# Patient Record
Sex: MALE | Race: WHITE | NOT HISPANIC OR LATINO | Employment: OTHER | URBAN - METROPOLITAN AREA
[De-identification: names, ages, dates, MRNs, and addresses within clinical notes are randomized per-mention and may not be internally consistent; named-entity substitution may affect disease eponyms.]

---

## 2017-10-05 ENCOUNTER — OFFICE VISIT (OUTPATIENT)
Dept: UROLOGY | Facility: CLINIC | Age: 52
End: 2017-10-05
Payer: COMMERCIAL

## 2017-10-05 VITALS
HEIGHT: 71 IN | BODY MASS INDEX: 26.85 KG/M2 | DIASTOLIC BLOOD PRESSURE: 63 MMHG | SYSTOLIC BLOOD PRESSURE: 131 MMHG | WEIGHT: 191.81 LBS | HEART RATE: 45 BPM

## 2017-10-05 DIAGNOSIS — N52.01 ERECTILE DYSFUNCTION DUE TO ARTERIAL INSUFFICIENCY: ICD-10-CM

## 2017-10-05 DIAGNOSIS — N40.1 BPH WITH URINARY OBSTRUCTION: ICD-10-CM

## 2017-10-05 DIAGNOSIS — E29.1 MALE HYPOGONADISM: Primary | ICD-10-CM

## 2017-10-05 DIAGNOSIS — N13.8 BPH WITH URINARY OBSTRUCTION: ICD-10-CM

## 2017-10-05 PROCEDURE — 99999 PR PBB SHADOW E&M-EST. PATIENT-LVL IV: CPT | Mod: PBBFAC,,, | Performed by: UROLOGY

## 2017-10-05 PROCEDURE — 99204 OFFICE O/P NEW MOD 45 MIN: CPT | Mod: S$GLB,,, | Performed by: UROLOGY

## 2017-10-05 RX ORDER — PAPAVERINE HYDROCHLORIDE 30 MG/ML
INJECTION INTRAMUSCULAR; INTRAVENOUS
Qty: 5 ML | Refills: 0 | Status: SHIPPED | OUTPATIENT
Start: 2017-10-05 | End: 2017-10-12

## 2017-10-05 NOTE — PROGRESS NOTES
CHIEF COMPLAINT:    Mr. Riggs is a 52 y.o. male presenting with hypogonadism and ED.    PRESENTING ILLNESS:    Nubia Riggs is a 52 y.o. male with a history of hypogonadism treated by Gildardo Andre with clomid at first.  However, his symptoms didn't improve, and he was then transitioned to IM TRT and HCG.  I have no records of what his T was before starting treatment.  His last shot was 4 days ago.  While on TRT he has more energy.    He also c/o ED.  This has been present for > 1 year.  He's tried and failed oral meds.  He's currently using ICI with good results.  However, he doesn't know the dose of ICI.  He'd like a refill.    He has urinary frequency.  No other LUTS.  It's not bothersome.  No hematuria.  No dysuria.    REVIEW OF SYSTEMS:    Nubia Riggs denies any history of headache, blurred vision, fever, nausea, vomiting, chills, abdominal pain, bleeding per rectum, cough, SOB, recent loss of consciousness, recent mental status changes, seizures, dizziness, or upper or lower extremity weakness.    SHAHZAD  1. 1  2. 1  3. 1  4. 2  5. 2      PATIENT HISTORY:    Past Medical History:   Diagnosis Date    ADD (attention deficit disorder)     Anemia     GERD (gastroesophageal reflux disease)        Past Surgical History:   Procedure Laterality Date    CERVICAL SPINE SURGERY      facit repair    CHOLECYSTECTOMY      implant      chin    LIPOSUCTION      waiste    RHINOPLASTY TIP      SPINE SURGERY      l5 s1 micro discectomy    SPINE SURGERY      l5 s1 rodes placed    TONSILLECTOMY         Family History   Problem Relation Age of Onset    Cancer Father     Cancer Paternal Grandmother     Heart disease Paternal Grandfather     Diabetes Neg Hx     Colon polyps Neg Hx        Social History     Social History    Marital status:      Spouse name: N/A    Number of children: 4    Years of education: N/A     Occupational History    Contractor [employer]      Social History  Main Topics    Smoking status: Former Smoker     Packs/day: 1.00     Years: 15.00     Quit date: 4/1/2012    Smokeless tobacco: Never Used    Alcohol use No    Drug use: No    Sexual activity: Not on file     Other Topics Concern    Not on file     Social History Narrative    No narrative on file       Allergies:  Meperidine and Sulfa (sulfonamide antibiotics)    Medications:    Current Outpatient Prescriptions:     albuterol (VENTOLIN HFA) 90 mcg/actuation HFAA, Inhale 2 puffs into the lungs every 4 (four) hours as needed., Disp: 1 Inhaler, Rfl: 6    alprazolam (XANAX) 0.25 MG tablet, Take 1 tablet (0.25 mg total) by mouth daily as needed., Disp: 30 tablet, Rfl: 3    ascorbic acid-multivit-min (VITAMIN C ENERGY BOOSTER) 1,000 mg PwEP, Take 1 Package by mouth once daily.  , Disp: , Rfl:     b complex vitamins capsule, Take 1 capsule by mouth once daily.  , Disp: , Rfl:     coQ10, ubiquinol, 100 mg Cap, Take 1 capsule by mouth once daily.  , Disp: , Rfl:     diphenhydrAMINE (BENADRYL) 25 mg capsule, Take 25 mg by mouth every 6 (six) hours as needed for Itching., Disp: , Rfl:     montelukast (SINGULAIR) 10 mg tablet, Take 10 mg by mouth daily as needed., Disp: , Rfl:     papaverine 30 mg/mL injection, Add Phentolamine 1 mg/cc Add PGE1 10 mcg/cc  SIG: Bring to office for test dose in physician office, Disp: 5 mL, Rfl: 0    zolpidem (AMBIEN) 10 mg Tab, Take 1 tablet (10 mg total) by mouth nightly as needed., Disp: 30 tablet, Rfl: 3    PHYSICAL EXAMINATION:    The patient generally appears in good health, is appropriately interactive, and is in no apparent distress.     Eyes: anicteric sclerae, moist conjunctivae; no lid-lag; PERRLA     HENT: Atraumatic; oropharynx clear with moist mucous membranes and no mucosal ulcerations;normal hard and soft palate.  No evidence of lymphadenopathy.    Neck: Trachea midline.  No thyromegaly.    Musculoskeletal: No abnormal gait.    Skin: No lesions.    Mental:  Cooperative with normal affect.  Is oriented to time, place, and person.    Neuro: Grossly intact.    Chest: Normal inspiratory effort.   No accessory muscles.  No audible wheezes.  Respirations symmetric on inspiration and expiration.    Heart: Regular rhythm.      Abdomen:  Soft, non-tender. No masses or organomegaly. Bladder is not palpable. No evidence of flank discomfort. No evidence of inguinal hernia.    Genitourinary: The penis is circumcised with no evidence of plaques or induration. The urethral meatus is normal. The testes, epididymides, and cord structures are normal in size and contour bilaterally. The scrotum is normal in size and contour.    Normal anal sphincter tone. No rectal mass.    The prostate is 30 g. Normal landmarks. Lateral sulci. Median furrow intact.  No nodularity or induration. Seminal vesicles are normal.    Extremities: No clubbing, cyanosis, or edema      LABS:      Lab Results   Component Value Date    PSA 0.17 05/25/2012    PSA 0.23 03/23/2011    PSA 0.48 08/26/2010       IMPRESSION:    Encounter Diagnoses   Name Primary?    Male hypogonadism Yes    Erectile dysfunction due to arterial insufficiency     BPH with urinary obstruction          PLAN:    1. Discussed that TRT with HCG is not traditional.  Recommend he stop this.  Also discussed that I do not do IM injections.  Recommend topical vs testopel.  He'd like to think about this.    2. Will check a T in 2 weeks to establish a baseline.  No TRT until then.  3. For the ED will use ICI. Side effects discussed.  A new Rx was given.  Will set up for teaching.  4. RTC 3 weeks to discuss his form of T replacement.    Copy to:

## 2017-10-05 NOTE — PATIENT INSTRUCTIONS
Testosterone Implant (Testopel)  What is this medicine?  TESTOSTERONE (carmel TOS ter one) is the main male hormone. It supports normal male traits such as muscle growth, facial hair, and deep voice. This medicine is used in males to treat low testosterone levels.  This medicine may be used for other purposes; ask your health care provider or pharmacist if you have questions.  What should I tell my health care provider before I take this medicine?  They need to know if you have any of these conditions:  · breast cancer  · diabetes  · heart disease  · kidney disease  · liver disease  · lung disease  · prostate cancer, enlargement  · an unusual or allergic reaction to testosterone, other medicines, foods, dyes, or preservatives  · this drug is not for use in females  How should I use this medicine?  This medicine will be inserted under your skin by your doctor or health care professional.  Contact your pediatrician or health care professional regarding the use of this medicine in children. While this medicine may be prescribed for children for selected conditions, precautions do apply.  Overdosage: If you think you have taken too much of this medicine contact a poison control center or emergency room at once.  NOTE: This medicine is only for you. Do not share this medicine with others.  What if I miss a dose?  Try not to miss a dose. Your doctor or health care professional will tell you when your next dose is due. Notify the office if you are unable to keep an appointment.  What may interact with this medicine?  · medicines for diabetes  · medicines that treat or prevent blood clots like warfarin  · oxyphenbutazone  · propranolol  · steroid medicines like prednisone or cortisone  This list may not describe all possible interactions. Give your health care provider a list of all the medicines, herbs, non-prescription drugs, or dietary supplements you use. Also tell them if you smoke, drink alcohol, or use illegal drugs.  Some items may interact with your medicine.  What should I watch for while using this medicine?  Visit your doctor or health care professional for regular checks on your progress. They will need to check the level of testosterone in your blood.  This medicine may affect blood sugar levels. If you have diabetes, check with your doctor or health care professional before you change your diet or the dose of your diabetic medicine.  Call your doctor or health care professional if you notice a change in the way this medicine is working. It is possible that the pellets may accidentally fall out.  This drug is banned from use in athletes by most athletic organizations.  What side effects may I notice from receiving this medicine?  Side effects that you should report to your doctor or health care professional as soon as possible:  · allergic reactions like skin rash, itching or hives, swelling of the face, lips, or tongue  · Infection  · breast enlargement  · breathing problems  · changes in mood, especially anger, depression, or rage  · dark urine  · general ill feeling or flu-like symptoms  · light-colored stools  · loss of appetite, nausea  · nausea, vomiting  · right upper belly pain  · stomach pain  · swelling of ankles  · too frequent or persistent erections  · trouble passing urine or change in the amount of urine  · unusually weak or tired  · yellowing of eyes or skin  Side effects that usually do not require medical attention (report to your doctor or health care professional if they continue or are bothersome):  · acne  · change in sex drive or performance  · hair loss  · headache  This list may not describe all possible side effects. Call your doctor for medical advice about side effects. You may report side effects to FDA at 3-211-KOT-6179.  Where should I keep my medicine?  This drug will be inserted under your skin by your doctor. It will not be stored at home.  NOTE:This sheet is a summary. It may not cover  all possible information. If you have questions about this medicine, talk to your doctor, pharmacist, or health care provider. Copyright© 2011 [employer]

## 2017-10-12 ENCOUNTER — OFFICE VISIT (OUTPATIENT)
Dept: UROLOGY | Facility: CLINIC | Age: 52
End: 2017-10-12
Payer: COMMERCIAL

## 2017-10-12 VITALS
WEIGHT: 191 LBS | HEIGHT: 70 IN | HEART RATE: 66 BPM | DIASTOLIC BLOOD PRESSURE: 66 MMHG | SYSTOLIC BLOOD PRESSURE: 120 MMHG | BODY MASS INDEX: 27.35 KG/M2 | TEMPERATURE: 98 F

## 2017-10-12 DIAGNOSIS — E29.1 MALE HYPOGONADISM: Primary | ICD-10-CM

## 2017-10-12 DIAGNOSIS — N52.01 ERECTILE DYSFUNCTION DUE TO ARTERIAL INSUFFICIENCY: ICD-10-CM

## 2017-10-12 PROCEDURE — 99214 OFFICE O/P EST MOD 30 MIN: CPT | Mod: S$GLB,,, | Performed by: NURSE PRACTITIONER

## 2017-10-12 PROCEDURE — 99999 PR PBB SHADOW E&M-EST. PATIENT-LVL III: CPT | Mod: PBBFAC,,, | Performed by: NURSE PRACTITIONER

## 2017-10-12 RX ORDER — PAPAVERINE HYDROCHLORIDE 30 MG/ML
INJECTION INTRAMUSCULAR; INTRAVENOUS
Qty: 10 ML | Refills: 11 | Status: SHIPPED | OUTPATIENT
Start: 2017-10-12 | End: 2017-10-12 | Stop reason: SDUPTHER

## 2017-10-12 RX ORDER — PAPAVERINE HYDROCHLORIDE 30 MG/ML
INJECTION INTRAMUSCULAR; INTRAVENOUS
Qty: 10 ML | Refills: 11 | Status: SHIPPED | OUTPATIENT
Start: 2017-10-12 | End: 2018-07-16 | Stop reason: SDUPTHER

## 2017-10-12 NOTE — PROGRESS NOTES
Subjective:       Patient ID: Nubia Riggs is a 52 y.o. male.    Chief Complaint: Erectile Dysfunction (PEP instructions)    Nubia Riggs is a 52 y.o. male with a history of hypogonadism treated by Gildardo Andre with clomid at first.    However, his symptoms didn't improve, and he was then transitioned to IM TRT and HCG.   He was seen in clinic with Dr. Salgado 10/05/2017.  He had no records of what his T was before starting treatment.   His last shot was 4 days ago.  While on TRT he has more energy.     He also c/o ED.    This has been present for > 1 year.    He's tried and failed oral meds.    He's currently using ICI with good results.    He'd like a refill.    He is here today for ICI education and reeducation.  He brought in his own medication.    He reports using ~80 units in the past with good results.  No history of priapisms.  He gets AM erections, but unable to maintain.          Past Medical History:  No date: ADD (attention deficit disorder)  No date: Anemia  No date: GERD (gastroesophageal reflux disease)    Past Surgical History:  No date: CERVICAL SPINE SURGERY      Comment: facit repair  No date: CHOLECYSTECTOMY  No date: implant      Comment: chin  No date: LIPOSUCTION      Comment: waiste  No date: RHINOPLASTY TIP  No date: SPINE SURGERY      Comment: l5 s1 micro discectomy  No date: SPINE SURGERY      Comment: l5 s1 rodes placed  No date: TONSILLECTOMY    Review of patient's family history indicates:    Cancer                         Father                    Cancer                         Paternal Grandmother      Heart disease                  Paternal Grandfather      Diabetes                       Neg Hx                    Colon polyps                   Neg Hx                      Social History    Marital status:             Spouse name:                       Years of education:                 Number of children: 4             Occupational History  Occupation           Employer            Comment               Contractor          [employer]     Social History Main Topics    Smoking status: Former Smoker                                                                Packs/day: 1.00      Years: 15.00          Quit date: 4/1/2012    Smokeless tobacco: Never Used                        Alcohol use: No              Drug use: No              Sexual activity: Not on file          Other Topics            Concern    None on file    Social History Narrative    None on file        Allergies:  Meperidine and Sulfa (sulfonamide antibiotics)    Medications:  Current Outpatient Prescriptions:   albuterol (VENTOLIN HFA) 90 mcg/actuation HFAA, Inhale 2 puffs into the lungs every 4 (four) hours as needed., Disp: 1 Inhaler, Rfl: 6  alprazolam (XANAX) 0.25 MG tablet, Take 1 tablet (0.25 mg total) by mouth daily as needed., Disp: 30 tablet, Rfl: 3  ascorbic acid-multivit-min (VITAMIN C ENERGY BOOSTER) 1,000 mg PwEP, Take 1 Package by mouth once daily.  , Disp: , Rfl:   b complex vitamins capsule, Take 1 capsule by mouth once daily.  , Disp: , Rfl:   coQ10, ubiquinol, 100 mg Cap, Take 1 capsule by mouth once daily.  , Disp: , Rfl:   diphenhydrAMINE (BENADRYL) 25 mg capsule, Take 25 mg by mouth every 6 (six) hours as needed for Itching., Disp: , Rfl:   montelukast (SINGULAIR) 10 mg tablet, Take 10 mg by mouth daily as needed., Disp: , Rfl:    zolpidem (AMBIEN) 10 mg Tab, Take 1 tablet (10 mg total) by mouth nightly as needed., Disp: 30 tablet, Rfl: 3                    Review of Systems   Constitutional: Negative.  Negative for activity change, appetite change, fatigue and fever.   HENT: Negative.  Negative for congestion, ear pain, facial swelling, mouth sores, nosebleeds, postnasal drip, rhinorrhea, sinus pressure, sore throat and trouble swallowing.    Eyes: Negative.  Negative for photophobia, redness, itching and visual disturbance.   Respiratory: Negative.  Negative for cough,  chest tightness, shortness of breath and wheezing.    Cardiovascular: Negative.  Negative for chest pain and palpitations.   Gastrointestinal: Negative.  Negative for abdominal pain, constipation, diarrhea, nausea and vomiting.   Genitourinary: Negative for difficulty urinating, dysuria, enuresis, flank pain, frequency, genital sores, hematuria, nocturia, penile pain, scrotal swelling, testicular pain and urgency.        He has urinary frequency.  No other LUTS.  It's not bothersome.  No hematuria.  No dysuria.   Musculoskeletal: Negative.  Negative for back pain, gait problem, joint swelling, myalgias and neck stiffness.   Skin: Negative.  Negative for rash and wound.   Neurological: Negative.  Negative for dizziness, tremors, seizures, syncope, speech difficulty, weakness, light-headedness and headaches.   Hematological: Does not bruise/bleed easily.   Psychiatric/Behavioral: Negative.  Negative for agitation, confusion, decreased concentration, hallucinations, self-injury, sleep disturbance and suicidal ideas. The patient is not nervous/anxious.        Objective:      Physical Exam   Nursing note and vitals reviewed.  Constitutional: He is oriented to person, place, and time. Vital signs are normal. He appears well-developed and well-nourished. He is active and cooperative.  Non-toxic appearance. He does not have a sickly appearance.   HENT:   Head: Normocephalic and atraumatic.   Right Ear: External ear normal.   Left Ear: External ear normal.   Nose: Nose normal.   Mouth/Throat: Mucous membranes are normal.   Eyes: Conjunctivae and lids are normal. No scleral icterus.   Neck: Trachea normal, normal range of motion and full passive range of motion without pain. Neck supple. No JVD present. No tracheal deviation present.   Cardiovascular: Normal rate, regular rhythm, S1 normal and S2 normal.    Pulmonary/Chest: Effort normal and breath sounds normal. No respiratory distress. He exhibits no tenderness.    Abdominal: Soft. Normal appearance and bowel sounds are normal. There is no hepatosplenomegaly. There is no tenderness. There is no rebound, no guarding and no CVA tenderness.   Genitourinary: Testes normal and penis normal. Right testis shows no mass, no swelling and no tenderness. Left testis shows no mass, no swelling and no tenderness. Circumcised. No hypospadias, penile erythema or penile tenderness. No discharge found.   Musculoskeletal: Normal range of motion.   Lymphadenopathy: No inguinal adenopathy noted on the right or left side.   Neurological: He is alert and oriented to person, place, and time. He has normal strength.   Skin: Skin is warm, dry and intact.     Psychiatric: He has a normal mood and affect. His behavior is normal. Judgment and thought content normal.       Assessment:       1. Male hypogonadism    2. Erectile dysfunction due to arterial insufficiency        Plan:         I spent 30 minutes with the patient of which more than half was spent in direct consultation with the patient in regards to our treatment and plan.    Education and recommendations of today's plan of care including home remedies.  We discussed ED and the contributing factors. We reviewed his personal factors that contribute to ED. Patient was educated on ED treatments. We discussed PDE-5 inhibitors, SEN, Muse, and IPP.    He is here today for the Intracorporal injection/ICI education and first injection.  Educational materials were supplied.    ICI is an injectable medication that consists of three different medications to produce an erection. It is known as a Trimix Compound or PEP. The medication is a compound medication and is only mixed up at certain pharmacies known as a compound pharmacy. The medication has to be stored in the refrigerator. Improper storage decreases the effectiveness of the medication.     He was educated that it is an injection. With any injection there is risk for possible pain at the injection  site as well as risk for an infection. Clean technique must be followed to help prevent infection. Proper needle disposable is necessary. He voices understanding.    The injection is best given when standing up and the penis is positioned perpendicular to the body. The urethral opening should be facing away from the body. Injection is always given on the lateral or side of the penis. Never give the injection to the top of the penis to avoid possible trauma to arteries and veins. Never give the injection to the bottom of the penis to avoid trauma to the urethra. He should alternate the injection sites to avoid the build up of scar tissue due to multiple injections.    This medication can increase the risk of erections lasting longer than 4 hours. This is known as a priapism and is a medical emergency. This requires immediate medical attention. This is main reason we give the first dose in the office today. We start at low dose and see how well the patients reacts. We can increase the medication if needed depending on the reaction the patient receives today. We discussed the fine line between enough medication for penetration and too much leading to a priapism. He voices understanding.    He bibi up 15 units and he injected him in the left lateral aspect of the penis. Within 15 minutes, he achieved an erection firm enough for intercourse but would like it firmer. He was pleased with low dose results.   We discussed risks for rapidly increasing. He is a long term user of ICI and knows dose titration.    He was instructed to keep the dosage at 15-20 units. If noticing a decrease in reaction he can increase the dosage by 5 units or 0.05ml. He may also refill the Rx.     If have any questions, please give me a call. Educational materials were given to patient and all questions were answered. He voiced understanding and left the office without a priapism.

## 2017-10-19 ENCOUNTER — TELEPHONE (OUTPATIENT)
Dept: INTERNAL MEDICINE | Facility: CLINIC | Age: 52
End: 2017-10-19

## 2017-10-19 NOTE — TELEPHONE ENCOUNTER
----- Message from Ximena Hinson sent at 10/18/2017  4:43 PM CDT -----  Contact: Self  Pt is calling to be scheduled for an appt.  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 Jorge Navarro.   unable to make the appt due to an exhausted template.    He can be reached at [phone].    Thank you.

## 2017-11-21 ENCOUNTER — OFFICE VISIT (OUTPATIENT)
Dept: UROLOGY | Facility: CLINIC | Age: 52
End: 2017-11-21
Payer: COMMERCIAL

## 2017-11-21 VITALS
HEART RATE: 59 BPM | HEIGHT: 70 IN | DIASTOLIC BLOOD PRESSURE: 75 MMHG | SYSTOLIC BLOOD PRESSURE: 132 MMHG | BODY MASS INDEX: 28.09 KG/M2 | WEIGHT: 196.19 LBS

## 2017-11-21 DIAGNOSIS — N52.01 ERECTILE DYSFUNCTION DUE TO ARTERIAL INSUFFICIENCY: ICD-10-CM

## 2017-11-21 DIAGNOSIS — N48.89 PENILE PAIN: Primary | ICD-10-CM

## 2017-11-21 PROCEDURE — 99214 OFFICE O/P EST MOD 30 MIN: CPT | Mod: S$GLB,,, | Performed by: NURSE PRACTITIONER

## 2017-11-21 PROCEDURE — 99999 PR PBB SHADOW E&M-EST. PATIENT-LVL III: CPT | Mod: PBBFAC,,, | Performed by: NURSE PRACTITIONER

## 2017-11-21 NOTE — PROGRESS NOTES
Subjective:       Patient ID: Nubia Riggs is a 52 y.o. male.    Chief Complaint: Penis Pain    Nubia Riggs is a 52 y.o. male with a history of hypogonadism treated by Gildardo Andre with clomid at first.    However, his symptoms didn't improve, and he was then transitioned to IM TRT and HCG.   He was seen in clinic with Dr. Salgado 10/05/2017.  He had no records of what his T was before starting treatment.   His last shot was 4 days ago.  While on TRT he has more energy.     He also c/o ED.    This has been present for > 1 year.    He's tried and failed oral meds.    He's currently using ICI with good results.    He'd like a refill.    He was last seen in clinic with me 10/12/2017 for ICI education and reeducation.  He brought in his own medication.    He reports using ~80 units in the past with good results.  No history of priapisms.  He gets AM erections, but unable to maintain.  He now successfully using 25 units.  Here today because he felt a sting on side of penis.  He concerned injections would worsen it.  He denies painful erection, curvature or urinary problems.    He has been thinking about IPP so has been doing some stretching exercises due to loss of length with IPP surgery.              Past Medical History:  No date: ADD (attention deficit disorder)  No date: Anemia  No date: GERD (gastroesophageal reflux disease)    Past Surgical History:  No date: CERVICAL SPINE SURGERY      Comment: facit repair  No date: CHOLECYSTECTOMY  No date: implant      Comment: chin  No date: LIPOSUCTION      Comment: waiste  No date: RHINOPLASTY TIP  No date: SPINE SURGERY      Comment: l5 s1 micro discectomy  No date: SPINE SURGERY      Comment: l5 s1 rodes placed  No date: TONSILLECTOMY    Review of patient's family history indicates:    Cancer                         Father                    Cancer                         Paternal Grandmother      Heart disease                  Paternal Grandfather      Diabetes                        Neg Hx                    Colon polyps                   Neg Hx                      Social History    Marital status:             Spouse name:                       Years of education:                 Number of children: 4             Occupational History  Occupation          Employer            Comment               Contractor          [employer]     Social History Main Topics    Smoking status: Former Smoker                                                                Packs/day: 1.00      Years: 15.00          Quit date: 4/1/2012    Smokeless tobacco: Never Used                        Alcohol use: No              Drug use: No              Sexual activity: Not on file          Other Topics            Concern    None on file    Social History Narrative    None on file        Allergies:  Meperidine and Sulfa (sulfonamide antibiotics)    Medications:  Current Outpatient Prescriptions:   albuterol (VENTOLIN HFA) 90 mcg/actuation HFAA, Inhale 2 puffs into the lungs every 4 (four) hours as needed., Disp: 1 Inhaler, Rfl: 6  alprazolam (XANAX) 0.25 MG tablet, Take 1 tablet (0.25 mg total) by mouth daily as needed., Disp: 30 tablet, Rfl: 3  ascorbic acid-multivit-min (VITAMIN C ENERGY BOOSTER) 1,000 mg PwEP, Take 1 Package by mouth once daily.  , Disp: , Rfl:   b complex vitamins capsule, Take 1 capsule by mouth once daily.  , Disp: , Rfl:   coQ10, ubiquinol, 100 mg Cap, Take 1 capsule by mouth once daily.  , Disp: , Rfl:   diphenhydrAMINE (BENADRYL) 25 mg capsule, Take 25 mg by mouth every 6 (six) hours as needed for Itching., Disp: , Rfl:   montelukast (SINGULAIR) 10 mg tablet, Take 10 mg by mouth daily as needed., Disp: , Rfl:    zolpidem (AMBIEN) 10 mg Tab, Take 1 tablet (10 mg total) by mouth nightly as needed., Disp: 30 tablet, Rfl: 3                    Review of Systems   Constitutional: Negative.  Negative for activity change, appetite change and fever.   HENT:  Negative.  Negative for facial swelling and trouble swallowing.    Eyes: Negative.    Respiratory: Negative.  Negative for shortness of breath.    Cardiovascular: Negative.  Negative for chest pain and palpitations.   Gastrointestinal: Negative for abdominal pain, constipation, diarrhea, nausea and vomiting.   Genitourinary: Positive for penile pain. Negative for difficulty urinating, discharge, dysuria, enuresis, flank pain, frequency, genital sores, hematuria, nocturia, penile swelling, scrotal swelling, testicular pain and urgency.        Ok with how he urinates.  Pleased with results with ICI  Pain/sting does not occur with every erection       Musculoskeletal: Negative for back pain, gait problem and neck stiffness.   Skin: Negative.  Negative for wound.   Neurological: Negative for dizziness, tremors, seizures, syncope, speech difficulty, light-headedness and headaches.   Hematological: Does not bruise/bleed easily.   Psychiatric/Behavioral: Negative for confusion and hallucinations. The patient is not nervous/anxious.        Objective:      Physical Exam   Nursing note and vitals reviewed.  Constitutional: He is oriented to person, place, and time. He appears well-developed and well-nourished.  Non-toxic appearance. He does not have a sickly appearance.   HENT:   Head: Normocephalic and atraumatic.   Right Ear: External ear normal.   Left Ear: External ear normal.   Nose: Nose normal.   Mouth/Throat: Mucous membranes are normal.   Eyes: Conjunctivae and lids are normal. No scleral icterus.   Neck: Trachea normal, normal range of motion and full passive range of motion without pain. Neck supple. No JVD present. No tracheal deviation present.   Cardiovascular: Normal rate, regular rhythm, S1 normal and S2 normal.    Pulmonary/Chest: Effort normal and breath sounds normal. No respiratory distress. He exhibits no tenderness.   Abdominal: Soft. Normal appearance and bowel sounds are normal. There is no  hepatosplenomegaly. There is no tenderness. There is no rebound, no guarding and no CVA tenderness.   Musculoskeletal: Normal range of motion.   Neurological: He is alert and oriented to person, place, and time. He has normal strength.   Skin: Skin is warm, dry and intact.     Psychiatric: He has a normal mood and affect. His behavior is normal. Judgment and thought content normal.       Assessment:       1. Penile pain    2. Erectile dysfunction due to arterial insufficiency        Plan:         I spent 25 minutes with the patient of which more than half was spent in direct consultation with the patient in regards to our treatment and plan.    Education and recommendations of today's plan of care including home remedies.  We discussed expectations with ICI; risks discussed  Avoid that area with injections.  Reassurance no penile fx.  Discussed IPP.   Not sure of the success with stretching exercises  Continue ICI

## 2018-03-08 ENCOUNTER — TELEPHONE (OUTPATIENT)
Dept: CARDIOLOGY | Facility: CLINIC | Age: 53
End: 2018-03-08

## 2018-03-08 ENCOUNTER — OFFICE VISIT (OUTPATIENT)
Dept: CARDIOLOGY | Facility: CLINIC | Age: 53
End: 2018-03-08
Attending: INTERNAL MEDICINE
Payer: COMMERCIAL

## 2018-03-08 VITALS
HEART RATE: 58 BPM | SYSTOLIC BLOOD PRESSURE: 126 MMHG | DIASTOLIC BLOOD PRESSURE: 68 MMHG | WEIGHT: 191 LBS | BODY MASS INDEX: 27.35 KG/M2 | HEIGHT: 70 IN

## 2018-03-08 DIAGNOSIS — R00.1 BRADYCARDIA: Primary | ICD-10-CM

## 2018-03-08 DIAGNOSIS — R00.1 BRADYCARDIA: ICD-10-CM

## 2018-03-08 PROCEDURE — 99203 OFFICE O/P NEW LOW 30 MIN: CPT | Mod: 25,S$GLB,, | Performed by: INTERNAL MEDICINE

## 2018-03-08 PROCEDURE — 93000 ELECTROCARDIOGRAM COMPLETE: CPT | Mod: S$GLB,,, | Performed by: INTERNAL MEDICINE

## 2018-03-08 RX ORDER — ANASTROZOLE 1 MG/1
TABLET ORAL
COMMUNITY
Start: 2018-02-22

## 2018-03-08 RX ORDER — TESTOSTERONE CYPIONATE 200 MG/ML
INJECTION, SOLUTION INTRAMUSCULAR
COMMUNITY
Start: 2017-12-19

## 2018-03-08 RX ORDER — MINOXIDIL 2.5 MG/1
TABLET ORAL
COMMUNITY
Start: 2018-02-19

## 2018-03-08 NOTE — PROGRESS NOTES
Subjective:     Nubia Riggs is a 52 y.o. male with no risk factors for vascular disease. He has issues with erectile dysfunction. He exercises a lot. He has noted that his heart rate has been on the low side for years. No palpitations or weak spells. Feeling well overall.    HPI    Review of Systems   Constitution: Negative for chills, fever, weakness and malaise/fatigue.   HENT: Negative for nosebleeds.    Eyes: Negative for double vision, vision loss in left eye and vision loss in right eye.   Cardiovascular: Positive for chest pain. Negative for claudication, dyspnea on exertion, irregular heartbeat, leg swelling, near-syncope, orthopnea, palpitations, paroxysmal nocturnal dyspnea and syncope.   Respiratory: Negative for cough, hemoptysis, shortness of breath and wheezing.    Endocrine: Negative for cold intolerance and heat intolerance.   Hematologic/Lymphatic: Negative for bleeding problem. Does not bruise/bleed easily.   Skin: Negative for color change and rash.   Musculoskeletal: Negative for back pain, falls, muscle weakness and myalgias.   Gastrointestinal: Negative for heartburn, hematemesis, hematochezia, hemorrhoids, jaundice, melena, nausea and vomiting.   Genitourinary: Negative for dysuria and hematuria.   Neurological: Negative for dizziness, focal weakness, headaches, light-headedness, loss of balance, numbness and vertigo.   Psychiatric/Behavioral: Negative for altered mental status, depression and memory loss. The patient is not nervous/anxious.    Allergic/Immunologic: Negative for hives and persistent infections.       Current Outpatient Prescriptions on File Prior to Visit   Medication Sig Dispense Refill    ascorbic acid-multivit-min (VITAMIN C ENERGY BOOSTER) 1,000 mg PwEP Take 1 Package by mouth once daily.        b complex vitamins capsule Take 1 capsule by mouth once daily.        coQ10, ubiquinol, 100 mg Cap Take 1 capsule by mouth once daily.        diphenhydrAMINE (BENADRYL)  "25 mg capsule Take 25 mg by mouth every 6 (six) hours as needed for Itching.      montelukast (SINGULAIR) 10 mg tablet Take 10 mg by mouth daily as needed.      papaverine 30 mg/mL injection Add: Phentolamine 10 mg  Add: PGE1 100 mcg    Sig:  Inject 20 units (0.20 mls) as directed 10 mL 11    [DISCONTINUED] albuterol (VENTOLIN HFA) 90 mcg/actuation HFAA Inhale 2 puffs into the lungs every 4 (four) hours as needed. 1 Inhaler 6    [DISCONTINUED] alprazolam (XANAX) 0.25 MG tablet Take 1 tablet (0.25 mg total) by mouth daily as needed. 30 tablet 3    [DISCONTINUED] zolpidem (AMBIEN) 10 mg Tab Take 1 tablet (10 mg total) by mouth nightly as needed. 30 tablet 3     No current facility-administered medications on file prior to visit.        /68   Pulse (!) 58   Ht 5' 10" (1.778 m)   Wt 86.6 kg (191 lb)   BMI 27.41 kg/m²       Objective:     Physical Exam   Constitutional: He is oriented to person, place, and time. He appears well-developed and well-nourished.  Non-toxic appearance. No distress.   HENT:   Head: Normocephalic and atraumatic.   Nose: Nose normal.   Eyes: Right eye exhibits no discharge. Left eye exhibits no discharge. Right conjunctiva is not injected. Left conjunctiva is not injected. Right pupil is round. Left pupil is round. Pupils are equal.   Neck: Neck supple. No JVD present. Carotid bruit is not present. No thyromegaly present.   Cardiovascular: Regular rhythm, S1 normal and S2 normal.   No extrasystoles are present. Bradycardia present.  PMI is not displaced.  Exam reveals no gallop.    Pulses:       Radial pulses are 2+ on the right side, and 2+ on the left side.        Femoral pulses are 2+ on the right side, and 2+ on the left side.       Dorsalis pedis pulses are 2+ on the right side, and 2+ on the left side.        Posterior tibial pulses are 2+ on the right side, and 2+ on the left side.   Pulmonary/Chest: Effort normal and breath sounds normal.   Abdominal: Soft. Normal " appearance. There is no hepatosplenomegaly. There is no tenderness.   Musculoskeletal:        Right ankle: He exhibits no swelling, no ecchymosis and no deformity.        Left ankle: He exhibits no swelling, no ecchymosis and no deformity.   Lymphadenopathy:        Head (right side): No submandibular adenopathy present.        Head (left side): No submandibular adenopathy present.     He has no cervical adenopathy.   Neurological: He is alert and oriented to person, place, and time. He is not disoriented. No cranial nerve deficit.   Skin: Skin is warm, dry and intact. No rash noted. He is not diaphoretic. No cyanosis. Nails show no clubbing.   Psychiatric: He has a normal mood and affect. His speech is normal and behavior is normal. Judgment and thought content normal. Cognition and memory are normal.       Assessment:     1. Bradycardia        Plan:     1. Bradycardia   3/8/2018: ECG: SB 46 bpm.    Appears physiologic as he exercises daily.   See no need for further cardiac testing.    F/u 1 year.    Pineda Rivera M.D.

## 2018-03-13 DIAGNOSIS — R00.1 BRADYCARDIA: ICD-10-CM

## 2018-05-25 ENCOUNTER — TELEPHONE (OUTPATIENT)
Dept: UROLOGY | Facility: CLINIC | Age: 53
End: 2018-05-25

## 2018-05-25 NOTE — TELEPHONE ENCOUNTER
Pt called and stated that he has a couple of questions about one of his medications, and would prefer that NP De La Vega give him a call back to discuss this. Please advise.     Pt can be reached at [phone]      Called patient no answer left message on voice mail

## 2018-07-16 ENCOUNTER — OFFICE VISIT (OUTPATIENT)
Dept: UROLOGY | Facility: CLINIC | Age: 53
End: 2018-07-16
Payer: COMMERCIAL

## 2018-07-16 VITALS
HEART RATE: 81 BPM | DIASTOLIC BLOOD PRESSURE: 75 MMHG | SYSTOLIC BLOOD PRESSURE: 138 MMHG | WEIGHT: 191.13 LBS | BODY MASS INDEX: 27.36 KG/M2 | HEIGHT: 70 IN

## 2018-07-16 DIAGNOSIS — E29.1 MALE HYPOGONADISM: ICD-10-CM

## 2018-07-16 DIAGNOSIS — N52.01 ERECTILE DYSFUNCTION DUE TO ARTERIAL INSUFFICIENCY: ICD-10-CM

## 2018-07-16 PROCEDURE — 99214 OFFICE O/P EST MOD 30 MIN: CPT | Mod: S$GLB,,, | Performed by: NURSE PRACTITIONER

## 2018-07-16 PROCEDURE — 99999 PR PBB SHADOW E&M-EST. PATIENT-LVL III: CPT | Mod: PBBFAC,,, | Performed by: NURSE PRACTITIONER

## 2018-07-16 PROCEDURE — 3008F BODY MASS INDEX DOCD: CPT | Mod: CPTII,S$GLB,, | Performed by: NURSE PRACTITIONER

## 2018-07-16 RX ORDER — PAPAVERINE HYDROCHLORIDE 30 MG/ML
INJECTION INTRAMUSCULAR; INTRAVENOUS
Qty: 10 ML | Refills: 11 | Status: SHIPPED | OUTPATIENT
Start: 2018-07-16 | End: 2018-07-16 | Stop reason: SDUPTHER

## 2018-07-16 RX ORDER — PAPAVERINE HYDROCHLORIDE 30 MG/ML
INJECTION INTRAMUSCULAR; INTRAVENOUS
Qty: 10 ML | Refills: 11 | Status: SHIPPED | OUTPATIENT
Start: 2018-07-16 | End: 2018-12-03 | Stop reason: SDUPTHER

## 2018-07-16 NOTE — PROGRESS NOTES
Subjective:       Patient ID: Nubia Riggs is a 53 y.o. male.    Chief Complaint: Follow-up and Erectile Dysfunction    Nubia Riggs is a 53 y.o. male with a history of hypogonadism treated by Gildardo Andre with clomid at first.    However, his symptoms didn't improve, and he was then transitioned to IM TRT and HCG.   He was seen in clinic with Dr. Salgado 10/05/2017.  He had no records of what his T was before starting treatment.   His last shot was 4 days ago.  While on TRT he has more energy.     He also c/o ED.    This has been present for > 1 year.    He's tried and failed oral meds.    He's currently using ICI with good results.    He'd like a refill.    He was seen in clinic with me 10/12/2017 for ICI education and reeducation.  He has been using ICI successfully;  A couple of times was not effective. He has been slowly increasing the dosage.  Now back up to 70 units.  Wants to discussed other options.  Discussed new ED tx on [facility].    He has been thinking about IPP so has been doing some stretching exercises due to loss of length with IPP surgery.              Past Medical History:  No date: ADD (attention deficit disorder)  No date: Anemia  No date: GERD (gastroesophageal reflux disease)    Past Surgical History:  No date: CERVICAL SPINE SURGERY      Comment: facit repair  No date: CHOLECYSTECTOMY  No date: implant      Comment: chin  No date: LIPOSUCTION      Comment: waiste  No date: RHINOPLASTY TIP  No date: SPINE SURGERY      Comment: l5 s1 micro discectomy  No date: SPINE SURGERY      Comment: l5 s1 rodes placed  No date: TONSILLECTOMY    Review of patient's family history indicates:    Cancer                         Father                    Cancer                         Paternal Grandmother      Heart disease                  Paternal Grandfather      Diabetes                       Neg Hx                    Colon polyps                   Neg Hx                       Social History    Marital status:             Spouse name:                       Years of education:                 Number of children: 4             Occupational History  Occupation          Employer            Comment               Contractor          [employer]     Social History Main Topics    Smoking status: Former Smoker                                                                Packs/day: 1.00      Years: 15.00          Quit date: 4/1/2012    Smokeless tobacco: Never Used                        Alcohol use: No              Drug use: No              Sexual activity: Not on file          Other Topics            Concern    None on file    Social History Narrative    None on file        Allergies:  Meperidine and Sulfa (sulfonamide antibiotics)    Medications:  Current Outpatient Prescriptions:   albuterol (VENTOLIN HFA) 90 mcg/actuation HFAA, Inhale 2 puffs into the lungs every 4 (four) hours as needed., Disp: 1 Inhaler, Rfl: 6  alprazolam (XANAX) 0.25 MG tablet, Take 1 tablet (0.25 mg total) by mouth daily as needed., Disp: 30 tablet, Rfl: 3  ascorbic acid-multivit-min (VITAMIN C ENERGY BOOSTER) 1,000 mg PwEP, Take 1 Package by mouth once daily.  , Disp: , Rfl:   b complex vitamins capsule, Take 1 capsule by mouth once daily.  , Disp: , Rfl:   coQ10, ubiquinol, 100 mg Cap, Take 1 capsule by mouth once daily.  , Disp: , Rfl:   diphenhydrAMINE (BENADRYL) 25 mg capsule, Take 25 mg by mouth every 6 (six) hours as needed for Itching., Disp: , Rfl:   montelukast (SINGULAIR) 10 mg tablet, Take 10 mg by mouth daily as needed., Disp: , Rfl:    zolpidem (AMBIEN) 10 mg Tab, Take 1 tablet (10 mg total) by mouth nightly as needed., Disp: 30 tablet, Rfl: 3                    Review of Systems   Constitutional: Negative for activity change, appetite change, chills and fever.   HENT: Negative for facial swelling and trouble swallowing.    Eyes: Negative for visual disturbance.   Respiratory:  Negative for chest tightness and shortness of breath.    Cardiovascular: Negative for chest pain and palpitations.   Gastrointestinal: Negative.  Negative for abdominal pain, constipation, diarrhea, nausea and vomiting.   Genitourinary: Negative for difficulty urinating, dysuria, flank pain, hematuria, penile pain, penile swelling, scrotal swelling and testicular pain.   Musculoskeletal: Negative for back pain, gait problem, myalgias and neck stiffness.   Skin: Negative for rash.   Neurological: Negative for dizziness and speech difficulty.   Hematological: Does not bruise/bleed easily.   Psychiatric/Behavioral: Negative for behavioral problems.       Objective:      Physical Exam   Nursing note and vitals reviewed.  Constitutional: He is oriented to person, place, and time. Vital signs are normal. He appears well-developed and well-nourished. He is active and cooperative.  Non-toxic appearance. He does not have a sickly appearance.   HENT:   Head: Normocephalic and atraumatic.   Right Ear: External ear normal.   Left Ear: External ear normal.   Nose: Nose normal.   Mouth/Throat: Mucous membranes are normal.   Eyes: Conjunctivae and lids are normal. No scleral icterus.   Neck: Trachea normal, normal range of motion and full passive range of motion without pain. Neck supple. No JVD present. No tracheal deviation present.   Cardiovascular: Normal rate, S1 normal and S2 normal.    Pulmonary/Chest: Effort normal. No respiratory distress. He exhibits no tenderness.   Abdominal: Soft. Normal appearance and bowel sounds are normal. There is no hepatosplenomegaly. There is no tenderness. There is no CVA tenderness.   Genitourinary: Testes normal and penis normal. No penile tenderness.   Musculoskeletal: Normal range of motion.   Neurological: He is alert and oriented to person, place, and time. He has normal strength.   Skin: Skin is warm, dry and intact.     Psychiatric: He has a normal mood and affect. His behavior is  normal. Judgment and thought content normal.       Assessment:       1. Male hypogonadism    2. Erectile dysfunction due to arterial insufficiency        Plan:         I spent 25 minutes with the patient of which more than half was spent in direct consultation with the patient in regards to our treatment and plan.    Education and recommendations of today's plan of care including home remedies.  We discussed ED and contributory factors.  Continue regular exercise; proper diet.  L-Arginine.  We discussed the other options; cant guaranteed success rate;  Not enough research on [employer]  [facility] he will let me know how it works.

## 2018-12-03 DIAGNOSIS — N52.01 ERECTILE DYSFUNCTION DUE TO ARTERIAL INSUFFICIENCY: ICD-10-CM

## 2018-12-03 DIAGNOSIS — E29.1 MALE HYPOGONADISM: ICD-10-CM

## 2018-12-03 RX ORDER — PAPAVERINE HYDROCHLORIDE 30 MG/ML
INJECTION INTRAMUSCULAR; INTRAVENOUS
Qty: 10 ML | Refills: 11 | Status: SHIPPED | OUTPATIENT
Start: 2018-12-03 | End: 2018-12-04 | Stop reason: SDUPTHER

## 2018-12-04 ENCOUNTER — TELEPHONE (OUTPATIENT)
Dept: UROLOGY | Facility: CLINIC | Age: 53
End: 2018-12-04

## 2018-12-04 DIAGNOSIS — N52.01 ERECTILE DYSFUNCTION DUE TO ARTERIAL INSUFFICIENCY: ICD-10-CM

## 2018-12-04 DIAGNOSIS — E29.1 MALE HYPOGONADISM: ICD-10-CM

## 2018-12-04 RX ORDER — PAPAVERINE HYDROCHLORIDE 30 MG/ML
INJECTION INTRAMUSCULAR; INTRAVENOUS
Qty: 10 ML | Refills: 11 | Status: SHIPPED | OUTPATIENT
Start: 2018-12-04 | End: 2018-12-06 | Stop reason: SDUPTHER

## 2018-12-04 NOTE — TELEPHONE ENCOUNTER
----- Message from Rebekah Waite LPN sent at 12/3/2018  2:59 PM CST -----  Contact: Self- [phone]  Leena he want you to send pep rx to [facility]  ----- Message -----  From: Leena De La Vega NP  Sent: 12/3/2018  12:38 PM  To: Rebekah Waite LPN    Let him know I faxed the new Rx to [facility].  It is the Double strength with atropine added.  Start with less dose.      ----- Message -----  From: Rebekah Waite LPN  Sent: 11/30/2018   3:13 PM  To: DILSHAD Henning spoke with patient states he would like the quad mix he said yall talked about it.  ----- Message -----  From: Briana Patiño  Sent: 11/30/2018   9:58 AM  To: Ceferino De La Vega- pt called to speak with staff in regards to changing his rx- Trimax- please contact pt at [phone]

## 2018-12-06 ENCOUNTER — TELEPHONE (OUTPATIENT)
Dept: UROLOGY | Facility: CLINIC | Age: 53
End: 2018-12-06

## 2018-12-06 DIAGNOSIS — N52.01 ERECTILE DYSFUNCTION DUE TO ARTERIAL INSUFFICIENCY: ICD-10-CM

## 2018-12-06 DIAGNOSIS — E29.1 MALE HYPOGONADISM: ICD-10-CM

## 2018-12-06 RX ORDER — PAPAVERINE HYDROCHLORIDE 30 MG/ML
INJECTION INTRAMUSCULAR; INTRAVENOUS
Qty: 10 ML | Refills: 11 | Status: SHIPPED | OUTPATIENT
Start: 2018-12-06 | End: 2020-01-01 | Stop reason: SDUPTHER

## 2018-12-06 NOTE — TELEPHONE ENCOUNTER
----- Message from Rebekah Waite LPN sent at 12/6/2018  2:31 PM CST -----  Contact: [facility]:[phone]      ----- Message -----  From: Rajinder Douglas  Sent: 12/6/2018   1:47 PM  To: Ceferino JOE Staff    .Needs Advice    Reason for call:[facility] called and states the pt would like a (quaid) with atropine. papaverine 30 mg/mL injection         Communication Preference:[facility]:[phone]    Additional Information:

## 2019-01-01 ENCOUNTER — TELEPHONE (OUTPATIENT)
Dept: UROLOGY | Facility: CLINIC | Age: 54
End: 2019-01-01

## 2019-01-16 ENCOUNTER — HOSPITAL ENCOUNTER (EMERGENCY)
Facility: HOSPITAL | Age: 54
Discharge: HOME | End: 2019-01-16
Attending: EMERGENCY MEDICINE
Payer: COMMERCIAL

## 2019-01-16 VITALS
TEMPERATURE: 99 F | BODY MASS INDEX: 26.77 KG/M2 | HEART RATE: 88 BPM | OXYGEN SATURATION: 96 % | HEIGHT: 71 IN | DIASTOLIC BLOOD PRESSURE: 80 MMHG | WEIGHT: 191.25 LBS | RESPIRATION RATE: 18 BRPM | SYSTOLIC BLOOD PRESSURE: 141 MMHG

## 2019-01-16 DIAGNOSIS — Z78.9 DIPHTHERIA-PERTUSSIS-TETANUS (DPT) VACCINATION ADMINISTERED AT CURRENT VISIT: ICD-10-CM
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
## 2019-01-16 PROCEDURE — 25000003 PHARM REV CODE 250: Performed by: PHYSICIAN ASSISTANT

## 2019-01-16 PROCEDURE — 63600175 PHARM REV CODE 636 W HCPCS: Performed by: PHYSICIAN ASSISTANT

## 2019-01-16 PROCEDURE — 99283 EMERGENCY DEPT VISIT LOW MDM: CPT | Mod: 25

## 2019-01-16 PROCEDURE — 90471 IMMUNIZATION ADMIN: CPT | Performed by: PHYSICIAN ASSISTANT

## 2019-01-16 PROCEDURE — 12032 INTMD RPR S/A/T/EXT 2.6-7.5: CPT

## 2019-01-16 PROCEDURE — 90715 TDAP VACCINE 7 YRS/> IM: CPT | Performed by: PHYSICIAN ASSISTANT

## 2019-01-16 RX ORDER — AMOXICILLIN AND CLAVULANATE POTASSIUM 875; 125 MG/1; MG/1
1 TABLET, FILM COATED ORAL 2 TIMES DAILY
Qty: 6 TABLET | Refills: 0 | Status: SHIPPED | OUTPATIENT
Start: 2019-01-16 | End: 2019-01-19

## 2019-01-16 RX ADMIN — CLOSTRIDIUM TETANI TOXOID ANTIGEN (FORMALDEHYDE INACTIVATED), CORYNEBACTERIUM DIPHTHERIAE TOXOID ANTIGEN (FORMALDEHYDE INACTIVATED), BORDETELLA PERTUSSIS TOXOID ANTIGEN (GLUTARALDEHYDE INACTIVATED), BORDETELLA PERTUSSIS FILAMENTOUS HEMAGGLUTININ ANTIGEN (FORMALDEHYDE INACTIVATED), BORDETELLA PERTUSSIS PERTACTIN ANTIGEN, AND BORDETELLA PERTUSSIS FIMBRIAE 2/3 ANTIGEN 0.5 ML: 5; 2; 2.5; 5; 3; 5 INJECTION, SUSPENSION INTRAMUSCULAR at 02:01

## 2019-01-16 RX ADMIN — LIDOCAINE HYDROCHLORIDE 10 ML: 10; .005 INJECTION, SOLUTION EPIDURAL; INFILTRATION; INTRACAUDAL; PERINEURAL at 03:01

## 2019-01-16 NOTE — ED PROVIDER NOTES
History      Chief Complaint   Patient presents with    Animal Bite     pt reports he was bit by a dog while at a job site today, pt is unsure of the animals vaccination status       Review of patient's allergies indicates:   Allergen Reactions    Meperidine      Other reaction(s): violent behavior    Sulfa (sulfonamide antibiotics)      Other reaction(s): Unknown        HPI   HPI    2019, 1:58 PM   History obtained from the patient      History of Present Illness: Nubia Riggs is a 53 y.o. male patient who presents to the Emergency Department for dog bite/lac to left forearm from dog at clients house.  He says dog is tied up.  Unknown rabies vac status.  Symptoms are moderate in severity.     No further complaints or concerns at this time.           PCP: Primary Doctor No       Past Medical History:  Past Medical History:   Diagnosis Date    ADD (attention deficit disorder)     Anemia     Bradycardia 3/8/2018    3/8/2018: ECG: SB 46 bpm.    GERD (gastroesophageal reflux disease)          Past Surgical History:  Past Surgical History:   Procedure Laterality Date    BACK SURGERY      CERVICAL SPINE SURGERY      facit repair    CHOLECYSTECTOMY      implant      chin    LIPOSUCTION      waiste    RHINOPLASTY TIP      SPINE SURGERY      l5 s1 micro discectomy    SPINE SURGERY      l5 s1 rodes placed    TONSILLECTOMY             Family History:  Family History   Problem Relation Age of Onset    Cancer Father     Cancer Paternal Grandmother     Heart disease Paternal Grandfather     Diabetes Neg Hx     Colon polyps Neg Hx            Social History:  Social History     Tobacco Use    Smoking status: Former Smoker     Packs/day: 1.00     Years: 15.00     Pack years: 15.00     Last attempt to quit: 2012     Years since quittin.7    Smokeless tobacco: Never Used   Substance and Sexual Activity    Alcohol use: No    Drug use: No    Sexual activity: Not on file       ROS      Review of Systems   Constitutional: Negative for chills and fever.   HENT: Negative for facial swelling and sore throat.    Eyes: Negative for pain and visual disturbance.   Respiratory: Negative for chest tightness and shortness of breath.    Cardiovascular: Negative for chest pain and palpitations.   Gastrointestinal: Negative for abdominal distention, abdominal pain and nausea.   Endocrine: Negative for cold intolerance and heat intolerance.   Genitourinary: Negative for dysuria and hematuria.   Musculoskeletal: Negative for back pain and neck stiffness.   Skin: Positive for wound. Negative for color change, pallor and rash.   Neurological: Negative for syncope and weakness.   Hematological: Negative for adenopathy. Does not bruise/bleed easily.   All other systems reviewed and are negative.      Physical Exam      Initial Vitals [01/16/19 1351]   BP Pulse Resp Temp SpO2   (!) 141/80 88 18 98.7 °F (37.1 °C) 96 %      MAP       --         Physical Exam  Vital signs and nursing notes reviewed.  Constitutional: Patient is in NAD. Awake and alert. Well-developed and well-nourished.  Head: Atraumatic. Normocephalic.  Eyes: PERRL. EOM intact. Conjunctivae nl. No scleral icterus.  ENT: Mucous membranes are moist. Oropharynx is clear.  Neck: Supple. No JVD. No lymphadenopathy.  No meningismus  Cardiovascular: Regular rate and rhythm. No murmurs, rubs, or gallops. Distal pulses are 2+ and symmetric.  Pulmonary/Chest: No respiratory distress. Clear to auscultation bilaterally. No wheezing, rales, or rhonchi.  Abdominal: Soft. Non-distended. No TTP. No rebound, guarding, or rigidity. Good bowel sounds.  Genitourinary: No CVA tenderness  Musculoskeletal: Moves all extremities. No edema.   Skin: Warm and dry.  4cm skin tear to left forearm.  2+dp.  Normal sensation, and cap refill less than 2, to fingers x 5.  Neurological: Awake and alert. No acute focal neurological deficits are appreciated.  Psychiatric: Normal affect.  "Good eye contact. Appropriate in content.      ED Course          Lac Repair  Date/Time: 1/16/2019 8:57 PM  Performed by: Sosa Frankel PA-C  Authorized by: Wiliam Martinez MD   Consent Done: Yes  Consent: Verbal consent obtained.  Risks and benefits: risks, benefits and alternatives were discussed  Consent given by: patient  Patient understanding: patient states understanding of the procedure being performed  Patient consent: the patient's understanding of the procedure matches consent given  Procedure consent: procedure consent matches procedure scheduled  Body area: upper extremity  Location details: left lower arm  Laceration length: 4 cm  Contamination: The wound is contaminated.  Foreign bodies: no foreign bodies  Tendon involvement: none  Nerve involvement: none  Vascular damage: no  Anesthesia: local infiltration    Anesthesia:  Local Anesthetic: lidocaine 1% with epinephrine  Preparation: Patient was prepped and draped in the usual sterile fashion.  Irrigation solution: saline  Irrigation method: syringe  Amount of cleaning: extensive  Debridement: none  Degree of undermining: none  Skin closure: Ethilon  Number of sutures: 3  Technique: simple  Approximation: loose  Approximation difficulty: simple  Dressing: dressing applied  Patient tolerance: Patient tolerated the procedure well with no immediate complications        ED Vital Signs:  Vitals:    01/16/19 1351   BP: (!) 141/80   Pulse: 88   Resp: 18   Temp: 98.7 °F (37.1 °C)   TempSrc: Oral   SpO2: 96%   Weight: 86.7 kg (191 lb 4 oz)   Height: 5' 11" (1.803 m)                 Imaging Results:  Imaging Results          X-Ray Forearm Left (Final result)  Result time 01/16/19 14:33:06    Final result by Pranay Fernández MD (01/16/19 14:33:06)                 Impression:      Soft tissue injury with soft tissue gas.  No fracture or foreign body.      Electronically signed by: Pranay Fernández MD  Date:    01/16/2019  Time:    14:33             " Narrative:    EXAMINATION:  XR FOREARM LEFT    CLINICAL HISTORY:  Bitten by dog, initial encounter, left forearm pain and swelling.    TECHNIQUE:  Standard radiography performed.    COMPARISON:  None    FINDINGS:  Bone density and architecture is normal.  No fracture.  No foreign body.    There is a soft tissue injury with gas within the soft tissues of the mid left forearm.                                   The Emergency Provider reviewed the vital signs and test results, which are outlined above.    ED Discussion             Medication(s) given in the ER:  Medications   Tdap vaccine injection 0.5 mL (0.5 mLs Intramuscular Given 1/16/19 5736)   lidocaine-EPINEPHrine (PF) 1%-1:200,000 injection 10 mL (10 mLs Intradermal Given 1/16/19 1500)           Follow-up Information     [facility] In 2 days.    Specialty:  Internal Medicine  Why:  For wound re-check.  Suture removal in 8-10 days  Contact information:  [address]  [facility] [address] [phone]                    Medication List      START taking these medications    amoxicillin-clavulanate 875-125mg 875-125 mg per tablet  Commonly known as:  AUGMENTIN  Take 1 tablet by mouth 2 (two) times daily. for 3 days        ASK your doctor about these medications    anastrozole 1 mg Tab  Commonly known as:  ARIMIDEX     b complex vitamins capsule     coQ10 (ubiquinol) 100 mg Cap     diphenhydrAMINE 25 mg capsule  Commonly known as:  BENADRYL     minoxidil 2.5 MG tablet  Commonly known as:  LONITEN     montelukast 10 mg tablet  Commonly known as:  SINGULAIR     papaverine 30 mg/mL injection  Papaverine 60mg/ml  Add: Phentolamine 20 mg  Add: PGE1 200 mcg  (60-2-20) Double Strength   Add Atropine 0.1mg/ml  Sig:  Inject 20 units (0.20 mls) as directed     testosterone cypionate 200 mg/mL injection  Commonly known as:  DEPOTESTOTERONE CYPIONATE     VITAMIN C ENERGY BOOSTER 1,000 mg Pwep  Generic drug:  ascorbic acid-multivit-min            Where to Get Your Medications      You can get these medications from any pharmacy    Bring a paper prescription for each of these medications  · amoxicillin-clavulanate 875-125mg 875-125 mg per tablet           Medical Decision Making      Animal control was notified by nurse.      All findings were reviewed with the patient/family in detail.   All remaining questions and concerns were addressed at that time.  Patient/family has been counseled regarding the need for follow-up as well as the indication to return to the emergency room should new or worrisome developments occur.        MDM               Clinical Impression:        ICD-10-CM ICD-9-CM   1. Dog bite W54.0XXA 879.8     E906.0   2. Diphtheria-pertussis-tetanus (DPT) vaccination administered at current visit Z78.9 V49.89             Sosa Frankel PA-C  01/16/19 2049

## 2019-01-16 NOTE — ED NOTES
Contacted animal control to report dog bite. Spoke with Gail. Animal control was given patient's address, telephone number, location of bite, severity, reason for contact with the dog. Also given dog owner's name, address, contact number.

## 2019-01-16 NOTE — ED NOTES
Sterlie, nonadherent dressing placed on wound. Pt given discharge instructions and walked to lobby by provider.

## 2019-10-21 ENCOUNTER — OFFICE VISIT (OUTPATIENT)
Dept: UROLOGY | Facility: CLINIC | Age: 54
End: 2019-10-21
Payer: COMMERCIAL

## 2019-10-21 VITALS
SYSTOLIC BLOOD PRESSURE: 120 MMHG | HEART RATE: 59 BPM | WEIGHT: 183 LBS | DIASTOLIC BLOOD PRESSURE: 65 MMHG | BODY MASS INDEX: 26.2 KG/M2 | HEIGHT: 70 IN

## 2019-10-21 DIAGNOSIS — N52.01 ERECTILE DYSFUNCTION DUE TO ARTERIAL INSUFFICIENCY: ICD-10-CM

## 2019-10-21 DIAGNOSIS — E29.1 MALE HYPOGONADISM: Primary | ICD-10-CM

## 2019-10-21 DIAGNOSIS — N40.0 BENIGN PROSTATIC HYPERPLASIA WITHOUT LOWER URINARY TRACT SYMPTOMS: ICD-10-CM

## 2019-10-21 PROCEDURE — 99999 PR PBB SHADOW E&M-EST. PATIENT-LVL IV: ICD-10-PCS | Mod: PBBFAC,,, | Performed by: NURSE PRACTITIONER

## 2019-10-21 PROCEDURE — 99214 PR OFFICE/OUTPT VISIT, EST, LEVL IV, 30-39 MIN: ICD-10-PCS | Mod: S$GLB,,, | Performed by: NURSE PRACTITIONER

## 2019-10-21 PROCEDURE — 99214 OFFICE O/P EST MOD 30 MIN: CPT | Mod: S$GLB,,, | Performed by: NURSE PRACTITIONER

## 2019-10-21 PROCEDURE — 99999 PR PBB SHADOW E&M-EST. PATIENT-LVL IV: CPT | Mod: PBBFAC,,, | Performed by: NURSE PRACTITIONER

## 2019-10-21 PROCEDURE — 3008F BODY MASS INDEX DOCD: CPT | Mod: CPTII,S$GLB,, | Performed by: NURSE PRACTITIONER

## 2019-10-21 PROCEDURE — 3008F PR BODY MASS INDEX (BMI) DOCUMENTED: ICD-10-PCS | Mod: CPTII,S$GLB,, | Performed by: NURSE PRACTITIONER

## 2019-10-21 RX ORDER — PAPAVERINE HYDROCHLORIDE 30 MG/ML
INJECTION INTRAMUSCULAR; INTRAVENOUS
Qty: 10 ML | Refills: 11 | Status: SHIPPED | OUTPATIENT
Start: 2019-10-21

## 2019-10-21 NOTE — PROGRESS NOTES
Subjective:       Patient ID: Nubia Riggs is a 54 y.o. male.    Chief Complaint: Male hypogonadism    Nubia Riggs is a 54 y.o. male with a history of hypogonadism treated by Gildardo Andre with clomid at first.    However, his symptoms didn't improve, and he was then transitioned to IM TRT and HCG.   He was seen in clinic with Dr. Salgado 10/05/2017.  He had no records of what his T was before starting treatment.   His last shot was 4 days ago.  While on TRT he has more energy.     He also c/o ED.    This has been present for > 1 year.    He's tried and failed oral meds.    He's currently using ICI with good results.    Was seen in clinic with me 10/12/2017 for ICI education and reeducation.  He has been using ICI successfully;  A couple of times was not effective. He has been slowly increasing the dosage.  Now back up to 100 units; titrate's. He gets special formula from [facility].  Wants to discussed other options.      He has been thinking about IPP so has been doing some stretching exercises due to loss of length with IPP surgery.                             PSA                      0.17                05/25/2012                Past Medical History:  No date: ADD (attention deficit disorder)  No date: Anemia  No date: GERD (gastroesophageal reflux disease)    Past Surgical History:  No date: CERVICAL SPINE SURGERY      Comment: facit repair  No date: CHOLECYSTECTOMY  No date: implant      Comment: chin  No date: LIPOSUCTION      Comment: waiste  No date: RHINOPLASTY TIP  No date: SPINE SURGERY      Comment: l5 s1 micro discectomy  No date: SPINE SURGERY      Comment: l5 s1 rodes placed  No date: TONSILLECTOMY    Review of patient's family history indicates:    Cancer                         Father                    Cancer                         Paternal Grandmother      Heart disease                  Paternal Grandfather      Diabetes                       Neg Hx                    Colon polyps                    Neg Hx                      Social History    Marital status:             Spouse name:                       Years of education:                 Number of children: 4             Occupational History  Occupation          Employer            Comment               Contractor          [employer]     Social History Main Topics    Smoking status: Former Smoker                                                                Packs/day: 1.00      Years: 15.00          Quit date: 4/1/2012    Smokeless tobacco: Never Used                        Alcohol use: No              Drug use: No              Sexual activity: Not on file          Other Topics            Concern    None on file    Social History Narrative    None on file        Allergies:  Meperidine and Sulfa (sulfonamide antibiotics)    Medications:  Current Outpatient Prescriptions:   albuterol (VENTOLIN HFA) 90 mcg/actuation HFAA, Inhale 2 puffs into the lungs every 4 (four) hours as needed., Disp: 1 Inhaler, Rfl: 6  alprazolam (XANAX) 0.25 MG tablet, Take 1 tablet (0.25 mg total) by mouth daily as needed., Disp: 30 tablet, Rfl: 3  ascorbic acid-multivit-min (VITAMIN C ENERGY BOOSTER) 1,000 mg PwEP, Take 1 Package by mouth once daily.  , Disp: , Rfl:   b complex vitamins capsule, Take 1 capsule by mouth once daily.  , Disp: , Rfl:   coQ10, ubiquinol, 100 mg Cap, Take 1 capsule by mouth once daily.  , Disp: , Rfl:   diphenhydrAMINE (BENADRYL) 25 mg capsule, Take 25 mg by mouth every 6 (six) hours as needed for Itching., Disp: , Rfl:   montelukast (SINGULAIR) 10 mg tablet, Take 10 mg by mouth daily as needed., Disp: , Rfl:    zolpidem (AMBIEN) 10 mg Tab, Take 1 tablet (10 mg total) by mouth nightly as needed., Disp: 30 tablet, Rfl: 3                  Review of Systems   Constitutional: Negative for activity change, appetite change, chills and fever.   HENT: Negative for facial swelling and trouble swallowing.    Eyes: Negative for visual  disturbance.   Respiratory: Negative for chest tightness and shortness of breath.    Cardiovascular: Negative for chest pain and palpitations.   Gastrointestinal: Negative.  Negative for abdominal pain, constipation, diarrhea, nausea and vomiting.   Genitourinary: Negative for difficulty urinating, dysuria, flank pain, hematuria, penile pain, penile swelling, scrotal swelling and testicular pain.        No issues with urination  Had a tender area on right side of penis after ICI injection.  It has improved in regards to slight curvature.     Musculoskeletal: Negative for back pain, gait problem, myalgias and neck stiffness.   Skin: Negative for rash.   Neurological: Negative for dizziness and speech difficulty.   Hematological: Does not bruise/bleed easily.   Psychiatric/Behavioral: Negative for behavioral problems.       Objective:      Physical Exam   Nursing note and vitals reviewed.  Constitutional: He is oriented to person, place, and time. Vital signs are normal. He appears well-developed and well-nourished. He is active and cooperative.  Non-toxic appearance. He does not have a sickly appearance.   HENT:   Head: Normocephalic and atraumatic.   Right Ear: External ear normal.   Left Ear: External ear normal.   Nose: Nose normal.   Mouth/Throat: Mucous membranes are normal.   Eyes: Conjunctivae and lids are normal. No scleral icterus.   Neck: Trachea normal, normal range of motion and full passive range of motion without pain. Neck supple. No JVD present. No tracheal deviation present.   Cardiovascular: Normal rate, S1 normal and S2 normal.    Pulmonary/Chest: Effort normal. No respiratory distress. He exhibits no tenderness.   Abdominal: Soft. Normal appearance and bowel sounds are normal. There is no hepatosplenomegaly. There is no tenderness. There is no CVA tenderness.   Genitourinary: Testes normal. Circumcised. Penile tenderness present. No hypospadias or penile erythema. No discharge found.          Musculoskeletal: Normal range of motion.   Neurological: He is alert and oriented to person, place, and time. He has normal strength.   Skin: Skin is warm, dry and intact.     Psychiatric: He has a normal mood and affect. His behavior is normal. Judgment and thought content normal.       Assessment:       1. Male hypogonadism    2. Erectile dysfunction due to arterial insufficiency    3. Benign prostatic hyperplasia without lower urinary tract symptoms        Plan:         I spent 25 minutes with the patient of which more than half was spent in direct consultation with the patient in regards to our treatment and plan.    Education and recommendations of today's plan of care including home remedies.  Discussed the contributory factors for his ED.  We discussed ICI therapy; reviewed the dosing and max doses; risks with injecting in same spot.  New Rx to try from [facility]; 30/2/40  We discussed Edex. Look for coupon for him  Discussed SEN and IPP.  He not ready.  Will let me know how the new Rx does; reducing to 50 units  Discussed proper management/maintainence with HRT

## 2019-10-28 ENCOUNTER — OFFICE VISIT (OUTPATIENT)
Dept: ORTHOPEDICS | Facility: CLINIC | Age: 54
End: 2019-10-28
Payer: COMMERCIAL

## 2019-10-28 ENCOUNTER — HOSPITAL ENCOUNTER (OUTPATIENT)
Dept: RADIOLOGY | Facility: HOSPITAL | Age: 54
Discharge: HOME | End: 2019-10-28
Attending: PHYSICIAN ASSISTANT
Payer: COMMERCIAL

## 2019-10-28 VITALS
HEIGHT: 70 IN | WEIGHT: 182.13 LBS | HEART RATE: 88 BPM | BODY MASS INDEX: 26.07 KG/M2 | DIASTOLIC BLOOD PRESSURE: 85 MMHG | SYSTOLIC BLOOD PRESSURE: 154 MMHG

## 2019-10-28 DIAGNOSIS — M25.511 RIGHT SHOULDER PAIN, UNSPECIFIED CHRONICITY: Primary | ICD-10-CM

## 2019-10-28 DIAGNOSIS — M25.511 RIGHT SHOULDER PAIN, UNSPECIFIED CHRONICITY: ICD-10-CM

## 2019-10-28 PROCEDURE — 99203 PR OFFICE/OUTPT VISIT, NEW, LEVL III, 30-44 MIN: ICD-10-PCS | Mod: S$GLB,,, | Performed by: PHYSICIAN ASSISTANT

## 2019-10-28 PROCEDURE — 99203 OFFICE O/P NEW LOW 30 MIN: CPT | Mod: S$GLB,,, | Performed by: PHYSICIAN ASSISTANT

## 2019-10-28 PROCEDURE — 99999 PR PBB SHADOW E&M-EST. PATIENT-LVL IV: ICD-10-PCS | Mod: PBBFAC,,, | Performed by: PHYSICIAN ASSISTANT

## 2019-10-28 PROCEDURE — 99999 PR PBB SHADOW E&M-EST. PATIENT-LVL IV: CPT | Mod: PBBFAC,,, | Performed by: PHYSICIAN ASSISTANT

## 2019-10-28 PROCEDURE — 73030 X-RAY EXAM OF SHOULDER: CPT | Mod: 26,RT,, | Performed by: RADIOLOGY

## 2019-10-28 PROCEDURE — 3008F BODY MASS INDEX DOCD: CPT | Mod: CPTII,S$GLB,, | Performed by: PHYSICIAN ASSISTANT

## 2019-10-28 PROCEDURE — 3008F PR BODY MASS INDEX (BMI) DOCUMENTED: ICD-10-PCS | Mod: CPTII,S$GLB,, | Performed by: PHYSICIAN ASSISTANT

## 2019-10-28 PROCEDURE — 73030 X-RAY EXAM OF SHOULDER: CPT | Mod: TC,RT

## 2019-10-28 PROCEDURE — 73030 XR SHOULDER COMPLETE 2 OR MORE VIEWS RIGHT: ICD-10-PCS | Mod: 26,RT,, | Performed by: RADIOLOGY

## 2019-10-28 RX ORDER — METHYLPREDNISOLONE 4 MG/1
TABLET ORAL
Qty: 1 PACKAGE | Refills: 0 | Status: SHIPPED | OUTPATIENT
Start: 2019-10-28 | End: 2020-01-01 | Stop reason: ALTCHOICE

## 2019-10-28 NOTE — PROGRESS NOTES
SUBJECTIVE:     Chief Complaint & History of Present Illness:  Nubia Riggs is a  New  patient 54 y.o. male who is seen here today with a complaint of  right shoulder pain .  Patient is here today for evaluation treatment of persistent pain in his right shoulder for the past 1 month to 6 weeks.  States he remembers developing sudden pain in sharp sensations in the lateral and posterior aspects of her shoulder while reaching behind him lifting a heavy book bag approximately 6 weeks ago.  Since that time he has had difficulty with sleeping on that side and any overhead activities.  he is quite active and does a daily intense exercise routine to include high weightlifting particularly with the upper extremities.  He has seen a chiropractor on multiple occasions and had and massage therapy to the shoulder which offers him short-term relief of 1 day to 36 hr the has return of pain.  He has not taken any NSAIDs secondary to stomach issues  On a scale of 1-10, with 10 being worst pain imaginable, he rates this pain as 1 on good days and 4 on bad days.  he describes the pain as tender and sore.    Review of patient's allergies indicates:   Allergen Reactions    Meperidine      Other reaction(s): violent behavior    Sulfa (sulfonamide antibiotics)      Other reaction(s): Unknown         Current Outpatient Medications   Medication Sig Dispense Refill    anastrozole (ARIMIDEX) 1 mg Tab       ascorbic acid-multivit-min (VITAMIN C ENERGY BOOSTER) 1,000 mg PwEP Take 1 Package by mouth once daily.        b complex vitamins capsule Take 1 capsule by mouth once daily.        coQ10, ubiquinol, 100 mg Cap Take 1 capsule by mouth once daily.        diphenhydrAMINE (BENADRYL) 25 mg capsule Take 25 mg by mouth every 6 (six) hours as needed for Itching.      minoxidil (LONITEN) 2.5 MG tablet       montelukast (SINGULAIR) 10 mg tablet Take 10 mg by mouth daily as needed.      papaverine 30 mg/mL injection Papaverine  "60mg/ml  Add: Phentolamine 20 mg  Add: PGE1 200 mcg  (60-2-20) Double Strength   Add Atropine 0.1mg/ml  Sig:  Inject 20 units (0.20 mls) as directed 10 mL 11    papaverine 30 mg/mL injection Add: Phentolamine 2 mg  Add: PGE1 40 mcg    Sig:  Inject 25 units (0.25 mls) as directed 10 mL 11    testosterone cypionate (DEPOTESTOTERONE CYPIONATE) 200 mg/mL injection        No current facility-administered medications for this visit.        Past Medical History:   Diagnosis Date    ADD (attention deficit disorder)     Anemia     Bradycardia 3/8/2018    3/8/2018: ECG: SB 46 bpm.    GERD (gastroesophageal reflux disease)        Past Surgical History:   Procedure Laterality Date    BACK SURGERY      CERVICAL SPINE SURGERY      facit repair    CHOLECYSTECTOMY      implant      chin    LIPOSUCTION      waiste    RHINOPLASTY TIP      SPINE SURGERY      l5 s1 micro discectomy    SPINE SURGERY      l5 s1 rodes placed    TONSILLECTOMY         Vital Signs (Most Recent)  Vitals:    10/28/19 1448   BP: (!) 154/85   Pulse: 88       Review of Systems:  ROS:  Constitutional: no fever or chills  Eyes: no visual changes  ENT: no nasal congestion or sore throat  Respiratory: no cough or shortness of breath  Cardiovascular: no chest pain or palpitations  Gastrointestinal: no nausea or vomiting, tolerating diet, Positive for GERD  Genitourinary: no hematuria or dysuria, Positive BPH  Integument/Breast: no rash or pruritis  Hematologic/Lymphatic: no easy bruising or lymphadenopathy  Musculoskeletal: no arthralgias or myalgias  Neurological: no seizures or tremors  Behavioral/Psych: no auditory or visual hallucinations, Positive ADD, and anxiety  Endocrine: no heat or cold intolerance, Positive male hypogonadism      OBJECTIVE:     PHYSICAL EXAM:  Height: 5' 9.5" (176.5 cm) Weight: 82.6 kg (182 lb 1.6 oz), General Appearance: Well nourished, well developed, in no acute distress.  Neurological: Mood & affect are normal.  Shoulder " exam: right  Tenderness: lateral acromial, deltoid muscle  ROM: forward flexion 180/180, extension 45/45, full abduction 180/180, abduction-glenohumeral 90/90, external rotation 50/50  Shoulder Strength: biceps 5/5, triceps 5/5, abduction 5/5, adduction 5/5, external rotation 5/5 with shoulder at side, flexion 5/5, and extension 5/5  negative for tenderness about the glenohumeral joint, negative for tenderness over the acromioclavicular joint and negative for impingement sign  Stability tests: anterior apprehension test negative and posterior apprehension test negative  Special Tests:Cross-chest abduction: negative                     RADIOGRAPHS:  X-rays taken today films reviewed by me demonstrate mild glenohumeral joint space narrowing without evidence sclerotic changes osteophytic spurring fracture dislocation or other bony abnormality    ASSESSMENT/PLAN:       ICD-10-CM ICD-9-CM   1. Right shoulder pain, unspecified chronicity M25.511 719.41       Plan: We discussed with the patient at length all the different treatment options available for his rightshoulder including anti-inflammatories, acetaminophen, rest, ice, Physical therapy to include strengthening exercise, occasional cortisone injections for temporary relief, arthroscopic surgical repair, and finally shoulder arthroplasty.   Patient shoulder.  To be stable in all fields in all planes no evidence of labral or rotator cuff pathology.  After lengthy discussion will begin with conservative care. Because he is unable to tolerate NSAIDs will proceed with Medrol Dosepak  Follow-up in 2 weeks if symptoms not resolved sooner symptoms dictate

## 2019-11-18 NOTE — TELEPHONE ENCOUNTER
----- Message from Rebekah Waite LPN sent at 11/7/2019  8:40 AM CST -----  Contact: pt: [phone]  Leena I called this patient he said he needed to ask you a question about something you suggested to him.   ----- Message -----  From: Thao Coles RN  Sent: 11/6/2019   5:45 PM CST  To: Rebekah Waite LPN        ----- Message -----  From: Disha Orozco  Sent: 11/6/2019  11:29 AM CST  To: [facility] Staff    Pt called to speak with leena, has questions re the f/u he had two weeks ago    Please contact pt: [phone]

## 2020-01-01 ENCOUNTER — NURSE TRIAGE (OUTPATIENT)
Dept: ADMINISTRATIVE | Facility: CLINIC | Age: 55
End: 2020-01-01

## 2020-01-01 ENCOUNTER — TELEPHONE (OUTPATIENT)
Dept: OTOLARYNGOLOGY | Facility: CLINIC | Age: 55
End: 2020-01-01

## 2020-01-01 ENCOUNTER — HOSPITAL ENCOUNTER (INPATIENT)
Facility: HOSPITAL | Age: 55
LOS: 22 days | DRG: 207 | End: 2020-11-09
Attending: EMERGENCY MEDICINE | Admitting: HOSPITALIST
Payer: COMMERCIAL

## 2020-01-01 ENCOUNTER — OFFICE VISIT (OUTPATIENT)
Dept: UROLOGY | Facility: CLINIC | Age: 55
End: 2020-01-01
Payer: COMMERCIAL

## 2020-01-01 ENCOUNTER — CLINICAL SUPPORT (OUTPATIENT)
Dept: ALLERGY | Facility: CLINIC | Age: 55
End: 2020-01-01
Payer: COMMERCIAL

## 2020-01-01 ENCOUNTER — ANESTHESIA EVENT (OUTPATIENT)
Dept: INTENSIVE CARE | Facility: HOSPITAL | Age: 55
DRG: 207 | End: 2020-01-01
Payer: COMMERCIAL

## 2020-01-01 ENCOUNTER — HOSPITAL ENCOUNTER (OUTPATIENT)
Dept: RADIOLOGY | Facility: HOSPITAL | Age: 55
Discharge: HOME | End: 2020-09-03
Attending: NURSE PRACTITIONER
Payer: COMMERCIAL

## 2020-01-01 ENCOUNTER — LAB VISIT (OUTPATIENT)
Dept: LAB | Facility: HOSPITAL | Age: 55
End: 2020-01-01
Payer: COMMERCIAL

## 2020-01-01 ENCOUNTER — PATIENT MESSAGE (OUTPATIENT)
Dept: ALLERGY | Facility: CLINIC | Age: 55
End: 2020-01-01

## 2020-01-01 ENCOUNTER — TELEPHONE (OUTPATIENT)
Dept: PULMONOLOGY | Facility: CLINIC | Age: 55
End: 2020-01-01

## 2020-01-01 ENCOUNTER — PATIENT MESSAGE (OUTPATIENT)
Dept: INTERNAL MEDICINE | Facility: CLINIC | Age: 55
End: 2020-01-01

## 2020-01-01 ENCOUNTER — PATIENT MESSAGE (OUTPATIENT)
Dept: PULMONOLOGY | Facility: CLINIC | Age: 55
End: 2020-01-01

## 2020-01-01 ENCOUNTER — RESEARCH ENCOUNTER (OUTPATIENT)
Dept: RESEARCH | Facility: HOSPITAL | Age: 55
End: 2020-01-01

## 2020-01-01 ENCOUNTER — PATIENT MESSAGE (OUTPATIENT)
Dept: RESEARCH | Facility: HOSPITAL | Age: 55
End: 2020-01-01

## 2020-01-01 ENCOUNTER — OFFICE VISIT (OUTPATIENT)
Dept: ALLERGY | Facility: CLINIC | Age: 55
End: 2020-01-01
Payer: COMMERCIAL

## 2020-01-01 ENCOUNTER — PATIENT MESSAGE (OUTPATIENT)
Dept: ADMINISTRATIVE | Facility: OTHER | Age: 55
End: 2020-01-01

## 2020-01-01 ENCOUNTER — OFFICE VISIT (OUTPATIENT)
Dept: OTOLARYNGOLOGY | Facility: CLINIC | Age: 55
End: 2020-01-01
Payer: COMMERCIAL

## 2020-01-01 ENCOUNTER — TELEPHONE (OUTPATIENT)
Dept: UROLOGY | Facility: CLINIC | Age: 55
End: 2020-01-01

## 2020-01-01 ENCOUNTER — OFFICE VISIT (OUTPATIENT)
Dept: PULMONOLOGY | Facility: CLINIC | Age: 55
End: 2020-01-01
Payer: COMMERCIAL

## 2020-01-01 ENCOUNTER — HOSPITAL ENCOUNTER (OUTPATIENT)
Dept: PULMONOLOGY | Facility: CLINIC | Age: 55
Discharge: HOME | End: 2020-09-03
Payer: COMMERCIAL

## 2020-01-01 ENCOUNTER — OFFICE VISIT (OUTPATIENT)
Dept: URGENT CARE | Facility: CLINIC | Age: 55
End: 2020-01-01
Payer: COMMERCIAL

## 2020-01-01 ENCOUNTER — ANESTHESIA (OUTPATIENT)
Dept: INTENSIVE CARE | Facility: HOSPITAL | Age: 55
DRG: 207 | End: 2020-01-01
Payer: COMMERCIAL

## 2020-01-01 ENCOUNTER — TELEPHONE (OUTPATIENT)
Dept: INTERNAL MEDICINE | Facility: CLINIC | Age: 55
End: 2020-01-01

## 2020-01-01 ENCOUNTER — TELEPHONE (OUTPATIENT)
Dept: URGENT CARE | Facility: CLINIC | Age: 55
End: 2020-01-01

## 2020-01-01 ENCOUNTER — PATIENT OUTREACH (OUTPATIENT)
Dept: ADMINISTRATIVE | Facility: HOSPITAL | Age: 55
End: 2020-01-01

## 2020-01-01 ENCOUNTER — LAB VISIT (OUTPATIENT)
Dept: SURGERY | Facility: CLINIC | Age: 55
End: 2020-01-01
Payer: COMMERCIAL

## 2020-01-01 ENCOUNTER — OFFICE VISIT (OUTPATIENT)
Dept: INTERNAL MEDICINE | Facility: CLINIC | Age: 55
End: 2020-01-01
Payer: COMMERCIAL

## 2020-01-01 VITALS
HEIGHT: 70 IN | OXYGEN SATURATION: 98 % | BODY MASS INDEX: 27.34 KG/M2 | WEIGHT: 190.94 LBS | DIASTOLIC BLOOD PRESSURE: 82 MMHG | SYSTOLIC BLOOD PRESSURE: 140 MMHG | HEART RATE: 76 BPM

## 2020-01-01 VITALS
HEART RATE: 57 BPM | HEIGHT: 70 IN | WEIGHT: 183 LBS | SYSTOLIC BLOOD PRESSURE: 136 MMHG | DIASTOLIC BLOOD PRESSURE: 69 MMHG | BODY MASS INDEX: 26.2 KG/M2

## 2020-01-01 VITALS
SYSTOLIC BLOOD PRESSURE: 135 MMHG | RESPIRATION RATE: 16 BRPM | HEIGHT: 70 IN | WEIGHT: 192 LBS | BODY MASS INDEX: 27.49 KG/M2 | OXYGEN SATURATION: 97 % | HEART RATE: 87 BPM | DIASTOLIC BLOOD PRESSURE: 82 MMHG | TEMPERATURE: 98 F

## 2020-01-01 VITALS
HEIGHT: 70 IN | HEART RATE: 71 BPM | RESPIRATION RATE: 18 BRPM | TEMPERATURE: 100 F | WEIGHT: 190 LBS | DIASTOLIC BLOOD PRESSURE: 81 MMHG | OXYGEN SATURATION: 93 % | BODY MASS INDEX: 27.2 KG/M2 | SYSTOLIC BLOOD PRESSURE: 134 MMHG

## 2020-01-01 VITALS — BODY MASS INDEX: 27.61 KG/M2 | HEIGHT: 70 IN | WEIGHT: 192.88 LBS

## 2020-01-01 VITALS — SYSTOLIC BLOOD PRESSURE: 154 MMHG | DIASTOLIC BLOOD PRESSURE: 76 MMHG | HEART RATE: 97 BPM

## 2020-01-01 VITALS
OXYGEN SATURATION: 3 % | DIASTOLIC BLOOD PRESSURE: 65 MMHG | BODY MASS INDEX: 26.63 KG/M2 | SYSTOLIC BLOOD PRESSURE: 107 MMHG | TEMPERATURE: 97 F | RESPIRATION RATE: 33 BRPM | HEIGHT: 70 IN | WEIGHT: 186 LBS

## 2020-01-01 DIAGNOSIS — U07.1 PNEUMONIA DUE TO COVID-19 VIRUS: ICD-10-CM

## 2020-01-01 DIAGNOSIS — U07.1 COVID-19: Primary | ICD-10-CM

## 2020-01-01 DIAGNOSIS — Z20.822 SUSPECTED COVID-19 VIRUS INFECTION: ICD-10-CM

## 2020-01-01 DIAGNOSIS — J34.89 SINUS PRESSURE: ICD-10-CM

## 2020-01-01 DIAGNOSIS — N52.01 ERECTILE DYSFUNCTION DUE TO ARTERIAL INSUFFICIENCY: ICD-10-CM

## 2020-01-01 DIAGNOSIS — R05.3 CHRONIC COUGH: ICD-10-CM

## 2020-01-01 DIAGNOSIS — U07.1 ACUTE RESPIRATORY DISTRESS SYNDROME (ARDS) DUE TO 2019 NOVEL CORONAVIRUS: ICD-10-CM

## 2020-01-01 DIAGNOSIS — J45.20 INTERMITTENT ASTHMA WITHOUT COMPLICATION, UNSPECIFIED ASTHMA SEVERITY: ICD-10-CM

## 2020-01-01 DIAGNOSIS — R06.02 SHORTNESS OF BREATH: ICD-10-CM

## 2020-01-01 DIAGNOSIS — R06.02 SHORTNESS OF BREATH: Primary | ICD-10-CM

## 2020-01-01 DIAGNOSIS — R09.02 HYPOXIA: ICD-10-CM

## 2020-01-01 DIAGNOSIS — J32.9 RECURRENT SINUS INFECTIONS: Primary | ICD-10-CM

## 2020-01-01 DIAGNOSIS — R05.8 ALLERGIC COUGH: ICD-10-CM

## 2020-01-01 DIAGNOSIS — R06.02 SOB (SHORTNESS OF BREATH): ICD-10-CM

## 2020-01-01 DIAGNOSIS — R05.8 ALLERGIC COUGH: Primary | ICD-10-CM

## 2020-01-01 DIAGNOSIS — J18.9 PNEUMONIA OF BOTH LUNGS DUE TO INFECTIOUS ORGANISM, UNSPECIFIED PART OF LUNG: ICD-10-CM

## 2020-01-01 DIAGNOSIS — U07.1 COVID-19 VIRUS DETECTED: ICD-10-CM

## 2020-01-01 DIAGNOSIS — J96.01 ACUTE HYPOXEMIC RESPIRATORY FAILURE DUE TO COVID-19: ICD-10-CM

## 2020-01-01 DIAGNOSIS — E87.0 HYPERNATREMIA: ICD-10-CM

## 2020-01-01 DIAGNOSIS — R00.0 TACHYCARDIA: ICD-10-CM

## 2020-01-01 DIAGNOSIS — N52.01 ERECTILE DYSFUNCTION DUE TO ARTERIAL INSUFFICIENCY: Primary | ICD-10-CM

## 2020-01-01 DIAGNOSIS — U07.1 ACUTE HYPOXEMIC RESPIRATORY FAILURE DUE TO COVID-19: ICD-10-CM

## 2020-01-01 DIAGNOSIS — J31.0 CHRONIC RHINITIS: ICD-10-CM

## 2020-01-01 DIAGNOSIS — J45.20 MILD INTERMITTENT ASTHMA WITHOUT COMPLICATION: Primary | ICD-10-CM

## 2020-01-01 DIAGNOSIS — E29.1 MALE HYPOGONADISM: ICD-10-CM

## 2020-01-01 DIAGNOSIS — H61.23 BILATERAL IMPACTED CERUMEN: Primary | ICD-10-CM

## 2020-01-01 DIAGNOSIS — J12.82 PNEUMONIA DUE TO COVID-19 VIRUS: ICD-10-CM

## 2020-01-01 DIAGNOSIS — N17.9 AKI (ACUTE KIDNEY INJURY): ICD-10-CM

## 2020-01-01 DIAGNOSIS — R05.9 COUGH: ICD-10-CM

## 2020-01-01 DIAGNOSIS — J96.01 ACUTE HYPOXEMIC RESPIRATORY FAILURE: ICD-10-CM

## 2020-01-01 DIAGNOSIS — J32.9 RECURRENT SINUS INFECTIONS: ICD-10-CM

## 2020-01-01 DIAGNOSIS — E83.39 HYPERPHOSPHATEMIA: ICD-10-CM

## 2020-01-01 DIAGNOSIS — J18.9 MULTIFOCAL PNEUMONIA: ICD-10-CM

## 2020-01-01 DIAGNOSIS — E87.5 HYPERKALEMIA: ICD-10-CM

## 2020-01-01 DIAGNOSIS — R06.02 SOB (SHORTNESS OF BREATH): Primary | ICD-10-CM

## 2020-01-01 DIAGNOSIS — J80 ACUTE RESPIRATORY DISTRESS SYNDROME (ARDS) DUE TO 2019 NOVEL CORONAVIRUS: ICD-10-CM

## 2020-01-01 DIAGNOSIS — U07.1 COVID-19: ICD-10-CM

## 2020-01-01 LAB
25(OH)D3+25(OH)D2 SERPL-MCNC: 43 NG/ML (ref 30–96)
ABO + RH BLD: NORMAL
ALBUMIN SERPL BCP-MCNC: 1.2 G/DL (ref 3.5–5.2)
ALBUMIN SERPL BCP-MCNC: 1.3 G/DL (ref 3.5–5.2)
ALBUMIN SERPL BCP-MCNC: 1.4 G/DL (ref 3.5–5.2)
ALBUMIN SERPL BCP-MCNC: 1.5 G/DL (ref 3.5–5.2)
ALBUMIN SERPL BCP-MCNC: 1.6 G/DL (ref 3.5–5.2)
ALBUMIN SERPL BCP-MCNC: 1.6 G/DL (ref 3.5–5.2)
ALBUMIN SERPL BCP-MCNC: 1.7 G/DL (ref 3.5–5.2)
ALBUMIN SERPL BCP-MCNC: 1.8 G/DL (ref 3.5–5.2)
ALBUMIN SERPL BCP-MCNC: 1.9 G/DL (ref 3.5–5.2)
ALBUMIN SERPL BCP-MCNC: 2 G/DL (ref 3.5–5.2)
ALBUMIN SERPL BCP-MCNC: 2.1 G/DL (ref 3.5–5.2)
ALBUMIN SERPL BCP-MCNC: 2.1 G/DL (ref 3.5–5.2)
ALBUMIN SERPL BCP-MCNC: 2.2 G/DL (ref 3.5–5.2)
ALBUMIN SERPL BCP-MCNC: 2.2 G/DL (ref 3.5–5.2)
ALBUMIN SERPL BCP-MCNC: 2.3 G/DL (ref 3.5–5.2)
ALBUMIN SERPL BCP-MCNC: 2.6 G/DL (ref 3.5–5.2)
ALBUMIN SERPL BCP-MCNC: 2.7 G/DL (ref 3.5–5.2)
ALBUMIN SERPL BCP-MCNC: 3 G/DL (ref 3.5–5.2)
ALBUMIN SERPL-MCNC: 2 G/DL (ref 3.6–5.1)
ALLENS TEST: ABNORMAL
ALP SERPL-CCNC: 103 U/L (ref 55–135)
ALP SERPL-CCNC: 107 U/L (ref 55–135)
ALP SERPL-CCNC: 109 U/L (ref 55–135)
ALP SERPL-CCNC: 111 U/L (ref 55–135)
ALP SERPL-CCNC: 111 U/L (ref 55–135)
ALP SERPL-CCNC: 115 U/L (ref 55–135)
ALP SERPL-CCNC: 115 U/L (ref 55–135)
ALP SERPL-CCNC: 127 U/L (ref 55–135)
ALP SERPL-CCNC: 128 U/L (ref 55–135)
ALP SERPL-CCNC: 140 U/L (ref 55–135)
ALP SERPL-CCNC: 147 U/L (ref 55–135)
ALP SERPL-CCNC: 147 U/L (ref 55–135)
ALP SERPL-CCNC: 155 U/L (ref 55–135)
ALP SERPL-CCNC: 157 U/L (ref 55–135)
ALP SERPL-CCNC: 176 U/L (ref 55–135)
ALP SERPL-CCNC: 179 U/L (ref 55–135)
ALP SERPL-CCNC: 187 U/L (ref 55–135)
ALP SERPL-CCNC: 214 U/L (ref 55–135)
ALP SERPL-CCNC: 219 U/L (ref 55–135)
ALP SERPL-CCNC: 219 U/L (ref 55–135)
ALP SERPL-CCNC: 222 U/L (ref 55–135)
ALP SERPL-CCNC: 224 U/L (ref 55–135)
ALP SERPL-CCNC: 36 U/L (ref 55–135)
ALP SERPL-CCNC: 38 U/L (ref 55–135)
ALP SERPL-CCNC: 48 U/L (ref 55–135)
ALP SERPL-CCNC: 59 U/L (ref 55–135)
ALP SERPL-CCNC: 71 U/L (ref 55–135)
ALP SERPL-CCNC: 76 U/L (ref 55–135)
ALP SERPL-CCNC: 93 U/L (ref 55–135)
ALP SERPL-CCNC: 95 U/L (ref 55–135)
ALP SERPL-CCNC: 96 U/L (ref 55–135)
ALT SERPL W/O P-5'-P-CCNC: 25 U/L (ref 10–44)
ALT SERPL W/O P-5'-P-CCNC: 27 U/L (ref 10–44)
ALT SERPL W/O P-5'-P-CCNC: 27 U/L (ref 10–44)
ALT SERPL W/O P-5'-P-CCNC: 28 U/L (ref 10–44)
ALT SERPL W/O P-5'-P-CCNC: 28 U/L (ref 10–44)
ALT SERPL W/O P-5'-P-CCNC: 29 U/L (ref 10–44)
ALT SERPL W/O P-5'-P-CCNC: 30 U/L (ref 10–44)
ALT SERPL W/O P-5'-P-CCNC: 31 U/L (ref 10–44)
ALT SERPL W/O P-5'-P-CCNC: 32 U/L (ref 10–44)
ALT SERPL W/O P-5'-P-CCNC: 33 U/L (ref 10–44)
ALT SERPL W/O P-5'-P-CCNC: 33 U/L (ref 10–44)
ALT SERPL W/O P-5'-P-CCNC: 36 U/L (ref 10–44)
ALT SERPL W/O P-5'-P-CCNC: 42 U/L (ref 10–44)
ALT SERPL W/O P-5'-P-CCNC: 43 U/L (ref 10–44)
ALT SERPL W/O P-5'-P-CCNC: 43 U/L (ref 10–44)
ALT SERPL W/O P-5'-P-CCNC: 44 U/L (ref 10–44)
ALT SERPL W/O P-5'-P-CCNC: 46 U/L (ref 10–44)
ALT SERPL W/O P-5'-P-CCNC: 47 U/L (ref 10–44)
ALT SERPL W/O P-5'-P-CCNC: 49 U/L (ref 10–44)
ALT SERPL W/O P-5'-P-CCNC: 51 U/L (ref 10–44)
ALT SERPL W/O P-5'-P-CCNC: 52 U/L (ref 10–44)
ALT SERPL W/O P-5'-P-CCNC: 53 U/L (ref 10–44)
ALT SERPL W/O P-5'-P-CCNC: 59 U/L (ref 10–44)
ALT SERPL W/O P-5'-P-CCNC: 61 U/L (ref 10–44)
ALT SERPL W/O P-5'-P-CCNC: 70 U/L (ref 10–44)
ALT SERPL W/O P-5'-P-CCNC: 77 U/L (ref 10–44)
ALT SERPL W/O P-5'-P-CCNC: 80 U/L (ref 10–44)
ALT SERPL W/O P-5'-P-CCNC: 81 U/L (ref 10–44)
ALT SERPL W/O P-5'-P-CCNC: 85 U/L (ref 10–44)
AMMONIA PLAS-SCNC: 77 UMOL/L (ref 10–50)
AMORPH CRY UR QL COMP ASSIST: ABNORMAL
AMORPH CRY UR QL COMP ASSIST: ABNORMAL
ANION GAP SERPL CALC-SCNC: 10 MMOL/L (ref 8–16)
ANION GAP SERPL CALC-SCNC: 11 MMOL/L (ref 8–16)
ANION GAP SERPL CALC-SCNC: 12 MMOL/L (ref 8–16)
ANION GAP SERPL CALC-SCNC: 13 MMOL/L (ref 8–16)
ANION GAP SERPL CALC-SCNC: 14 MMOL/L (ref 8–16)
ANION GAP SERPL CALC-SCNC: 15 MMOL/L (ref 8–16)
ANION GAP SERPL CALC-SCNC: 5 MMOL/L (ref 8–16)
ANION GAP SERPL CALC-SCNC: 7 MMOL/L (ref 8–16)
ANION GAP SERPL CALC-SCNC: 8 MMOL/L (ref 8–16)
ANION GAP SERPL CALC-SCNC: 9 MMOL/L (ref 8–16)
ANISOCYTOSIS BLD QL SMEAR: SLIGHT
APTT BLDCRRT: 28.2 SEC (ref 21–32)
APTT BLDCRRT: 30.1 SEC (ref 21–32)
APTT BLDCRRT: 31.3 SEC (ref 21–32)
APTT BLDCRRT: 32.2 SEC (ref 21–32)
APTT BLDCRRT: 36.7 SEC (ref 21–32)
APTT BLDCRRT: 36.7 SEC (ref 21–32)
APTT BLDCRRT: 36.8 SEC (ref 21–32)
APTT BLDCRRT: 36.9 SEC (ref 21–32)
APTT BLDCRRT: 37 SEC (ref 21–32)
APTT BLDCRRT: 38.2 SEC (ref 21–32)
APTT BLDCRRT: 39.5 SEC (ref 21–32)
APTT BLDCRRT: 41.4 SEC (ref 21–32)
APTT BLDCRRT: 41.6 SEC (ref 21–32)
APTT BLDCRRT: 41.7 SEC (ref 21–32)
APTT BLDCRRT: 41.9 SEC (ref 21–32)
APTT BLDCRRT: 41.9 SEC (ref 21–32)
APTT BLDCRRT: 42.7 SEC (ref 21–32)
APTT BLDCRRT: 43.1 SEC (ref 21–32)
APTT BLDCRRT: 43.4 SEC (ref 21–32)
APTT BLDCRRT: 43.8 SEC (ref 21–32)
APTT BLDCRRT: 44.7 SEC (ref 21–32)
ASCENDING AORTA: 2.73 CM
AST SERPL-CCNC: 100 U/L (ref 10–40)
AST SERPL-CCNC: 103 U/L (ref 10–40)
AST SERPL-CCNC: 109 U/L (ref 10–40)
AST SERPL-CCNC: 121 U/L (ref 10–40)
AST SERPL-CCNC: 40 U/L (ref 10–40)
AST SERPL-CCNC: 43 U/L (ref 10–40)
AST SERPL-CCNC: 45 U/L (ref 10–40)
AST SERPL-CCNC: 47 U/L (ref 10–40)
AST SERPL-CCNC: 48 U/L (ref 10–40)
AST SERPL-CCNC: 50 U/L (ref 10–40)
AST SERPL-CCNC: 52 U/L (ref 10–40)
AST SERPL-CCNC: 62 U/L (ref 10–40)
AST SERPL-CCNC: 68 U/L (ref 10–40)
AST SERPL-CCNC: 71 U/L (ref 10–40)
AST SERPL-CCNC: 72 U/L (ref 10–40)
AST SERPL-CCNC: 73 U/L (ref 10–40)
AST SERPL-CCNC: 73 U/L (ref 10–40)
AST SERPL-CCNC: 74 U/L (ref 10–40)
AST SERPL-CCNC: 76 U/L (ref 10–40)
AST SERPL-CCNC: 78 U/L (ref 10–40)
AST SERPL-CCNC: 79 U/L (ref 10–40)
AST SERPL-CCNC: 79 U/L (ref 10–40)
AST SERPL-CCNC: 80 U/L (ref 10–40)
AST SERPL-CCNC: 81 U/L (ref 10–40)
AST SERPL-CCNC: 85 U/L (ref 10–40)
AST SERPL-CCNC: 86 U/L (ref 10–40)
AST SERPL-CCNC: 88 U/L (ref 10–40)
AST SERPL-CCNC: 90 U/L (ref 10–40)
AST SERPL-CCNC: 97 U/L (ref 10–40)
BACTERIA #/AREA URNS AUTO: ABNORMAL /HPF
BACTERIA #/AREA URNS AUTO: ABNORMAL /HPF
BACTERIA BLD CULT: NORMAL
BACTERIA SPEC AEROBE CULT: NORMAL
BACTERIA UR CULT: NO GROWTH
BACTERIA UR CULT: NO GROWTH
BASO STIPL BLD QL SMEAR: ABNORMAL
BASOPHILS # BLD AUTO: 0 K/UL (ref 0–0.2)
BASOPHILS # BLD AUTO: 0 K/UL (ref 0–0.2)
BASOPHILS # BLD AUTO: 0.01 K/UL (ref 0–0.2)
BASOPHILS # BLD AUTO: 0.02 K/UL (ref 0–0.2)
BASOPHILS # BLD AUTO: 0.03 K/UL (ref 0–0.2)
BASOPHILS # BLD AUTO: 0.04 K/UL (ref 0–0.2)
BASOPHILS # BLD AUTO: 0.08 K/UL (ref 0–0.2)
BASOPHILS # BLD AUTO: 0.09 K/UL (ref 0–0.2)
BASOPHILS # BLD AUTO: 0.1 K/UL (ref 0–0.2)
BASOPHILS # BLD AUTO: ABNORMAL K/UL (ref 0–0.2)
BASOPHILS NFR BLD: 0 % (ref 0–1.9)
BASOPHILS NFR BLD: 0.1 % (ref 0–1.9)
BASOPHILS NFR BLD: 0.2 % (ref 0–1.9)
BASOPHILS NFR BLD: 0.4 % (ref 0–1.9)
BASOPHILS NFR BLD: 0.5 % (ref 0–1.9)
BASOPHILS NFR BLD: 0.6 % (ref 0–1.9)
BASOPHILS NFR BLD: 0.7 % (ref 0–1.9)
BASOPHILS NFR BLD: 1 % (ref 0–1.9)
BILIRUB SERPL-MCNC: 0.2 MG/DL (ref 0.1–1)
BILIRUB SERPL-MCNC: 0.3 MG/DL (ref 0.1–1)
BILIRUB SERPL-MCNC: 0.4 MG/DL (ref 0.1–1)
BILIRUB SERPL-MCNC: 0.5 MG/DL (ref 0.1–1)
BILIRUB SERPL-MCNC: 0.6 MG/DL (ref 0.1–1)
BILIRUB SERPL-MCNC: 0.7 MG/DL (ref 0.1–1)
BILIRUB SERPL-MCNC: 0.8 MG/DL (ref 0.1–1)
BILIRUB UR QL STRIP: NEGATIVE
BILIRUB UR QL STRIP: NEGATIVE
BLD GP AB SCN CELLS X3 SERPL QL: NORMAL
BLD PROD TYP BPU: NORMAL
BLD PROD TYP BPU: NORMAL
BLOOD UNIT EXPIRATION DATE: NORMAL
BLOOD UNIT EXPIRATION DATE: NORMAL
BLOOD UNIT TYPE CODE: 6200
BLOOD UNIT TYPE CODE: 6200
BLOOD UNIT TYPE: NORMAL
BLOOD UNIT TYPE: NORMAL
BNP SERPL-MCNC: <10 PG/ML (ref 0–99)
BSA FOR ECHO PROCEDURE: 2.04 M2
BUN SERPL-MCNC: 100 MG/DL (ref 6–20)
BUN SERPL-MCNC: 100 MG/DL (ref 6–20)
BUN SERPL-MCNC: 101 MG/DL (ref 6–20)
BUN SERPL-MCNC: 104 MG/DL (ref 6–20)
BUN SERPL-MCNC: 106 MG/DL (ref 6–20)
BUN SERPL-MCNC: 107 MG/DL (ref 6–20)
BUN SERPL-MCNC: 108 MG/DL (ref 6–20)
BUN SERPL-MCNC: 108 MG/DL (ref 6–20)
BUN SERPL-MCNC: 11 MG/DL (ref 6–20)
BUN SERPL-MCNC: 110 MG/DL (ref 6–20)
BUN SERPL-MCNC: 110 MG/DL (ref 6–20)
BUN SERPL-MCNC: 111 MG/DL (ref 6–20)
BUN SERPL-MCNC: 112 MG/DL (ref 6–20)
BUN SERPL-MCNC: 112 MG/DL (ref 6–20)
BUN SERPL-MCNC: 113 MG/DL (ref 6–20)
BUN SERPL-MCNC: 113 MG/DL (ref 6–20)
BUN SERPL-MCNC: 114 MG/DL (ref 6–20)
BUN SERPL-MCNC: 115 MG/DL (ref 6–20)
BUN SERPL-MCNC: 117 MG/DL (ref 6–20)
BUN SERPL-MCNC: 12 MG/DL (ref 6–20)
BUN SERPL-MCNC: 120 MG/DL (ref 6–20)
BUN SERPL-MCNC: 122 MG/DL (ref 6–20)
BUN SERPL-MCNC: 132 MG/DL (ref 6–20)
BUN SERPL-MCNC: 132 MG/DL (ref 6–20)
BUN SERPL-MCNC: 135 MG/DL (ref 6–20)
BUN SERPL-MCNC: 14 MG/DL (ref 6–20)
BUN SERPL-MCNC: 15 MG/DL (ref 6–20)
BUN SERPL-MCNC: 16 MG/DL (ref 6–20)
BUN SERPL-MCNC: 17 MG/DL (ref 6–20)
BUN SERPL-MCNC: 18 MG/DL (ref 6–20)
BUN SERPL-MCNC: 20 MG/DL (ref 6–20)
BUN SERPL-MCNC: 21 MG/DL (ref 6–20)
BUN SERPL-MCNC: 22 MG/DL (ref 6–20)
BUN SERPL-MCNC: 22 MG/DL (ref 6–20)
BUN SERPL-MCNC: 26 MG/DL (ref 6–20)
BUN SERPL-MCNC: 26 MG/DL (ref 6–20)
BUN SERPL-MCNC: 28 MG/DL (ref 6–20)
BUN SERPL-MCNC: 29 MG/DL (ref 6–20)
BUN SERPL-MCNC: 30 MG/DL (ref 6–20)
BUN SERPL-MCNC: 30 MG/DL (ref 6–20)
BUN SERPL-MCNC: 34 MG/DL (ref 6–20)
BUN SERPL-MCNC: 36 MG/DL (ref 6–20)
BUN SERPL-MCNC: 37 MG/DL (ref 6–20)
BUN SERPL-MCNC: 37 MG/DL (ref 6–20)
BUN SERPL-MCNC: 40 MG/DL (ref 6–20)
BUN SERPL-MCNC: 43 MG/DL (ref 6–20)
BUN SERPL-MCNC: 46 MG/DL (ref 6–20)
BUN SERPL-MCNC: 52 MG/DL (ref 6–20)
BUN SERPL-MCNC: 52 MG/DL (ref 6–20)
BUN SERPL-MCNC: 66 MG/DL (ref 6–20)
BUN SERPL-MCNC: 70 MG/DL (ref 6–20)
BUN SERPL-MCNC: 71 MG/DL (ref 6–20)
BUN SERPL-MCNC: 72 MG/DL (ref 6–20)
BUN SERPL-MCNC: 78 MG/DL (ref 6–20)
BUN SERPL-MCNC: 80 MG/DL (ref 6–20)
BUN SERPL-MCNC: 80 MG/DL (ref 6–20)
BUN SERPL-MCNC: 82 MG/DL (ref 6–20)
BUN SERPL-MCNC: 9 MG/DL (ref 6–20)
BUN SERPL-MCNC: 96 MG/DL (ref 6–20)
BUN SERPL-MCNC: 98 MG/DL (ref 6–20)
BUN SERPL-MCNC: 99 MG/DL (ref 6–20)
BUN SERPL-MCNC: 99 MG/DL (ref 6–20)
C DIFF GDH STL QL: NEGATIVE
C DIFF TOX A+B STL QL IA: NEGATIVE
C DIPHTHERIAE AB SER IA-ACNC: 1.62 IU/ML
C TETANI TOXOID AB SER-ACNC: >5.33 IU/ML
CA-I BLDV-SCNC: 0.75 MMOL/L (ref 1.06–1.42)
CA-I BLDV-SCNC: 1 MMOL/L (ref 1.06–1.42)
CA-I BLDV-SCNC: 1.01 MMOL/L (ref 1.06–1.42)
CA-I BLDV-SCNC: 1.05 MMOL/L (ref 1.06–1.42)
CALCIUM SERPL-MCNC: 6.2 MG/DL (ref 8.7–10.5)
CALCIUM SERPL-MCNC: 6.5 MG/DL (ref 8.7–10.5)
CALCIUM SERPL-MCNC: 6.9 MG/DL (ref 8.7–10.5)
CALCIUM SERPL-MCNC: 7 MG/DL (ref 8.7–10.5)
CALCIUM SERPL-MCNC: 7 MG/DL (ref 8.7–10.5)
CALCIUM SERPL-MCNC: 7.2 MG/DL (ref 8.7–10.5)
CALCIUM SERPL-MCNC: 7.3 MG/DL (ref 8.7–10.5)
CALCIUM SERPL-MCNC: 7.4 MG/DL (ref 8.7–10.5)
CALCIUM SERPL-MCNC: 7.5 MG/DL (ref 8.7–10.5)
CALCIUM SERPL-MCNC: 7.6 MG/DL (ref 8.7–10.5)
CALCIUM SERPL-MCNC: 7.7 MG/DL (ref 8.7–10.5)
CALCIUM SERPL-MCNC: 7.8 MG/DL (ref 8.7–10.5)
CALCIUM SERPL-MCNC: 7.9 MG/DL (ref 8.7–10.5)
CALCIUM SERPL-MCNC: 8 MG/DL (ref 8.7–10.5)
CALCIUM SERPL-MCNC: 8 MG/DL (ref 8.7–10.5)
CALCIUM SERPL-MCNC: 8.1 MG/DL (ref 8.7–10.5)
CALCIUM SERPL-MCNC: 8.2 MG/DL (ref 8.7–10.5)
CALCIUM SERPL-MCNC: 8.3 MG/DL (ref 8.7–10.5)
CALCIUM SERPL-MCNC: 8.4 MG/DL (ref 8.7–10.5)
CALCIUM SERPL-MCNC: 8.4 MG/DL (ref 8.7–10.5)
CALCIUM SERPL-MCNC: 8.5 MG/DL (ref 8.7–10.5)
CALCIUM SERPL-MCNC: 8.7 MG/DL (ref 8.7–10.5)
CALCIUM SERPL-MCNC: 8.9 MG/DL (ref 8.7–10.5)
CALCIUM SERPL-MCNC: 8.9 MG/DL (ref 8.7–10.5)
CALCIUM SERPL-MCNC: 9.2 MG/DL (ref 8.7–10.5)
CALCIUM SERPL-MCNC: 9.5 MG/DL (ref 8.7–10.5)
CHLORIDE SERPL-SCNC: 100 MMOL/L (ref 95–110)
CHLORIDE SERPL-SCNC: 101 MMOL/L (ref 95–110)
CHLORIDE SERPL-SCNC: 102 MMOL/L (ref 95–110)
CHLORIDE SERPL-SCNC: 103 MMOL/L (ref 95–110)
CHLORIDE SERPL-SCNC: 104 MMOL/L (ref 95–110)
CHLORIDE SERPL-SCNC: 104 MMOL/L (ref 95–110)
CHLORIDE SERPL-SCNC: 105 MMOL/L (ref 95–110)
CHLORIDE SERPL-SCNC: 106 MMOL/L (ref 95–110)
CHLORIDE SERPL-SCNC: 107 MMOL/L (ref 95–110)
CHLORIDE SERPL-SCNC: 108 MMOL/L (ref 95–110)
CHLORIDE SERPL-SCNC: 109 MMOL/L (ref 95–110)
CHLORIDE SERPL-SCNC: 116 MMOL/L (ref 95–110)
CHLORIDE SERPL-SCNC: 95 MMOL/L (ref 95–110)
CHLORIDE SERPL-SCNC: 96 MMOL/L (ref 95–110)
CHLORIDE SERPL-SCNC: 97 MMOL/L (ref 95–110)
CHLORIDE SERPL-SCNC: 99 MMOL/L (ref 95–110)
CHLORIDE UR-SCNC: 91 MMOL/L (ref 25–200)
CHOLEST SERPL-MCNC: 147 MG/DL (ref 120–199)
CHOLEST/HDLC SERPL: 5.1 {RATIO} (ref 2–5)
CK SERPL-CCNC: 445 U/L (ref 20–200)
CK SERPL-CCNC: 457 U/L (ref 20–200)
CK SERPL-CCNC: 495 U/L (ref 20–200)
CK SERPL-CCNC: 617 U/L (ref 20–200)
CLARITY UR REFRACT.AUTO: ABNORMAL
CLARITY UR REFRACT.AUTO: ABNORMAL
CO2 SERPL-SCNC: 19 MMOL/L (ref 23–29)
CO2 SERPL-SCNC: 20 MMOL/L (ref 23–29)
CO2 SERPL-SCNC: 22 MMOL/L (ref 23–29)
CO2 SERPL-SCNC: 23 MMOL/L (ref 23–29)
CO2 SERPL-SCNC: 24 MMOL/L (ref 23–29)
CO2 SERPL-SCNC: 25 MMOL/L (ref 23–29)
CO2 SERPL-SCNC: 26 MMOL/L (ref 23–29)
CO2 SERPL-SCNC: 27 MMOL/L (ref 23–29)
CO2 SERPL-SCNC: 28 MMOL/L (ref 23–29)
CO2 SERPL-SCNC: 29 MMOL/L (ref 23–29)
CO2 SERPL-SCNC: 30 MMOL/L (ref 23–29)
CO2 SERPL-SCNC: 31 MMOL/L (ref 23–29)
CO2 SERPL-SCNC: 32 MMOL/L (ref 23–29)
CO2 SERPL-SCNC: 33 MMOL/L (ref 23–29)
CO2 SERPL-SCNC: 33 MMOL/L (ref 23–29)
CO2 SERPL-SCNC: 34 MMOL/L (ref 23–29)
CODING SYSTEM: NORMAL
CODING SYSTEM: NORMAL
COLOR UR AUTO: ABNORMAL
COLOR UR AUTO: YELLOW
CREAT SERPL-MCNC: 0.8 MG/DL (ref 0.5–1.4)
CREAT SERPL-MCNC: 0.9 MG/DL (ref 0.5–1.4)
CREAT SERPL-MCNC: 1 MG/DL (ref 0.5–1.4)
CREAT SERPL-MCNC: 1.1 MG/DL (ref 0.5–1.4)
CREAT SERPL-MCNC: 1.2 MG/DL (ref 0.5–1.4)
CREAT SERPL-MCNC: 1.4 MG/DL (ref 0.5–1.4)
CREAT SERPL-MCNC: 1.5 MG/DL (ref 0.5–1.4)
CREAT SERPL-MCNC: 1.5 MG/DL (ref 0.5–1.4)
CREAT SERPL-MCNC: 1.6 MG/DL (ref 0.5–1.4)
CREAT SERPL-MCNC: 1.7 MG/DL (ref 0.5–1.4)
CREAT SERPL-MCNC: 1.9 MG/DL (ref 0.5–1.4)
CREAT SERPL-MCNC: 2 MG/DL (ref 0.5–1.4)
CREAT SERPL-MCNC: 2.1 MG/DL (ref 0.5–1.4)
CREAT SERPL-MCNC: 2.1 MG/DL (ref 0.5–1.4)
CREAT SERPL-MCNC: 2.3 MG/DL (ref 0.5–1.4)
CREAT SERPL-MCNC: 2.4 MG/DL (ref 0.5–1.4)
CREAT SERPL-MCNC: 2.5 MG/DL (ref 0.5–1.4)
CREAT SERPL-MCNC: 2.6 MG/DL (ref 0.5–1.4)
CREAT SERPL-MCNC: 2.7 MG/DL (ref 0.5–1.4)
CREAT SERPL-MCNC: 2.8 MG/DL (ref 0.5–1.4)
CREAT SERPL-MCNC: 2.8 MG/DL (ref 0.5–1.4)
CREAT SERPL-MCNC: 2.9 MG/DL (ref 0.5–1.4)
CREAT SERPL-MCNC: 3 MG/DL (ref 0.5–1.4)
CREAT SERPL-MCNC: 3.1 MG/DL (ref 0.5–1.4)
CREAT SERPL-MCNC: 3.3 MG/DL (ref 0.5–1.4)
CREAT SERPL-MCNC: 3.4 MG/DL (ref 0.5–1.4)
CREAT SERPL-MCNC: 3.5 MG/DL (ref 0.5–1.4)
CREAT SERPL-MCNC: 3.9 MG/DL (ref 0.5–1.4)
CREAT SERPL-MCNC: 4.3 MG/DL (ref 0.5–1.4)
CREAT SERPL-MCNC: 4.9 MG/DL (ref 0.5–1.4)
CREAT SERPL-MCNC: 5.4 MG/DL (ref 0.5–1.4)
CREAT SERPL-MCNC: 5.4 MG/DL (ref 0.5–1.4)
CREAT UR-MCNC: 28 MG/DL (ref 23–375)
CREAT UR-MCNC: 40 MG/DL (ref 23–375)
CRP SERPL-MCNC: 173.6 MG/L (ref 0–8.2)
CRP SERPL-MCNC: 193.8 MG/L (ref 0–8.2)
CRP SERPL-MCNC: 36.5 MG/L (ref 0–8.2)
CRP SERPL-MCNC: 56.2 MG/L (ref 0–8.2)
CRP SERPL-MCNC: 72.2 MG/L (ref 0–8.2)
CTP QC/QA: YES
CTP QC/QA: YES
CV ECHO LV RWT: 0.38 CM
D DIMER PPP IA.FEU-MCNC: 0.45 MG/L FEU
D DIMER PPP IA.FEU-MCNC: 0.51 MG/L FEU
D DIMER PPP IA.FEU-MCNC: 1.34 MG/L FEU
D DIMER PPP IA.FEU-MCNC: 13.81 MG/L FEU
D DIMER PPP IA.FEU-MCNC: 24.89 MG/L FEU
D DIMER PPP IA.FEU-MCNC: >33 MG/L FEU
DACRYOCYTES BLD QL SMEAR: ABNORMAL
DACRYOCYTES BLD QL SMEAR: ABNORMAL
DELSYS: ABNORMAL
DEPRECATED S PNEUM23 IGG SER-MCNC: 0.3 UG/ML
DEPRECATED S PNEUM4 IGG SER-MCNC: 0.3 UG/ML
DIFFERENTIAL METHOD: ABNORMAL
DISPENSE STATUS: NORMAL
DISPENSE STATUS: NORMAL
DOP CALC LVOT AREA: 3.3 CM2
DOP CALC LVOT DIAMETER: 2.05 CM
E WAVE DECELERATION TIME: 197.82 MSEC
E/A RATIO: 1.13
ECHO LV POSTERIOR WALL: 0.87 CM (ref 0.6–1.1)
EOSINOPHIL # BLD AUTO: 0 K/UL (ref 0–0.5)
EOSINOPHIL # BLD AUTO: 0.1 K/UL (ref 0–0.5)
EOSINOPHIL # BLD AUTO: 0.2 K/UL (ref 0–0.5)
EOSINOPHIL # BLD AUTO: ABNORMAL K/UL (ref 0–0.5)
EOSINOPHIL NFR BLD: 0 % (ref 0–8)
EOSINOPHIL NFR BLD: 0.1 % (ref 0–8)
EOSINOPHIL NFR BLD: 0.2 % (ref 0–8)
EOSINOPHIL NFR BLD: 0.2 % (ref 0–8)
EOSINOPHIL NFR BLD: 1 % (ref 0–8)
EOSINOPHIL NFR BLD: 1 % (ref 0–8)
EOSINOPHIL NFR BLD: 1.8 % (ref 0–8)
EP: 10
EP: 10
EP: 14
ERYTHROCYTE [DISTWIDTH] IN BLOOD BY AUTOMATED COUNT: 11.9 % (ref 11.5–14.5)
ERYTHROCYTE [DISTWIDTH] IN BLOOD BY AUTOMATED COUNT: 12.1 % (ref 11.5–14.5)
ERYTHROCYTE [DISTWIDTH] IN BLOOD BY AUTOMATED COUNT: 12.1 % (ref 11.5–14.5)
ERYTHROCYTE [DISTWIDTH] IN BLOOD BY AUTOMATED COUNT: 12.2 % (ref 11.5–14.5)
ERYTHROCYTE [DISTWIDTH] IN BLOOD BY AUTOMATED COUNT: 12.3 % (ref 11.5–14.5)
ERYTHROCYTE [DISTWIDTH] IN BLOOD BY AUTOMATED COUNT: 12.4 % (ref 11.5–14.5)
ERYTHROCYTE [DISTWIDTH] IN BLOOD BY AUTOMATED COUNT: 12.5 % (ref 11.5–14.5)
ERYTHROCYTE [DISTWIDTH] IN BLOOD BY AUTOMATED COUNT: 12.6 % (ref 11.5–14.5)
ERYTHROCYTE [DISTWIDTH] IN BLOOD BY AUTOMATED COUNT: 12.6 % (ref 11.5–14.5)
ERYTHROCYTE [DISTWIDTH] IN BLOOD BY AUTOMATED COUNT: 12.7 % (ref 11.5–14.5)
ERYTHROCYTE [DISTWIDTH] IN BLOOD BY AUTOMATED COUNT: 12.8 % (ref 11.5–14.5)
ERYTHROCYTE [DISTWIDTH] IN BLOOD BY AUTOMATED COUNT: 12.9 % (ref 11.5–14.5)
ERYTHROCYTE [DISTWIDTH] IN BLOOD BY AUTOMATED COUNT: 13.1 % (ref 11.5–14.5)
ERYTHROCYTE [DISTWIDTH] IN BLOOD BY AUTOMATED COUNT: 13.2 % (ref 11.5–14.5)
ERYTHROCYTE [DISTWIDTH] IN BLOOD BY AUTOMATED COUNT: 13.7 % (ref 11.5–14.5)
ERYTHROCYTE [DISTWIDTH] IN BLOOD BY AUTOMATED COUNT: 13.9 % (ref 11.5–14.5)
ERYTHROCYTE [DISTWIDTH] IN BLOOD BY AUTOMATED COUNT: 13.9 % (ref 11.5–14.5)
ERYTHROCYTE [DISTWIDTH] IN BLOOD BY AUTOMATED COUNT: 14 % (ref 11.5–14.5)
ERYTHROCYTE [DISTWIDTH] IN BLOOD BY AUTOMATED COUNT: 14 % (ref 11.5–14.5)
ERYTHROCYTE [DISTWIDTH] IN BLOOD BY AUTOMATED COUNT: 14.2 % (ref 11.5–14.5)
ERYTHROCYTE [DISTWIDTH] IN BLOOD BY AUTOMATED COUNT: 14.4 % (ref 11.5–14.5)
ERYTHROCYTE [DISTWIDTH] IN BLOOD BY AUTOMATED COUNT: 14.6 % (ref 11.5–14.5)
ERYTHROCYTE [DISTWIDTH] IN BLOOD BY AUTOMATED COUNT: 16 % (ref 11.5–14.5)
ERYTHROCYTE [DISTWIDTH] IN BLOOD BY AUTOMATED COUNT: 17 % (ref 11.5–14.5)
ERYTHROCYTE [SEDIMENTATION RATE] IN BLOOD BY WESTERGREN METHOD: 16 MM/H
ERYTHROCYTE [SEDIMENTATION RATE] IN BLOOD BY WESTERGREN METHOD: 16 MM/H
ERYTHROCYTE [SEDIMENTATION RATE] IN BLOOD BY WESTERGREN METHOD: 20 MM/H
ERYTHROCYTE [SEDIMENTATION RATE] IN BLOOD BY WESTERGREN METHOD: 20 MM/H
ERYTHROCYTE [SEDIMENTATION RATE] IN BLOOD BY WESTERGREN METHOD: 30 MM/H
ERYTHROCYTE [SEDIMENTATION RATE] IN BLOOD BY WESTERGREN METHOD: 32 MM/H
ERYTHROCYTE [SEDIMENTATION RATE] IN BLOOD BY WESTERGREN METHOD: 35 MM/H
ERYTHROCYTE [SEDIMENTATION RATE] IN BLOOD BY WESTERGREN METHOD: 36 MM/H
EST. GFR  (AFRICAN AMERICAN): 12.9 ML/MIN/1.73 M^2
EST. GFR  (AFRICAN AMERICAN): 12.9 ML/MIN/1.73 M^2
EST. GFR  (AFRICAN AMERICAN): 14.5 ML/MIN/1.73 M^2
EST. GFR  (AFRICAN AMERICAN): 17 ML/MIN/1.73 M^2
EST. GFR  (AFRICAN AMERICAN): 19.1 ML/MIN/1.73 M^2
EST. GFR  (AFRICAN AMERICAN): 21.7 ML/MIN/1.73 M^2
EST. GFR  (AFRICAN AMERICAN): 22.5 ML/MIN/1.73 M^2
EST. GFR  (AFRICAN AMERICAN): 23.4 ML/MIN/1.73 M^2
EST. GFR  (AFRICAN AMERICAN): 25.2 ML/MIN/1.73 M^2
EST. GFR  (AFRICAN AMERICAN): 26.2 ML/MIN/1.73 M^2
EST. GFR  (AFRICAN AMERICAN): 27.3 ML/MIN/1.73 M^2
EST. GFR  (AFRICAN AMERICAN): 28.5 ML/MIN/1.73 M^2
EST. GFR  (AFRICAN AMERICAN): 28.5 ML/MIN/1.73 M^2
EST. GFR  (AFRICAN AMERICAN): 29.8 ML/MIN/1.73 M^2
EST. GFR  (AFRICAN AMERICAN): 31.2 ML/MIN/1.73 M^2
EST. GFR  (AFRICAN AMERICAN): 32.7 ML/MIN/1.73 M^2
EST. GFR  (AFRICAN AMERICAN): 34.3 ML/MIN/1.73 M^2
EST. GFR  (AFRICAN AMERICAN): 36.1 ML/MIN/1.73 M^2
EST. GFR  (AFRICAN AMERICAN): 40.3 ML/MIN/1.73 M^2
EST. GFR  (AFRICAN AMERICAN): 40.3 ML/MIN/1.73 M^2
EST. GFR  (AFRICAN AMERICAN): 42.8 ML/MIN/1.73 M^2
EST. GFR  (AFRICAN AMERICAN): 45.5 ML/MIN/1.73 M^2
EST. GFR  (AFRICAN AMERICAN): 52.1 ML/MIN/1.73 M^2
EST. GFR  (AFRICAN AMERICAN): 56 ML/MIN/1.73 M^2
EST. GFR  (AFRICAN AMERICAN): >60 ML/MIN/1.73 M^2
EST. GFR  (NON AFRICAN AMERICAN): 11.1 ML/MIN/1.73 M^2
EST. GFR  (NON AFRICAN AMERICAN): 11.1 ML/MIN/1.73 M^2
EST. GFR  (NON AFRICAN AMERICAN): 12.5 ML/MIN/1.73 M^2
EST. GFR  (NON AFRICAN AMERICAN): 14.7 ML/MIN/1.73 M^2
EST. GFR  (NON AFRICAN AMERICAN): 16.5 ML/MIN/1.73 M^2
EST. GFR  (NON AFRICAN AMERICAN): 18.8 ML/MIN/1.73 M^2
EST. GFR  (NON AFRICAN AMERICAN): 19.5 ML/MIN/1.73 M^2
EST. GFR  (NON AFRICAN AMERICAN): 20.2 ML/MIN/1.73 M^2
EST. GFR  (NON AFRICAN AMERICAN): 21.8 ML/MIN/1.73 M^2
EST. GFR  (NON AFRICAN AMERICAN): 22.7 ML/MIN/1.73 M^2
EST. GFR  (NON AFRICAN AMERICAN): 23.6 ML/MIN/1.73 M^2
EST. GFR  (NON AFRICAN AMERICAN): 24.6 ML/MIN/1.73 M^2
EST. GFR  (NON AFRICAN AMERICAN): 24.6 ML/MIN/1.73 M^2
EST. GFR  (NON AFRICAN AMERICAN): 25.7 ML/MIN/1.73 M^2
EST. GFR  (NON AFRICAN AMERICAN): 26.9 ML/MIN/1.73 M^2
EST. GFR  (NON AFRICAN AMERICAN): 28.3 ML/MIN/1.73 M^2
EST. GFR  (NON AFRICAN AMERICAN): 29.7 ML/MIN/1.73 M^2
EST. GFR  (NON AFRICAN AMERICAN): 31.3 ML/MIN/1.73 M^2
EST. GFR  (NON AFRICAN AMERICAN): 34.9 ML/MIN/1.73 M^2
EST. GFR  (NON AFRICAN AMERICAN): 34.9 ML/MIN/1.73 M^2
EST. GFR  (NON AFRICAN AMERICAN): 37 ML/MIN/1.73 M^2
EST. GFR  (NON AFRICAN AMERICAN): 39.4 ML/MIN/1.73 M^2
EST. GFR  (NON AFRICAN AMERICAN): 45 ML/MIN/1.73 M^2
EST. GFR  (NON AFRICAN AMERICAN): 48.5 ML/MIN/1.73 M^2
EST. GFR  (NON AFRICAN AMERICAN): 52.4 ML/MIN/1.73 M^2
EST. GFR  (NON AFRICAN AMERICAN): 52.4 ML/MIN/1.73 M^2
EST. GFR  (NON AFRICAN AMERICAN): 57 ML/MIN/1.73 M^2
EST. GFR  (NON AFRICAN AMERICAN): >60 ML/MIN/1.73 M^2
ESTIMATED AVG GLUCOSE: 108 MG/DL (ref 68–131)
FACT X PPP CHRO-ACNC: 0.32 IU/ML (ref 0.3–0.7)
FACT X PPP CHRO-ACNC: 0.37 IU/ML (ref 0.3–0.7)
FACT X PPP CHRO-ACNC: 0.38 IU/ML (ref 0.3–0.7)
FACT X PPP CHRO-ACNC: 0.45 IU/ML (ref 0.3–0.7)
FACT X PPP CHRO-ACNC: 0.47 IU/ML (ref 0.3–0.7)
FACT X PPP CHRO-ACNC: 0.49 IU/ML (ref 0.3–0.7)
FACT X PPP CHRO-ACNC: 0.49 IU/ML (ref 0.3–0.7)
FACT X PPP CHRO-ACNC: 0.53 IU/ML (ref 0.3–0.7)
FACT X PPP CHRO-ACNC: 0.57 IU/ML (ref 0.3–0.7)
FACT X PPP CHRO-ACNC: 0.74 IU/ML (ref 0.3–0.7)
FERRITIN SERPL-MCNC: 1175 NG/ML (ref 20–300)
FERRITIN SERPL-MCNC: 435 NG/ML (ref 20–300)
FERRITIN SERPL-MCNC: 460 NG/ML (ref 20–300)
FERRITIN SERPL-MCNC: 477 NG/ML (ref 20–300)
FERRITIN SERPL-MCNC: 707 NG/ML (ref 20–300)
FIO2: 100
FIO2: 70
FIO2: 80
FIO2: 90
FIO2: 95
FLOW: 10
FRACTIONAL SHORTENING: 34 % (ref 28–44)
GIANT PLATELETS BLD QL SMEAR: PRESENT
GLUCOSE SERPL-MCNC: 100 MG/DL (ref 70–110)
GLUCOSE SERPL-MCNC: 107 MG/DL (ref 70–110)
GLUCOSE SERPL-MCNC: 107 MG/DL (ref 70–110)
GLUCOSE SERPL-MCNC: 109 MG/DL (ref 70–110)
GLUCOSE SERPL-MCNC: 110 MG/DL (ref 70–110)
GLUCOSE SERPL-MCNC: 112 MG/DL (ref 70–110)
GLUCOSE SERPL-MCNC: 112 MG/DL (ref 70–110)
GLUCOSE SERPL-MCNC: 113 MG/DL (ref 70–110)
GLUCOSE SERPL-MCNC: 117 MG/DL (ref 70–110)
GLUCOSE SERPL-MCNC: 118 MG/DL (ref 70–110)
GLUCOSE SERPL-MCNC: 119 MG/DL (ref 70–110)
GLUCOSE SERPL-MCNC: 120 MG/DL (ref 70–110)
GLUCOSE SERPL-MCNC: 123 MG/DL (ref 70–110)
GLUCOSE SERPL-MCNC: 124 MG/DL (ref 70–110)
GLUCOSE SERPL-MCNC: 124 MG/DL (ref 70–110)
GLUCOSE SERPL-MCNC: 127 MG/DL (ref 70–110)
GLUCOSE SERPL-MCNC: 128 MG/DL (ref 70–110)
GLUCOSE SERPL-MCNC: 129 MG/DL (ref 70–110)
GLUCOSE SERPL-MCNC: 132 MG/DL (ref 70–110)
GLUCOSE SERPL-MCNC: 134 MG/DL (ref 70–110)
GLUCOSE SERPL-MCNC: 136 MG/DL (ref 70–110)
GLUCOSE SERPL-MCNC: 138 MG/DL (ref 70–110)
GLUCOSE SERPL-MCNC: 139 MG/DL (ref 70–110)
GLUCOSE SERPL-MCNC: 140 MG/DL (ref 70–110)
GLUCOSE SERPL-MCNC: 141 MG/DL (ref 70–110)
GLUCOSE SERPL-MCNC: 141 MG/DL (ref 70–110)
GLUCOSE SERPL-MCNC: 143 MG/DL (ref 70–110)
GLUCOSE SERPL-MCNC: 145 MG/DL (ref 70–110)
GLUCOSE SERPL-MCNC: 146 MG/DL (ref 70–110)
GLUCOSE SERPL-MCNC: 147 MG/DL (ref 70–110)
GLUCOSE SERPL-MCNC: 147 MG/DL (ref 70–110)
GLUCOSE SERPL-MCNC: 148 MG/DL (ref 70–110)
GLUCOSE SERPL-MCNC: 148 MG/DL (ref 70–110)
GLUCOSE SERPL-MCNC: 149 MG/DL (ref 70–110)
GLUCOSE SERPL-MCNC: 149 MG/DL (ref 70–110)
GLUCOSE SERPL-MCNC: 152 MG/DL (ref 70–110)
GLUCOSE SERPL-MCNC: 153 MG/DL (ref 70–110)
GLUCOSE SERPL-MCNC: 154 MG/DL (ref 70–110)
GLUCOSE SERPL-MCNC: 155 MG/DL (ref 70–110)
GLUCOSE SERPL-MCNC: 157 MG/DL (ref 70–110)
GLUCOSE SERPL-MCNC: 158 MG/DL (ref 70–110)
GLUCOSE SERPL-MCNC: 159 MG/DL (ref 70–110)
GLUCOSE SERPL-MCNC: 164 MG/DL (ref 70–110)
GLUCOSE SERPL-MCNC: 165 MG/DL (ref 70–110)
GLUCOSE SERPL-MCNC: 166 MG/DL (ref 70–110)
GLUCOSE SERPL-MCNC: 166 MG/DL (ref 70–110)
GLUCOSE SERPL-MCNC: 173 MG/DL (ref 70–110)
GLUCOSE SERPL-MCNC: 174 MG/DL (ref 70–110)
GLUCOSE SERPL-MCNC: 175 MG/DL (ref 70–110)
GLUCOSE SERPL-MCNC: 178 MG/DL (ref 70–110)
GLUCOSE SERPL-MCNC: 178 MG/DL (ref 70–110)
GLUCOSE SERPL-MCNC: 179 MG/DL (ref 70–110)
GLUCOSE SERPL-MCNC: 179 MG/DL (ref 70–110)
GLUCOSE SERPL-MCNC: 182 MG/DL (ref 70–110)
GLUCOSE SERPL-MCNC: 185 MG/DL (ref 70–110)
GLUCOSE SERPL-MCNC: 214 MG/DL (ref 70–110)
GLUCOSE SERPL-MCNC: 86 MG/DL (ref 70–110)
GLUCOSE SERPL-MCNC: 90 MG/DL (ref 70–110)
GLUCOSE SERPL-MCNC: 93 MG/DL (ref 70–110)
GLUCOSE SERPL-MCNC: 94 MG/DL (ref 70–110)
GLUCOSE SERPL-MCNC: 94 MG/DL (ref 70–110)
GLUCOSE UR QL STRIP: NEGATIVE
GLUCOSE UR QL STRIP: NEGATIVE
GRAM STN SPEC: NORMAL
HAEM INFLU B IGG SER IA-MCNC: <0.15 MCG/ML
HBA1C MFR BLD HPLC: 5.4 % (ref 4–5.6)
HCO3 UR-SCNC: 17.3 MMOL/L (ref 24–28)
HCO3 UR-SCNC: 21.1 MMOL/L (ref 24–28)
HCO3 UR-SCNC: 22.9 MMOL/L (ref 24–28)
HCO3 UR-SCNC: 22.9 MMOL/L (ref 24–28)
HCO3 UR-SCNC: 23.1 MMOL/L (ref 24–28)
HCO3 UR-SCNC: 23.1 MMOL/L (ref 24–28)
HCO3 UR-SCNC: 23.2 MMOL/L (ref 24–28)
HCO3 UR-SCNC: 23.3 MMOL/L (ref 24–28)
HCO3 UR-SCNC: 23.3 MMOL/L (ref 24–28)
HCO3 UR-SCNC: 23.8 MMOL/L (ref 24–28)
HCO3 UR-SCNC: 24 MMOL/L (ref 24–28)
HCO3 UR-SCNC: 24.4 MMOL/L (ref 24–28)
HCO3 UR-SCNC: 24.5 MMOL/L (ref 24–28)
HCO3 UR-SCNC: 24.8 MMOL/L (ref 24–28)
HCO3 UR-SCNC: 24.8 MMOL/L (ref 24–28)
HCO3 UR-SCNC: 25 MMOL/L (ref 24–28)
HCO3 UR-SCNC: 25.4 MMOL/L (ref 24–28)
HCO3 UR-SCNC: 26.8 MMOL/L (ref 24–28)
HCO3 UR-SCNC: 26.9 MMOL/L (ref 24–28)
HCO3 UR-SCNC: 27.5 MMOL/L (ref 24–28)
HCO3 UR-SCNC: 28.3 MMOL/L (ref 24–28)
HCO3 UR-SCNC: 28.6 MMOL/L (ref 24–28)
HCO3 UR-SCNC: 28.7 MMOL/L (ref 24–28)
HCO3 UR-SCNC: 28.8 MMOL/L (ref 24–28)
HCO3 UR-SCNC: 28.8 MMOL/L (ref 24–28)
HCO3 UR-SCNC: 29.3 MMOL/L (ref 24–28)
HCO3 UR-SCNC: 29.4 MMOL/L (ref 24–28)
HCO3 UR-SCNC: 29.9 MMOL/L (ref 24–28)
HCO3 UR-SCNC: 30 MMOL/L (ref 24–28)
HCO3 UR-SCNC: 30.1 MMOL/L (ref 24–28)
HCO3 UR-SCNC: 30.4 MMOL/L (ref 24–28)
HCO3 UR-SCNC: 30.6 MMOL/L (ref 24–28)
HCO3 UR-SCNC: 30.7 MMOL/L (ref 24–28)
HCO3 UR-SCNC: 30.8 MMOL/L (ref 24–28)
HCO3 UR-SCNC: 31 MMOL/L (ref 24–28)
HCO3 UR-SCNC: 31 MMOL/L (ref 24–28)
HCO3 UR-SCNC: 31.3 MMOL/L (ref 24–28)
HCO3 UR-SCNC: 31.6 MMOL/L (ref 24–28)
HCO3 UR-SCNC: 32.3 MMOL/L (ref 24–28)
HCO3 UR-SCNC: 32.4 MMOL/L (ref 24–28)
HCO3 UR-SCNC: 32.6 MMOL/L (ref 24–28)
HCO3 UR-SCNC: 32.7 MMOL/L (ref 24–28)
HCO3 UR-SCNC: 33.3 MMOL/L (ref 24–28)
HCO3 UR-SCNC: 33.4 MMOL/L (ref 24–28)
HCO3 UR-SCNC: 33.4 MMOL/L (ref 24–28)
HCO3 UR-SCNC: 33.8 MMOL/L (ref 24–28)
HCO3 UR-SCNC: 33.9 MMOL/L (ref 24–28)
HCO3 UR-SCNC: 34.9 MMOL/L (ref 24–28)
HCO3 UR-SCNC: 35.8 MMOL/L (ref 24–28)
HCO3 UR-SCNC: 36.9 MMOL/L (ref 24–28)
HCO3 UR-SCNC: 38.6 MMOL/L (ref 24–28)
HCO3 UR-SCNC: 39.8 MMOL/L (ref 24–28)
HCO3 UR-SCNC: ABNORMAL MMOL/L
HCT VFR BLD AUTO: 23.1 % (ref 40–54)
HCT VFR BLD AUTO: 25.1 % (ref 40–54)
HCT VFR BLD AUTO: 26.2 % (ref 40–54)
HCT VFR BLD AUTO: 29.5 % (ref 40–54)
HCT VFR BLD AUTO: 30.2 % (ref 40–54)
HCT VFR BLD AUTO: 31.6 % (ref 40–54)
HCT VFR BLD AUTO: 31.6 % (ref 40–54)
HCT VFR BLD AUTO: 32.1 % (ref 40–54)
HCT VFR BLD AUTO: 35.7 % (ref 40–54)
HCT VFR BLD AUTO: 36.6 % (ref 40–54)
HCT VFR BLD AUTO: 37.5 % (ref 40–54)
HCT VFR BLD AUTO: 38.9 % (ref 40–54)
HCT VFR BLD AUTO: 39.7 % (ref 40–54)
HCT VFR BLD AUTO: 43.5 % (ref 40–54)
HCT VFR BLD AUTO: 45.1 % (ref 40–54)
HCT VFR BLD AUTO: 45.1 % (ref 40–54)
HCT VFR BLD AUTO: 45.6 % (ref 40–54)
HCT VFR BLD AUTO: 45.8 % (ref 40–54)
HCT VFR BLD AUTO: 46 % (ref 40–54)
HCT VFR BLD AUTO: 46.1 % (ref 40–54)
HCT VFR BLD AUTO: 46.4 % (ref 40–54)
HCT VFR BLD AUTO: 46.6 % (ref 40–54)
HCT VFR BLD AUTO: 46.7 % (ref 40–54)
HCT VFR BLD AUTO: 46.9 % (ref 40–54)
HCT VFR BLD AUTO: 47.9 % (ref 40–54)
HCT VFR BLD AUTO: 48.6 % (ref 40–54)
HCT VFR BLD AUTO: 48.6 % (ref 40–54)
HCT VFR BLD AUTO: 48.9 % (ref 40–54)
HCT VFR BLD AUTO: 51.1 % (ref 40–54)
HCT VFR BLD AUTO: 52.3 % (ref 40–54)
HDLC SERPL-MCNC: 29 MG/DL (ref 40–75)
HDLC SERPL: 19.7 % (ref 20–50)
HGB BLD-MCNC: 10.3 G/DL (ref 14–18)
HGB BLD-MCNC: 11 G/DL (ref 14–18)
HGB BLD-MCNC: 11.2 G/DL (ref 14–18)
HGB BLD-MCNC: 12.2 G/DL (ref 14–18)
HGB BLD-MCNC: 12.2 G/DL (ref 14–18)
HGB BLD-MCNC: 13.8 G/DL (ref 14–18)
HGB BLD-MCNC: 14.2 G/DL (ref 14–18)
HGB BLD-MCNC: 14.3 G/DL (ref 14–18)
HGB BLD-MCNC: 14.4 G/DL (ref 14–18)
HGB BLD-MCNC: 14.7 G/DL (ref 14–18)
HGB BLD-MCNC: 14.7 G/DL (ref 14–18)
HGB BLD-MCNC: 15 G/DL (ref 14–18)
HGB BLD-MCNC: 15.1 G/DL (ref 14–18)
HGB BLD-MCNC: 15.2 G/DL (ref 14–18)
HGB BLD-MCNC: 15.2 G/DL (ref 14–18)
HGB BLD-MCNC: 15.3 G/DL (ref 14–18)
HGB BLD-MCNC: 15.4 G/DL (ref 14–18)
HGB BLD-MCNC: 15.4 G/DL (ref 14–18)
HGB BLD-MCNC: 15.6 G/DL (ref 14–18)
HGB BLD-MCNC: 15.7 G/DL (ref 14–18)
HGB BLD-MCNC: 15.7 G/DL (ref 14–18)
HGB BLD-MCNC: 16.2 G/DL (ref 14–18)
HGB BLD-MCNC: 7.5 G/DL (ref 14–18)
HGB BLD-MCNC: 8.2 G/DL (ref 14–18)
HGB BLD-MCNC: 8.5 G/DL (ref 14–18)
HGB BLD-MCNC: 9.3 G/DL (ref 14–18)
HGB BLD-MCNC: 9.4 G/DL (ref 14–18)
HGB UR QL STRIP: ABNORMAL
HGB UR QL STRIP: ABNORMAL
HYALINE CASTS UR QL AUTO: 0 /LPF
HYALINE CASTS UR QL AUTO: 18 /LPF
HYPOCHROMIA BLD QL SMEAR: ABNORMAL
IGA SERPL-MCNC: 125 MG/DL (ref 40–350)
IGE SERPL-ACNC: 95 IU/ML (ref 0–100)
IGG SERPL-MCNC: 1115 MG/DL (ref 650–1600)
IGG1 SER-MCNC: 675 MG/DL (ref 382–929)
IGG2 SER-MCNC: 278 MG/DL (ref 242–700)
IGG3 SER-MCNC: 16 MG/DL (ref 22–176)
IGG4 SER-MCNC: 35 MG/DL (ref 4–86)
IGM SERPL-MCNC: 171 MG/DL (ref 50–300)
IMM GRANULOCYTES # BLD AUTO: 0.01 K/UL (ref 0–0.04)
IMM GRANULOCYTES # BLD AUTO: 0.02 K/UL (ref 0–0.04)
IMM GRANULOCYTES # BLD AUTO: 0.05 K/UL (ref 0–0.04)
IMM GRANULOCYTES # BLD AUTO: 0.06 K/UL (ref 0–0.04)
IMM GRANULOCYTES # BLD AUTO: 0.06 K/UL (ref 0–0.04)
IMM GRANULOCYTES # BLD AUTO: 0.07 K/UL (ref 0–0.04)
IMM GRANULOCYTES # BLD AUTO: 0.07 K/UL (ref 0–0.04)
IMM GRANULOCYTES # BLD AUTO: 0.09 K/UL (ref 0–0.04)
IMM GRANULOCYTES # BLD AUTO: 0.12 K/UL (ref 0–0.04)
IMM GRANULOCYTES # BLD AUTO: 0.16 K/UL (ref 0–0.04)
IMM GRANULOCYTES # BLD AUTO: 0.18 K/UL (ref 0–0.04)
IMM GRANULOCYTES # BLD AUTO: 0.45 K/UL (ref 0–0.04)
IMM GRANULOCYTES # BLD AUTO: 0.7 K/UL (ref 0–0.04)
IMM GRANULOCYTES # BLD AUTO: 0.98 K/UL (ref 0–0.04)
IMM GRANULOCYTES # BLD AUTO: ABNORMAL K/UL (ref 0–0.04)
IMM GRANULOCYTES NFR BLD AUTO: 0.2 % (ref 0–0.5)
IMM GRANULOCYTES NFR BLD AUTO: 0.4 % (ref 0–0.5)
IMM GRANULOCYTES NFR BLD AUTO: 0.4 % (ref 0–0.5)
IMM GRANULOCYTES NFR BLD AUTO: 0.5 % (ref 0–0.5)
IMM GRANULOCYTES NFR BLD AUTO: 0.5 % (ref 0–0.5)
IMM GRANULOCYTES NFR BLD AUTO: 0.6 % (ref 0–0.5)
IMM GRANULOCYTES NFR BLD AUTO: 0.7 % (ref 0–0.5)
IMM GRANULOCYTES NFR BLD AUTO: 0.8 % (ref 0–0.5)
IMM GRANULOCYTES NFR BLD AUTO: 1.2 % (ref 0–0.5)
IMM GRANULOCYTES NFR BLD AUTO: 1.4 % (ref 0–0.5)
IMM GRANULOCYTES NFR BLD AUTO: 1.4 % (ref 0–0.5)
IMM GRANULOCYTES NFR BLD AUTO: 3 % (ref 0–0.5)
IMM GRANULOCYTES NFR BLD AUTO: 4.4 % (ref 0–0.5)
IMM GRANULOCYTES NFR BLD AUTO: 5 % (ref 0–0.5)
IMM GRANULOCYTES NFR BLD AUTO: ABNORMAL % (ref 0–0.5)
INFLUENZA A, MOLECULAR: NEGATIVE
INFLUENZA B, MOLECULAR: NEGATIVE
INR PPP: 1 (ref 0.8–1.2)
INR PPP: 1.1 (ref 0.8–1.2)
INR PPP: 1.2 (ref 0.8–1.2)
INR PPP: 1.3 (ref 0.8–1.2)
INTERVENTRICULAR SEPTUM: 0.56 CM (ref 0.6–1.1)
IP: 15
IP: 15
IP: 20
IT: 0.9
KETONES UR QL STRIP: NEGATIVE
KETONES UR QL STRIP: NEGATIVE
LA MAJOR: 4.97 CM
LA MINOR: 4.18 CM
LA WIDTH: 2.87 CM
LACTATE SERPL-SCNC: 1.3 MMOL/L (ref 0.5–2.2)
LACTATE SERPL-SCNC: 1.5 MMOL/L (ref 0.5–2.2)
LACTATE SERPL-SCNC: 1.6 MMOL/L (ref 0.5–2.2)
LACTATE SERPL-SCNC: 2.3 MMOL/L (ref 0.5–2.2)
LDH SERPL L TO P-CCNC: 396 U/L (ref 110–260)
LDLC SERPL CALC-MCNC: 101 MG/DL (ref 63–159)
LEFT ATRIUM SIZE: 3.03 CM
LEFT ATRIUM VOLUME INDEX: 16.6 ML/M2
LEFT ATRIUM VOLUME: 33.56 CM3
LEFT INTERNAL DIMENSION IN SYSTOLE: 2.98 CM (ref 2.1–4)
LEFT VENTRICLE DIASTOLIC VOLUME INDEX: 46.63 ML/M2
LEFT VENTRICLE DIASTOLIC VOLUME: 94.38 ML
LEFT VENTRICLE MASS INDEX: 49 G/M2
LEFT VENTRICLE SYSTOLIC VOLUME INDEX: 17.1 ML/M2
LEFT VENTRICLE SYSTOLIC VOLUME: 34.54 ML
LEFT VENTRICULAR INTERNAL DIMENSION IN DIASTOLE: 4.54 CM (ref 3.5–6)
LEFT VENTRICULAR MASS: 99.77 G
LEUKOCYTE ESTERASE UR QL STRIP: ABNORMAL
LEUKOCYTE ESTERASE UR QL STRIP: NEGATIVE
LYMPHOCYTES # BLD AUTO: 0.3 K/UL (ref 1–4.8)
LYMPHOCYTES # BLD AUTO: 0.4 K/UL (ref 1–4.8)
LYMPHOCYTES # BLD AUTO: 0.5 K/UL (ref 1–4.8)
LYMPHOCYTES # BLD AUTO: 0.6 K/UL (ref 1–4.8)
LYMPHOCYTES # BLD AUTO: 1.1 K/UL (ref 1–4.8)
LYMPHOCYTES # BLD AUTO: ABNORMAL K/UL (ref 1–4.8)
LYMPHOCYTES NFR BLD: 1 % (ref 18–48)
LYMPHOCYTES NFR BLD: 10.7 % (ref 18–48)
LYMPHOCYTES NFR BLD: 11 % (ref 18–48)
LYMPHOCYTES NFR BLD: 13.5 % (ref 18–48)
LYMPHOCYTES NFR BLD: 2 % (ref 18–48)
LYMPHOCYTES NFR BLD: 2.4 % (ref 18–48)
LYMPHOCYTES NFR BLD: 2.4 % (ref 18–48)
LYMPHOCYTES NFR BLD: 2.5 % (ref 18–48)
LYMPHOCYTES NFR BLD: 2.6 % (ref 18–48)
LYMPHOCYTES NFR BLD: 3 % (ref 18–48)
LYMPHOCYTES NFR BLD: 3.2 % (ref 18–48)
LYMPHOCYTES NFR BLD: 3.3 % (ref 18–48)
LYMPHOCYTES NFR BLD: 3.7 % (ref 18–48)
LYMPHOCYTES NFR BLD: 3.9 % (ref 18–48)
LYMPHOCYTES NFR BLD: 4 % (ref 18–48)
LYMPHOCYTES NFR BLD: 5 % (ref 18–48)
LYMPHOCYTES NFR BLD: 5.2 % (ref 18–48)
LYMPHOCYTES NFR BLD: 6 % (ref 18–48)
LYMPHOCYTES NFR BLD: 6 % (ref 18–48)
LYMPHOCYTES NFR BLD: 6.2 % (ref 18–48)
LYMPHOCYTES NFR BLD: 7 % (ref 18–48)
LYMPHOCYTES NFR BLD: 8.3 % (ref 18–48)
LYMPHOCYTES NFR BLD: 8.8 % (ref 18–48)
LYMPHOCYTES NFR BLD: 9.5 % (ref 18–48)
MAGNESIUM SERPL-MCNC: 1.6 MG/DL (ref 1.6–2.6)
MAGNESIUM SERPL-MCNC: 1.7 MG/DL (ref 1.6–2.6)
MAGNESIUM SERPL-MCNC: 1.8 MG/DL (ref 1.6–2.6)
MAGNESIUM SERPL-MCNC: 1.9 MG/DL (ref 1.6–2.6)
MAGNESIUM SERPL-MCNC: 2 MG/DL (ref 1.6–2.6)
MAGNESIUM SERPL-MCNC: 2.1 MG/DL (ref 1.6–2.6)
MAGNESIUM SERPL-MCNC: 2.2 MG/DL (ref 1.6–2.6)
MAGNESIUM SERPL-MCNC: 2.3 MG/DL (ref 1.6–2.6)
MAGNESIUM SERPL-MCNC: 2.4 MG/DL (ref 1.6–2.6)
MAGNESIUM SERPL-MCNC: 2.5 MG/DL (ref 1.6–2.6)
MAGNESIUM SERPL-MCNC: 2.6 MG/DL (ref 1.6–2.6)
MAGNESIUM SERPL-MCNC: 2.6 MG/DL (ref 1.6–2.6)
MAGNESIUM SERPL-MCNC: 2.7 MG/DL (ref 1.6–2.6)
MAGNESIUM SERPL-MCNC: 2.8 MG/DL (ref 1.6–2.6)
MAGNESIUM SERPL-MCNC: 2.9 MG/DL (ref 1.6–2.6)
MAGNESIUM SERPL-MCNC: 3.2 MG/DL (ref 1.6–2.6)
MAGNESIUM SERPL-MCNC: 3.3 MG/DL (ref 1.6–2.6)
MAP: 18
MCH RBC QN AUTO: 30.2 PG (ref 27–31)
MCH RBC QN AUTO: 30.6 PG (ref 27–31)
MCH RBC QN AUTO: 30.7 PG (ref 27–31)
MCH RBC QN AUTO: 30.7 PG (ref 27–31)
MCH RBC QN AUTO: 30.8 PG (ref 27–31)
MCH RBC QN AUTO: 30.9 PG (ref 27–31)
MCH RBC QN AUTO: 30.9 PG (ref 27–31)
MCH RBC QN AUTO: 31 PG (ref 27–31)
MCH RBC QN AUTO: 31.1 PG (ref 27–31)
MCH RBC QN AUTO: 31.2 PG (ref 27–31)
MCH RBC QN AUTO: 31.3 PG (ref 27–31)
MCH RBC QN AUTO: 31.4 PG (ref 27–31)
MCH RBC QN AUTO: 31.5 PG (ref 27–31)
MCH RBC QN AUTO: 31.6 PG (ref 27–31)
MCH RBC QN AUTO: 31.7 PG (ref 27–31)
MCH RBC QN AUTO: 31.8 PG (ref 27–31)
MCH RBC QN AUTO: 32 PG (ref 27–31)
MCH RBC QN AUTO: 32.1 PG (ref 27–31)
MCH RBC QN AUTO: 32.2 PG (ref 27–31)
MCH RBC QN AUTO: 32.3 PG (ref 27–31)
MCH RBC QN AUTO: 33.3 PG (ref 27–31)
MCH RBC QN AUTO: 33.5 PG (ref 27–31)
MCH RBC QN AUTO: 34.7 PG (ref 27–31)
MCHC RBC AUTO-ENTMCNC: 28.9 G/DL (ref 32–36)
MCHC RBC AUTO-ENTMCNC: 29.3 G/DL (ref 32–36)
MCHC RBC AUTO-ENTMCNC: 29.4 G/DL (ref 32–36)
MCHC RBC AUTO-ENTMCNC: 29.4 G/DL (ref 32–36)
MCHC RBC AUTO-ENTMCNC: 29.9 G/DL (ref 32–36)
MCHC RBC AUTO-ENTMCNC: 30.1 G/DL (ref 32–36)
MCHC RBC AUTO-ENTMCNC: 30.6 G/DL (ref 32–36)
MCHC RBC AUTO-ENTMCNC: 30.7 G/DL (ref 32–36)
MCHC RBC AUTO-ENTMCNC: 30.8 G/DL (ref 32–36)
MCHC RBC AUTO-ENTMCNC: 30.9 G/DL (ref 32–36)
MCHC RBC AUTO-ENTMCNC: 30.9 G/DL (ref 32–36)
MCHC RBC AUTO-ENTMCNC: 31 G/DL (ref 32–36)
MCHC RBC AUTO-ENTMCNC: 31.4 G/DL (ref 32–36)
MCHC RBC AUTO-ENTMCNC: 31.4 G/DL (ref 32–36)
MCHC RBC AUTO-ENTMCNC: 31.5 G/DL (ref 32–36)
MCHC RBC AUTO-ENTMCNC: 31.7 G/DL (ref 32–36)
MCHC RBC AUTO-ENTMCNC: 32.3 G/DL (ref 32–36)
MCHC RBC AUTO-ENTMCNC: 32.4 G/DL (ref 32–36)
MCHC RBC AUTO-ENTMCNC: 32.5 G/DL (ref 32–36)
MCHC RBC AUTO-ENTMCNC: 32.6 G/DL (ref 32–36)
MCHC RBC AUTO-ENTMCNC: 32.7 G/DL (ref 32–36)
MCHC RBC AUTO-ENTMCNC: 32.8 G/DL (ref 32–36)
MCHC RBC AUTO-ENTMCNC: 32.8 G/DL (ref 32–36)
MCHC RBC AUTO-ENTMCNC: 33 G/DL (ref 32–36)
MCHC RBC AUTO-ENTMCNC: 33.2 G/DL (ref 32–36)
MCHC RBC AUTO-ENTMCNC: 33.8 G/DL (ref 32–36)
MCV RBC AUTO: 100 FL (ref 82–98)
MCV RBC AUTO: 101 FL (ref 82–98)
MCV RBC AUTO: 102 FL (ref 82–98)
MCV RBC AUTO: 102 FL (ref 82–98)
MCV RBC AUTO: 103 FL (ref 82–98)
MCV RBC AUTO: 105 FL (ref 82–98)
MCV RBC AUTO: 106 FL (ref 82–98)
MCV RBC AUTO: 107 FL (ref 82–98)
MCV RBC AUTO: 108 FL (ref 82–98)
MCV RBC AUTO: 95 FL (ref 82–98)
MCV RBC AUTO: 96 FL (ref 82–98)
MCV RBC AUTO: 97 FL (ref 82–98)
MCV RBC AUTO: 97 FL (ref 82–98)
MCV RBC AUTO: 98 FL (ref 82–98)
METAMYELOCYTES NFR BLD MANUAL: 1 %
METAMYELOCYTES NFR BLD MANUAL: 1.5 %
METAMYELOCYTES NFR BLD MANUAL: 2 %
METAMYELOCYTES NFR BLD MANUAL: 3 %
METAMYELOCYTES NFR BLD MANUAL: 4 %
METHEMOGLOBIN: 0.7 % (ref 0–3)
METHEMOGLOBIN: 0.8 % (ref 0–3)
METHEMOGLOBIN: 0.8 % (ref 0–3)
METHEMOGLOBIN: 1 % (ref 0–3)
METHEMOGLOBIN: 1.1 % (ref 0–3)
MICROSCOPIC COMMENT: ABNORMAL
MICROSCOPIC COMMENT: ABNORMAL
MIN VOL: 12
MIN VOL: 12.1
MIN VOL: 14
MIN VOL: 14.7
MIN VOL: 14.8
MIN VOL: 15.2
MIN VOL: 16.1
MIN VOL: 16.8
MIN VOL: 16.9
MIN VOL: 17
MIN VOL: 17
MIN VOL: 17.1
MIN VOL: 17.2
MIN VOL: 19
MIN VOL: 19.4
MIN VOL: 19.8
MIN VOL: 19.9
MIN VOL: 19.9
MIN VOL: 20
MIN VOL: 21
MODE: ABNORMAL
MONOCYTES # BLD AUTO: 0.2 K/UL (ref 0.3–1)
MONOCYTES # BLD AUTO: 0.3 K/UL (ref 0.3–1)
MONOCYTES # BLD AUTO: 0.4 K/UL (ref 0.3–1)
MONOCYTES # BLD AUTO: 0.5 K/UL (ref 0.3–1)
MONOCYTES # BLD AUTO: 0.5 K/UL (ref 0.3–1)
MONOCYTES # BLD AUTO: 0.6 K/UL (ref 0.3–1)
MONOCYTES # BLD AUTO: 0.6 K/UL (ref 0.3–1)
MONOCYTES # BLD AUTO: 0.7 K/UL (ref 0.3–1)
MONOCYTES # BLD AUTO: 1.1 K/UL (ref 0.3–1)
MONOCYTES # BLD AUTO: 1.6 K/UL (ref 0.3–1)
MONOCYTES # BLD AUTO: ABNORMAL K/UL (ref 0.3–1)
MONOCYTES NFR BLD: 1.2 % (ref 4–15)
MONOCYTES NFR BLD: 1.7 % (ref 4–15)
MONOCYTES NFR BLD: 1.7 % (ref 4–15)
MONOCYTES NFR BLD: 1.8 % (ref 4–15)
MONOCYTES NFR BLD: 1.9 % (ref 4–15)
MONOCYTES NFR BLD: 10 % (ref 4–15)
MONOCYTES NFR BLD: 10.3 % (ref 4–15)
MONOCYTES NFR BLD: 10.6 % (ref 4–15)
MONOCYTES NFR BLD: 13 % (ref 4–15)
MONOCYTES NFR BLD: 13 % (ref 4–15)
MONOCYTES NFR BLD: 3 % (ref 4–15)
MONOCYTES NFR BLD: 3 % (ref 4–15)
MONOCYTES NFR BLD: 3.5 % (ref 4–15)
MONOCYTES NFR BLD: 3.8 % (ref 4–15)
MONOCYTES NFR BLD: 4 % (ref 4–15)
MONOCYTES NFR BLD: 4.3 % (ref 4–15)
MONOCYTES NFR BLD: 5 % (ref 4–15)
MONOCYTES NFR BLD: 5 % (ref 4–15)
MONOCYTES NFR BLD: 5.6 % (ref 4–15)
MONOCYTES NFR BLD: 6 % (ref 4–15)
MONOCYTES NFR BLD: 6 % (ref 4–15)
MONOCYTES NFR BLD: 7 % (ref 4–15)
MONOCYTES NFR BLD: 7 % (ref 4–15)
MONOCYTES NFR BLD: 7.1 % (ref 4–15)
MONOCYTES NFR BLD: 7.4 % (ref 4–15)
MONOCYTES NFR BLD: 7.5 % (ref 4–15)
MONOCYTES NFR BLD: 7.7 % (ref 4–15)
MONOCYTES NFR BLD: 8 % (ref 4–15)
MV PEAK A VEL: 0.78 M/S
MV PEAK E VEL: 0.88 M/S
MV STENOSIS PRESSURE HALF TIME: 57.37 MS
MV VALVE AREA P 1/2 METHOD: 3.83 CM2
MYELOCYTES NFR BLD MANUAL: 0.5 %
MYELOCYTES NFR BLD MANUAL: 1 %
MYELOCYTES NFR BLD MANUAL: 2 %
MYELOCYTES NFR BLD MANUAL: 2 %
MYELOCYTES NFR BLD MANUAL: 3 %
MYELOCYTES NFR BLD MANUAL: 4 %
MYELOCYTES NFR BLD MANUAL: 6 %
MYELOCYTES NFR BLD MANUAL: 7 %
NEUTROPHILS # BLD AUTO: 10.1 K/UL (ref 1.8–7.7)
NEUTROPHILS # BLD AUTO: 10.4 K/UL (ref 1.8–7.7)
NEUTROPHILS # BLD AUTO: 12.3 K/UL (ref 1.8–7.7)
NEUTROPHILS # BLD AUTO: 12.7 K/UL (ref 1.8–7.7)
NEUTROPHILS # BLD AUTO: 12.9 K/UL (ref 1.8–7.7)
NEUTROPHILS # BLD AUTO: 14.6 K/UL (ref 1.8–7.7)
NEUTROPHILS # BLD AUTO: 17.8 K/UL (ref 1.8–7.7)
NEUTROPHILS # BLD AUTO: 3.5 K/UL (ref 1.8–7.7)
NEUTROPHILS # BLD AUTO: 4.2 K/UL (ref 1.8–7.7)
NEUTROPHILS # BLD AUTO: 4.4 K/UL (ref 1.8–7.7)
NEUTROPHILS # BLD AUTO: 4.5 K/UL (ref 1.8–7.7)
NEUTROPHILS # BLD AUTO: 6.2 K/UL (ref 1.8–7.7)
NEUTROPHILS # BLD AUTO: 7.3 K/UL (ref 1.8–7.7)
NEUTROPHILS # BLD AUTO: 7.7 K/UL (ref 1.8–7.7)
NEUTROPHILS # BLD AUTO: 7.8 K/UL (ref 1.8–7.7)
NEUTROPHILS # BLD AUTO: 9.1 K/UL (ref 1.8–7.7)
NEUTROPHILS # BLD AUTO: ABNORMAL K/UL (ref 1.8–7.7)
NEUTROPHILS NFR BLD: 67 % (ref 38–73)
NEUTROPHILS NFR BLD: 74 % (ref 38–73)
NEUTROPHILS NFR BLD: 74 % (ref 38–73)
NEUTROPHILS NFR BLD: 76 % (ref 38–73)
NEUTROPHILS NFR BLD: 76.6 % (ref 38–73)
NEUTROPHILS NFR BLD: 79.5 % (ref 38–73)
NEUTROPHILS NFR BLD: 80.8 % (ref 38–73)
NEUTROPHILS NFR BLD: 80.9 % (ref 38–73)
NEUTROPHILS NFR BLD: 81.1 % (ref 38–73)
NEUTROPHILS NFR BLD: 83 % (ref 38–73)
NEUTROPHILS NFR BLD: 83 % (ref 38–73)
NEUTROPHILS NFR BLD: 83.4 % (ref 38–73)
NEUTROPHILS NFR BLD: 84 % (ref 38–73)
NEUTROPHILS NFR BLD: 84 % (ref 38–73)
NEUTROPHILS NFR BLD: 85.8 % (ref 38–73)
NEUTROPHILS NFR BLD: 86 % (ref 38–73)
NEUTROPHILS NFR BLD: 86.6 % (ref 38–73)
NEUTROPHILS NFR BLD: 87.3 % (ref 38–73)
NEUTROPHILS NFR BLD: 88 % (ref 38–73)
NEUTROPHILS NFR BLD: 90 % (ref 38–73)
NEUTROPHILS NFR BLD: 90.1 % (ref 38–73)
NEUTROPHILS NFR BLD: 90.3 % (ref 38–73)
NEUTROPHILS NFR BLD: 90.4 % (ref 38–73)
NEUTROPHILS NFR BLD: 91 % (ref 38–73)
NEUTROPHILS NFR BLD: 94.2 % (ref 38–73)
NEUTROPHILS NFR BLD: 94.4 % (ref 38–73)
NEUTROPHILS NFR BLD: 95.1 % (ref 38–73)
NEUTROPHILS NFR BLD: 95.3 % (ref 38–73)
NEUTS BAND NFR BLD MANUAL: 2 %
NEUTS BAND NFR BLD MANUAL: 2.5 %
NEUTS BAND NFR BLD MANUAL: 3 %
NEUTS BAND NFR BLD MANUAL: 5 %
NEUTS BAND NFR BLD MANUAL: 6 %
NITRITE UR QL STRIP: NEGATIVE
NITRITE UR QL STRIP: NEGATIVE
NONHDLC SERPL-MCNC: 118 MG/DL
NRBC BLD-RTO: 0 /100 WBC
NRBC BLD-RTO: 1 /100 WBC
NRBC BLD-RTO: 13 /100 WBC
NRBC BLD-RTO: 17 /100 WBC
NRBC BLD-RTO: 2 /100 WBC
NRBC BLD-RTO: 27 /100 WBC
NRBC BLD-RTO: 3 /100 WBC
NRBC BLD-RTO: 41 /100 WBC
OSMOLALITY UR: 380 MOSM/KG (ref 50–1200)
OVALOCYTES BLD QL SMEAR: ABNORMAL
PCO2 BLDA: 112.5 MMHG (ref 35–45)
PCO2 BLDA: 37.2 MMHG (ref 35–45)
PCO2 BLDA: 37.9 MMHG (ref 35–45)
PCO2 BLDA: 38.3 MMHG (ref 35–45)
PCO2 BLDA: 38.4 MMHG (ref 35–45)
PCO2 BLDA: 39 MMHG (ref 35–45)
PCO2 BLDA: 42.4 MMHG (ref 35–45)
PCO2 BLDA: 46.6 MMHG (ref 35–45)
PCO2 BLDA: 52.3 MMHG (ref 35–45)
PCO2 BLDA: 54.3 MMHG (ref 35–45)
PCO2 BLDA: 56.4 MMHG (ref 35–45)
PCO2 BLDA: 56.6 MMHG (ref 35–45)
PCO2 BLDA: 56.9 MMHG (ref 35–45)
PCO2 BLDA: 57.1 MMHG (ref 35–45)
PCO2 BLDA: 57.2 MMHG (ref 35–45)
PCO2 BLDA: 57.3 MMHG (ref 35–45)
PCO2 BLDA: 57.3 MMHG (ref 35–45)
PCO2 BLDA: 57.8 MMHG (ref 35–45)
PCO2 BLDA: 58.3 MMHG (ref 35–45)
PCO2 BLDA: 58.5 MMHG (ref 35–45)
PCO2 BLDA: 59 MMHG (ref 35–45)
PCO2 BLDA: 59.5 MMHG (ref 35–45)
PCO2 BLDA: 60.7 MMHG (ref 35–45)
PCO2 BLDA: 60.9 MMHG (ref 35–45)
PCO2 BLDA: 61.1 MMHG (ref 35–45)
PCO2 BLDA: 62 MMHG (ref 35–45)
PCO2 BLDA: 62.2 MMHG (ref 35–45)
PCO2 BLDA: 62.4 MMHG (ref 35–45)
PCO2 BLDA: 62.6 MMHG (ref 35–45)
PCO2 BLDA: 62.6 MMHG (ref 35–45)
PCO2 BLDA: 62.7 MMHG (ref 35–45)
PCO2 BLDA: 63.7 MMHG (ref 35–45)
PCO2 BLDA: 63.8 MMHG (ref 35–45)
PCO2 BLDA: 64.1 MMHG (ref 35–45)
PCO2 BLDA: 64.3 MMHG (ref 35–45)
PCO2 BLDA: 67.1 MMHG (ref 35–45)
PCO2 BLDA: 67.9 MMHG (ref 35–45)
PCO2 BLDA: 68.7 MMHG (ref 35–45)
PCO2 BLDA: 71.6 MMHG (ref 35–45)
PCO2 BLDA: 71.7 MMHG (ref 35–45)
PCO2 BLDA: 73 MMHG (ref 35–45)
PCO2 BLDA: 83.2 MMHG (ref 35–45)
PCO2 BLDA: 83.3 MMHG (ref 35–45)
PCO2 BLDA: 84.4 MMHG (ref 35–45)
PCO2 BLDA: 84.5 MMHG (ref 35–45)
PCO2 BLDA: 85.3 MMHG (ref 35–45)
PCO2 BLDA: 86.1 MMHG (ref 35–45)
PCO2 BLDA: 89.1 MMHG (ref 35–45)
PCO2 BLDA: 89.5 MMHG (ref 35–45)
PCO2 BLDA: 90.2 MMHG (ref 35–45)
PCO2 BLDA: 94.4 MMHG (ref 35–45)
PCO2 BLDA: 96.1 MMHG (ref 35–45)
PCO2 BLDA: >130 MMHG (ref 35–45)
PEEP: 10
PEEP: 12
PEEP: 14
PEEP: 8
PH SMN: 6.86 [PH] (ref 7.35–7.45)
PH SMN: 6.95 [PH] (ref 7.35–7.45)
PH SMN: 6.99 [PH] (ref 7.35–7.45)
PH SMN: 7.01 [PH] (ref 7.35–7.45)
PH SMN: 7.08 [PH] (ref 7.35–7.45)
PH SMN: 7.14 [PH] (ref 7.35–7.45)
PH SMN: 7.15 [PH] (ref 7.35–7.45)
PH SMN: 7.17 [PH] (ref 7.35–7.45)
PH SMN: 7.18 [PH] (ref 7.35–7.45)
PH SMN: 7.18 [PH] (ref 7.35–7.45)
PH SMN: 7.2 [PH] (ref 7.35–7.45)
PH SMN: 7.21 [PH] (ref 7.35–7.45)
PH SMN: 7.22 [PH] (ref 7.35–7.45)
PH SMN: 7.23 [PH] (ref 7.35–7.45)
PH SMN: 7.25 [PH] (ref 7.35–7.45)
PH SMN: 7.26 [PH] (ref 7.35–7.45)
PH SMN: 7.28 [PH] (ref 7.35–7.45)
PH SMN: 7.29 [PH] (ref 7.35–7.45)
PH SMN: 7.3 [PH] (ref 7.35–7.45)
PH SMN: 7.31 [PH] (ref 7.35–7.45)
PH SMN: 7.32 [PH] (ref 7.35–7.45)
PH SMN: 7.33 [PH] (ref 7.35–7.45)
PH SMN: 7.34 [PH] (ref 7.35–7.45)
PH SMN: 7.35 [PH] (ref 7.35–7.45)
PH SMN: 7.35 [PH] (ref 7.35–7.45)
PH SMN: 7.4 [PH] (ref 7.35–7.45)
PH SMN: 7.42 [PH] (ref 7.35–7.45)
PH SMN: 7.42 [PH] (ref 7.35–7.45)
PH SMN: 7.46 [PH] (ref 7.35–7.45)
PH UR STRIP: 5 [PH] (ref 5–8)
PH UR STRIP: 5 [PH] (ref 5–8)
PHOSPHATE SERPL-MCNC: 2.2 MG/DL (ref 2.7–4.5)
PHOSPHATE SERPL-MCNC: 2.3 MG/DL (ref 2.7–4.5)
PHOSPHATE SERPL-MCNC: 2.9 MG/DL (ref 2.7–4.5)
PHOSPHATE SERPL-MCNC: 3 MG/DL (ref 2.7–4.5)
PHOSPHATE SERPL-MCNC: 3.1 MG/DL (ref 2.7–4.5)
PHOSPHATE SERPL-MCNC: 3.6 MG/DL (ref 2.7–4.5)
PHOSPHATE SERPL-MCNC: 3.8 MG/DL (ref 2.7–4.5)
PHOSPHATE SERPL-MCNC: 3.9 MG/DL (ref 2.7–4.5)
PHOSPHATE SERPL-MCNC: 4.3 MG/DL (ref 2.7–4.5)
PHOSPHATE SERPL-MCNC: 4.3 MG/DL (ref 2.7–4.5)
PHOSPHATE SERPL-MCNC: 4.4 MG/DL (ref 2.7–4.5)
PHOSPHATE SERPL-MCNC: 4.5 MG/DL (ref 2.7–4.5)
PHOSPHATE SERPL-MCNC: 4.6 MG/DL (ref 2.7–4.5)
PHOSPHATE SERPL-MCNC: 4.7 MG/DL (ref 2.7–4.5)
PHOSPHATE SERPL-MCNC: 4.9 MG/DL (ref 2.7–4.5)
PHOSPHATE SERPL-MCNC: 5.2 MG/DL (ref 2.7–4.5)
PHOSPHATE SERPL-MCNC: 5.3 MG/DL (ref 2.7–4.5)
PHOSPHATE SERPL-MCNC: 5.3 MG/DL (ref 2.7–4.5)
PHOSPHATE SERPL-MCNC: 5.7 MG/DL (ref 2.7–4.5)
PHOSPHATE SERPL-MCNC: 5.7 MG/DL (ref 2.7–4.5)
PHOSPHATE SERPL-MCNC: 5.9 MG/DL (ref 2.7–4.5)
PHOSPHATE SERPL-MCNC: 6 MG/DL (ref 2.7–4.5)
PHOSPHATE SERPL-MCNC: 6.2 MG/DL (ref 2.7–4.5)
PHOSPHATE SERPL-MCNC: 6.8 MG/DL (ref 2.7–4.5)
PHOSPHATE SERPL-MCNC: 6.8 MG/DL (ref 2.7–4.5)
PHOSPHATE SERPL-MCNC: 6.9 MG/DL (ref 2.7–4.5)
PHOSPHATE SERPL-MCNC: 7.6 MG/DL (ref 2.7–4.5)
PHOSPHATE SERPL-MCNC: 9.3 MG/DL (ref 2.7–4.5)
PHOSPHATE SERPL-MCNC: 9.3 MG/DL (ref 2.7–4.5)
PIP: 30
PIP: 30
PIP: 31
PIP: 34
PIP: 35
PIP: 36
PIP: 37
PIP: 38
PIP: 39
PIP: 39
PLATELET # BLD AUTO: 111 K/UL (ref 150–350)
PLATELET # BLD AUTO: 119 K/UL (ref 150–350)
PLATELET # BLD AUTO: 140 K/UL (ref 150–350)
PLATELET # BLD AUTO: 141 K/UL (ref 150–350)
PLATELET # BLD AUTO: 146 K/UL (ref 150–350)
PLATELET # BLD AUTO: 147 K/UL (ref 150–350)
PLATELET # BLD AUTO: 156 K/UL (ref 150–350)
PLATELET # BLD AUTO: 160 K/UL (ref 150–350)
PLATELET # BLD AUTO: 161 K/UL (ref 150–350)
PLATELET # BLD AUTO: 169 K/UL (ref 150–350)
PLATELET # BLD AUTO: 187 K/UL (ref 150–350)
PLATELET # BLD AUTO: 188 K/UL (ref 150–350)
PLATELET # BLD AUTO: 197 K/UL (ref 150–350)
PLATELET # BLD AUTO: 201 K/UL (ref 150–350)
PLATELET # BLD AUTO: 201 K/UL (ref 150–350)
PLATELET # BLD AUTO: 209 K/UL (ref 150–350)
PLATELET # BLD AUTO: 221 K/UL (ref 150–350)
PLATELET # BLD AUTO: 224 K/UL (ref 150–350)
PLATELET # BLD AUTO: 241 K/UL (ref 150–350)
PLATELET # BLD AUTO: 249 K/UL (ref 150–350)
PLATELET # BLD AUTO: 253 K/UL (ref 150–350)
PLATELET # BLD AUTO: 256 K/UL (ref 150–350)
PLATELET # BLD AUTO: 257 K/UL (ref 150–350)
PLATELET # BLD AUTO: 258 K/UL (ref 150–350)
PLATELET # BLD AUTO: 258 K/UL (ref 150–350)
PLATELET # BLD AUTO: 280 K/UL (ref 150–350)
PLATELET # BLD AUTO: 281 K/UL (ref 150–350)
PLATELET # BLD AUTO: 326 K/UL (ref 150–350)
PLATELET # BLD AUTO: 340 K/UL (ref 150–350)
PLATELET # BLD AUTO: 366 K/UL (ref 150–350)
PLATELET BLD QL SMEAR: ABNORMAL
PMV BLD AUTO: 10.2 FL (ref 9.2–12.9)
PMV BLD AUTO: 10.3 FL (ref 9.2–12.9)
PMV BLD AUTO: 10.3 FL (ref 9.2–12.9)
PMV BLD AUTO: 10.4 FL (ref 9.2–12.9)
PMV BLD AUTO: 10.5 FL (ref 9.2–12.9)
PMV BLD AUTO: 10.5 FL (ref 9.2–12.9)
PMV BLD AUTO: 10.6 FL (ref 9.2–12.9)
PMV BLD AUTO: 10.7 FL (ref 9.2–12.9)
PMV BLD AUTO: 10.9 FL (ref 9.2–12.9)
PMV BLD AUTO: 11 FL (ref 9.2–12.9)
PMV BLD AUTO: 11.1 FL (ref 9.2–12.9)
PMV BLD AUTO: 11.2 FL (ref 9.2–12.9)
PMV BLD AUTO: 11.3 FL (ref 9.2–12.9)
PMV BLD AUTO: 11.4 FL (ref 9.2–12.9)
PMV BLD AUTO: 11.4 FL (ref 9.2–12.9)
PMV BLD AUTO: 11.6 FL (ref 9.2–12.9)
PMV BLD AUTO: 11.7 FL (ref 9.2–12.9)
PMV BLD AUTO: 12.1 FL (ref 9.2–12.9)
PMV BLD AUTO: 9.7 FL (ref 9.2–12.9)
PO2 BLDA: 101 MMHG (ref 80–100)
PO2 BLDA: 101 MMHG (ref 80–100)
PO2 BLDA: 103 MMHG (ref 80–100)
PO2 BLDA: 103 MMHG (ref 80–100)
PO2 BLDA: 105 MMHG (ref 80–100)
PO2 BLDA: 106 MMHG (ref 80–100)
PO2 BLDA: 109 MMHG (ref 80–100)
PO2 BLDA: 122 MMHG (ref 80–100)
PO2 BLDA: 123 MMHG (ref 80–100)
PO2 BLDA: 126 MMHG (ref 80–100)
PO2 BLDA: 132 MMHG (ref 80–100)
PO2 BLDA: 140 MMHG (ref 80–100)
PO2 BLDA: 47 MMHG (ref 80–100)
PO2 BLDA: 47 MMHG (ref 80–100)
PO2 BLDA: 49 MMHG (ref 40–60)
PO2 BLDA: 50 MMHG (ref 80–100)
PO2 BLDA: 50 MMHG (ref 80–100)
PO2 BLDA: 53 MMHG (ref 80–100)
PO2 BLDA: 54 MMHG (ref 80–100)
PO2 BLDA: 55 MMHG (ref 80–100)
PO2 BLDA: 62 MMHG (ref 80–100)
PO2 BLDA: 63 MMHG (ref 80–100)
PO2 BLDA: 64 MMHG (ref 80–100)
PO2 BLDA: 65 MMHG (ref 80–100)
PO2 BLDA: 66 MMHG (ref 80–100)
PO2 BLDA: 66 MMHG (ref 80–100)
PO2 BLDA: 67 MMHG (ref 80–100)
PO2 BLDA: 67 MMHG (ref 80–100)
PO2 BLDA: 69 MMHG (ref 80–100)
PO2 BLDA: 70 MMHG (ref 80–100)
PO2 BLDA: 70 MMHG (ref 80–100)
PO2 BLDA: 71 MMHG (ref 80–100)
PO2 BLDA: 72 MMHG (ref 80–100)
PO2 BLDA: 73 MMHG (ref 80–100)
PO2 BLDA: 73 MMHG (ref 80–100)
PO2 BLDA: 75 MMHG (ref 80–100)
PO2 BLDA: 75 MMHG (ref 80–100)
PO2 BLDA: 76 MMHG (ref 80–100)
PO2 BLDA: 78 MMHG (ref 80–100)
PO2 BLDA: 79 MMHG (ref 80–100)
PO2 BLDA: 79 MMHG (ref 80–100)
PO2 BLDA: 84 MMHG (ref 80–100)
PO2 BLDA: 85 MMHG (ref 80–100)
PO2 BLDA: 86 MMHG (ref 80–100)
PO2 BLDA: 87 MMHG (ref 80–100)
PO2 BLDA: 90 MMHG (ref 80–100)
PO2 BLDA: 92 MMHG (ref 80–100)
PO2 BLDA: 93 MMHG (ref 80–100)
PO2 BLDA: 94 MMHG (ref 80–100)
PO2 BLDA: 94 MMHG (ref 80–100)
POC BE: -1 MMOL/L
POC BE: -10 MMOL/L
POC BE: -2 MMOL/L
POC BE: -3 MMOL/L
POC BE: -4 MMOL/L
POC BE: -5 MMOL/L
POC BE: 0 MMOL/L
POC BE: 1 MMOL/L
POC BE: 10 MMOL/L
POC BE: 11 MMOL/L
POC BE: 13 MMOL/L
POC BE: 2 MMOL/L
POC BE: 3 MMOL/L
POC BE: 4 MMOL/L
POC BE: 5 MMOL/L
POC BE: 6 MMOL/L
POC BE: 7 MMOL/L
POC BE: 7 MMOL/L
POC BE: 9 MMOL/L
POC BE: ABNORMAL MMOL/L
POC SATURATED O2: 69 % (ref 95–100)
POC SATURATED O2: 74 % (ref 95–100)
POC SATURATED O2: 76 % (ref 95–100)
POC SATURATED O2: 76 % (ref 95–100)
POC SATURATED O2: 77 % (ref 95–100)
POC SATURATED O2: 83 % (ref 95–100)
POC SATURATED O2: 83 % (ref 95–100)
POC SATURATED O2: 85 % (ref 95–100)
POC SATURATED O2: 85 % (ref 95–100)
POC SATURATED O2: 87 % (ref 95–100)
POC SATURATED O2: 88 % (ref 95–100)
POC SATURATED O2: 89 % (ref 95–100)
POC SATURATED O2: 90 % (ref 95–100)
POC SATURATED O2: 91 % (ref 95–100)
POC SATURATED O2: 92 % (ref 95–100)
POC SATURATED O2: 93 % (ref 95–100)
POC SATURATED O2: 94 % (ref 95–100)
POC SATURATED O2: 95 % (ref 95–100)
POC SATURATED O2: 96 % (ref 95–100)
POC SATURATED O2: 97 % (ref 95–100)
POC SATURATED O2: 98 % (ref 95–100)
POC SATURATED O2: 99 % (ref 95–100)
POC SATURATED O2: ABNORMAL %
POC TCO2: 19 MMOL/L (ref 23–27)
POC TCO2: 22 MMOL/L (ref 23–27)
POC TCO2: 25 MMOL/L (ref 23–27)
POC TCO2: 25 MMOL/L (ref 24–29)
POC TCO2: 26 MMOL/L (ref 23–27)
POC TCO2: 27 MMOL/L (ref 23–27)
POC TCO2: 27 MMOL/L (ref 23–27)
POC TCO2: 28 MMOL/L (ref 23–27)
POC TCO2: 29 MMOL/L (ref 23–27)
POC TCO2: 30 MMOL/L (ref 23–27)
POC TCO2: 31 MMOL/L (ref 23–27)
POC TCO2: 32 MMOL/L (ref 23–27)
POC TCO2: 33 MMOL/L (ref 23–27)
POC TCO2: 34 MMOL/L (ref 23–27)
POC TCO2: 35 MMOL/L (ref 23–27)
POC TCO2: 35 MMOL/L (ref 23–27)
POC TCO2: 36 MMOL/L (ref 23–27)
POC TCO2: 37 MMOL/L (ref 23–27)
POC TCO2: 38 MMOL/L (ref 23–27)
POC TCO2: 39 MMOL/L (ref 23–27)
POC TCO2: 41 MMOL/L (ref 23–27)
POC TCO2: 42 MMOL/L (ref 23–27)
POC TCO2: ABNORMAL MMOL/L
POCT GLUCOSE: 105 MG/DL (ref 70–110)
POCT GLUCOSE: 106 MG/DL (ref 70–110)
POCT GLUCOSE: 109 MG/DL (ref 70–110)
POCT GLUCOSE: 111 MG/DL (ref 70–110)
POCT GLUCOSE: 113 MG/DL (ref 70–110)
POCT GLUCOSE: 114 MG/DL (ref 70–110)
POCT GLUCOSE: 115 MG/DL (ref 70–110)
POCT GLUCOSE: 117 MG/DL (ref 70–110)
POCT GLUCOSE: 118 MG/DL (ref 70–110)
POCT GLUCOSE: 120 MG/DL (ref 70–110)
POCT GLUCOSE: 121 MG/DL (ref 70–110)
POCT GLUCOSE: 121 MG/DL (ref 70–110)
POCT GLUCOSE: 122 MG/DL (ref 70–110)
POCT GLUCOSE: 123 MG/DL (ref 70–110)
POCT GLUCOSE: 124 MG/DL (ref 70–110)
POCT GLUCOSE: 125 MG/DL (ref 70–110)
POCT GLUCOSE: 126 MG/DL (ref 70–110)
POCT GLUCOSE: 127 MG/DL (ref 70–110)
POCT GLUCOSE: 128 MG/DL (ref 70–110)
POCT GLUCOSE: 129 MG/DL (ref 70–110)
POCT GLUCOSE: 129 MG/DL (ref 70–110)
POCT GLUCOSE: 131 MG/DL (ref 70–110)
POCT GLUCOSE: 131 MG/DL (ref 70–110)
POCT GLUCOSE: 135 MG/DL (ref 70–110)
POCT GLUCOSE: 136 MG/DL (ref 70–110)
POCT GLUCOSE: 136 MG/DL (ref 70–110)
POCT GLUCOSE: 141 MG/DL (ref 70–110)
POCT GLUCOSE: 141 MG/DL (ref 70–110)
POCT GLUCOSE: 145 MG/DL (ref 70–110)
POCT GLUCOSE: 146 MG/DL (ref 70–110)
POCT GLUCOSE: 149 MG/DL (ref 70–110)
POCT GLUCOSE: 150 MG/DL (ref 70–110)
POCT GLUCOSE: 152 MG/DL (ref 70–110)
POCT GLUCOSE: 152 MG/DL (ref 70–110)
POCT GLUCOSE: 155 MG/DL (ref 70–110)
POCT GLUCOSE: 157 MG/DL (ref 70–110)
POCT GLUCOSE: 166 MG/DL (ref 70–110)
POCT GLUCOSE: 167 MG/DL (ref 70–110)
POCT GLUCOSE: 196 MG/DL (ref 70–110)
POCT GLUCOSE: 207 MG/DL (ref 70–110)
POCT GLUCOSE: 208 MG/DL (ref 70–110)
POCT GLUCOSE: 214 MG/DL (ref 70–110)
POCT GLUCOSE: 91 MG/DL (ref 70–110)
POCT GLUCOSE: 94 MG/DL (ref 70–110)
POIKILOCYTOSIS BLD QL SMEAR: SLIGHT
POLYCHROMASIA BLD QL SMEAR: ABNORMAL
POTASSIUM SERPL-SCNC: 3.3 MMOL/L (ref 3.5–5.1)
POTASSIUM SERPL-SCNC: 4 MMOL/L (ref 3.5–5.1)
POTASSIUM SERPL-SCNC: 4.1 MMOL/L (ref 3.5–5.1)
POTASSIUM SERPL-SCNC: 4.2 MMOL/L (ref 3.5–5.1)
POTASSIUM SERPL-SCNC: 4.3 MMOL/L (ref 3.5–5.1)
POTASSIUM SERPL-SCNC: 4.4 MMOL/L (ref 3.5–5.1)
POTASSIUM SERPL-SCNC: 4.5 MMOL/L (ref 3.5–5.1)
POTASSIUM SERPL-SCNC: 4.6 MMOL/L (ref 3.5–5.1)
POTASSIUM SERPL-SCNC: 4.7 MMOL/L (ref 3.5–5.1)
POTASSIUM SERPL-SCNC: 4.8 MMOL/L (ref 3.5–5.1)
POTASSIUM SERPL-SCNC: 4.9 MMOL/L (ref 3.5–5.1)
POTASSIUM SERPL-SCNC: 5 MMOL/L (ref 3.5–5.1)
POTASSIUM SERPL-SCNC: 5.1 MMOL/L (ref 3.5–5.1)
POTASSIUM SERPL-SCNC: 5.2 MMOL/L (ref 3.5–5.1)
POTASSIUM SERPL-SCNC: 5.2 MMOL/L (ref 3.5–5.1)
POTASSIUM SERPL-SCNC: 5.3 MMOL/L (ref 3.5–5.1)
POTASSIUM SERPL-SCNC: 5.4 MMOL/L (ref 3.5–5.1)
POTASSIUM SERPL-SCNC: 5.4 MMOL/L (ref 3.5–5.1)
POTASSIUM SERPL-SCNC: 5.5 MMOL/L (ref 3.5–5.1)
POTASSIUM SERPL-SCNC: 5.6 MMOL/L (ref 3.5–5.1)
POTASSIUM SERPL-SCNC: 5.7 MMOL/L (ref 3.5–5.1)
POTASSIUM SERPL-SCNC: 5.7 MMOL/L (ref 3.5–5.1)
POTASSIUM SERPL-SCNC: 5.9 MMOL/L (ref 3.5–5.1)
POTASSIUM SERPL-SCNC: 6.1 MMOL/L (ref 3.5–5.1)
POTASSIUM SERPL-SCNC: 6.3 MMOL/L (ref 3.5–5.1)
POTASSIUM SERPL-SCNC: 6.4 MMOL/L (ref 3.5–5.1)
POTASSIUM SERPL-SCNC: 6.6 MMOL/L (ref 3.5–5.1)
POTASSIUM SERPL-SCNC: 6.9 MMOL/L (ref 3.5–5.1)
POTASSIUM SERPL-SCNC: 7 MMOL/L (ref 3.5–5.1)
POTASSIUM UR-SCNC: 37 MMOL/L (ref 15–95)
PROCALCITONIN SERPL IA-MCNC: 0.09 NG/ML
PROMYELOCYTES NFR BLD MANUAL: 1 %
PROT SERPL-MCNC: 5.1 G/DL (ref 6–8.4)
PROT SERPL-MCNC: 5.3 G/DL (ref 6–8.4)
PROT SERPL-MCNC: 5.4 G/DL (ref 6–8.4)
PROT SERPL-MCNC: 5.4 G/DL (ref 6–8.4)
PROT SERPL-MCNC: 5.5 G/DL (ref 6–8.4)
PROT SERPL-MCNC: 5.5 G/DL (ref 6–8.4)
PROT SERPL-MCNC: 5.7 G/DL (ref 6–8.4)
PROT SERPL-MCNC: 5.8 G/DL (ref 6–8.4)
PROT SERPL-MCNC: 5.8 G/DL (ref 6–8.4)
PROT SERPL-MCNC: 5.9 G/DL (ref 6–8.4)
PROT SERPL-MCNC: 6 G/DL (ref 6–8.4)
PROT SERPL-MCNC: 6.1 G/DL (ref 6–8.4)
PROT SERPL-MCNC: 6.2 G/DL (ref 6–8.4)
PROT SERPL-MCNC: 6.4 G/DL (ref 6–8.4)
PROT SERPL-MCNC: 6.4 G/DL (ref 6–8.4)
PROT SERPL-MCNC: 6.5 G/DL (ref 6–8.4)
PROT SERPL-MCNC: 7.1 G/DL (ref 6–8.4)
PROT UR QL STRIP: ABNORMAL
PROT UR QL STRIP: ABNORMAL
PROT UR-MCNC: 26 MG/DL (ref 0–15)
PROTHROMBIN TIME: 11 SEC (ref 9–12.5)
PROTHROMBIN TIME: 12.1 SEC (ref 9–12.5)
PROTHROMBIN TIME: 13 SEC (ref 9–12.5)
PROTHROMBIN TIME: 14.6 SEC (ref 9–12.5)
PROVIDER CREDENTIALS: ABNORMAL
PROVIDER CREDENTIALS: ABNORMAL
PROVIDER NOTIFIED: ABNORMAL
PROVIDER NOTIFIED: ABNORMAL
RA MAJOR: 4.71 CM
RA PRESSURE: 8 MMHG
RA WIDTH: 3.09 CM
RBC # BLD AUTO: 2.24 M/UL (ref 4.6–6.2)
RBC # BLD AUTO: 2.36 M/UL (ref 4.6–6.2)
RBC # BLD AUTO: 2.55 M/UL (ref 4.6–6.2)
RBC # BLD AUTO: 2.88 M/UL (ref 4.6–6.2)
RBC # BLD AUTO: 2.94 M/UL (ref 4.6–6.2)
RBC # BLD AUTO: 2.94 M/UL (ref 4.6–6.2)
RBC # BLD AUTO: 2.99 M/UL (ref 4.6–6.2)
RBC # BLD AUTO: 2.99 M/UL (ref 4.6–6.2)
RBC # BLD AUTO: 3.32 M/UL (ref 4.6–6.2)
RBC # BLD AUTO: 3.49 M/UL (ref 4.6–6.2)
RBC # BLD AUTO: 3.65 M/UL (ref 4.6–6.2)
RBC # BLD AUTO: 3.85 M/UL (ref 4.6–6.2)
RBC # BLD AUTO: 3.99 M/UL (ref 4.6–6.2)
RBC # BLD AUTO: 4.47 M/UL (ref 4.6–6.2)
RBC # BLD AUTO: 4.58 M/UL (ref 4.6–6.2)
RBC # BLD AUTO: 4.6 M/UL (ref 4.6–6.2)
RBC # BLD AUTO: 4.62 M/UL (ref 4.6–6.2)
RBC # BLD AUTO: 4.67 M/UL (ref 4.6–6.2)
RBC # BLD AUTO: 4.71 M/UL (ref 4.6–6.2)
RBC # BLD AUTO: 4.78 M/UL (ref 4.6–6.2)
RBC # BLD AUTO: 4.79 M/UL (ref 4.6–6.2)
RBC # BLD AUTO: 4.8 M/UL (ref 4.6–6.2)
RBC # BLD AUTO: 4.83 M/UL (ref 4.6–6.2)
RBC # BLD AUTO: 4.84 M/UL (ref 4.6–6.2)
RBC # BLD AUTO: 4.87 M/UL (ref 4.6–6.2)
RBC # BLD AUTO: 4.97 M/UL (ref 4.6–6.2)
RBC # BLD AUTO: 5.04 M/UL (ref 4.6–6.2)
RBC # BLD AUTO: 5.07 M/UL (ref 4.6–6.2)
RBC # BLD AUTO: 5.08 M/UL (ref 4.6–6.2)
RBC # BLD AUTO: 5.19 M/UL (ref 4.6–6.2)
RBC #/AREA URNS AUTO: 59 /HPF (ref 0–4)
RBC #/AREA URNS AUTO: >100 /HPF (ref 0–4)
RIGHT VENTRICULAR END-DIASTOLIC DIMENSION: 3.28 CM
S PN DA SERO 19F IGG SER-MCNC: 1.6 UG/ML
S PNEUM DA 1 IGG SER-MCNC: 5.6 UG/ML
S PNEUM DA 14 IGG SER-MCNC: 11.4
S PNEUM DA 18C IGG SER-MCNC: 7.6
S PNEUM DA 19A IGG SER-MCNC: 25.8 UG/ML
S PNEUM DA 3 IGG SER-MCNC: 0.6 UG/ML
S PNEUM DA 5 IGG SER-MCNC: 34.3 UG/ML
S PNEUM DA 6A IGG SER-MCNC: 1.1 UG/ML
S PNEUM DA 6B IGG SER-MCNC: 12.6 UG/ML
S PNEUM DA 7F IGG SER-MCNC: 31.9 UG/ML
S PNEUM DA 9V IGG SER-MCNC: <0.3 UG/ML
SAMPLE: ABNORMAL
SARS-COV-2 RDRP RESP QL NAA+PROBE: POSITIVE
SARS-COV-2 RDRP RESP QL NAA+PROBE: POSITIVE
SARS-COV-2 RNA RESP QL NAA+PROBE: NOT DETECTED
SHBG SERPL-SCNC: 9 NMOL/L (ref 10–50)
SINUS: 2.56 CM
SITE: ABNORMAL
SMUDGE CELLS BLD QL SMEAR: PRESENT
SMUDGE CELLS BLD QL SMEAR: PRESENT
SODIUM SERPL-SCNC: 134 MMOL/L (ref 136–145)
SODIUM SERPL-SCNC: 135 MMOL/L (ref 136–145)
SODIUM SERPL-SCNC: 136 MMOL/L (ref 136–145)
SODIUM SERPL-SCNC: 137 MMOL/L (ref 136–145)
SODIUM SERPL-SCNC: 138 MMOL/L (ref 136–145)
SODIUM SERPL-SCNC: 139 MMOL/L (ref 136–145)
SODIUM SERPL-SCNC: 140 MMOL/L (ref 136–145)
SODIUM SERPL-SCNC: 141 MMOL/L (ref 136–145)
SODIUM SERPL-SCNC: 142 MMOL/L (ref 136–145)
SODIUM SERPL-SCNC: 143 MMOL/L (ref 136–145)
SODIUM SERPL-SCNC: 144 MMOL/L (ref 136–145)
SODIUM SERPL-SCNC: 144 MMOL/L (ref 136–145)
SODIUM SERPL-SCNC: 145 MMOL/L (ref 136–145)
SODIUM SERPL-SCNC: 146 MMOL/L (ref 136–145)
SODIUM SERPL-SCNC: 146 MMOL/L (ref 136–145)
SODIUM SERPL-SCNC: 147 MMOL/L (ref 136–145)
SODIUM SERPL-SCNC: 148 MMOL/L (ref 136–145)
SODIUM SERPL-SCNC: 149 MMOL/L (ref 136–145)
SODIUM SERPL-SCNC: 150 MMOL/L (ref 136–145)
SODIUM SERPL-SCNC: 150 MMOL/L (ref 136–145)
SODIUM SERPL-SCNC: 151 MMOL/L (ref 136–145)
SODIUM SERPL-SCNC: 152 MMOL/L (ref 136–145)
SODIUM UR-SCNC: 62 MMOL/L (ref 20–250)
SP GR UR STRIP: 1.02 (ref 1–1.03)
SP GR UR STRIP: 1.02 (ref 1–1.03)
SP02: 100
SP02: 100
SP02: 83
SP02: 86
SP02: 87
SP02: 88
SP02: 88
SP02: 89
SP02: 91
SP02: 92
SP02: 93
SP02: 93
SP02: 94
SP02: 95
SP02: 95
SP02: 96
SP02: 96
SP02: 98
SP02: 98
SP02: 99
SPECIMEN SOURCE: NORMAL
SPONT RATE: 35
SQUAMOUS #/AREA URNS AUTO: 1 /HPF
STJ: 2.34 CM
TESTOST FREE SERPL-MCNC: 15.2 PG/ML
TESTOST FREE SERPL-MCNC: 173 PG/ML (ref 46–224)
TESTOST SERPL-MCNC: 362 NG/DL (ref 250–1100)
TESTOST SERPL-MCNC: 419 NG/DL (ref 304–1227)
TESTOSTERONE.FREE+WB SERPL-MCNC: 167.7 NG/DL (ref 110–575)
TIME NOTIFIED: 1912
TOXIC GRANULES BLD QL SMEAR: PRESENT
TOXIC GRANULES BLD QL SMEAR: PRESENT
TRIGL SERPL-MCNC: 137 MG/DL (ref 30–150)
TRIGL SERPL-MCNC: 198 MG/DL (ref 30–150)
TRIGL SERPL-MCNC: 85 MG/DL (ref 30–150)
TROPONIN I SERPL DL<=0.01 NG/ML-MCNC: 0.03 NG/ML (ref 0–0.03)
UNIT NUMBER: NORMAL
UNIT NUMBER: NORMAL
URATE SERPL-MCNC: 7.7 MG/DL (ref 3.4–7)
URN SPEC COLLECT METH UR: ABNORMAL
URN SPEC COLLECT METH UR: ABNORMAL
UUN UR-MCNC: 285 MG/DL (ref 140–1050)
VANCOMYCIN SERPL-MCNC: 10 UG/ML
VANCOMYCIN SERPL-MCNC: 11.2 UG/ML
VANCOMYCIN SERPL-MCNC: 11.3 UG/ML
VANCOMYCIN SERPL-MCNC: 12.3 UG/ML
VANCOMYCIN SERPL-MCNC: 13.3 UG/ML
VANCOMYCIN SERPL-MCNC: 14.9 UG/ML
VANCOMYCIN SERPL-MCNC: 16.5 UG/ML
VANCOMYCIN SERPL-MCNC: 17.5 UG/ML
VANCOMYCIN SERPL-MCNC: 19.7 UG/ML
VANCOMYCIN SERPL-MCNC: 21.6 UG/ML
VANCOMYCIN SERPL-MCNC: 22.9 UG/ML
VANCOMYCIN SERPL-MCNC: 24.4 UG/ML
VERBAL RESULT READBACK PERFORMED: YES
VERBAL RESULT READBACK PERFORMED: YES
VT: 312
VT: 390
VT: 400
VT: 420
VT: 430
VT: 450
VT: 460
VT: 470
VT: 490
VT: 520
VT: 60
WBC # BLD AUTO: 10.95 K/UL (ref 3.9–12.7)
WBC # BLD AUTO: 11.62 K/UL (ref 3.9–12.7)
WBC # BLD AUTO: 12.87 K/UL (ref 3.9–12.7)
WBC # BLD AUTO: 14.78 K/UL (ref 3.9–12.7)
WBC # BLD AUTO: 15.4 K/UL (ref 3.9–12.7)
WBC # BLD AUTO: 15.93 K/UL (ref 3.9–12.7)
WBC # BLD AUTO: 18.04 K/UL (ref 3.9–12.7)
WBC # BLD AUTO: 18.53 K/UL (ref 3.9–12.7)
WBC # BLD AUTO: 19.71 K/UL (ref 3.9–12.7)
WBC # BLD AUTO: 23.57 K/UL (ref 3.9–12.7)
WBC # BLD AUTO: 24.42 K/UL (ref 3.9–12.7)
WBC # BLD AUTO: 24.42 K/UL (ref 3.9–12.7)
WBC # BLD AUTO: 25 K/UL (ref 3.9–12.7)
WBC # BLD AUTO: 26.19 K/UL (ref 3.9–12.7)
WBC # BLD AUTO: 30.29 K/UL (ref 3.9–12.7)
WBC # BLD AUTO: 36.52 K/UL (ref 3.9–12.7)
WBC # BLD AUTO: 4.28 K/UL (ref 3.9–12.7)
WBC # BLD AUTO: 41.29 K/UL (ref 3.9–12.7)
WBC # BLD AUTO: 43.27 K/UL (ref 3.9–12.7)
WBC # BLD AUTO: 44.03 K/UL (ref 3.9–12.7)
WBC # BLD AUTO: 5.15 K/UL (ref 3.9–12.7)
WBC # BLD AUTO: 5.43 K/UL (ref 3.9–12.7)
WBC # BLD AUTO: 5.55 K/UL (ref 3.9–12.7)
WBC # BLD AUTO: 51.72 K/UL (ref 3.9–12.7)
WBC # BLD AUTO: 63.47 K/UL (ref 3.9–12.7)
WBC # BLD AUTO: 8.08 K/UL (ref 3.9–12.7)
WBC # BLD AUTO: 8.33 K/UL (ref 3.9–12.7)
WBC # BLD AUTO: 8.49 K/UL (ref 3.9–12.7)
WBC # BLD AUTO: 8.64 K/UL (ref 3.9–12.7)
WBC # BLD AUTO: 9.63 K/UL (ref 3.9–12.7)
WBC #/AREA URNS AUTO: 20 /HPF (ref 0–5)
WBC #/AREA URNS AUTO: 96 /HPF (ref 0–5)

## 2020-01-01 PROCEDURE — 84100 ASSAY OF PHOSPHORUS: CPT

## 2020-01-01 PROCEDURE — 82800 BLOOD PH: CPT

## 2020-01-01 PROCEDURE — 25000003 PHARM REV CODE 250: Performed by: HOSPITALIST

## 2020-01-01 PROCEDURE — 99213 PR OFFICE/OUTPT VISIT, EST, LEVL III, 20-29 MIN: ICD-10-PCS | Mod: 95,,, | Performed by: INTERNAL MEDICINE

## 2020-01-01 PROCEDURE — 20000000 HC ICU ROOM

## 2020-01-01 PROCEDURE — 99292 PR CRITICAL CARE, ADDL 30 MIN: ICD-10-PCS | Mod: ICN,,, | Performed by: NURSE PRACTITIONER

## 2020-01-01 PROCEDURE — 85007 BL SMEAR W/DIFF WBC COUNT: CPT

## 2020-01-01 PROCEDURE — 80053 COMPREHEN METABOLIC PANEL: CPT

## 2020-01-01 PROCEDURE — 93005 ELECTROCARDIOGRAM TRACING: CPT

## 2020-01-01 PROCEDURE — 36556 PR INSERT NON-TUNNEL CV CATH 5+ YRS OLD: ICD-10-PCS | Mod: ,,, | Performed by: INTERNAL MEDICINE

## 2020-01-01 PROCEDURE — 80202 ASSAY OF VANCOMYCIN: CPT

## 2020-01-01 PROCEDURE — 83735 ASSAY OF MAGNESIUM: CPT

## 2020-01-01 PROCEDURE — 99291 PR CRITICAL CARE, E/M 30-74 MINUTES: ICD-10-PCS | Mod: ,,, | Performed by: NURSE PRACTITIONER

## 2020-01-01 PROCEDURE — 80053 COMPREHEN METABOLIC PANEL: CPT | Mod: 91

## 2020-01-01 PROCEDURE — 25000003 PHARM REV CODE 250: Performed by: NURSE PRACTITIONER

## 2020-01-01 PROCEDURE — 25000242 PHARM REV CODE 250 ALT 637 W/ HCPCS: Performed by: INTERNAL MEDICINE

## 2020-01-01 PROCEDURE — 90945 DIALYSIS ONE EVALUATION: CPT | Mod: ,,, | Performed by: INTERNAL MEDICINE

## 2020-01-01 PROCEDURE — 94640 AIRWAY INHALATION TREATMENT: CPT

## 2020-01-01 PROCEDURE — 99900026 HC AIRWAY MAINTENANCE (STAT)

## 2020-01-01 PROCEDURE — 25000003 PHARM REV CODE 250: Performed by: CLINICAL NURSE SPECIALIST

## 2020-01-01 PROCEDURE — 93010 ELECTROCARDIOGRAM REPORT: CPT | Mod: ,,, | Performed by: INTERNAL MEDICINE

## 2020-01-01 PROCEDURE — 82306 VITAMIN D 25 HYDROXY: CPT

## 2020-01-01 PROCEDURE — 87040 BLOOD CULTURE FOR BACTERIA: CPT | Mod: 59

## 2020-01-01 PROCEDURE — 36556 INSERT NON-TUNNEL CV CATH: CPT | Mod: ,,, | Performed by: INTERNAL MEDICINE

## 2020-01-01 PROCEDURE — 25000003 PHARM REV CODE 250: Performed by: STUDENT IN AN ORGANIZED HEALTH CARE EDUCATION/TRAINING PROGRAM

## 2020-01-01 PROCEDURE — 99291 PR CRITICAL CARE, E/M 30-74 MINUTES: ICD-10-PCS | Mod: 25,,, | Performed by: NURSE PRACTITIONER

## 2020-01-01 PROCEDURE — 27000221 HC OXYGEN, UP TO 24 HOURS

## 2020-01-01 PROCEDURE — 99232 SBSQ HOSP IP/OBS MODERATE 35: CPT | Mod: ,,, | Performed by: INTERNAL MEDICINE

## 2020-01-01 PROCEDURE — 63600175 PHARM REV CODE 636 W HCPCS: Performed by: STUDENT IN AN ORGANIZED HEALTH CARE EDUCATION/TRAINING PROGRAM

## 2020-01-01 PROCEDURE — 37799 UNLISTED PX VASCULAR SURGERY: CPT

## 2020-01-01 PROCEDURE — 82330 ASSAY OF CALCIUM: CPT

## 2020-01-01 PROCEDURE — 84540 ASSAY OF URINE/UREA-N: CPT

## 2020-01-01 PROCEDURE — U0002 COVID-19 LAB TEST NON-CDC: HCPCS | Mod: QW,S$GLB,, | Performed by: NURSE PRACTITIONER

## 2020-01-01 PROCEDURE — 94799 UNLISTED PULMONARY SVC/PX: CPT

## 2020-01-01 PROCEDURE — 85027 COMPLETE CBC AUTOMATED: CPT

## 2020-01-01 PROCEDURE — 25000242 PHARM REV CODE 250 ALT 637 W/ HCPCS: Performed by: HOSPITALIST

## 2020-01-01 PROCEDURE — 25000003 PHARM REV CODE 250: Performed by: INTERNAL MEDICINE

## 2020-01-01 PROCEDURE — 99499 NO LOS: ICD-10-PCS | Mod: ,,, | Performed by: NURSE PRACTITIONER

## 2020-01-01 PROCEDURE — 85025 COMPLETE CBC W/AUTO DIFF WBC: CPT

## 2020-01-01 PROCEDURE — 80100008 HC CRRT DAILY MAINTENANCE

## 2020-01-01 PROCEDURE — 83735 ASSAY OF MAGNESIUM: CPT | Mod: 91

## 2020-01-01 PROCEDURE — 82803 BLOOD GASES ANY COMBINATION: CPT

## 2020-01-01 PROCEDURE — 63600175 PHARM REV CODE 636 W HCPCS: Performed by: INTERNAL MEDICINE

## 2020-01-01 PROCEDURE — 63600367 HC NITRIC OXIDE PER HOUR

## 2020-01-01 PROCEDURE — 25000242 PHARM REV CODE 250 ALT 637 W/ HCPCS: Performed by: STUDENT IN AN ORGANIZED HEALTH CARE EDUCATION/TRAINING PROGRAM

## 2020-01-01 PROCEDURE — 99292 PR CRITICAL CARE, ADDL 30 MIN: ICD-10-PCS | Mod: 25,,, | Performed by: CLINICAL NURSE SPECIALIST

## 2020-01-01 PROCEDURE — 63600175 PHARM REV CODE 636 W HCPCS: Performed by: CLINICAL NURSE SPECIALIST

## 2020-01-01 PROCEDURE — 87502 INFLUENZA DNA AMP PROBE: CPT

## 2020-01-01 PROCEDURE — 99900035 HC TECH TIME PER 15 MIN (STAT)

## 2020-01-01 PROCEDURE — 63600175 PHARM REV CODE 636 W HCPCS

## 2020-01-01 PROCEDURE — 85520 HEPARIN ASSAY: CPT | Mod: 91

## 2020-01-01 PROCEDURE — 94003 VENT MGMT INPAT SUBQ DAY: CPT

## 2020-01-01 PROCEDURE — 90945 DIALYSIS ONE EVALUATION: CPT

## 2020-01-01 PROCEDURE — 82728 ASSAY OF FERRITIN: CPT

## 2020-01-01 PROCEDURE — 99202 PR OFFICE/OUTPT VISIT, NEW, LEVL II, 15-29 MIN: ICD-10-PCS | Mod: 25,S$GLB,, | Performed by: NURSE PRACTITIONER

## 2020-01-01 PROCEDURE — 99214 OFFICE O/P EST MOD 30 MIN: CPT | Mod: S$GLB,,, | Performed by: NURSE PRACTITIONER

## 2020-01-01 PROCEDURE — 99999 PR PBB SHADOW E&M-EST. PATIENT-LVL I: ICD-10-PCS | Mod: PBBFAC,,,

## 2020-01-01 PROCEDURE — 63600175 PHARM REV CODE 636 W HCPCS: Performed by: NURSE PRACTITIONER

## 2020-01-01 PROCEDURE — 99291 CRITICAL CARE FIRST HOUR: CPT | Mod: ,,, | Performed by: INTERNAL MEDICINE

## 2020-01-01 PROCEDURE — 94761 N-INVAS EAR/PLS OXIMETRY MLT: CPT

## 2020-01-01 PROCEDURE — 27200966 HC CLOSED SUCTION SYSTEM

## 2020-01-01 PROCEDURE — 82570 ASSAY OF URINE CREATININE: CPT

## 2020-01-01 PROCEDURE — U0003 INFECTIOUS AGENT DETECTION BY NUCLEIC ACID (DNA OR RNA); SEVERE ACUTE RESPIRATORY SYNDROME CORONAVIRUS 2 (SARS-COV-2) (CORONAVIRUS DISEASE [COVID-19]), AMPLIFIED PROBE TECHNIQUE, MAKING USE OF HIGH THROUGHPUT TECHNOLOGIES AS DESCRIBED BY CMS-2020-01-R: HCPCS

## 2020-01-01 PROCEDURE — 85379 FIBRIN DEGRADATION QUANT: CPT

## 2020-01-01 PROCEDURE — 36556 PR INSERT NON-TUNNEL CV CATH 5+ YRS OLD: ICD-10-PCS | Mod: ,,, | Performed by: NURSE PRACTITIONER

## 2020-01-01 PROCEDURE — 83880 ASSAY OF NATRIURETIC PEPTIDE: CPT

## 2020-01-01 PROCEDURE — 80069 RENAL FUNCTION PANEL: CPT

## 2020-01-01 PROCEDURE — 87205 SMEAR GRAM STAIN: CPT

## 2020-01-01 PROCEDURE — 27000646 HC AEROBIKA DEVICE

## 2020-01-01 PROCEDURE — U0002 COVID-19 LAB TEST NON-CDC: HCPCS | Mod: QW,S$GLB,, | Performed by: PHYSICIAN ASSISTANT

## 2020-01-01 PROCEDURE — 99223 1ST HOSP IP/OBS HIGH 75: CPT | Mod: ,,, | Performed by: HOSPITALIST

## 2020-01-01 PROCEDURE — 94727 PR PULM FUNCTION TEST BY GAS: ICD-10-PCS | Mod: S$GLB,,, | Performed by: INTERNAL MEDICINE

## 2020-01-01 PROCEDURE — 85730 THROMBOPLASTIN TIME PARTIAL: CPT

## 2020-01-01 PROCEDURE — 20600001 HC STEP DOWN PRIVATE ROOM

## 2020-01-01 PROCEDURE — 63600175 PHARM REV CODE 636 W HCPCS: Performed by: HOSPITALIST

## 2020-01-01 PROCEDURE — 85610 PROTHROMBIN TIME: CPT

## 2020-01-01 PROCEDURE — 3008F BODY MASS INDEX DOCD: CPT | Mod: CPTII,S$GLB,, | Performed by: NURSE PRACTITIONER

## 2020-01-01 PROCEDURE — 27100171 HC OXYGEN HIGH FLOW UP TO 24 HOURS

## 2020-01-01 PROCEDURE — 84100 ASSAY OF PHOSPHORUS: CPT | Mod: 91

## 2020-01-01 PROCEDURE — 36556 INSERT NON-TUNNEL CV CATH: CPT

## 2020-01-01 PROCEDURE — 36620 INSERTION CATHETER ARTERY: CPT

## 2020-01-01 PROCEDURE — 99292 CRITICAL CARE ADDL 30 MIN: CPT | Mod: 25,,, | Performed by: NURSE PRACTITIONER

## 2020-01-01 PROCEDURE — 99214 PR OFFICE/OUTPT VISIT, EST, LEVL IV, 30-39 MIN: ICD-10-PCS | Mod: S$GLB,,, | Performed by: NURSE PRACTITIONER

## 2020-01-01 PROCEDURE — 99233 PR SUBSEQUENT HOSPITAL CARE,LEVL III: ICD-10-PCS | Mod: ,,, | Performed by: INTERNAL MEDICINE

## 2020-01-01 PROCEDURE — 99292 PR CRITICAL CARE, ADDL 30 MIN: ICD-10-PCS | Mod: ,,, | Performed by: NURSE PRACTITIONER

## 2020-01-01 PROCEDURE — 80048 BASIC METABOLIC PNL TOTAL CA: CPT

## 2020-01-01 PROCEDURE — 82787 IGG 1 2 3 OR 4 EACH: CPT

## 2020-01-01 PROCEDURE — 36620 ARTERIAL LINE: ICD-10-PCS | Mod: ,,, | Performed by: INTERNAL MEDICINE

## 2020-01-01 PROCEDURE — 94729 DIFFUSING CAPACITY: CPT | Mod: S$GLB,,, | Performed by: INTERNAL MEDICINE

## 2020-01-01 PROCEDURE — 99233 PR SUBSEQUENT HOSPITAL CARE,LEVL III: ICD-10-PCS | Mod: ,,, | Performed by: HOSPITALIST

## 2020-01-01 PROCEDURE — 99291 PR CRITICAL CARE, E/M 30-74 MINUTES: ICD-10-PCS | Mod: 25,,, | Performed by: CLINICAL NURSE SPECIALIST

## 2020-01-01 PROCEDURE — 69210 EAR CERUMEN REMOVAL: ICD-10-PCS | Mod: S$GLB,,, | Performed by: NURSE PRACTITIONER

## 2020-01-01 PROCEDURE — 63600175 PHARM REV CODE 636 W HCPCS: Performed by: PHYSICIAN ASSISTANT

## 2020-01-01 PROCEDURE — 99285 PR EMERGENCY DEPT VISIT,LEVEL V: ICD-10-PCS | Mod: ,,, | Performed by: PHYSICIAN ASSISTANT

## 2020-01-01 PROCEDURE — 80048 BASIC METABOLIC PNL TOTAL CA: CPT | Mod: 91

## 2020-01-01 PROCEDURE — 99204 OFFICE O/P NEW MOD 45 MIN: CPT | Mod: S$GLB,,, | Performed by: NURSE PRACTITIONER

## 2020-01-01 PROCEDURE — 99292 CRITICAL CARE ADDL 30 MIN: CPT | Mod: ,,, | Performed by: INTERNAL MEDICINE

## 2020-01-01 PROCEDURE — 94660 CPAP INITIATION&MGMT: CPT

## 2020-01-01 PROCEDURE — 36600 WITHDRAWAL OF ARTERIAL BLOOD: CPT

## 2020-01-01 PROCEDURE — 80061 LIPID PANEL: CPT

## 2020-01-01 PROCEDURE — 83605 ASSAY OF LACTIC ACID: CPT

## 2020-01-01 PROCEDURE — 99223 1ST HOSP IP/OBS HIGH 75: CPT | Mod: ,,, | Performed by: INTERNAL MEDICINE

## 2020-01-01 PROCEDURE — 36415 COLL VENOUS BLD VENIPUNCTURE: CPT

## 2020-01-01 PROCEDURE — 80069 RENAL FUNCTION PANEL: CPT | Mod: 91

## 2020-01-01 PROCEDURE — 81001 URINALYSIS AUTO W/SCOPE: CPT

## 2020-01-01 PROCEDURE — 99292 PR CRITICAL CARE, ADDL 30 MIN: ICD-10-PCS | Mod: ,,, | Performed by: INTERNAL MEDICINE

## 2020-01-01 PROCEDURE — 82784 ASSAY IGA/IGD/IGG/IGM EACH: CPT | Mod: 59

## 2020-01-01 PROCEDURE — 99999 PR PBB SHADOW E&M-EST. PATIENT-LVL III: ICD-10-PCS | Mod: PBBFAC,,, | Performed by: NURSE PRACTITIONER

## 2020-01-01 PROCEDURE — 82785 ASSAY OF IGE: CPT

## 2020-01-01 PROCEDURE — 71046 X-RAY EXAM CHEST 2 VIEWS: CPT | Mod: 26,,, | Performed by: RADIOLOGY

## 2020-01-01 PROCEDURE — 94729 PR C02/MEMBANE DIFFUSE CAPACITY: ICD-10-PCS | Mod: S$GLB,,, | Performed by: INTERNAL MEDICINE

## 2020-01-01 PROCEDURE — 71046 XR CHEST PA AND LATERAL: ICD-10-PCS | Mod: FY,S$GLB,, | Performed by: RADIOLOGY

## 2020-01-01 PROCEDURE — 27200188 HC TRANSDUCER, ART ADULT/PEDS

## 2020-01-01 PROCEDURE — 87070 CULTURE OTHR SPECIMN AEROBIC: CPT

## 2020-01-01 PROCEDURE — 82784 ASSAY IGA/IGD/IGG/IGM EACH: CPT

## 2020-01-01 PROCEDURE — 99233 SBSQ HOSP IP/OBS HIGH 50: CPT | Mod: ,,, | Performed by: HOSPITALIST

## 2020-01-01 PROCEDURE — 36620 PR INSERT CATH,ART,PERCUT,SHORTTERM: ICD-10-PCS | Mod: 59,,, | Performed by: INTERNAL MEDICINE

## 2020-01-01 PROCEDURE — 99233 SBSQ HOSP IP/OBS HIGH 50: CPT | Mod: ,,, | Performed by: INTERNAL MEDICINE

## 2020-01-01 PROCEDURE — 84403 ASSAY OF TOTAL TESTOSTERONE: CPT

## 2020-01-01 PROCEDURE — 36415 COLL VENOUS BLD VENIPUNCTURE: CPT | Mod: PO

## 2020-01-01 PROCEDURE — 36556 INSERT NON-TUNNEL CV CATH: CPT | Mod: ,,, | Performed by: NURSE PRACTITIONER

## 2020-01-01 PROCEDURE — 27100098 HC SPACER

## 2020-01-01 PROCEDURE — 84478 ASSAY OF TRIGLYCERIDES: CPT

## 2020-01-01 PROCEDURE — 71046 X-RAY EXAM CHEST 2 VIEWS: CPT | Mod: FY,S$GLB,, | Performed by: RADIOLOGY

## 2020-01-01 PROCEDURE — 99499 UNLISTED E&M SERVICE: CPT | Mod: S$GLB,,, | Performed by: ALLERGY & IMMUNOLOGY

## 2020-01-01 PROCEDURE — 82330 ASSAY OF CALCIUM: CPT | Mod: 91

## 2020-01-01 PROCEDURE — 94060 EVALUATION OF WHEEZING: CPT | Mod: S$GLB,,, | Performed by: INTERNAL MEDICINE

## 2020-01-01 PROCEDURE — 99291 CRITICAL CARE FIRST HOUR: CPT | Mod: ,,, | Performed by: NURSE PRACTITIONER

## 2020-01-01 PROCEDURE — 94664 DEMO&/EVAL PT USE INHALER: CPT

## 2020-01-01 PROCEDURE — 85520 HEPARIN ASSAY: CPT

## 2020-01-01 PROCEDURE — 90945 PR DIALYSIS, NOT HEMO, 1 EVAL: ICD-10-PCS | Mod: ,,, | Performed by: INTERNAL MEDICINE

## 2020-01-01 PROCEDURE — 99223 PR INITIAL HOSPITAL CARE,LEVL III: ICD-10-PCS | Mod: ,,, | Performed by: INTERNAL MEDICINE

## 2020-01-01 PROCEDURE — 90945 DIALYSIS ONE EVALUATION: CPT | Mod: ,,, | Performed by: NURSE PRACTITIONER

## 2020-01-01 PROCEDURE — 31500 AIRWAY MANAGEMENT: ICD-10-PCS | Mod: ,,, | Performed by: STUDENT IN AN ORGANIZED HEALTH CARE EDUCATION/TRAINING PROGRAM

## 2020-01-01 PROCEDURE — 94667 MNPJ CHEST WALL 1ST: CPT

## 2020-01-01 PROCEDURE — 99292 PR CRITICAL CARE, ADDL 30 MIN: ICD-10-PCS | Mod: ,,, | Performed by: PHYSICIAN ASSISTANT

## 2020-01-01 PROCEDURE — 71250 CT THORAX DX C-: CPT | Mod: TC

## 2020-01-01 PROCEDURE — 82140 ASSAY OF AMMONIA: CPT

## 2020-01-01 PROCEDURE — 71250 CT THORAX DX C-: CPT | Mod: 26,,, | Performed by: RADIOLOGY

## 2020-01-01 PROCEDURE — 84133 ASSAY OF URINE POTASSIUM: CPT

## 2020-01-01 PROCEDURE — 99999 PR PBB SHADOW E&M-EST. PATIENT-LVL IV: CPT | Mod: PBBFAC,,, | Performed by: NURSE PRACTITIONER

## 2020-01-01 PROCEDURE — 43752 NASAL/OROGASTRIC W/TUBE PLMT: CPT

## 2020-01-01 PROCEDURE — C1751 CATH, INF, PER/CENT/MIDLINE: HCPCS

## 2020-01-01 PROCEDURE — U0002: ICD-10-PCS | Mod: QW,S$GLB,, | Performed by: PHYSICIAN ASSISTANT

## 2020-01-01 PROCEDURE — 99499 UNLISTED E&M SERVICE: CPT | Mod: ,,, | Performed by: NURSE PRACTITIONER

## 2020-01-01 PROCEDURE — 27100078 HC HELIUM & OXYGEN MIX PER DAY

## 2020-01-01 PROCEDURE — 87086 URINE CULTURE/COLONY COUNT: CPT

## 2020-01-01 PROCEDURE — 94668 MNPJ CHEST WALL SBSQ: CPT

## 2020-01-01 PROCEDURE — 99292 PR CRITICAL CARE, ADDL 30 MIN: ICD-10-PCS | Mod: 25,,, | Performed by: NURSE PRACTITIONER

## 2020-01-01 PROCEDURE — 99999 PR PBB SHADOW E&M-EST. PATIENT-LVL IV: ICD-10-PCS | Mod: PBBFAC,,, | Performed by: NURSE PRACTITIONER

## 2020-01-01 PROCEDURE — 86140 C-REACTIVE PROTEIN: CPT

## 2020-01-01 PROCEDURE — 99291 PR CRITICAL CARE, E/M 30-74 MINUTES: ICD-10-PCS | Mod: ICN,,, | Performed by: NURSE PRACTITIONER

## 2020-01-01 PROCEDURE — 83036 HEMOGLOBIN GLYCOSYLATED A1C: CPT

## 2020-01-01 PROCEDURE — 85027 COMPLETE CBC AUTOMATED: CPT | Mod: 91

## 2020-01-01 PROCEDURE — 82550 ASSAY OF CK (CPK): CPT

## 2020-01-01 PROCEDURE — 99292 CRITICAL CARE ADDL 30 MIN: CPT | Mod: ICN,,, | Performed by: NURSE PRACTITIONER

## 2020-01-01 PROCEDURE — 99291 PR CRITICAL CARE, E/M 30-74 MINUTES: ICD-10-PCS | Mod: ,,, | Performed by: INTERNAL MEDICINE

## 2020-01-01 PROCEDURE — 82436 ASSAY OF URINE CHLORIDE: CPT

## 2020-01-01 PROCEDURE — 94002 VENT MGMT INPAT INIT DAY: CPT

## 2020-01-01 PROCEDURE — 99499 UNLISTED E&M SERVICE: CPT | Mod: ,,, | Performed by: PHYSICIAN ASSISTANT

## 2020-01-01 PROCEDURE — 85007 BL SMEAR W/DIFF WBC COUNT: CPT | Mod: 91

## 2020-01-01 PROCEDURE — 99202 OFFICE O/P NEW SF 15 MIN: CPT | Mod: 25,S$GLB,, | Performed by: NURSE PRACTITIONER

## 2020-01-01 PROCEDURE — 86850 RBC ANTIBODY SCREEN: CPT

## 2020-01-01 PROCEDURE — 31500 INSERT EMERGENCY AIRWAY: CPT | Mod: ,,, | Performed by: STUDENT IN AN ORGANIZED HEALTH CARE EDUCATION/TRAINING PROGRAM

## 2020-01-01 PROCEDURE — 99291 CRITICAL CARE FIRST HOUR: CPT | Mod: 25,,, | Performed by: NURSE PRACTITIONER

## 2020-01-01 PROCEDURE — 83050 HGB METHEMOGLOBIN QUAN: CPT

## 2020-01-01 PROCEDURE — 93010 ELECTROCARDIOGRAM REPORT: CPT | Mod: 76,,, | Performed by: INTERNAL MEDICINE

## 2020-01-01 PROCEDURE — 99292 CRITICAL CARE ADDL 30 MIN: CPT | Mod: ,,, | Performed by: NURSE PRACTITIONER

## 2020-01-01 PROCEDURE — 99291 CRITICAL CARE FIRST HOUR: CPT | Mod: ICN,,, | Performed by: NURSE PRACTITIONER

## 2020-01-01 PROCEDURE — 87449 NOS EACH ORGANISM AG IA: CPT

## 2020-01-01 PROCEDURE — 99292 CRITICAL CARE ADDL 30 MIN: CPT | Mod: ,,, | Performed by: PHYSICIAN ASSISTANT

## 2020-01-01 PROCEDURE — 99233 SBSQ HOSP IP/OBS HIGH 50: CPT | Mod: ,,, | Performed by: STUDENT IN AN ORGANIZED HEALTH CARE EDUCATION/TRAINING PROGRAM

## 2020-01-01 PROCEDURE — 99999 PR PBB SHADOW E&M-EST. PATIENT-LVL III: CPT | Mod: PBBFAC,,, | Performed by: ALLERGY & IMMUNOLOGY

## 2020-01-01 PROCEDURE — 99204 PR OFFICE/OUTPT VISIT, NEW, LEVL IV, 45-59 MIN: ICD-10-PCS | Mod: S$GLB,,, | Performed by: NURSE PRACTITIONER

## 2020-01-01 PROCEDURE — 94060 PR EVAL OF BRONCHOSPASM: ICD-10-PCS | Mod: S$GLB,,, | Performed by: INTERNAL MEDICINE

## 2020-01-01 PROCEDURE — 99214 PR OFFICE/OUTPT VISIT, EST, LEVL IV, 30-39 MIN: ICD-10-PCS | Mod: S$GLB,,, | Performed by: PHYSICIAN ASSISTANT

## 2020-01-01 PROCEDURE — 82040 ASSAY OF SERUM ALBUMIN: CPT

## 2020-01-01 PROCEDURE — 93010 EKG 12-LEAD: ICD-10-PCS | Mod: 76,,, | Performed by: INTERNAL MEDICINE

## 2020-01-01 PROCEDURE — 99232 PR SUBSEQUENT HOSPITAL CARE,LEVL II: ICD-10-PCS | Mod: ,,, | Performed by: INTERNAL MEDICINE

## 2020-01-01 PROCEDURE — 99499 NO LOS: ICD-10-PCS | Mod: S$GLB,,, | Performed by: ALLERGY & IMMUNOLOGY

## 2020-01-01 PROCEDURE — 71250 CT CHEST WITHOUT CONTRAST: ICD-10-PCS | Mod: 26,,, | Performed by: RADIOLOGY

## 2020-01-01 PROCEDURE — 31500 INSERT EMERGENCY AIRWAY: CPT

## 2020-01-01 PROCEDURE — 99291 PR CRITICAL CARE, E/M 30-74 MINUTES: ICD-10-PCS | Mod: ,,, | Performed by: PHYSICIAN ASSISTANT

## 2020-01-01 PROCEDURE — 86774 TETANUS ANTIBODY: CPT

## 2020-01-01 PROCEDURE — 27000190 HC CPAP FULL FACE MASK W/VALVE

## 2020-01-01 PROCEDURE — 94727 GAS DIL/WSHOT DETER LNG VOL: CPT | Mod: S$GLB,,, | Performed by: INTERNAL MEDICINE

## 2020-01-01 PROCEDURE — 99223 1ST HOSP IP/OBS HIGH 75: CPT | Mod: ,,, | Performed by: EMERGENCY MEDICINE

## 2020-01-01 PROCEDURE — 99213 OFFICE O/P EST LOW 20 MIN: CPT | Mod: 95,,, | Performed by: INTERNAL MEDICINE

## 2020-01-01 PROCEDURE — 84132 ASSAY OF SERUM POTASSIUM: CPT

## 2020-01-01 PROCEDURE — 71046 XR CHEST PA AND LATERAL: ICD-10-PCS | Mod: 26,,, | Performed by: RADIOLOGY

## 2020-01-01 PROCEDURE — 85730 THROMBOPLASTIN TIME PARTIAL: CPT | Mod: 91

## 2020-01-01 PROCEDURE — 99244 OFF/OP CNSLTJ NEW/EST MOD 40: CPT | Mod: S$GLB,,, | Performed by: ALLERGY & IMMUNOLOGY

## 2020-01-01 PROCEDURE — 84484 ASSAY OF TROPONIN QUANT: CPT

## 2020-01-01 PROCEDURE — 51702 INSERT TEMP BLADDER CATH: CPT

## 2020-01-01 PROCEDURE — 99292 CRITICAL CARE ADDL 30 MIN: CPT | Mod: 25,,, | Performed by: CLINICAL NURSE SPECIALIST

## 2020-01-01 PROCEDURE — 83615 LACTATE (LD) (LDH) ENZYME: CPT

## 2020-01-01 PROCEDURE — 87040 BLOOD CULTURE FOR BACTERIA: CPT

## 2020-01-01 PROCEDURE — 99900031 HC PATIENT EDUCATION (STAT)

## 2020-01-01 PROCEDURE — 99292 CRITICAL CARE ADDL 30 MIN: CPT | Mod: ,,, | Performed by: EMERGENCY MEDICINE

## 2020-01-01 PROCEDURE — 69210 REMOVE IMPACTED EAR WAX UNI: CPT | Mod: S$GLB,,, | Performed by: NURSE PRACTITIONER

## 2020-01-01 PROCEDURE — 3008F PR BODY MASS INDEX (BMI) DOCUMENTED: ICD-10-PCS | Mod: CPTII,S$GLB,, | Performed by: NURSE PRACTITIONER

## 2020-01-01 PROCEDURE — 99233 PR SUBSEQUENT HOSPITAL CARE,LEVL III: ICD-10-PCS | Mod: ,,, | Performed by: STUDENT IN AN ORGANIZED HEALTH CARE EDUCATION/TRAINING PROGRAM

## 2020-01-01 PROCEDURE — 36620 INSERTION CATHETER ARTERY: CPT | Mod: ,,, | Performed by: INTERNAL MEDICINE

## 2020-01-01 PROCEDURE — P9047 ALBUMIN (HUMAN), 25%, 50ML: HCPCS | Performed by: CLINICAL NURSE SPECIALIST

## 2020-01-01 PROCEDURE — 99499 NO LOS: ICD-10-PCS | Mod: ,,, | Performed by: PHYSICIAN ASSISTANT

## 2020-01-01 PROCEDURE — 99285 EMERGENCY DEPT VISIT HI MDM: CPT | Mod: ,,, | Performed by: PHYSICIAN ASSISTANT

## 2020-01-01 PROCEDURE — 99223 PR INITIAL HOSPITAL CARE,LEVL III: ICD-10-PCS | Mod: ,,, | Performed by: EMERGENCY MEDICINE

## 2020-01-01 PROCEDURE — 84156 ASSAY OF PROTEIN URINE: CPT

## 2020-01-01 PROCEDURE — 99244 PR OFFICE CONSULTATION,LEVEL IV: ICD-10-PCS | Mod: S$GLB,,, | Performed by: ALLERGY & IMMUNOLOGY

## 2020-01-01 PROCEDURE — P9017 PLASMA 1 DONOR FRZ W/IN 8 HR: HCPCS

## 2020-01-01 PROCEDURE — 83935 ASSAY OF URINE OSMOLALITY: CPT

## 2020-01-01 PROCEDURE — 99291 CRITICAL CARE FIRST HOUR: CPT | Mod: 25,,, | Performed by: CLINICAL NURSE SPECIALIST

## 2020-01-01 PROCEDURE — 99999 PR PBB SHADOW E&M-EST. PATIENT-LVL III: CPT | Mod: PBBFAC,,, | Performed by: NURSE PRACTITIONER

## 2020-01-01 PROCEDURE — 87324 CLOSTRIDIUM AG IA: CPT

## 2020-01-01 PROCEDURE — 84550 ASSAY OF BLOOD/URIC ACID: CPT

## 2020-01-01 PROCEDURE — 51701 INSERT BLADDER CATHETER: CPT

## 2020-01-01 PROCEDURE — 63700000 PHARM REV CODE 250 ALT 637 W/O HCPCS: Performed by: HOSPITALIST

## 2020-01-01 PROCEDURE — 86901 BLOOD TYPING SEROLOGIC RH(D): CPT | Mod: 91

## 2020-01-01 PROCEDURE — 90945 PR DIALYSIS, NOT HEMO, 1 EVAL: ICD-10-PCS | Mod: ,,, | Performed by: NURSE PRACTITIONER

## 2020-01-01 PROCEDURE — 84300 ASSAY OF URINE SODIUM: CPT

## 2020-01-01 PROCEDURE — 31720 CLEARANCE OF AIRWAYS: CPT

## 2020-01-01 PROCEDURE — 99999 PR PBB SHADOW E&M-EST. PATIENT-LVL III: ICD-10-PCS | Mod: PBBFAC,,, | Performed by: ALLERGY & IMMUNOLOGY

## 2020-01-01 PROCEDURE — U0002: ICD-10-PCS | Mod: QW,S$GLB,, | Performed by: NURSE PRACTITIONER

## 2020-01-01 PROCEDURE — 84402 ASSAY OF FREE TESTOSTERONE: CPT

## 2020-01-01 PROCEDURE — 99999 PR PBB SHADOW E&M-EST. PATIENT-LVL I: CPT | Mod: PBBFAC,,,

## 2020-01-01 PROCEDURE — 36620 INSERTION CATHETER ARTERY: CPT | Mod: 59,,, | Performed by: INTERNAL MEDICINE

## 2020-01-01 PROCEDURE — 99292 PR CRITICAL CARE, ADDL 30 MIN: ICD-10-PCS | Mod: ,,, | Performed by: EMERGENCY MEDICINE

## 2020-01-01 PROCEDURE — 99291 CRITICAL CARE FIRST HOUR: CPT | Mod: ,,, | Performed by: PHYSICIAN ASSISTANT

## 2020-01-01 PROCEDURE — 93010 EKG 12-LEAD: ICD-10-PCS | Mod: ,,, | Performed by: INTERNAL MEDICINE

## 2020-01-01 PROCEDURE — 99214 OFFICE O/P EST MOD 30 MIN: CPT | Mod: S$GLB,,, | Performed by: PHYSICIAN ASSISTANT

## 2020-01-01 PROCEDURE — 25000003 PHARM REV CODE 250

## 2020-01-01 PROCEDURE — 85025 COMPLETE CBC W/AUTO DIFF WBC: CPT | Mod: 91

## 2020-01-01 PROCEDURE — 71046 X-RAY EXAM CHEST 2 VIEWS: CPT | Mod: TC,FY

## 2020-01-01 PROCEDURE — 99223 PR INITIAL HOSPITAL CARE,LEVL III: ICD-10-PCS | Mod: ,,, | Performed by: HOSPITALIST

## 2020-01-01 PROCEDURE — 84145 PROCALCITONIN (PCT): CPT

## 2020-01-01 RX ORDER — HEPARIN SODIUM,PORCINE/D5W 25000/250
12 INTRAVENOUS SOLUTION INTRAVENOUS CONTINUOUS
Status: DISCONTINUED | OUTPATIENT
Start: 2020-01-01 | End: 2020-11-10 | Stop reason: HOSPADM

## 2020-01-01 RX ORDER — FAMOTIDINE 10 MG/ML
20 INJECTION INTRAVENOUS DAILY
Status: DISCONTINUED | OUTPATIENT
Start: 2020-01-01 | End: 2020-11-10 | Stop reason: HOSPADM

## 2020-01-01 RX ORDER — VANCOMYCIN HCL IN 5 % DEXTROSE 1G/250ML
1000 PLASTIC BAG, INJECTION (ML) INTRAVENOUS ONCE
Status: COMPLETED | OUTPATIENT
Start: 2020-01-01 | End: 2020-01-01

## 2020-01-01 RX ORDER — MEROPENEM AND SODIUM CHLORIDE 1 G/50ML
1 INJECTION, SOLUTION INTRAVENOUS
Status: DISCONTINUED | OUTPATIENT
Start: 2020-01-01 | End: 2020-11-10 | Stop reason: HOSPADM

## 2020-01-01 RX ORDER — EPINEPHRINE 0.1 MG/ML
INJECTION INTRAVENOUS
Status: COMPLETED
Start: 2020-01-01 | End: 2020-01-01

## 2020-01-01 RX ORDER — PHENYLEPHRINE HCL IN 0.9% NACL 20MG/250ML
0.5 PLASTIC BAG, INJECTION (ML) INTRAVENOUS CONTINUOUS
Status: DISCONTINUED | OUTPATIENT
Start: 2020-01-01 | End: 2020-01-01

## 2020-01-01 RX ORDER — PAPAVERINE HYDROCHLORIDE 30 MG/ML
INJECTION INTRAMUSCULAR; INTRAVENOUS
Qty: 10 ML | Refills: 11 | Status: SHIPPED | OUTPATIENT
Start: 2020-01-01

## 2020-01-01 RX ORDER — ACETAMINOPHEN 325 MG/1
650 TABLET ORAL EVERY 4 HOURS PRN
Status: DISCONTINUED | OUTPATIENT
Start: 2020-01-01 | End: 2020-01-01

## 2020-01-01 RX ORDER — ONDANSETRON 2 MG/ML
4 INJECTION INTRAMUSCULAR; INTRAVENOUS EVERY 8 HOURS PRN
Status: DISCONTINUED | OUTPATIENT
Start: 2020-01-01 | End: 2020-01-01

## 2020-01-01 RX ORDER — CHOLECALCIFEROL (VITAMIN D3) 25 MCG
1000 TABLET ORAL DAILY
Status: DISCONTINUED | OUTPATIENT
Start: 2020-01-01 | End: 2020-11-10 | Stop reason: HOSPADM

## 2020-01-01 RX ORDER — MEROPENEM 500 MG/1
500 INJECTION, POWDER, FOR SOLUTION INTRAVENOUS
Status: DISCONTINUED | OUTPATIENT
Start: 2020-01-01 | End: 2020-01-01

## 2020-01-01 RX ORDER — PROMETHAZINE HYDROCHLORIDE AND CODEINE PHOSPHATE 6.25; 1 MG/5ML; MG/5ML
5 SOLUTION ORAL EVERY 6 HOURS PRN
Status: DISCONTINUED | OUTPATIENT
Start: 2020-01-01 | End: 2020-01-01

## 2020-01-01 RX ORDER — FUROSEMIDE 10 MG/ML
60 INJECTION INTRAMUSCULAR; INTRAVENOUS ONCE
Status: COMPLETED | OUTPATIENT
Start: 2020-01-01 | End: 2020-01-01

## 2020-01-01 RX ORDER — NOREPINEPHRINE BITARTRATE 1 MG/ML
INJECTION, SOLUTION INTRAVENOUS
Status: DISPENSED
Start: 2020-01-01 | End: 2020-01-01

## 2020-01-01 RX ORDER — ALBUTEROL SULFATE 2.5 MG/.5ML
10 SOLUTION RESPIRATORY (INHALATION) ONCE
Status: COMPLETED | OUTPATIENT
Start: 2020-01-01 | End: 2020-01-01

## 2020-01-01 RX ORDER — FAMOTIDINE 10 MG/ML
20 INJECTION INTRAVENOUS 2 TIMES DAILY
Status: DISCONTINUED | OUTPATIENT
Start: 2020-01-01 | End: 2020-01-01

## 2020-01-01 RX ORDER — ENOXAPARIN SODIUM 100 MG/ML
1 INJECTION SUBCUTANEOUS
Status: DISCONTINUED | OUTPATIENT
Start: 2020-01-01 | End: 2020-01-01

## 2020-01-01 RX ORDER — HYDROCODONE BITARTRATE AND ACETAMINOPHEN 500; 5 MG/1; MG/1
TABLET ORAL
Status: DISCONTINUED | OUTPATIENT
Start: 2020-01-01 | End: 2020-01-01

## 2020-01-01 RX ORDER — HEPARIN SODIUM,PORCINE/D5W 25000/250
18 INTRAVENOUS SOLUTION INTRAVENOUS CONTINUOUS
Status: DISCONTINUED | OUTPATIENT
Start: 2020-01-01 | End: 2020-01-01

## 2020-01-01 RX ORDER — INDOMETHACIN 25 MG/1
100 CAPSULE ORAL ONCE
Status: COMPLETED | OUTPATIENT
Start: 2020-01-01 | End: 2020-01-01

## 2020-01-01 RX ORDER — MORPHINE SULFATE 2 MG/ML
2 INJECTION, SOLUTION INTRAMUSCULAR; INTRAVENOUS ONCE
Status: COMPLETED | OUTPATIENT
Start: 2020-01-01 | End: 2020-01-01

## 2020-01-01 RX ORDER — DEXAMETHASONE SODIUM PHOSPHATE 4 MG/ML
6 INJECTION, SOLUTION INTRA-ARTICULAR; INTRALESIONAL; INTRAMUSCULAR; INTRAVENOUS; SOFT TISSUE
Status: COMPLETED | OUTPATIENT
Start: 2020-01-01 | End: 2020-01-01

## 2020-01-01 RX ORDER — FUROSEMIDE 10 MG/ML
80 INJECTION INTRAMUSCULAR; INTRAVENOUS ONCE
Status: COMPLETED | OUTPATIENT
Start: 2020-01-01 | End: 2020-01-01

## 2020-01-01 RX ORDER — HYDROCODONE BITARTRATE AND ACETAMINOPHEN 500; 5 MG/1; MG/1
TABLET ORAL CONTINUOUS
Status: ACTIVE | OUTPATIENT
Start: 2020-01-01 | End: 2020-01-01

## 2020-01-01 RX ORDER — ZOLPIDEM TARTRATE 5 MG/1
5 TABLET ORAL NIGHTLY
COMMUNITY
Start: 2020-01-01

## 2020-01-01 RX ORDER — CHOLECALCIFEROL (VITAMIN D3) 25 MCG
1000 TABLET ORAL DAILY
Status: DISCONTINUED | OUTPATIENT
Start: 2020-01-01 | End: 2020-01-01

## 2020-01-01 RX ORDER — ALBUTEROL SULFATE 1.25 MG/3ML
1.25 SOLUTION RESPIRATORY (INHALATION) EVERY 6 HOURS PRN
Qty: 3 BOX | Refills: 6 | Status: SHIPPED | OUTPATIENT
Start: 2020-01-01 | End: 2021-09-15

## 2020-01-01 RX ORDER — ALBUTEROL SULFATE 1.25 MG/3ML
1.25 SOLUTION RESPIRATORY (INHALATION) EVERY 6 HOURS PRN
Qty: 3 BOX | Refills: 6 | Status: SHIPPED | OUTPATIENT
Start: 2020-01-01 | End: 2020-01-01 | Stop reason: SDUPTHER

## 2020-01-01 RX ORDER — HYDROCODONE BITARTRATE AND ACETAMINOPHEN 500; 5 MG/1; MG/1
TABLET ORAL CONTINUOUS
Status: DISCONTINUED | OUTPATIENT
Start: 2020-01-01 | End: 2020-01-01

## 2020-01-01 RX ORDER — MIDAZOLAM HYDROCHLORIDE 1 MG/ML
2 INJECTION INTRAMUSCULAR; INTRAVENOUS ONCE
Status: COMPLETED | OUTPATIENT
Start: 2020-01-01 | End: 2020-01-01

## 2020-01-01 RX ORDER — ACETAMINOPHEN 650 MG/20.3ML
650 LIQUID ORAL EVERY 6 HOURS PRN
Status: DISCONTINUED | OUTPATIENT
Start: 2020-01-01 | End: 2020-11-10 | Stop reason: HOSPADM

## 2020-01-01 RX ORDER — IPRATROPIUM BROMIDE AND ALBUTEROL SULFATE 2.5; .5 MG/3ML; MG/3ML
3 SOLUTION RESPIRATORY (INHALATION) EVERY 8 HOURS
Status: DISCONTINUED | OUTPATIENT
Start: 2020-01-01 | End: 2020-11-10 | Stop reason: HOSPADM

## 2020-01-01 RX ORDER — FUROSEMIDE 10 MG/ML
40 INJECTION INTRAMUSCULAR; INTRAVENOUS ONCE
Status: COMPLETED | OUTPATIENT
Start: 2020-01-01 | End: 2020-01-01

## 2020-01-01 RX ORDER — AMOXICILLIN 250 MG
1 CAPSULE ORAL DAILY
Status: DISCONTINUED | OUTPATIENT
Start: 2020-01-01 | End: 2020-01-01

## 2020-01-01 RX ORDER — PHENYLEPHRINE HCL IN 0.9% NACL 1 MG/10 ML
SYRINGE (ML) INTRAVENOUS
Status: DISPENSED
Start: 2020-01-01 | End: 2020-01-01

## 2020-01-01 RX ORDER — AZELASTINE HYDROCHLORIDE 0.5 MG/ML
1 SOLUTION/ DROPS OPHTHALMIC 2 TIMES DAILY PRN
Qty: 6 ML | Refills: 12 | Status: SHIPPED | OUTPATIENT
Start: 2020-01-01 | End: 2021-09-22

## 2020-01-01 RX ORDER — ASCORBIC ACID 250 MG
250 TABLET ORAL DAILY
Status: DISCONTINUED | OUTPATIENT
Start: 2020-01-01 | End: 2020-01-01

## 2020-01-01 RX ORDER — SODIUM CHLORIDE 0.9 % (FLUSH) 0.9 %
10 SYRINGE (ML) INJECTION
Status: DISCONTINUED | OUTPATIENT
Start: 2020-01-01 | End: 2020-11-10 | Stop reason: HOSPADM

## 2020-01-01 RX ORDER — AZELASTINE 1 MG/ML
2 SPRAY, METERED NASAL DAILY
COMMUNITY
Start: 2020-01-01 | End: 2020-01-01

## 2020-01-01 RX ORDER — AMOXICILLIN 250 MG
2 CAPSULE ORAL 2 TIMES DAILY
Status: DISCONTINUED | OUTPATIENT
Start: 2020-01-01 | End: 2020-11-10 | Stop reason: HOSPADM

## 2020-01-01 RX ORDER — TADALAFIL 5 MG/1
5 TABLET ORAL DAILY PRN
Qty: 30 TABLET | Refills: 12 | Status: SHIPPED | OUTPATIENT
Start: 2020-01-01 | End: 2020-01-01 | Stop reason: SDUPTHER

## 2020-01-01 RX ORDER — LOPERAMIDE HYDROCHLORIDE 2 MG/1
2 CAPSULE ORAL EVERY 6 HOURS PRN
Status: DISCONTINUED | OUTPATIENT
Start: 2020-01-01 | End: 2020-01-01

## 2020-01-01 RX ORDER — MORPHINE SULFATE 2 MG/ML
2 INJECTION, SOLUTION INTRAMUSCULAR; INTRAVENOUS EVERY 4 HOURS PRN
Status: DISPENSED | OUTPATIENT
Start: 2020-01-01 | End: 2020-01-01

## 2020-01-01 RX ORDER — MAGNESIUM SULFATE HEPTAHYDRATE 40 MG/ML
2 INJECTION, SOLUTION INTRAVENOUS
Status: DISCONTINUED | OUTPATIENT
Start: 2020-01-01 | End: 2020-01-01

## 2020-01-01 RX ORDER — ALBUTEROL SULFATE 90 UG/1
1-2 AEROSOL, METERED RESPIRATORY (INHALATION) EVERY 4 HOURS PRN
Qty: 18 G | Refills: 6 | Status: SHIPPED | OUTPATIENT
Start: 2020-01-01 | End: 2021-08-27

## 2020-01-01 RX ORDER — ALBUTEROL SULFATE 90 UG/1
2 AEROSOL, METERED RESPIRATORY (INHALATION) EVERY 6 HOURS PRN
Status: DISCONTINUED | OUTPATIENT
Start: 2020-01-01 | End: 2020-01-01

## 2020-01-01 RX ORDER — AZITHROMYCIN 250 MG/1
250 TABLET, FILM COATED ORAL DAILY
Status: DISCONTINUED | OUTPATIENT
Start: 2020-01-01 | End: 2020-01-01

## 2020-01-01 RX ORDER — VILAZODONE HYDROCHLORIDE 10 MG/1
20 TABLET ORAL DAILY
Status: DISCONTINUED | OUTPATIENT
Start: 2020-01-01 | End: 2020-01-01

## 2020-01-01 RX ORDER — LORAZEPAM 2 MG/ML
1 INJECTION INTRAMUSCULAR ONCE
Status: COMPLETED | OUTPATIENT
Start: 2020-01-01 | End: 2020-01-01

## 2020-01-01 RX ORDER — FUROSEMIDE 10 MG/ML
120 INJECTION INTRAMUSCULAR; INTRAVENOUS ONCE
Status: COMPLETED | OUTPATIENT
Start: 2020-01-01 | End: 2020-01-01

## 2020-01-01 RX ORDER — NOREPINEPHRINE BITARTRATE/D5W 4MG/250ML
PLASTIC BAG, INJECTION (ML) INTRAVENOUS
Status: COMPLETED
Start: 2020-01-01 | End: 2020-01-01

## 2020-01-01 RX ORDER — CEFEPIME HYDROCHLORIDE 2 G/1
2 INJECTION, POWDER, FOR SOLUTION INTRAVENOUS
Status: DISCONTINUED | OUTPATIENT
Start: 2020-01-01 | End: 2020-01-01

## 2020-01-01 RX ORDER — SUCCINYLCHOLINE CHLORIDE 20 MG/ML
INJECTION INTRAMUSCULAR; INTRAVENOUS
Status: COMPLETED
Start: 2020-01-01 | End: 2020-01-01

## 2020-01-01 RX ORDER — MAGNESIUM SULFATE HEPTAHYDRATE 40 MG/ML
2 INJECTION, SOLUTION INTRAVENOUS ONCE
Status: COMPLETED | OUTPATIENT
Start: 2020-01-01 | End: 2020-01-01

## 2020-01-01 RX ORDER — FUROSEMIDE 10 MG/ML
INJECTION INTRAMUSCULAR; INTRAVENOUS
Status: COMPLETED
Start: 2020-01-01 | End: 2020-01-01

## 2020-01-01 RX ORDER — NOREPINEPHRINE BITARTRATE/D5W 4MG/250ML
0.02 PLASTIC BAG, INJECTION (ML) INTRAVENOUS CONTINUOUS
Status: DISCONTINUED | OUTPATIENT
Start: 2020-01-01 | End: 2020-01-01

## 2020-01-01 RX ORDER — NOREPINEPHRINE BITARTRATE/D5W 4MG/250ML
PLASTIC BAG, INJECTION (ML) INTRAVENOUS
Status: DISPENSED
Start: 2020-01-01 | End: 2020-01-01

## 2020-01-01 RX ORDER — GUAIFENESIN 600 MG/1
600 TABLET, EXTENDED RELEASE ORAL 2 TIMES DAILY
Status: DISCONTINUED | OUTPATIENT
Start: 2020-01-01 | End: 2020-01-01

## 2020-01-01 RX ORDER — IPRATROPIUM BROMIDE AND ALBUTEROL SULFATE 2.5; .5 MG/3ML; MG/3ML
3 SOLUTION RESPIRATORY (INHALATION) EVERY 4 HOURS
Status: DISCONTINUED | OUTPATIENT
Start: 2020-01-01 | End: 2020-01-01

## 2020-01-01 RX ORDER — IPRATROPIUM BROMIDE AND ALBUTEROL SULFATE 2.5; .5 MG/3ML; MG/3ML
3 SOLUTION RESPIRATORY (INHALATION) EVERY 6 HOURS
Status: DISCONTINUED | OUTPATIENT
Start: 2020-01-01 | End: 2020-01-01

## 2020-01-01 RX ORDER — MORPHINE SULFATE 2 MG/ML
INJECTION, SOLUTION INTRAMUSCULAR; INTRAVENOUS
Status: COMPLETED
Start: 2020-01-01 | End: 2020-01-01

## 2020-01-01 RX ORDER — MAGNESIUM SULFATE HEPTAHYDRATE 40 MG/ML
2 INJECTION, SOLUTION INTRAVENOUS
Status: DISCONTINUED | OUTPATIENT
Start: 2020-01-01 | End: 2020-11-10 | Stop reason: HOSPADM

## 2020-01-01 RX ORDER — LORAZEPAM 0.5 MG/1
1 TABLET ORAL ONCE
Status: COMPLETED | OUTPATIENT
Start: 2020-01-01 | End: 2020-01-01

## 2020-01-01 RX ORDER — ALBUTEROL SULFATE 90 UG/1
2 AEROSOL, METERED RESPIRATORY (INHALATION) EVERY 6 HOURS
Status: DISCONTINUED | OUTPATIENT
Start: 2020-01-01 | End: 2020-01-01

## 2020-01-01 RX ORDER — ETOMIDATE 2 MG/ML
INJECTION INTRAVENOUS
Status: COMPLETED
Start: 2020-01-01 | End: 2020-01-01

## 2020-01-01 RX ORDER — IBUPROFEN 200 MG
16 TABLET ORAL
Status: DISCONTINUED | OUTPATIENT
Start: 2020-01-01 | End: 2020-11-10 | Stop reason: HOSPADM

## 2020-01-01 RX ORDER — CHOLESTYRAMINE 4 G/9G
POWDER, FOR SUSPENSION ORAL
COMMUNITY
Start: 2020-01-01

## 2020-01-01 RX ORDER — ALBUMIN HUMAN 250 G/1000ML
25 SOLUTION INTRAVENOUS ONCE
Status: COMPLETED | OUTPATIENT
Start: 2020-01-01 | End: 2020-01-01

## 2020-01-01 RX ORDER — FLUCONAZOLE 2 MG/ML
400 INJECTION, SOLUTION INTRAVENOUS ONCE
Status: COMPLETED | OUTPATIENT
Start: 2020-01-01 | End: 2020-01-01

## 2020-01-01 RX ORDER — FLUTICASONE FUROATE AND VILANTEROL TRIFENATATE 100; 25 UG/1; UG/1
1 POWDER RESPIRATORY (INHALATION) DAILY
Qty: 60 EACH | Refills: 11 | Status: SHIPPED | OUTPATIENT
Start: 2020-01-01

## 2020-01-01 RX ORDER — PROPOFOL 10 MG/ML
INJECTION, EMULSION INTRAVENOUS
Status: COMPLETED
Start: 2020-01-01 | End: 2020-01-01

## 2020-01-01 RX ORDER — LORAZEPAM 0.5 MG/1
1 TABLET ORAL EVERY 6 HOURS PRN
Status: COMPLETED | OUTPATIENT
Start: 2020-01-01 | End: 2020-01-01

## 2020-01-01 RX ORDER — ACETAMINOPHEN 500 MG
1000 TABLET ORAL EVERY 8 HOURS PRN
Status: DISCONTINUED | OUTPATIENT
Start: 2020-01-01 | End: 2020-01-01

## 2020-01-01 RX ORDER — ENOXAPARIN SODIUM 100 MG/ML
40 INJECTION SUBCUTANEOUS EVERY 24 HOURS
Status: DISCONTINUED | OUTPATIENT
Start: 2020-01-01 | End: 2020-01-01

## 2020-01-01 RX ORDER — CEFTRIAXONE 1 G/1
1 INJECTION, POWDER, FOR SOLUTION INTRAMUSCULAR; INTRAVENOUS
Status: DISCONTINUED | OUTPATIENT
Start: 2020-01-01 | End: 2020-01-01

## 2020-01-01 RX ORDER — LEVOFLOXACIN 500 MG/1
500 TABLET, FILM COATED ORAL DAILY
Qty: 7 TABLET | Refills: 0 | Status: SHIPPED | OUTPATIENT
Start: 2020-01-01 | End: 2020-01-01

## 2020-01-01 RX ORDER — FLUDROCORTISONE ACETATE 0.1 MG/1
100 TABLET ORAL DAILY
Status: DISCONTINUED | OUTPATIENT
Start: 2020-01-01 | End: 2020-11-10 | Stop reason: HOSPADM

## 2020-01-01 RX ORDER — MAGNESIUM SULFATE HEPTAHYDRATE 40 MG/ML
2 INJECTION, SOLUTION INTRAVENOUS
Status: DISPENSED | OUTPATIENT
Start: 2020-01-01 | End: 2020-01-01

## 2020-01-01 RX ORDER — HEPARIN SODIUM,PORCINE/D5W 25000/250
12 INTRAVENOUS SOLUTION INTRAVENOUS CONTINUOUS
Status: DISCONTINUED | OUTPATIENT
Start: 2020-01-01 | End: 2020-01-01

## 2020-01-01 RX ORDER — ALBUTEROL SULFATE 90 UG/1
2 AEROSOL, METERED RESPIRATORY (INHALATION) 4 TIMES DAILY
Status: DISCONTINUED | OUTPATIENT
Start: 2020-01-01 | End: 2020-01-01

## 2020-01-01 RX ORDER — BENZONATATE 100 MG/1
100 CAPSULE ORAL 3 TIMES DAILY PRN
Status: DISCONTINUED | OUTPATIENT
Start: 2020-01-01 | End: 2020-01-01

## 2020-01-01 RX ORDER — LORAZEPAM 2 MG/ML
INJECTION INTRAMUSCULAR
Status: DISPENSED
Start: 2020-01-01 | End: 2020-01-01

## 2020-01-01 RX ORDER — LACTULOSE 10 G/15ML
30 SOLUTION ORAL 3 TIMES DAILY
Status: DISPENSED | OUTPATIENT
Start: 2020-01-01 | End: 2020-01-01

## 2020-01-01 RX ORDER — LORAZEPAM 2 MG/ML
0.5 INJECTION INTRAMUSCULAR ONCE
Status: COMPLETED | OUTPATIENT
Start: 2020-01-01 | End: 2020-01-01

## 2020-01-01 RX ORDER — ASCORBIC ACID 500 MG
500 TABLET ORAL 2 TIMES DAILY
Status: DISCONTINUED | OUTPATIENT
Start: 2020-01-01 | End: 2020-01-01

## 2020-01-01 RX ORDER — IBUPROFEN 200 MG
24 TABLET ORAL
Status: DISCONTINUED | OUTPATIENT
Start: 2020-01-01 | End: 2020-11-10 | Stop reason: HOSPADM

## 2020-01-01 RX ORDER — DEXTROSE MONOHYDRATE 50 MG/ML
INJECTION, SOLUTION INTRAVENOUS CONTINUOUS
Status: DISCONTINUED | OUTPATIENT
Start: 2020-01-01 | End: 2020-01-01

## 2020-01-01 RX ORDER — GLUCAGON 1 MG
1 KIT INJECTION
Status: DISCONTINUED | OUTPATIENT
Start: 2020-01-01 | End: 2020-11-10 | Stop reason: HOSPADM

## 2020-01-01 RX ORDER — VILAZODONE HYDROCHLORIDE 40 MG/1
TABLET ORAL
COMMUNITY
Start: 2020-01-01

## 2020-01-01 RX ORDER — ALPRAZOLAM 0.25 MG/1
0.25 TABLET ORAL 2 TIMES DAILY PRN
Status: DISCONTINUED | OUTPATIENT
Start: 2020-01-01 | End: 2020-01-01

## 2020-01-01 RX ORDER — TADALAFIL 5 MG/1
5 TABLET ORAL DAILY PRN
Qty: 30 TABLET | Refills: 12 | Status: SHIPPED | OUTPATIENT
Start: 2020-01-01 | End: 2021-02-20

## 2020-01-01 RX ORDER — ATORVASTATIN CALCIUM 20 MG/1
40 TABLET, FILM COATED ORAL NIGHTLY
Status: DISCONTINUED | OUTPATIENT
Start: 2020-01-01 | End: 2020-01-01

## 2020-01-01 RX ORDER — FLUTICASONE PROPIONATE 50 MCG
2 SPRAY, SUSPENSION (ML) NASAL DAILY
Status: DISCONTINUED | OUTPATIENT
Start: 2020-01-01 | End: 2020-01-01

## 2020-01-01 RX ORDER — FENTANYL CITRATE 50 UG/ML
50 INJECTION, SOLUTION INTRAMUSCULAR; INTRAVENOUS ONCE
Status: COMPLETED | OUTPATIENT
Start: 2020-01-01 | End: 2020-01-01

## 2020-01-01 RX ORDER — DEXMEDETOMIDINE HYDROCHLORIDE 4 UG/ML
0.2 INJECTION, SOLUTION INTRAVENOUS CONTINUOUS
Status: DISCONTINUED | OUTPATIENT
Start: 2020-01-01 | End: 2020-01-01

## 2020-01-01 RX ORDER — AZITHROMYCIN 250 MG/1
500 TABLET, FILM COATED ORAL ONCE
Status: COMPLETED | OUTPATIENT
Start: 2020-01-01 | End: 2020-01-01

## 2020-01-01 RX ORDER — ROCURONIUM BROMIDE 10 MG/ML
INJECTION, SOLUTION INTRAVENOUS
Status: DISPENSED
Start: 2020-01-01 | End: 2020-01-01

## 2020-01-01 RX ORDER — SEVELAMER CARBONATE FOR ORAL SUSPENSION 800 MG/1
1.6 POWDER, FOR SUSPENSION ORAL 3 TIMES DAILY
Status: DISCONTINUED | OUTPATIENT
Start: 2020-01-01 | End: 2020-01-01

## 2020-01-01 RX ORDER — POLYETHYLENE GLYCOL 3350 17 G/17G
17 POWDER, FOR SOLUTION ORAL 2 TIMES DAILY
Status: DISCONTINUED | OUTPATIENT
Start: 2020-01-01 | End: 2020-11-10 | Stop reason: HOSPADM

## 2020-01-01 RX ORDER — FLUTICASONE FUROATE AND VILANTEROL 100; 25 UG/1; UG/1
1 POWDER RESPIRATORY (INHALATION) DAILY
Status: DISCONTINUED | OUTPATIENT
Start: 2020-01-01 | End: 2020-01-01

## 2020-01-01 RX ORDER — TADALAFIL 20 MG/1
20 TABLET ORAL
Qty: 10 TABLET | Refills: 11 | Status: SHIPPED | OUTPATIENT
Start: 2020-01-01 | End: 2021-02-20

## 2020-01-01 RX ORDER — INSULIN ASPART 100 [IU]/ML
1-10 INJECTION, SOLUTION INTRAVENOUS; SUBCUTANEOUS EVERY 6 HOURS PRN
Status: DISCONTINUED | OUTPATIENT
Start: 2020-01-01 | End: 2020-01-01

## 2020-01-01 RX ORDER — ROCURONIUM BROMIDE 10 MG/ML
INJECTION, SOLUTION INTRAVENOUS
Status: COMPLETED
Start: 2020-01-01 | End: 2020-01-01

## 2020-01-01 RX ORDER — IPRATROPIUM BROMIDE AND ALBUTEROL SULFATE 2.5; .5 MG/3ML; MG/3ML
3 SOLUTION RESPIRATORY (INHALATION) EVERY 6 HOURS PRN
Status: DISCONTINUED | OUTPATIENT
Start: 2020-01-01 | End: 2020-11-10 | Stop reason: HOSPADM

## 2020-01-01 RX ORDER — BALSAM PERU/CASTOR OIL
OINTMENT (GRAM) TOPICAL 2 TIMES DAILY
Status: DISCONTINUED | OUTPATIENT
Start: 2020-01-01 | End: 2020-01-01

## 2020-01-01 RX ORDER — PHENYLEPHRINE HYDROCHLORIDE 10 MG/ML
INJECTION INTRAVENOUS
Status: COMPLETED
Start: 2020-01-01 | End: 2020-01-01

## 2020-01-01 RX ORDER — AZELASTINE 1 MG/ML
2 SPRAY, METERED NASAL 2 TIMES DAILY
Qty: 30 ML | Refills: 12 | Status: SHIPPED | OUTPATIENT
Start: 2020-01-01

## 2020-01-01 RX ORDER — MAGNESIUM SULFATE HEPTAHYDRATE 40 MG/ML
2 INJECTION, SOLUTION INTRAVENOUS
Status: ACTIVE | OUTPATIENT
Start: 2020-01-01 | End: 2020-01-01

## 2020-01-01 RX ORDER — GUAIFENESIN 600 MG/1
1200 TABLET, EXTENDED RELEASE ORAL 2 TIMES DAILY
Status: DISCONTINUED | OUTPATIENT
Start: 2020-01-01 | End: 2020-01-01

## 2020-01-01 RX ORDER — MORPHINE SULFATE 2 MG/ML
2 INJECTION, SOLUTION INTRAMUSCULAR; INTRAVENOUS EVERY 4 HOURS PRN
Status: DISCONTINUED | OUTPATIENT
Start: 2020-01-01 | End: 2020-01-01

## 2020-01-01 RX ORDER — DEXAMETHASONE SODIUM PHOSPHATE 4 MG/ML
6 INJECTION, SOLUTION INTRA-ARTICULAR; INTRALESIONAL; INTRAMUSCULAR; INTRAVENOUS; SOFT TISSUE DAILY
Status: DISCONTINUED | OUTPATIENT
Start: 2020-01-01 | End: 2020-01-01

## 2020-01-01 RX ORDER — PROPOFOL 10 MG/ML
0-50 INJECTION, EMULSION INTRAVENOUS CONTINUOUS
Status: DISCONTINUED | OUTPATIENT
Start: 2020-01-01 | End: 2020-11-10 | Stop reason: HOSPADM

## 2020-01-01 RX ORDER — POLYETHYLENE GLYCOL 3350 17 G/17G
17 POWDER, FOR SOLUTION ORAL DAILY
Status: DISCONTINUED | OUTPATIENT
Start: 2020-01-01 | End: 2020-01-01

## 2020-01-01 RX ORDER — FLUCONAZOLE 2 MG/ML
400 INJECTION, SOLUTION INTRAVENOUS
Status: DISCONTINUED | OUTPATIENT
Start: 2020-01-01 | End: 2020-01-01

## 2020-01-01 RX ORDER — FENTANYL CITRATE-0.9 % NACL/PF 10 MCG/ML
PLASTIC BAG, INJECTION (ML) INTRAVENOUS CONTINUOUS
Status: DISCONTINUED | OUTPATIENT
Start: 2020-01-01 | End: 2020-11-10 | Stop reason: HOSPADM

## 2020-01-01 RX ORDER — LORAZEPAM 2 MG/ML
1 INJECTION INTRAMUSCULAR EVERY 6 HOURS PRN
Status: DISCONTINUED | OUTPATIENT
Start: 2020-01-01 | End: 2020-01-01

## 2020-01-01 RX ORDER — HYDROCODONE BITARTRATE AND ACETAMINOPHEN 500; 5 MG/1; MG/1
TABLET ORAL CONTINUOUS
Status: DISCONTINUED | OUTPATIENT
Start: 2020-01-01 | End: 2020-11-10 | Stop reason: HOSPADM

## 2020-01-01 RX ADMIN — IPRATROPIUM BROMIDE AND ALBUTEROL SULFATE 3 ML: .5; 2.5 SOLUTION RESPIRATORY (INHALATION) at 12:11

## 2020-01-01 RX ADMIN — IPRATROPIUM BROMIDE AND ALBUTEROL SULFATE 3 ML: .5; 2.5 SOLUTION RESPIRATORY (INHALATION) at 12:10

## 2020-01-01 RX ADMIN — MINERAL OIL AND WHITE PETROLATUM 1 G: 150; 830 OINTMENT OPHTHALMIC at 09:10

## 2020-01-01 RX ADMIN — CEFTRIAXONE SODIUM 1 G: 1 INJECTION, POWDER, FOR SOLUTION INTRAMUSCULAR; INTRAVENOUS at 01:10

## 2020-01-01 RX ADMIN — VASOPRESSIN 0.04 UNITS/MIN: 20 INJECTION INTRAVENOUS at 02:11

## 2020-01-01 RX ADMIN — Medication 1000 UNITS: at 08:10

## 2020-01-01 RX ADMIN — DEXTROSE MONOHYDRATE 25 G: 25 INJECTION, SOLUTION INTRAVENOUS at 05:10

## 2020-01-01 RX ADMIN — PROPOFOL 50 MCG/KG/MIN: 10 INJECTION, EMULSION INTRAVENOUS at 06:11

## 2020-01-01 RX ADMIN — IPRATROPIUM BROMIDE AND ALBUTEROL SULFATE 3 ML: .5; 2.5 SOLUTION RESPIRATORY (INHALATION) at 05:11

## 2020-01-01 RX ADMIN — ALBUTEROL SULFATE 2 PUFF: 90 AEROSOL, METERED RESPIRATORY (INHALATION) at 08:10

## 2020-01-01 RX ADMIN — Medication 200 MCG/HR: at 05:10

## 2020-01-01 RX ADMIN — MINERAL OIL AND WHITE PETROLATUM: 150; 830 OINTMENT OPHTHALMIC at 09:11

## 2020-01-01 RX ADMIN — HYDROCORTISONE SODIUM SUCCINATE 100 MG: 100 INJECTION, POWDER, FOR SOLUTION INTRAMUSCULAR; INTRAVENOUS at 05:11

## 2020-01-01 RX ADMIN — ROCURONIUM BROMIDE 60 MG: 10 INJECTION, SOLUTION INTRAVENOUS at 07:10

## 2020-01-01 RX ADMIN — MORPHINE SULFATE 2 MG: 2 INJECTION, SOLUTION INTRAMUSCULAR; INTRAVENOUS at 01:10

## 2020-01-01 RX ADMIN — HEPARIN SODIUM AND DEXTROSE 14 UNITS/KG/HR: 10000; 5 INJECTION INTRAVENOUS at 11:10

## 2020-01-01 RX ADMIN — HEPARIN SODIUM AND DEXTROSE 14 UNITS/KG/HR: 10000; 5 INJECTION INTRAVENOUS at 07:10

## 2020-01-01 RX ADMIN — SODIUM CHLORIDE: 0.9 INJECTION, SOLUTION INTRAVENOUS at 10:11

## 2020-01-01 RX ADMIN — IPRATROPIUM BROMIDE AND ALBUTEROL SULFATE 3 ML: .5; 2.5 SOLUTION RESPIRATORY (INHALATION) at 08:11

## 2020-01-01 RX ADMIN — DEXAMETHASONE 6 MG: 4 TABLET ORAL at 08:10

## 2020-01-01 RX ADMIN — ALBUTEROL SULFATE 2 PUFF: 90 AEROSOL, METERED RESPIRATORY (INHALATION) at 07:10

## 2020-01-01 RX ADMIN — Medication 300 MCG/HR: at 11:11

## 2020-01-01 RX ADMIN — Medication 250 MCG: at 07:11

## 2020-01-01 RX ADMIN — FLUDROCORTISONE ACETATE 100 MCG: 0.1 TABLET ORAL at 08:11

## 2020-01-01 RX ADMIN — VANCOMYCIN HYDROCHLORIDE 500 MG: 500 INJECTION, POWDER, LYOPHILIZED, FOR SOLUTION INTRAVENOUS at 06:10

## 2020-01-01 RX ADMIN — PIPERACILLIN AND TAZOBACTAM 4.5 G: 4; .5 INJECTION, POWDER, LYOPHILIZED, FOR SOLUTION INTRAVENOUS; PARENTERAL at 01:11

## 2020-01-01 RX ADMIN — MORPHINE SULFATE 2 MG: 2 INJECTION, SOLUTION INTRAMUSCULAR; INTRAVENOUS at 09:10

## 2020-01-01 RX ADMIN — IPRATROPIUM BROMIDE AND ALBUTEROL SULFATE 3 ML: .5; 2.5 SOLUTION RESPIRATORY (INHALATION) at 11:10

## 2020-01-01 RX ADMIN — PROPOFOL 50 MCG/KG/MIN: 10 INJECTION, EMULSION INTRAVENOUS at 02:11

## 2020-01-01 RX ADMIN — Medication 10 ML: at 09:11

## 2020-01-01 RX ADMIN — ATORVASTATIN CALCIUM 40 MG: 20 TABLET, FILM COATED ORAL at 08:10

## 2020-01-01 RX ADMIN — SODIUM ZIRCONIUM CYCLOSILICATE 10 G: 10 POWDER, FOR SUSPENSION ORAL at 01:11

## 2020-01-01 RX ADMIN — SODIUM CHLORIDE: 0.9 INJECTION, SOLUTION INTRAVENOUS at 08:11

## 2020-01-01 RX ADMIN — ALBUTEROL SULFATE 2 PUFF: 90 AEROSOL, METERED RESPIRATORY (INHALATION) at 12:10

## 2020-01-01 RX ADMIN — Medication 0.02 MCG/KG/MIN: at 01:11

## 2020-01-01 RX ADMIN — VANCOMYCIN HYDROCHLORIDE 750 MG: 750 INJECTION, POWDER, LYOPHILIZED, FOR SOLUTION INTRAVENOUS at 01:10

## 2020-01-01 RX ADMIN — VASOPRESSIN 0.04 UNITS/MIN: 20 INJECTION INTRAVENOUS at 06:11

## 2020-01-01 RX ADMIN — MORPHINE SULFATE 2 MG: 2 INJECTION, SOLUTION INTRAMUSCULAR; INTRAVENOUS at 11:10

## 2020-01-01 RX ADMIN — PROMETHAZINE HYDROCHLORIDE AND CODEINE PHOSPHATE 5 ML: 10; 6.25 SOLUTION ORAL at 04:10

## 2020-01-01 RX ADMIN — PROPOFOL 50 MCG/KG/MIN: 10 INJECTION, EMULSION INTRAVENOUS at 11:11

## 2020-01-01 RX ADMIN — VILAZODONE HYDROCHLORIDE 20 MG: 10 TABLET ORAL at 09:10

## 2020-01-01 RX ADMIN — DEXMEDETOMIDINE HYDROCHLORIDE 1.2 MCG/KG/HR: 4 INJECTION, SOLUTION INTRAVENOUS at 06:10

## 2020-01-01 RX ADMIN — Medication 250 MCG: at 10:10

## 2020-01-01 RX ADMIN — PROPOFOL 50 MCG/KG/MIN: 10 INJECTION, EMULSION INTRAVENOUS at 09:10

## 2020-01-01 RX ADMIN — MINERAL OIL AND WHITE PETROLATUM: 150; 830 OINTMENT OPHTHALMIC at 08:11

## 2020-01-01 RX ADMIN — PIPERACILLIN AND TAZOBACTAM 4.5 G: 4; .5 INJECTION, POWDER, LYOPHILIZED, FOR SOLUTION INTRAVENOUS; PARENTERAL at 05:11

## 2020-01-01 RX ADMIN — SODIUM CHLORIDE: 0.9 INJECTION, SOLUTION INTRAVENOUS at 01:11

## 2020-01-01 RX ADMIN — MEROPENEM 500 MG: 500 INJECTION, POWDER, FOR SOLUTION INTRAVENOUS at 06:11

## 2020-01-01 RX ADMIN — VANCOMYCIN HYDROCHLORIDE 1250 MG: 1.25 INJECTION, POWDER, LYOPHILIZED, FOR SOLUTION INTRAVENOUS at 02:11

## 2020-01-01 RX ADMIN — ALBUTEROL SULFATE 2 PUFF: 90 AEROSOL, METERED RESPIRATORY (INHALATION) at 01:10

## 2020-01-01 RX ADMIN — DEXMEDETOMIDINE HYDROCHLORIDE 0.9 MCG/KG/HR: 4 INJECTION, SOLUTION INTRAVENOUS at 12:10

## 2020-01-01 RX ADMIN — THERA TABS 1 TABLET: TAB at 09:10

## 2020-01-01 RX ADMIN — VANCOMYCIN HYDROCHLORIDE 1250 MG: 1.25 INJECTION, POWDER, LYOPHILIZED, FOR SOLUTION INTRAVENOUS at 05:11

## 2020-01-01 RX ADMIN — IPRATROPIUM BROMIDE AND ALBUTEROL SULFATE 3 ML: .5; 2.5 SOLUTION RESPIRATORY (INHALATION) at 08:10

## 2020-01-01 RX ADMIN — ACETAMINOPHEN 650 MG: 325 TABLET ORAL at 02:10

## 2020-01-01 RX ADMIN — OXYCODONE HYDROCHLORIDE AND ACETAMINOPHEN 500 MG: 500 TABLET ORAL at 09:10

## 2020-01-01 RX ADMIN — CASTOR OIL AND BALSAM, PERU: 788; 87 OINTMENT TOPICAL at 12:10

## 2020-01-01 RX ADMIN — REMDESIVIR 200 MG: 100 INJECTION, POWDER, LYOPHILIZED, FOR SOLUTION INTRAVENOUS at 04:10

## 2020-01-01 RX ADMIN — ALBUTEROL SULFATE 2 PUFF: 90 AEROSOL, METERED RESPIRATORY (INHALATION) at 05:10

## 2020-01-01 RX ADMIN — HEPARIN SODIUM AND DEXTROSE 16 UNITS/KG/HR: 10000; 5 INJECTION INTRAVENOUS at 01:11

## 2020-01-01 RX ADMIN — Medication 300 MCG: at 05:11

## 2020-01-01 RX ADMIN — FAMOTIDINE 20 MG: 10 INJECTION INTRAVENOUS at 08:10

## 2020-01-01 RX ADMIN — HEPARIN SODIUM AND DEXTROSE 19 UNITS/KG/HR: 10000; 5 INJECTION INTRAVENOUS at 06:11

## 2020-01-01 RX ADMIN — PIPERACILLIN AND TAZOBACTAM 4.5 G: 4; .5 INJECTION, POWDER, LYOPHILIZED, FOR SOLUTION INTRAVENOUS; PARENTERAL at 09:11

## 2020-01-01 RX ADMIN — VANCOMYCIN HYDROCHLORIDE 1250 MG: 1.25 INJECTION, POWDER, LYOPHILIZED, FOR SOLUTION INTRAVENOUS at 08:10

## 2020-01-01 RX ADMIN — FAMOTIDINE 20 MG: 10 INJECTION INTRAVENOUS at 08:11

## 2020-01-01 RX ADMIN — IPRATROPIUM BROMIDE AND ALBUTEROL SULFATE 3 ML: .5; 2.5 SOLUTION RESPIRATORY (INHALATION) at 03:11

## 2020-01-01 RX ADMIN — POLYETHYLENE GLYCOL 3350 17 G: 17 POWDER, FOR SOLUTION ORAL at 09:11

## 2020-01-01 RX ADMIN — MINERAL OIL AND WHITE PETROLATUM: 150; 830 OINTMENT OPHTHALMIC at 10:10

## 2020-01-01 RX ADMIN — VANCOMYCIN HYDROCHLORIDE 1250 MG: 1.25 INJECTION, POWDER, LYOPHILIZED, FOR SOLUTION INTRAVENOUS at 08:11

## 2020-01-01 RX ADMIN — Medication 300 MCG: at 04:11

## 2020-01-01 RX ADMIN — MINERAL OIL AND WHITE PETROLATUM: 150; 830 OINTMENT OPHTHALMIC at 06:10

## 2020-01-01 RX ADMIN — FUROSEMIDE 40 MG: 10 INJECTION INTRAMUSCULAR; INTRAVENOUS at 09:10

## 2020-01-01 RX ADMIN — PROPOFOL 50 MCG/KG/MIN: 10 INJECTION, EMULSION INTRAVENOUS at 05:10

## 2020-01-01 RX ADMIN — Medication 250 MG: at 09:10

## 2020-01-01 RX ADMIN — PROPOFOL 50 MCG/KG/MIN: 10 INJECTION, EMULSION INTRAVENOUS at 03:11

## 2020-01-01 RX ADMIN — DOCUSATE SODIUM 50MG AND SENNOSIDES 8.6MG 2 TABLET: 8.6; 5 TABLET, FILM COATED ORAL at 09:11

## 2020-01-01 RX ADMIN — FAMOTIDINE 20 MG: 10 INJECTION INTRAVENOUS at 09:11

## 2020-01-01 RX ADMIN — IPRATROPIUM BROMIDE AND ALBUTEROL SULFATE 3 ML: .5; 2.5 SOLUTION RESPIRATORY (INHALATION) at 04:11

## 2020-01-01 RX ADMIN — Medication 1000 UNITS: at 08:11

## 2020-01-01 RX ADMIN — PROPOFOL 50 MCG/KG/MIN: 10 INJECTION, EMULSION INTRAVENOUS at 10:11

## 2020-01-01 RX ADMIN — PROPOFOL 50 MCG/KG/MIN: 10 INJECTION, EMULSION INTRAVENOUS at 07:11

## 2020-01-01 RX ADMIN — DEXMEDETOMIDINE HYDROCHLORIDE 1.2 MCG/KG/HR: 4 INJECTION, SOLUTION INTRAVENOUS at 05:10

## 2020-01-01 RX ADMIN — DEXTROSE MONOHYDRATE 2 MCG/KG/MIN: 50 INJECTION, SOLUTION INTRAVENOUS at 11:11

## 2020-01-01 RX ADMIN — PROPOFOL 50 MCG/KG/MIN: 10 INJECTION, EMULSION INTRAVENOUS at 01:10

## 2020-01-01 RX ADMIN — Medication 2500 MCG: at 02:11

## 2020-01-01 RX ADMIN — MORPHINE SULFATE 2 MG: 2 INJECTION, SOLUTION INTRAMUSCULAR; INTRAVENOUS at 06:10

## 2020-01-01 RX ADMIN — ETOMIDATE 30 MG: 2 INJECTION INTRAVENOUS at 06:10

## 2020-01-01 RX ADMIN — CEFEPIME 2 G: 2 INJECTION, POWDER, FOR SOLUTION INTRAVENOUS at 04:10

## 2020-01-01 RX ADMIN — HEPARIN SODIUM AND DEXTROSE 16 UNITS/KG/HR: 10000; 5 INJECTION INTRAVENOUS at 04:11

## 2020-01-01 RX ADMIN — PIPERACILLIN AND TAZOBACTAM 4.5 G: 4; .5 INJECTION, POWDER, LYOPHILIZED, FOR SOLUTION INTRAVENOUS; PARENTERAL at 05:10

## 2020-01-01 RX ADMIN — ALBUMIN (HUMAN) 25 G: 12.5 SOLUTION INTRAVENOUS at 04:11

## 2020-01-01 RX ADMIN — SODIUM BICARBONATE 100 MEQ: 84 INJECTION, SOLUTION INTRAVENOUS at 07:11

## 2020-01-01 RX ADMIN — PROMETHAZINE HYDROCHLORIDE AND CODEINE PHOSPHATE 5 ML: 10; 6.25 SOLUTION ORAL at 09:10

## 2020-01-01 RX ADMIN — IPRATROPIUM BROMIDE AND ALBUTEROL SULFATE 3 ML: .5; 2.5 SOLUTION RESPIRATORY (INHALATION) at 04:10

## 2020-01-01 RX ADMIN — CEFEPIME 2 G: 2 INJECTION, POWDER, FOR SOLUTION INTRAVENOUS at 12:10

## 2020-01-01 RX ADMIN — CASTOR OIL AND BALSAM, PERU: 788; 87 OINTMENT TOPICAL at 09:11

## 2020-01-01 RX ADMIN — Medication 300 MCG/HR: at 07:11

## 2020-01-01 RX ADMIN — DOCUSATE SODIUM 50MG AND SENNOSIDES 8.6MG 1 TABLET: 8.6; 5 TABLET, FILM COATED ORAL at 08:11

## 2020-01-01 RX ADMIN — Medication 300 MCG/HR: at 03:11

## 2020-01-01 RX ADMIN — MAGNESIUM SULFATE IN WATER 2 G: 40 INJECTION, SOLUTION INTRAVENOUS at 03:11

## 2020-01-01 RX ADMIN — ALBUTEROL SULFATE 10 MG: 2.5 SOLUTION RESPIRATORY (INHALATION) at 03:10

## 2020-01-01 RX ADMIN — PROPOFOL 50 MCG/KG/MIN: 10 INJECTION, EMULSION INTRAVENOUS at 09:11

## 2020-01-01 RX ADMIN — HYDROCORTISONE SODIUM SUCCINATE 100 MG: 100 INJECTION, POWDER, FOR SOLUTION INTRAMUSCULAR; INTRAVENOUS at 09:11

## 2020-01-01 RX ADMIN — Medication 10 ML: at 08:11

## 2020-01-01 RX ADMIN — PROPOFOL 50 MCG/KG/MIN: 10 INJECTION, EMULSION INTRAVENOUS at 02:10

## 2020-01-01 RX ADMIN — CALCIUM GLUCONATE 1000 MG: 98 INJECTION, SOLUTION INTRAVENOUS at 08:11

## 2020-01-01 RX ADMIN — LORAZEPAM 1 MG: 2 INJECTION INTRAMUSCULAR at 05:10

## 2020-01-01 RX ADMIN — Medication 10 ML: at 08:10

## 2020-01-01 RX ADMIN — FUROSEMIDE 80 MG: 10 INJECTION, SOLUTION INTRAMUSCULAR; INTRAVENOUS at 02:10

## 2020-01-01 RX ADMIN — THERA TABS 1 TABLET: TAB at 08:10

## 2020-01-01 RX ADMIN — DEXTROSE MONOHYDRATE 25 G: 25 INJECTION, SOLUTION INTRAVENOUS at 01:10

## 2020-01-01 RX ADMIN — DOCUSATE SODIUM 50MG AND SENNOSIDES 8.6MG 2 TABLET: 8.6; 5 TABLET, FILM COATED ORAL at 08:11

## 2020-01-01 RX ADMIN — Medication 300 MCG/HR: at 01:11

## 2020-01-01 RX ADMIN — CEFEPIME 2 G: 2 INJECTION, POWDER, FOR SOLUTION INTRAVENOUS at 08:10

## 2020-01-01 RX ADMIN — SODIUM CHLORIDE, SODIUM LACTATE, POTASSIUM CHLORIDE, AND CALCIUM CHLORIDE 1000 ML: .6; .31; .03; .02 INJECTION, SOLUTION INTRAVENOUS at 03:10

## 2020-01-01 RX ADMIN — PROPOFOL 50 MCG/KG/MIN: 10 INJECTION, EMULSION INTRAVENOUS at 06:10

## 2020-01-01 RX ADMIN — ACETAMINOPHEN 650 MG: 325 TABLET ORAL at 09:10

## 2020-01-01 RX ADMIN — PROMETHAZINE HYDROCHLORIDE AND CODEINE PHOSPHATE 5 ML: 10; 6.25 SOLUTION ORAL at 02:10

## 2020-01-01 RX ADMIN — MIDAZOLAM 2 MG: 1 INJECTION INTRAMUSCULAR; INTRAVENOUS at 03:11

## 2020-01-01 RX ADMIN — AZITHROMYCIN MONOHYDRATE 500 MG: 500 INJECTION, POWDER, LYOPHILIZED, FOR SOLUTION INTRAVENOUS at 04:10

## 2020-01-01 RX ADMIN — HYDROCORTISONE SODIUM SUCCINATE 100 MG: 100 INJECTION, POWDER, FOR SOLUTION INTRAMUSCULAR; INTRAVENOUS at 06:11

## 2020-01-01 RX ADMIN — MINERAL OIL AND WHITE PETROLATUM: 150; 830 OINTMENT OPHTHALMIC at 02:11

## 2020-01-01 RX ADMIN — DEXAMETHASONE 6 MG: 4 TABLET ORAL at 09:10

## 2020-01-01 RX ADMIN — MINERAL OIL AND WHITE PETROLATUM: 150; 830 OINTMENT OPHTHALMIC at 03:10

## 2020-01-01 RX ADMIN — LACTULOSE 30 G: 20 SOLUTION ORAL at 02:11

## 2020-01-01 RX ADMIN — DEXMEDETOMIDINE HYDROCHLORIDE 1.2 MCG/KG/HR: 4 INJECTION, SOLUTION INTRAVENOUS at 09:10

## 2020-01-01 RX ADMIN — IPRATROPIUM BROMIDE AND ALBUTEROL SULFATE 3 ML: .5; 2.5 SOLUTION RESPIRATORY (INHALATION) at 11:11

## 2020-01-01 RX ADMIN — VASOPRESSIN 0.04 UNITS/MIN: 20 INJECTION INTRAVENOUS at 05:10

## 2020-01-01 RX ADMIN — DEXMEDETOMIDINE HYDROCHLORIDE 1.2 MCG/KG/HR: 4 INJECTION, SOLUTION INTRAVENOUS at 02:10

## 2020-01-01 RX ADMIN — DEXMEDETOMIDINE HYDROCHLORIDE 1.2 MCG/KG/HR: 4 INJECTION, SOLUTION INTRAVENOUS at 01:10

## 2020-01-01 RX ADMIN — IPRATROPIUM BROMIDE AND ALBUTEROL SULFATE 3 ML: .5; 2.5 SOLUTION RESPIRATORY (INHALATION) at 03:10

## 2020-01-01 RX ADMIN — IPRATROPIUM BROMIDE AND ALBUTEROL SULFATE 3 ML: .5; 2.5 SOLUTION RESPIRATORY (INHALATION) at 01:11

## 2020-01-01 RX ADMIN — VANCOMYCIN HYDROCHLORIDE 1500 MG: 1.5 INJECTION, POWDER, LYOPHILIZED, FOR SOLUTION INTRAVENOUS at 04:10

## 2020-01-01 RX ADMIN — GUAIFENESIN 1200 MG: 600 TABLET, EXTENDED RELEASE ORAL at 09:10

## 2020-01-01 RX ADMIN — VASOPRESSIN 0.04 UNITS/MIN: 20 INJECTION INTRAVENOUS at 09:11

## 2020-01-01 RX ADMIN — MEROPENEM 500 MG: 500 INJECTION, POWDER, FOR SOLUTION INTRAVENOUS at 09:11

## 2020-01-01 RX ADMIN — VANCOMYCIN HYDROCHLORIDE 750 MG: 750 INJECTION, POWDER, LYOPHILIZED, FOR SOLUTION INTRAVENOUS at 09:11

## 2020-01-01 RX ADMIN — Medication 1000 UNITS: at 09:10

## 2020-01-01 RX ADMIN — Medication 250 MCG/HR: at 04:11

## 2020-01-01 RX ADMIN — VANCOMYCIN HYDROCHLORIDE 1250 MG: 1.25 INJECTION, POWDER, LYOPHILIZED, FOR SOLUTION INTRAVENOUS at 12:11

## 2020-01-01 RX ADMIN — SODIUM ZIRCONIUM CYCLOSILICATE 10 G: 10 POWDER, FOR SUSPENSION ORAL at 02:10

## 2020-01-01 RX ADMIN — Medication 250 MCG: at 04:10

## 2020-01-01 RX ADMIN — Medication 250 MCG: at 12:10

## 2020-01-01 RX ADMIN — CALCIUM GLUCONATE 1000 MG: 98 INJECTION, SOLUTION INTRAVENOUS at 08:10

## 2020-01-01 RX ADMIN — FLUTICASONE FUROATE AND VILANTEROL TRIFENATATE 1 PUFF: 100; 25 POWDER RESPIRATORY (INHALATION) at 08:10

## 2020-01-01 RX ADMIN — ENOXAPARIN SODIUM 80 MG: 80 INJECTION SUBCUTANEOUS at 05:10

## 2020-01-01 RX ADMIN — ENOXAPARIN SODIUM 40 MG: 40 INJECTION SUBCUTANEOUS at 04:10

## 2020-01-01 RX ADMIN — PROPOFOL 50 MCG/KG/MIN: 10 INJECTION, EMULSION INTRAVENOUS at 05:11

## 2020-01-01 RX ADMIN — CALCIUM GLUCONATE 3000 MG: 98 INJECTION, SOLUTION INTRAVENOUS at 01:11

## 2020-01-01 RX ADMIN — Medication 2500 MCG: at 09:11

## 2020-01-01 RX ADMIN — FENTANYL CITRATE 50 MCG: 50 INJECTION, SOLUTION INTRAMUSCULAR; INTRAVENOUS at 05:10

## 2020-01-01 RX ADMIN — ACETAMINOPHEN 1000 MG: 500 TABLET ORAL at 05:10

## 2020-01-01 RX ADMIN — Medication 250 MG: at 08:10

## 2020-01-01 RX ADMIN — FLUCONAZOLE 400 MG: 2 INJECTION, SOLUTION INTRAVENOUS at 09:11

## 2020-01-01 RX ADMIN — PROPOFOL 50 MCG/KG/MIN: 10 INJECTION, EMULSION INTRAVENOUS at 10:10

## 2020-01-01 RX ADMIN — ACETAMINOPHEN 650 MG: 160 SOLUTION ORAL at 03:11

## 2020-01-01 RX ADMIN — DEXTROSE MONOHYDRATE 2 MCG/KG/MIN: 50 INJECTION, SOLUTION INTRAVENOUS at 01:11

## 2020-01-01 RX ADMIN — IPRATROPIUM BROMIDE AND ALBUTEROL SULFATE 3 ML: .5; 2.5 SOLUTION RESPIRATORY (INHALATION) at 01:10

## 2020-01-01 RX ADMIN — Medication 250 MCG: at 05:11

## 2020-01-01 RX ADMIN — MINERAL OIL AND WHITE PETROLATUM: 150; 830 OINTMENT OPHTHALMIC at 02:10

## 2020-01-01 RX ADMIN — DEXMEDETOMIDINE HYDROCHLORIDE 1.4 MCG/KG/HR: 4 INJECTION, SOLUTION INTRAVENOUS at 06:10

## 2020-01-01 RX ADMIN — FUROSEMIDE 60 MG: 10 INJECTION, SOLUTION INTRAMUSCULAR; INTRAVENOUS at 08:10

## 2020-01-01 RX ADMIN — MORPHINE SULFATE 2 MG: 2 INJECTION, SOLUTION INTRAMUSCULAR; INTRAVENOUS at 04:10

## 2020-01-01 RX ADMIN — ATORVASTATIN CALCIUM 40 MG: 20 TABLET, FILM COATED ORAL at 09:10

## 2020-01-01 RX ADMIN — INSULIN HUMAN 8.44 UNITS: 100 INJECTION, SOLUTION PARENTERAL at 08:10

## 2020-01-01 RX ADMIN — PROPOFOL 50 MCG/KG/MIN: 10 INJECTION, EMULSION INTRAVENOUS at 01:11

## 2020-01-01 RX ADMIN — REMDESIVIR 100 MG: 100 INJECTION, POWDER, LYOPHILIZED, FOR SOLUTION INTRAVENOUS at 04:10

## 2020-01-01 RX ADMIN — MINERAL OIL AND WHITE PETROLATUM: 150; 830 OINTMENT OPHTHALMIC at 01:10

## 2020-01-01 RX ADMIN — CEFEPIME 2 G: 2 INJECTION, POWDER, FOR SOLUTION INTRAVENOUS at 09:10

## 2020-01-01 RX ADMIN — ENOXAPARIN SODIUM 40 MG: 40 INJECTION SUBCUTANEOUS at 05:10

## 2020-01-01 RX ADMIN — HEPARIN SODIUM AND DEXTROSE 17 UNITS/KG/HR: 10000; 5 INJECTION INTRAVENOUS at 01:11

## 2020-01-01 RX ADMIN — FLUTICASONE PROPIONATE 100 MCG: 50 SPRAY, METERED NASAL at 09:10

## 2020-01-01 RX ADMIN — VANCOMYCIN HYDROCHLORIDE 1250 MG: 1.25 INJECTION, POWDER, LYOPHILIZED, FOR SOLUTION INTRAVENOUS at 09:10

## 2020-01-01 RX ADMIN — NOREPINEPHRINE BITARTRATE 0.34 MCG/KG/MIN: 1 INJECTION, SOLUTION, CONCENTRATE INTRAVENOUS at 04:11

## 2020-01-01 RX ADMIN — Medication 2500 MCG: at 11:11

## 2020-01-01 RX ADMIN — GUAIFENESIN 600 MG: 600 TABLET, EXTENDED RELEASE ORAL at 08:10

## 2020-01-01 RX ADMIN — IPRATROPIUM BROMIDE AND ALBUTEROL SULFATE 3 ML: .5; 2.5 SOLUTION RESPIRATORY (INHALATION) at 07:10

## 2020-01-01 RX ADMIN — NITROGLYCERIN 0.5 INCH: 20 OINTMENT TOPICAL at 02:11

## 2020-01-01 RX ADMIN — PROMETHAZINE HYDROCHLORIDE AND CODEINE PHOSPHATE 5 ML: 10; 6.25 SOLUTION ORAL at 05:10

## 2020-01-01 RX ADMIN — BENZONATATE 100 MG: 100 CAPSULE ORAL at 08:10

## 2020-01-01 RX ADMIN — HEPARIN SODIUM AND DEXTROSE 14 UNITS/KG/HR: 10000; 5 INJECTION INTRAVENOUS at 08:10

## 2020-01-01 RX ADMIN — Medication 1000 UNITS: at 11:10

## 2020-01-01 RX ADMIN — VASOPRESSIN 0.04 UNITS/MIN: 20 INJECTION INTRAVENOUS at 01:11

## 2020-01-01 RX ADMIN — SODIUM CHLORIDE: 0.9 INJECTION, SOLUTION INTRAVENOUS at 05:11

## 2020-01-01 RX ADMIN — CASTOR OIL AND BALSAM, PERU: 788; 87 OINTMENT TOPICAL at 11:11

## 2020-01-01 RX ADMIN — VILAZODONE HYDROCHLORIDE 20 MG: 10 TABLET ORAL at 10:10

## 2020-01-01 RX ADMIN — Medication 1000 UNITS: at 09:11

## 2020-01-01 RX ADMIN — HYDROCORTISONE SODIUM SUCCINATE 100 MG: 100 INJECTION, POWDER, FOR SOLUTION INTRAMUSCULAR; INTRAVENOUS at 02:11

## 2020-01-01 RX ADMIN — NITROGLYCERIN 0.5 INCH: 20 OINTMENT TOPICAL at 09:11

## 2020-01-01 RX ADMIN — LACTULOSE 30 G: 20 SOLUTION ORAL at 09:11

## 2020-01-01 RX ADMIN — NITROGLYCERIN 0.5 INCH: 20 OINTMENT TOPICAL at 06:11

## 2020-01-01 RX ADMIN — GUAIFENESIN 1200 MG: 600 TABLET, EXTENDED RELEASE ORAL at 08:10

## 2020-01-01 RX ADMIN — DEXTROSE MONOHYDRATE 1 MCG/KG/MIN: 50 INJECTION, SOLUTION INTRAVENOUS at 06:11

## 2020-01-01 RX ADMIN — FLUTICASONE PROPIONATE 100 MCG: 50 SPRAY, METERED NASAL at 04:10

## 2020-01-01 RX ADMIN — ENOXAPARIN SODIUM 80 MG: 80 INJECTION SUBCUTANEOUS at 04:10

## 2020-01-01 RX ADMIN — CALCIUM GLUCONATE 1000 MG: 98 INJECTION, SOLUTION INTRAVENOUS at 09:11

## 2020-01-01 RX ADMIN — FAMOTIDINE 20 MG: 10 INJECTION INTRAVENOUS at 09:10

## 2020-01-01 RX ADMIN — ACETAMINOPHEN 1000 MG: 500 TABLET ORAL at 08:10

## 2020-01-01 RX ADMIN — VILAZODONE HYDROCHLORIDE 20 MG: 10 TABLET ORAL at 11:10

## 2020-01-01 RX ADMIN — PROPOFOL: 10 INJECTION, EMULSION INTRAVENOUS at 07:10

## 2020-01-01 RX ADMIN — MEROPENEM 500 MG: 500 INJECTION, POWDER, FOR SOLUTION INTRAVENOUS at 05:11

## 2020-01-01 RX ADMIN — VASOPRESSIN 0.04 UNITS/MIN: 20 INJECTION INTRAVENOUS at 08:11

## 2020-01-01 RX ADMIN — DEXTROSE MONOHYDRATE 4 MCG/KG/MIN: 50 INJECTION, SOLUTION INTRAVENOUS at 07:11

## 2020-01-01 RX ADMIN — MINERAL OIL AND WHITE PETROLATUM: 150; 830 OINTMENT OPHTHALMIC at 06:11

## 2020-01-01 RX ADMIN — PROPOFOL 50 MCG/KG/MIN: 10 INJECTION, EMULSION INTRAVENOUS at 12:11

## 2020-01-01 RX ADMIN — DEXTROSE MONOHYDRATE 4 MCG/KG/MIN: 50 INJECTION, SOLUTION INTRAVENOUS at 12:11

## 2020-01-01 RX ADMIN — IPRATROPIUM BROMIDE AND ALBUTEROL SULFATE 3 ML: .5; 2.5 SOLUTION RESPIRATORY (INHALATION) at 07:11

## 2020-01-01 RX ADMIN — Medication 250 MCG: at 03:11

## 2020-01-01 RX ADMIN — PIPERACILLIN AND TAZOBACTAM 4.5 G: 4; .5 INJECTION, POWDER, LYOPHILIZED, FOR SOLUTION INTRAVENOUS; PARENTERAL at 06:11

## 2020-01-01 RX ADMIN — MICAFUNGIN SODIUM 100 MG: 20 INJECTION, POWDER, LYOPHILIZED, FOR SOLUTION INTRAVENOUS at 10:11

## 2020-01-01 RX ADMIN — CALCIUM GLUCONATE 1 G: 98 INJECTION, SOLUTION INTRAVENOUS at 01:10

## 2020-01-01 RX ADMIN — PROMETHAZINE HYDROCHLORIDE AND CODEINE PHOSPHATE 5 ML: 10; 6.25 SOLUTION ORAL at 06:10

## 2020-01-01 RX ADMIN — MEROPENEM 2 G: 1 INJECTION, POWDER, FOR SOLUTION INTRAVENOUS at 05:11

## 2020-01-01 RX ADMIN — FLUCONAZOLE 400 MG: 2 INJECTION, SOLUTION INTRAVENOUS at 02:11

## 2020-01-01 RX ADMIN — POLYETHYLENE GLYCOL 3350 17 G: 17 POWDER, FOR SOLUTION ORAL at 08:11

## 2020-01-01 RX ADMIN — PROMETHAZINE HYDROCHLORIDE AND CODEINE PHOSPHATE 5 ML: 10; 6.25 SOLUTION ORAL at 08:10

## 2020-01-01 RX ADMIN — SODIUM CHLORIDE, SODIUM LACTATE, POTASSIUM CHLORIDE, AND CALCIUM CHLORIDE 1000 ML: .6; .31; .03; .02 INJECTION, SOLUTION INTRAVENOUS at 11:10

## 2020-01-01 RX ADMIN — SODIUM CHLORIDE, SODIUM LACTATE, POTASSIUM CHLORIDE, AND CALCIUM CHLORIDE 1000 ML: .6; .31; .03; .02 INJECTION, SOLUTION INTRAVENOUS at 06:10

## 2020-01-01 RX ADMIN — FLUDROCORTISONE ACETATE 100 MCG: 0.1 TABLET ORAL at 09:11

## 2020-01-01 RX ADMIN — FLUTICASONE PROPIONATE 100 MCG: 50 SPRAY, METERED NASAL at 11:10

## 2020-01-01 RX ADMIN — ACETAMINOPHEN 650 MG: 325 TABLET ORAL at 06:10

## 2020-01-01 RX ADMIN — Medication 200 MCG/HR: at 04:10

## 2020-01-01 RX ADMIN — FLUTICASONE PROPIONATE 100 MCG: 50 SPRAY, METERED NASAL at 08:10

## 2020-01-01 RX ADMIN — GUAIFENESIN 1200 MG: 600 TABLET, EXTENDED RELEASE ORAL at 10:10

## 2020-01-01 RX ADMIN — DEXTROSE MONOHYDRATE 2 MCG/KG/MIN: 50 INJECTION, SOLUTION INTRAVENOUS at 07:11

## 2020-01-01 RX ADMIN — FUROSEMIDE 160 MG: 10 INJECTION, SOLUTION INTRAMUSCULAR; INTRAVENOUS at 04:11

## 2020-01-01 RX ADMIN — NOREPINEPHRINE BITARTRATE 0.04 MCG/KG/MIN: 1 INJECTION, SOLUTION, CONCENTRATE INTRAVENOUS at 08:11

## 2020-01-01 RX ADMIN — DEXTROSE MONOHYDRATE 4 MCG/KG/MIN: 50 INJECTION, SOLUTION INTRAVENOUS at 05:11

## 2020-01-01 RX ADMIN — HEPARIN SODIUM AND DEXTROSE 17 UNITS/KG/HR: 10000; 5 INJECTION INTRAVENOUS at 03:11

## 2020-01-01 RX ADMIN — FLUTICASONE FUROATE AND VILANTEROL TRIFENATATE 1 PUFF: 100; 25 POWDER RESPIRATORY (INHALATION) at 07:10

## 2020-01-01 RX ADMIN — DEXMEDETOMIDINE HYDROCHLORIDE 1.1 MCG/KG/HR: 4 INJECTION, SOLUTION INTRAVENOUS at 09:10

## 2020-01-01 RX ADMIN — LORAZEPAM 1 MG: 2 INJECTION INTRAMUSCULAR; INTRAVENOUS at 05:10

## 2020-01-01 RX ADMIN — NOREPINEPHRINE BITARTRATE 0.32 MCG/KG/MIN: 1 INJECTION, SOLUTION, CONCENTRATE INTRAVENOUS at 09:11

## 2020-01-01 RX ADMIN — Medication 0.18 MCG/KG/MIN: at 05:10

## 2020-01-01 RX ADMIN — PROPOFOL 50 MCG/KG/MIN: 10 INJECTION, EMULSION INTRAVENOUS at 07:10

## 2020-01-01 RX ADMIN — THERA TABS 1 TABLET: TAB at 03:10

## 2020-01-01 RX ADMIN — CALCIUM GLUCONATE 1 G: 94 INJECTION, SOLUTION INTRAVENOUS at 05:10

## 2020-01-01 RX ADMIN — DEXTROSE MONOHYDRATE, SODIUM CITRATE, AND CITRIC ACID MONOHYDRATE: 2.45; 2.2; .8 INJECTION, SOLUTION INTRAVENOUS at 01:11

## 2020-01-01 RX ADMIN — SUCCINYLCHOLINE CHLORIDE 160 MG: 20 INJECTION, SOLUTION INTRAMUSCULAR; INTRAVENOUS; PARENTERAL at 06:10

## 2020-01-01 RX ADMIN — Medication 250 MCG: at 06:10

## 2020-01-01 RX ADMIN — FLUCONAZOLE 400 MG: 2 INJECTION, SOLUTION INTRAVENOUS at 05:11

## 2020-01-01 RX ADMIN — SODIUM ZIRCONIUM CYCLOSILICATE 10 G: 10 POWDER, FOR SUSPENSION ORAL at 09:10

## 2020-01-01 RX ADMIN — VANCOMYCIN HYDROCHLORIDE 1250 MG: 1.25 INJECTION, POWDER, LYOPHILIZED, FOR SOLUTION INTRAVENOUS at 04:10

## 2020-01-01 RX ADMIN — SEVELAMER CARBONATE 1.6 G: 800 POWDER, FOR SUSPENSION ORAL at 08:11

## 2020-01-01 RX ADMIN — AZITHROMYCIN MONOHYDRATE 500 MG: 250 TABLET ORAL at 01:10

## 2020-01-01 RX ADMIN — ACETAMINOPHEN 650 MG: 325 TABLET ORAL at 10:10

## 2020-01-01 RX ADMIN — Medication 250 MCG: at 02:10

## 2020-01-01 RX ADMIN — DEXMEDETOMIDINE HYDROCHLORIDE 1.2 MCG/KG/HR: 4 INJECTION, SOLUTION INTRAVENOUS at 10:10

## 2020-01-01 RX ADMIN — MINERAL OIL AND WHITE PETROLATUM: 150; 830 OINTMENT OPHTHALMIC at 09:10

## 2020-01-01 RX ADMIN — PROPOFOL 50 MCG/KG/MIN: 10 INJECTION, EMULSION INTRAVENOUS at 04:11

## 2020-01-01 RX ADMIN — DEXMEDETOMIDINE HYDROCHLORIDE 0.2 MCG/KG/HR: 4 INJECTION, SOLUTION INTRAVENOUS at 09:10

## 2020-01-01 RX ADMIN — GUAIFENESIN 600 MG: 600 TABLET, EXTENDED RELEASE ORAL at 09:10

## 2020-01-01 RX ADMIN — OXYCODONE HYDROCHLORIDE AND ACETAMINOPHEN 500 MG: 500 TABLET ORAL at 08:10

## 2020-01-01 RX ADMIN — ACETAMINOPHEN 1000 MG: 500 TABLET ORAL at 11:10

## 2020-01-01 RX ADMIN — BENZONATATE 100 MG: 100 CAPSULE ORAL at 09:10

## 2020-01-01 RX ADMIN — Medication 200 MCG/HR: at 08:10

## 2020-01-01 RX ADMIN — PROMETHAZINE HYDROCHLORIDE AND CODEINE PHOSPHATE 5 ML: 10; 6.25 SOLUTION ORAL at 01:10

## 2020-01-01 RX ADMIN — FUROSEMIDE 40 MG: 10 INJECTION, SOLUTION INTRAMUSCULAR; INTRAVENOUS at 09:10

## 2020-01-01 RX ADMIN — DEXTROSE MONOHYDRATE 4 MCG/KG/MIN: 50 INJECTION, SOLUTION INTRAVENOUS at 04:11

## 2020-01-01 RX ADMIN — FUROSEMIDE 80 MG: 10 INJECTION, SOLUTION INTRAMUSCULAR; INTRAVENOUS at 05:11

## 2020-01-01 RX ADMIN — NOREPINEPHRINE BITARTRATE 0.02 MCG/KG/MIN: 1 INJECTION, SOLUTION, CONCENTRATE INTRAVENOUS at 09:10

## 2020-01-01 RX ADMIN — ACETAMINOPHEN 1000 MG: 500 TABLET ORAL at 09:10

## 2020-01-01 RX ADMIN — Medication 250 MCG/HR: at 11:11

## 2020-01-01 RX ADMIN — PROMETHAZINE HYDROCHLORIDE AND CODEINE PHOSPHATE 5 ML: 10; 6.25 SOLUTION ORAL at 10:10

## 2020-01-01 RX ADMIN — HEPARIN SODIUM AND DEXTROSE 17 UNITS/KG/HR: 10000; 5 INJECTION INTRAVENOUS at 06:11

## 2020-01-01 RX ADMIN — Medication 1 MCG/KG/MIN: at 03:10

## 2020-01-01 RX ADMIN — NOREPINEPHRINE BITARTRATE 0.3 MCG/KG/MIN: 1 INJECTION, SOLUTION, CONCENTRATE INTRAVENOUS at 05:11

## 2020-01-01 RX ADMIN — SEVELAMER CARBONATE 1.6 G: 800 POWDER, FOR SUSPENSION ORAL at 02:11

## 2020-01-01 RX ADMIN — OXYCODONE HYDROCHLORIDE AND ACETAMINOPHEN 500 MG: 500 TABLET ORAL at 10:10

## 2020-01-01 RX ADMIN — ATORVASTATIN CALCIUM 40 MG: 20 TABLET, FILM COATED ORAL at 07:10

## 2020-01-01 RX ADMIN — ACETAMINOPHEN 650 MG: 160 SOLUTION ORAL at 09:11

## 2020-01-01 RX ADMIN — FUROSEMIDE 80 MG: 10 INJECTION, SOLUTION INTRAMUSCULAR; INTRAVENOUS at 05:10

## 2020-01-01 RX ADMIN — EPINEPHRINE: 0.1 INJECTION, SOLUTION ENDOTRACHEAL; INTRACARDIAC; INTRAVENOUS at 11:10

## 2020-01-01 RX ADMIN — VILAZODONE HYDROCHLORIDE 20 MG: 10 TABLET ORAL at 08:10

## 2020-01-01 RX ADMIN — Medication 250 MG: at 03:10

## 2020-01-01 RX ADMIN — IPRATROPIUM BROMIDE AND ALBUTEROL SULFATE 3 ML: .5; 2.5 SOLUTION RESPIRATORY (INHALATION) at 02:10

## 2020-01-01 RX ADMIN — Medication 0.02 MCG/KG/MIN: at 10:10

## 2020-01-01 RX ADMIN — DEXAMETHASONE SODIUM PHOSPHATE 6 MG: 4 INJECTION, SOLUTION INTRA-ARTICULAR; INTRALESIONAL; INTRAMUSCULAR; INTRAVENOUS; SOFT TISSUE at 09:10

## 2020-01-01 RX ADMIN — LORAZEPAM 1 MG: 2 INJECTION INTRAMUSCULAR; INTRAVENOUS at 11:10

## 2020-01-01 RX ADMIN — MAGNESIUM SULFATE IN WATER 2 G: 40 INJECTION, SOLUTION INTRAVENOUS at 12:10

## 2020-01-01 RX ADMIN — Medication 2500 MCG: at 06:11

## 2020-01-01 RX ADMIN — Medication 250 MCG: at 07:10

## 2020-01-01 RX ADMIN — CASTOR OIL AND BALSAM, PERU: 788; 87 OINTMENT TOPICAL at 10:11

## 2020-01-01 RX ADMIN — FUROSEMIDE 80 MG: 10 INJECTION, SOLUTION INTRAMUSCULAR; INTRAVENOUS at 01:11

## 2020-01-01 RX ADMIN — NOREPINEPHRINE BITARTRATE 0.12 MCG/KG/MIN: 1 INJECTION, SOLUTION, CONCENTRATE INTRAVENOUS at 12:11

## 2020-01-01 RX ADMIN — Medication 0.02 MCG/KG/MIN: at 05:10

## 2020-01-01 RX ADMIN — MINERAL OIL AND WHITE PETROLATUM: 150; 830 OINTMENT OPHTHALMIC at 05:10

## 2020-01-01 RX ADMIN — PIPERACILLIN AND TAZOBACTAM 4.5 G: 4; .5 INJECTION, POWDER, LYOPHILIZED, FOR SOLUTION INTRAVENOUS; PARENTERAL at 10:11

## 2020-01-01 RX ADMIN — PROPOFOL 50 MCG/KG/MIN: 10 INJECTION, EMULSION INTRAVENOUS at 08:10

## 2020-01-01 RX ADMIN — MEROPENEM 2 G: 1 INJECTION, POWDER, FOR SOLUTION INTRAVENOUS at 09:11

## 2020-01-01 RX ADMIN — SODIUM CHLORIDE: 0.9 INJECTION, SOLUTION INTRAVENOUS at 03:11

## 2020-01-01 RX ADMIN — ALBUTEROL SULFATE 10 MG: 2.5 SOLUTION RESPIRATORY (INHALATION) at 01:11

## 2020-01-01 RX ADMIN — Medication 0.5 MCG/KG/MIN: at 12:10

## 2020-01-01 RX ADMIN — Medication 300 MCG: at 08:11

## 2020-01-01 RX ADMIN — VANCOMYCIN HYDROCHLORIDE 1000 MG: 1 INJECTION, POWDER, LYOPHILIZED, FOR SOLUTION INTRAVENOUS at 08:10

## 2020-01-01 RX ADMIN — GUAIFENESIN 1200 MG: 600 TABLET, EXTENDED RELEASE ORAL at 07:10

## 2020-01-01 RX ADMIN — ACETAMINOPHEN 650 MG: 160 SOLUTION ORAL at 08:10

## 2020-01-01 RX ADMIN — DEXTROSE MONOHYDRATE 25 G: 25 INJECTION, SOLUTION INTRAVENOUS at 08:10

## 2020-01-01 RX ADMIN — HYDROCORTISONE SODIUM SUCCINATE 100 MG: 100 INJECTION, POWDER, FOR SOLUTION INTRAMUSCULAR; INTRAVENOUS at 10:11

## 2020-01-01 RX ADMIN — HEPARIN SODIUM AND DEXTROSE 20 UNITS/KG/HR: 10000; 5 INJECTION INTRAVENOUS at 06:11

## 2020-01-01 RX ADMIN — INSULIN HUMAN 8.44 UNITS: 100 INJECTION, SOLUTION PARENTERAL at 01:10

## 2020-01-01 RX ADMIN — HEPARIN SODIUM AND DEXTROSE 14 UNITS/KG/HR: 10000; 5 INJECTION INTRAVENOUS at 05:10

## 2020-01-01 RX ADMIN — Medication 0.18 MCG/KG/MIN: at 09:10

## 2020-01-01 RX ADMIN — DEXAMETHASONE SODIUM PHOSPHATE 6 MG: 4 INJECTION, SOLUTION INTRAMUSCULAR; INTRAVENOUS at 08:10

## 2020-01-01 RX ADMIN — INSULIN HUMAN 8.44 UNITS: 100 INJECTION, SOLUTION PARENTERAL at 05:10

## 2020-01-01 RX ADMIN — VASOPRESSIN 0.04 UNITS/MIN: 20 INJECTION INTRAVENOUS at 05:11

## 2020-01-01 RX ADMIN — Medication 0.08 MCG/KG/MIN: at 02:10

## 2020-01-01 RX ADMIN — ACETAMINOPHEN 1000 MG: 500 TABLET ORAL at 07:10

## 2020-01-01 RX ADMIN — FLUCONAZOLE 400 MG: 2 INJECTION, SOLUTION INTRAVENOUS at 11:11

## 2020-01-01 RX ADMIN — DEXMEDETOMIDINE HYDROCHLORIDE 1 MCG/KG/HR: 4 INJECTION, SOLUTION INTRAVENOUS at 10:10

## 2020-01-01 RX ADMIN — HEPARIN SODIUM AND DEXTROSE 17 UNITS/KG/HR: 10000; 5 INJECTION INTRAVENOUS at 05:11

## 2020-01-01 RX ADMIN — DEXTROSE MONOHYDRATE 4 MCG/KG/MIN: 50 INJECTION, SOLUTION INTRAVENOUS at 01:11

## 2020-01-01 RX ADMIN — DEXTROSE MONOHYDRATE 2 MCG/KG/MIN: 50 INJECTION, SOLUTION INTRAVENOUS at 03:10

## 2020-01-01 RX ADMIN — SODIUM ZIRCONIUM CYCLOSILICATE 10 G: 10 POWDER, FOR SUSPENSION ORAL at 10:11

## 2020-01-01 RX ADMIN — HEPARIN SODIUM AND DEXTROSE 22 UNITS/KG/HR: 10000; 5 INJECTION INTRAVENOUS at 09:11

## 2020-01-01 RX ADMIN — ALBUTEROL SULFATE 2 PUFF: 90 AEROSOL, METERED RESPIRATORY (INHALATION) at 02:10

## 2020-01-01 RX ADMIN — NITROGLYCERIN 0.5 INCH: 20 OINTMENT TOPICAL at 10:11

## 2020-01-01 RX ADMIN — VILAZODONE HYDROCHLORIDE 20 MG: 10 TABLET ORAL at 12:10

## 2020-01-01 RX ADMIN — Medication 0.16 MCG/KG/MIN: at 04:10

## 2020-01-01 RX ADMIN — DEXTROSE MONOHYDRATE 1 MCG/KG/MIN: 50 INJECTION, SOLUTION INTRAVENOUS at 07:10

## 2020-01-01 RX ADMIN — ACETAMINOPHEN 650 MG: 325 TABLET ORAL at 08:10

## 2020-01-01 RX ADMIN — VASOPRESSIN 0.04 UNITS/MIN: 20 INJECTION INTRAVENOUS at 10:10

## 2020-01-01 RX ADMIN — ACETAMINOPHEN 650 MG: 160 SOLUTION ORAL at 04:11

## 2020-01-01 RX ADMIN — FLUTICASONE FUROATE AND VILANTEROL TRIFENATATE 1 PUFF: 100; 25 POWDER RESPIRATORY (INHALATION) at 03:10

## 2020-01-01 RX ADMIN — DEXMEDETOMIDINE HYDROCHLORIDE 0.8 MCG/KG/HR: 4 INJECTION, SOLUTION INTRAVENOUS at 04:10

## 2020-01-01 RX ADMIN — DEXTROSE MONOHYDRATE 2 MCG/KG/MIN: 50 INJECTION, SOLUTION INTRAVENOUS at 12:10

## 2020-01-01 RX ADMIN — BENZONATATE 100 MG: 100 CAPSULE ORAL at 02:10

## 2020-01-01 RX ADMIN — Medication 250 MCG: at 02:11

## 2020-01-01 RX ADMIN — HEPARIN SODIUM AND DEXTROSE 12 UNITS/KG/HR: 10000; 5 INJECTION INTRAVENOUS at 10:10

## 2020-01-01 RX ADMIN — MAGNESIUM SULFATE IN WATER 2 G: 40 INJECTION, SOLUTION INTRAVENOUS at 12:11

## 2020-01-01 RX ADMIN — LORAZEPAM 1 MG: 0.5 TABLET ORAL at 02:10

## 2020-01-01 RX ADMIN — Medication 300 MCG: at 09:11

## 2020-01-01 RX ADMIN — FLUDROCORTISONE ACETATE 100 MCG: 0.1 TABLET ORAL at 10:11

## 2020-01-01 RX ADMIN — Medication 10 ML: at 11:10

## 2020-01-01 RX ADMIN — ATORVASTATIN CALCIUM 40 MG: 20 TABLET, FILM COATED ORAL at 10:10

## 2020-01-01 RX ADMIN — FUROSEMIDE 80 MG: 10 INJECTION, SOLUTION INTRAMUSCULAR; INTRAVENOUS at 01:10

## 2020-01-01 RX ADMIN — Medication 150 MCG/HR: at 06:10

## 2020-01-01 RX ADMIN — PIPERACILLIN AND TAZOBACTAM 4.5 G: 4; .5 INJECTION, POWDER, LYOPHILIZED, FOR SOLUTION INTRAVENOUS; PARENTERAL at 02:11

## 2020-01-01 RX ADMIN — FAMOTIDINE 20 MG: 10 INJECTION INTRAVENOUS at 10:10

## 2020-01-01 RX ADMIN — LORAZEPAM 1 MG: 0.5 TABLET ORAL at 05:10

## 2020-01-01 RX ADMIN — FUROSEMIDE 120 MG: 10 INJECTION, SOLUTION INTRAMUSCULAR; INTRAVENOUS at 01:10

## 2020-01-01 RX ADMIN — PIPERACILLIN AND TAZOBACTAM 4.5 G: 4; .5 INJECTION, POWDER, LYOPHILIZED, FOR SOLUTION INTRAVENOUS; PARENTERAL at 11:10

## 2020-01-01 RX ADMIN — DEXTROSE MONOHYDRATE 1.5 MCG/KG/MIN: 50 INJECTION, SOLUTION INTRAVENOUS at 09:10

## 2020-01-01 RX ADMIN — ALBUTEROL SULFATE 2 PUFF: 90 AEROSOL, METERED RESPIRATORY (INHALATION) at 03:10

## 2020-01-01 RX ADMIN — FUROSEMIDE 40 MG: 10 INJECTION, SOLUTION INTRAMUSCULAR; INTRAVENOUS at 03:10

## 2020-01-01 RX ADMIN — PROPOFOL 45 MCG/KG/MIN: 10 INJECTION, EMULSION INTRAVENOUS at 02:10

## 2020-01-01 RX ADMIN — DEXTROSE: 5 SOLUTION INTRAVENOUS at 12:11

## 2020-01-01 RX ADMIN — MORPHINE SULFATE 2 MG: 2 INJECTION, SOLUTION INTRAMUSCULAR; INTRAVENOUS at 03:10

## 2020-01-01 RX ADMIN — MICAFUNGIN SODIUM 100 MG: 20 INJECTION, POWDER, LYOPHILIZED, FOR SOLUTION INTRAVENOUS at 02:11

## 2020-01-01 RX ADMIN — VASOPRESSIN 0.04 UNITS/MIN: 20 INJECTION INTRAVENOUS at 04:11

## 2020-01-01 RX ADMIN — LORAZEPAM 1 MG: 0.5 TABLET ORAL at 11:10

## 2020-01-01 RX ADMIN — PIPERACILLIN AND TAZOBACTAM 4.5 G: 4; .5 INJECTION, POWDER, LYOPHILIZED, FOR SOLUTION INTRAVENOUS; PARENTERAL at 11:11

## 2020-01-01 RX ADMIN — MIDAZOLAM 2 MG: 1 INJECTION INTRAMUSCULAR; INTRAVENOUS at 05:11

## 2020-01-01 RX ADMIN — DEXMEDETOMIDINE HYDROCHLORIDE 1.1 MCG/KG/HR: 4 INJECTION, SOLUTION INTRAVENOUS at 05:10

## 2020-01-01 RX ADMIN — PHENYLEPHRINE HYDROCHLORIDE: 10 INJECTION INTRAVENOUS at 11:10

## 2020-01-01 RX ADMIN — SODIUM ZIRCONIUM CYCLOSILICATE 10 G: 10 POWDER, FOR SUSPENSION ORAL at 06:10

## 2020-01-01 RX ADMIN — LORAZEPAM 0.5 MG: 2 INJECTION INTRAMUSCULAR; INTRAVENOUS at 04:10

## 2020-01-01 RX ADMIN — PIPERACILLIN AND TAZOBACTAM 4.5 G: 4; .5 INJECTION, POWDER, LYOPHILIZED, FOR SOLUTION INTRAVENOUS; PARENTERAL at 03:11

## 2020-01-01 RX ADMIN — ALBUTEROL SULFATE 10 MG: 2.5 SOLUTION RESPIRATORY (INHALATION) at 07:10

## 2020-01-01 RX ADMIN — SODIUM PHOSPHATE, MONOBASIC, MONOHYDRATE 30 MMOL: 276; 142 INJECTION, SOLUTION INTRAVENOUS at 02:11

## 2020-01-01 RX ADMIN — DEXMEDETOMIDINE HYDROCHLORIDE 1.4 MCG/KG/HR: 4 INJECTION, SOLUTION INTRAVENOUS at 10:10

## 2020-01-01 RX ADMIN — REMDESIVIR 100 MG: 100 INJECTION, POWDER, LYOPHILIZED, FOR SOLUTION INTRAVENOUS at 05:10

## 2020-01-01 RX ADMIN — PROMETHAZINE HYDROCHLORIDE AND CODEINE PHOSPHATE 5 ML: 10; 6.25 SOLUTION ORAL at 11:10

## 2020-01-01 RX ADMIN — Medication 250 MCG: at 09:11

## 2020-01-01 RX ADMIN — AZITHROMYCIN MONOHYDRATE 500 MG: 500 INJECTION, POWDER, LYOPHILIZED, FOR SOLUTION INTRAVENOUS at 03:10

## 2020-01-01 RX ADMIN — DOCUSATE SODIUM 50MG AND SENNOSIDES 8.6MG 1 TABLET: 8.6; 5 TABLET, FILM COATED ORAL at 09:11

## 2020-01-01 RX ADMIN — HEPARIN SODIUM AND DEXTROSE 17 UNITS/KG/HR: 10000; 5 INJECTION INTRAVENOUS at 10:11

## 2020-01-01 RX ADMIN — HEPARIN SODIUM AND DEXTROSE 21 UNITS/KG/HR: 10000; 5 INJECTION INTRAVENOUS at 11:11

## 2020-01-01 RX ADMIN — Medication 250 MCG: at 11:11

## 2020-01-01 RX ADMIN — HYDROCORTISONE SODIUM SUCCINATE 100 MG: 100 INJECTION, POWDER, FOR SOLUTION INTRAMUSCULAR; INTRAVENOUS at 01:11

## 2020-01-01 RX ADMIN — HEPARIN SODIUM AND DEXTROSE 17 UNITS/KG/HR: 10000; 5 INJECTION INTRAVENOUS at 02:11

## 2020-01-01 RX ADMIN — OXYCODONE HYDROCHLORIDE AND ACETAMINOPHEN 500 MG: 500 TABLET ORAL at 07:10

## 2020-01-01 RX ADMIN — GUAIFENESIN 600 MG: 600 TABLET, EXTENDED RELEASE ORAL at 12:10

## 2020-01-01 RX ADMIN — HEPARIN SODIUM AND DEXTROSE 17.01 UNITS/KG/HR: 10000; 5 INJECTION INTRAVENOUS at 12:11

## 2020-01-01 RX ADMIN — THERA TABS 1 TABLET: TAB at 10:10

## 2020-01-01 RX ADMIN — Medication 0.04 MCG/KG/MIN: at 02:10

## 2020-01-03 NOTE — TELEPHONE ENCOUNTER
----- Message from Cathi Amin LPN sent at 1/3/2020  1:31 PM CST -----  Contact:   [facility] [phone]      ----- Message -----  From: Ashley Hilliard  Sent: 1/3/2020   1:25 PM CST  To: [facility] Staff    Type:  RX Refill Request    Who Called: Mr Henry states patient need refill strength change Tri Mix  Refill or New Rx: refill  RX Name and Strength: Tri Mix  How is the patient currently taking it? (ex. 1XDay):  Is this a 30 day or 90 day RX  Preferred Pharmacy with phone number:  Local or Mail Order:  Ordering Provider: Dr De La Vega  Would the patient rather a call back or a response via [facility]? call  Best Call Back Number: [phone]  Additional Information:

## 2020-02-21 NOTE — PROGRESS NOTES
Subjective:       Patient ID: Nubia Riggs is a 54 y.o. male.    Chief Complaint: Erectile Dysfunction    Nubia Riggs is a 54 y.o. male with a history of hypogonadism treated by Gildardo Andre with clomid at first.    However, his symptoms didn't improve, and he was then transitioned to IM TRT and HCG.   While on TRT he has more energy.  He had no records of what his T was before starting treatment.   He was seen in clinic with Dr. Salgado 10/05/2017.  He also c/o ED.    This has been present for > 1 year.    He's tried and failed oral meds.    He's currently using ICI with good results initially but lately mixed results.    Last clinic visit 10/21/2019 reported had used up to 100 units.  He now gets a special formula from [employer] (DS with Atropine 0.1mg/ml); .    Was seen in clinic with me 10/12/2017 for ICI education and reeducation.    Today here to discuss update with his ED treatment.   He has been going to [place] for focused shock wave therapy; he has completed 6 treatments; expected to do 6 more.  The range of treatments are 6-12 treatment. (~$350 a treatment)  Seeing improvement; he actually has been getting erections with only 20 units at times.  He is pleased with the results.    Discussed that the Urologist in [place] for the procedure interested in coming here to demonstrate.  He also inquiring about Kisspeptin, mental viagra    He has been thinking about IPP so has been doing some stretching exercises due to loss of length with IPP surgery.                             PSA                      0.17                05/25/2012                Past Medical History:  No date: ADD (attention deficit disorder)  No date: Anemia  No date: GERD (gastroesophageal reflux disease)    Past Surgical History:  No date: CERVICAL SPINE SURGERY      Comment: facit repair  No date: CHOLECYSTECTOMY  No date: implant      Comment: chin  No date: LIPOSUCTION      Comment: waiste  No date: RHINOPLASTY TIP  No date:  SPINE SURGERY      Comment: l5 s1 micro discectomy  No date: SPINE SURGERY      Comment: l5 s1 rodes placed  No date: TONSILLECTOMY    Review of patient's family history indicates:    Cancer                         Father                    Cancer                         Paternal Grandmother      Heart disease                  Paternal Grandfather      Diabetes                       Neg Hx                    Colon polyps                   Neg Hx                      Social History    Marital status:             Spouse name:                       Years of education:                 Number of children: 4             Occupational History  Occupation          Employer            Comment               Contractor          [employer]     Social History Main Topics    Smoking status: Former Smoker                                                                Packs/day: 1.00      Years: 15.00          Quit date: 4/1/2012    Smokeless tobacco: Never Used                        Alcohol use: No              Drug use: No              Sexual activity: Not on file          Other Topics            Concern    None on file    Social History Narrative    None on file        Allergies:  Meperidine and Sulfa (sulfonamide antibiotics)    Medications:  Current Outpatient Prescriptions:   albuterol (VENTOLIN HFA) 90 mcg/actuation HFAA, Inhale 2 puffs into the lungs every 4 (four) hours as needed., Disp: 1 Inhaler, Rfl: 6  alprazolam (XANAX) 0.25 MG tablet, Take 1 tablet (0.25 mg total) by mouth daily as needed., Disp: 30 tablet, Rfl: 3  ascorbic acid-multivit-min (VITAMIN C ENERGY BOOSTER) 1,000 mg PwEP, Take 1 Package by mouth once daily.  , Disp: , Rfl:   b complex vitamins capsule, Take 1 capsule by mouth once daily.  , Disp: , Rfl:   coQ10, ubiquinol, 100 mg Cap, Take 1 capsule by mouth once daily.  , Disp: , Rfl:   diphenhydrAMINE (BENADRYL) 25 mg capsule, Take 25 mg by mouth every 6 (six) hours as needed for  Itching., Disp: , Rfl:   montelukast (SINGULAIR) 10 mg tablet, Take 10 mg by mouth daily as needed., Disp: , Rfl:    zolpidem (AMBIEN) 10 mg Tab, Take 1 tablet (10 mg total) by mouth nightly as needed., Disp: 30 tablet, Rfl: 3                  Review of Systems   Constitutional: Negative for activity change, appetite change, chills and fever.   HENT: Negative for facial swelling and trouble swallowing.    Eyes: Negative for visual disturbance.   Respiratory: Negative for chest tightness and shortness of breath.    Cardiovascular: Negative for chest pain and palpitations.   Gastrointestinal: Negative.  Negative for abdominal pain, constipation, diarrhea, nausea and vomiting.   Genitourinary: Negative for difficulty urinating, dysuria, flank pain, hematuria, penile pain, penile swelling, scrotal swelling and testicular pain.        Ok with urination     Musculoskeletal: Negative for back pain, gait problem, myalgias and neck stiffness.   Skin: Negative for rash.   Neurological: Negative for dizziness and speech difficulty.   Hematological: Does not bruise/bleed easily.   Psychiatric/Behavioral: Negative for behavioral problems.       Objective:      Physical Exam   Nursing note and vitals reviewed.  Constitutional: He is oriented to person, place, and time. Vital signs are normal. He appears well-developed and well-nourished. He is active and cooperative.  Non-toxic appearance. He does not have a sickly appearance.   HENT:   Head: Normocephalic and atraumatic.   Right Ear: External ear normal.   Left Ear: External ear normal.   Nose: Nose normal.   Mouth/Throat: Mucous membranes are normal.   Eyes: Conjunctivae and lids are normal. No scleral icterus.   Neck: Trachea normal, normal range of motion and full passive range of motion without pain. Neck supple. No JVD present. No tracheal deviation present.   Cardiovascular: Normal rate, S1 normal and S2 normal.    Pulmonary/Chest: Effort normal. No respiratory distress.  He exhibits no tenderness.   Abdominal: Soft. Normal appearance. There is no hepatosplenomegaly. There is no tenderness. There is no CVA tenderness.   Genitourinary: Penis normal.   Musculoskeletal: Normal range of motion.   Neurological: He is alert and oriented to person, place, and time. He has normal strength.   Skin: Skin is warm, dry and intact.     Psychiatric: He has a normal mood and affect. His behavior is normal. Judgment and thought content normal.       Assessment:       1. Erectile dysfunction due to arterial insufficiency        Plan:         I spent 25 minutes with the patient of which more than half was spent in direct consultation with the patient in regards to our treatment and plan.    Education and recommendations of today's plan of care including home remedies.  We discussed his ED and current treatment.  Very pleased to hear about the results.  Should continue current treatments.   I said that I would have to speak with Dr. Salgado about any in service and need approval from [facility];  Will also discuss with him benefits, risks of Kisspeptin.  Will reach out once I discuss with Dr. Salgado (he out for [place] holidays).

## 2020-08-25 PROBLEM — R05.8 ALLERGIC COUGH: Status: ACTIVE | Noted: 2020-01-01

## 2020-08-25 NOTE — PROGRESS NOTES
INTERNAL MEDICINE CLINIC  TELEMEDICINE ENCOUNTER  Progress Note     PRESENTING HISTORY     PCP: Primary Doctor None    Current Chief Complaint/Problem:  Lines on head (inflammation), increase sinus infection.    The patient location is: Home  Visit type: Virtual visit with synchronous audio and video    History of Present Illness & ROS: Mr. Nubia Riggs is a 55 y.o. male.    Has seen pulmonologist, allergist (last seen few years ago at [facility]) and GI.    On Questran per GI (had c-scope)    Increased sinus drainage. Recently had steroid treatment.     Saw Dermatology (hair graft done).      Review of Systems:  Answers for HPI/ROS submitted by the patient on 8/18/2020   activity change: No  unexpected weight change: No  neck pain: No  hearing loss: No  rhinorrhea: No  trouble swallowing: No  eye discharge: No  visual disturbance: No  chest tightness: No  wheezing: No  chest pain: No  palpitations: No  blood in stool: No  constipation: No  vomiting: No  diarrhea: No  polydipsia: No  polyuria: No  difficulty urinating: No  urgency: No  hematuria: No  joint swelling: No  arthralgias: No  headaches: No  weakness: No  confusion: No  dysphoric mood: No      PAST HISTORY:     Past Medical History:   Diagnosis Date    ADD (attention deficit disorder)     Anemia     Bradycardia 3/8/2018    3/8/2018: ECG: SB 46 bpm.    GERD (gastroesophageal reflux disease)        Past Surgical History:   Procedure Laterality Date    BACK SURGERY      CERVICAL SPINE SURGERY      facit repair    CHOLECYSTECTOMY      implant      chin    LIPOSUCTION      waiste    RHINOPLASTY TIP      SPINE SURGERY      l5 s1 micro discectomy    SPINE SURGERY      l5 s1 rodes placed    TONSILLECTOMY         Family History   Problem Relation Age of Onset    Cancer Father     Cancer Paternal Grandmother     Heart disease Paternal Grandfather     Diabetes Neg Hx     Colon polyps Neg Hx        Social History     Socioeconomic History     Marital status:      Spouse name: Not on file    Number of children: 4    Years of education: Not on file    Highest education level: Not on file   Occupational History    Occupation: Contractor     Employer: [employer]    Social Needs    Financial resource strain: Not on file    Food insecurity     Worry: Not on file     Inability: Not on file    Transportation needs     Medical: Not on file     Non-medical: Not on file   Tobacco Use    Smoking status: Former Smoker     Packs/day: 1.00     Years: 15.00     Pack years: 15.00     Quit date: 2012     Years since quittin.4    Smokeless tobacco: Never Used   Substance and Sexual Activity    Alcohol use: No    Drug use: No    Sexual activity: Not on file   Lifestyle    Physical activity     Days per week: Not on file     Minutes per session: Not on file    Stress: Not on file   Relationships    Social connections     Talks on phone: Not on file     Gets together: Not on file     Attends Faith service: Not on file     Active member of club or organization: Not on file     Attends meetings of clubs or organizations: Not on file     Relationship status: Not on file   Other Topics Concern    Not on file   Social History Narrative    Not on file       MEDICATIONS & ALLERGIES:     Current Outpatient Medications on File Prior to Visit   Medication Sig Dispense Refill    anastrozole (ARIMIDEX) 1 mg Tab       ascorbic acid-multivit-min (VITAMIN C ENERGY BOOSTER) 1,000 mg PwEP Take 1 Package by mouth once daily.        b complex vitamins capsule Take 1 capsule by mouth once daily.        coQ10, ubiquinol, 100 mg Cap Take 1 capsule by mouth once daily.        diphenhydrAMINE (BENADRYL) 25 mg capsule Take 25 mg by mouth every 6 (six) hours as needed for Itching.      methylPREDNISolone (MEDROL DOSEPACK) 4 mg tablet use as directed (Patient not taking: Reported on 2020) 1 Package 0    minoxidil (LONITEN) 2.5 MG tablet        montelukast (SINGULAIR) 10 mg tablet Take 10 mg by mouth daily as needed.      papaverine 30 mg/mL injection Add: Phentolamine 2 mg  Add: PGE1 40 mcg    Sig:  Inject 25 units (0.25 mls) as directed 10 mL 11    papaverine 30 mg/mL injection Papaverine 60mg/ml  Add: Phentolamine 20 mg  Add: PGE1 200 mcg  (60-2-20) Double Strength   Add Atropine 0.1mg/ml  Sig:  Inject 20 units (0.20 mls) as directed 10 mL 11    tadalafil (CIALIS) 20 MG Tab Take 1 tablet (20 mg total) by mouth Every 3 (three) days. 10 tablet 11    tadalafil (CIALIS) 5 MG tablet Take 1 tablet (5 mg total) by mouth daily as needed for Erectile Dysfunction. 30 tablet 12    testosterone cypionate (DEPOTESTOTERONE CYPIONATE) 200 mg/mL injection        No current facility-administered medications on file prior to visit.         Review of patient's allergies indicates:   Allergen Reactions    Meperidine      Other reaction(s): violent behavior    Sulfa (sulfonamide antibiotics)      Other reaction(s): Unknown       Medications Reconciliation:   I have reconciled the patient's home medications and discharge medications with the patient/family. I have updated all changes.  Refer to After-Visit Medication List.    OBJECTIVE:     Previous Weight and BMI:  Wt Readings from Last 1 Encounters:   02/21/20 0812 83 kg (182 lb 15.7 oz)     There is no height or weight on file to calculate BMI.     Physical Exam per Video and/or Audio:  Voice: Normal speech.  General: Well developed, well nourished. No distress.  HEENT: Face is symmetric.   Eyes: Clear conjunctiva.  Lungs: Normal respiratory effort.  Psychiatric: Normal affect. Alert.    Laboratory  Lab Results   Component Value Date    WBC 4.89 01/23/2013    HGB 14.0 01/23/2013    HCT 41.4 01/23/2013     01/23/2013    CHOL 216 (H) 05/25/2012    TRIG 72 05/25/2012    HDL 41 05/25/2012    ALT 27 05/25/2012    AST 23 05/25/2012     05/25/2012    K 4.0 05/25/2012     05/25/2012    CREATININE 0.9  05/25/2012    BUN 17 05/25/2012    CO2 25 05/25/2012    TSH 1.793 05/25/2012    PSA 0.17 05/25/2012    INR 1.0 08/18/2009    HGBA1C 5.8 05/25/2012       ASSESSMENT & PLAN:     Allergic cough  -     Ambulatory referral/consult to Allergy; Future; Expected date: 09/01/2020  -     Ambulatory referral/consult to Pulmonology; Future; Expected date: 09/01/2020    - Patient was asked to bring records from previous evaluation in order to get second opinion.    If he wishes to establish care with PCP, he can request an appointment here.    Total time spent with patient: 12 min.  Each patient to whom he or she provides medical services by telemedicine is:  (1) informed of the relationship between the physician and patient and the respective role of any other health care provider with respect to management of the patient; and (2) notified that he or she may decline to receive medical services by telemedicine and may withdraw from such care at any time.    After Visit Medication List :     Medication List          Accurate as of August 25, 2020  8:16 AM. If you have any questions, ask your nurse or doctor.            CONTINUE taking these medications    anastrozole 1 mg Tab  Commonly known as: ARIMIDEX     b complex vitamins capsule     coQ10 (ubiquinol) 100 mg Cap     diphenhydrAMINE 25 mg capsule  Commonly known as: BENADRYL     methylPREDNISolone 4 mg tablet  Commonly known as: MEDROL DOSEPACK  use as directed     minoxidiL 2.5 MG tablet  Commonly known as: LONITEN     montelukast 10 mg tablet  Commonly known as: SINGULAIR     * papaverine 30 mg/mL injection  Add: Phentolamine 2 mg  Add: PGE1 40 mcg    Sig:  Inject 25 units (0.25 mls) as directed     * papaverine 30 mg/mL injection  Papaverine 60mg/ml  Add: Phentolamine 20 mg  Add: PGE1 200 mcg  (60-2-20) Double Strength   Add Atropine 0.1mg/ml  Sig:  Inject 20 units (0.20 mls) as directed     * tadalafiL 20 MG Tab  Commonly known as: CIALIS  Take 1 tablet (20 mg total) by  mouth Every 3 (three) days.     * tadalafiL 5 MG tablet  Commonly known as: CIALIS  Take 1 tablet (5 mg total) by mouth daily as needed for Erectile Dysfunction.     testosterone cypionate 200 mg/mL injection  Commonly known as: DEPOTESTOTERONE CYPIONATE     VITAMIN C ENERGY BOOSTER 1,000 mg Pwep  Generic drug: ascorbic acid-multivit-min         * This list has 4 medication(s) that are the same as other medications prescribed for you. Read the directions carefully, and ask your doctor or other care provider to review them with you.                Signing Physician:  Bebo Matamoros MD

## 2020-08-25 NOTE — TELEPHONE ENCOUNTER
Left message on patient voicemail, informing him that I'm contacting him in regards to scheduling his appointment with one of our providers. I advised patient that if he wants to schedule appointment or if he has any questions or concerns, he may contact the office. Office number has been provided.

## 2020-08-26 NOTE — TELEPHONE ENCOUNTER
Left message on patient voicemail, informing him that I'm contacting him in regards to scheduling him a appointment with one of our providers. I advised patient that if he wants to schedule appointment or if he has any questions or concerns, he may contact the office. Office number has been provided.

## 2020-08-27 NOTE — LETTER
August 28, 2020      Bebo Matamoros MD  [address]  [facility] [address]           [facility] [address]  [address]  [facility] [address]  Phone: [phone]          Patient: Nubia Riggs   MR Number: [id number]   YOB: 1965   Date of Visit: 8/27/2020       Dear Dr. Bebo Matamoros:    Thank you for referring Nubia Riggs to me for evaluation. Attached you will find relevant portions of my assessment and plan of care.    If you have questions, please do not hesitate to call me. I look forward to following Nubia Riggs along with you.    Sincerely,    Sue Arreaga, DNP    Enclosure  CC:  No Recipients    If you would like to receive this communication electronically, please contact [email or web address] or [phone] to request more information on [employer] access.    For providers and/or their staff who would like to refer a patient to [facility], please contact us through our one-stop-shop provider referral line, [facility], at [phone].    If you feel you have received this communication in error or would no longer like to receive these types of communications, please e-mail [email or web address]

## 2020-08-27 NOTE — PROGRESS NOTES
"Subjective:       Patient ID: Nubia Riggs is a 55 y.o. male.    Chief Complaint: Cough, Asthma  HPI:   Nubia Riggs is a 55 y.o. male who presents with evaluation for cough.   Hx of allergies and cough, uses Mucinex and Flonase. He has chest tightness and throat clearing if around an allergen.  For last two months it is getting worse.  Coughing is more severe in AM.  Was treated for sinus infection 2 months ago.  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 Very sensitive to odors  He finds that if he takes his antihistamines that it makes the mucus thicker.  Wakes up 3-4 times per week with symptoms  Was on nexium for reflux previously  He is heading out to [place] to as part of post-[facility] response    Does use injectable testosterone and 2.5 months ago did not aspirate prior to his injection- he had some shortness of breath after which was thought to be due to some of the diluent being injected into a vessel. Symptoms resolved and he discussed them with his prescribing provider.    Review of Systems   Constitutional: Negative for chills, activity change, fatigue and night sweats.   HENT: Positive for rhinorrhea and congestion. Negative for postnasal drip and trouble swallowing.    Eyes: Negative for itching.   Respiratory: Positive for sputum production ("like glue" at times. usually clear), chest tightness and dyspnea on extertion. Negative for cough, hemoptysis, choking, shortness of breath, wheezing and use of rescue inhaler.    Cardiovascular: Negative for chest pain and palpitations.   Genitourinary: Negative for difficulty urinating.   Endocrine: Negative for cold intolerance and heat intolerance.    Musculoskeletal: Negative for arthralgias.   Skin: Negative for rash.   Gastrointestinal: Negative for nausea, vomiting and acid " reflux.   Neurological: Negative for dizziness and light-headedness.   Hematological: Negative for adenopathy.   Psychiatric/Behavioral: Positive for sleep disturbance.         Social History     Tobacco Use    Smoking status: Former Smoker     Packs/day: 1.00     Years: 15.00     Pack years: 15.00     Quit date: 2012     Years since quittin.4    Smokeless tobacco: Never Used   Substance Use Topics    Alcohol use: No       Review of patient's allergies indicates:   Allergen Reactions    Meperidine      Other reaction(s): violent behavior    Sulfa (sulfonamide antibiotics)      Other reaction(s): Unknown     Past Medical History:   Diagnosis Date    ADD (attention deficit disorder)     Anemia     Bradycardia 3/8/2018    3/8/2018: ECG: SB 46 bpm.    GERD (gastroesophageal reflux disease)      Past Surgical History:   Procedure Laterality Date    BACK SURGERY      CERVICAL SPINE SURGERY      facit repair    CHOLECYSTECTOMY      implant      chin    LIPOSUCTION      waiste    RHINOPLASTY TIP      SPINE SURGERY      l5 s1 micro discectomy    SPINE SURGERY      l5 s1 rodes placed    TONSILLECTOMY       Current Outpatient Medications on File Prior to Visit   Medication Sig    anastrozole (ARIMIDEX) 1 mg Tab     ascorbic acid-multivit-min (VITAMIN C ENERGY BOOSTER) 1,000 mg PwEP Take 1 Package by mouth once daily.      b complex vitamins capsule Take 1 capsule by mouth once daily.      cholestyramine (QUESTRAN) 4 gram packet dissolve ONE PACKET in liquid AND TAKE BY MOUTH ONCE TO TWICE daily    coQ10, ubiquinol, 100 mg Cap Take 1 capsule by mouth once daily.      diphenhydrAMINE (BENADRYL) 25 mg capsule Take 25 mg by mouth every 6 (six) hours as needed for Itching.    methylPREDNISolone (MEDROL DOSEPACK) 4 mg tablet use as directed    minoxidil (LONITEN) 2.5 MG tablet     montelukast (SINGULAIR) 10 mg tablet Take 10 mg by mouth daily as needed.    papaverine 30 mg/mL injection Add:  Phentolamine 2 mg  Add: PGE1 40 mcg    Sig:  Inject 25 units (0.25 mls) as directed    papaverine 30 mg/mL injection Papaverine 60mg/ml  Add: Phentolamine 20 mg  Add: PGE1 200 mcg  (60-2-20) Double Strength   Add Atropine 0.1mg/ml  Sig:  Inject 20 units (0.20 mls) as directed    tadalafil (CIALIS) 20 MG Tab Take 1 tablet (20 mg total) by mouth Every 3 (three) days.    tadalafil (CIALIS) 5 MG tablet Take 1 tablet (5 mg total) by mouth daily as needed for Erectile Dysfunction.    testosterone cypionate (DEPOTESTOTERONE CYPIONATE) 200 mg/mL injection     VIIBRYD 40 mg Tab tablet     zolpidem (AMBIEN) 5 MG Tab Take 5 mg by mouth nightly.     No current facility-administered medications on file prior to visit.        Objective:      Vitals:    08/27/20 1501   BP: (!) 140/82   Pulse: 76     Physical Exam   Constitutional: He is oriented to person, place, and time. He appears well-developed and well-nourished. No distress.   HENT:   Head: Normocephalic.   Neck: Normal range of motion. Neck supple.   Cardiovascular: Normal rate and regular rhythm.   No murmur heard.  Pulmonary/Chest: Normal expansion, symmetric chest wall expansion, effort normal and breath sounds normal. No respiratory distress. He has no decreased breath sounds. He has no wheezes. He has no rhonchi. He has no rales.   Abdominal: Soft. He exhibits no distension. There is no hepatosplenomegaly. There is no abdominal tenderness.   Musculoskeletal: Normal range of motion.         General: No edema.   Lymphadenopathy:     He has no cervical adenopathy.   Neurological: He is alert and oriented to person, place, and time. Gait normal.   Skin: Skin is warm and dry. No cyanosis. Nails show no clubbing.   Psychiatric: He has a normal mood and affect.   Vitals reviewed.    Personal Diagnostic Review    Pulmonary Function Tests 8/27/2020   SpO2 98   Height 70   Weight 3054.69   BMI (Calculated) 27.4   Some recent data might be hidden         Assessment:  "    Problem List Items Addressed This Visit        Pulmonary    Allergic cough    Overview     CBC and IgE  Await PFTs  Start ICS/LABA with PRN KORINA  CXR when able         Current Assessment & Plan     Strongly suspect asthma as patient has been diagnosed with "early asthma" previously  [place] I am starting therapy prior to studies.            Other Visit Diagnoses     Shortness of breath    -  Primary    Relevant Medications    fluticasone furoate-vilanteroL (BREO ELLIPTA) 100-25 mcg/dose diskus inhaler    albuterol (VENTOLIN HFA) 90 mcg/actuation inhaler    Other Relevant Orders    IGE (Completed)    CBC auto differential (Completed)    Spirometry with/without bronchodilator    DLCO-Carbon Monoxide Diffusing Capacity    LUNG VOLUMES    X-Ray Chest PA And Lateral    COVID-19 Routine Screening    CT Chest Without Contrast    Intermittent asthma without complication, unspecified asthma severity        Relevant Medications    fluticasone furoate-vilanteroL (BREO ELLIPTA) 100-25 mcg/dose diskus inhaler    albuterol (VENTOLIN HFA) 90 mcg/actuation inhaler    Other Relevant Orders    IGE (Completed)            "

## 2020-08-28 NOTE — ASSESSMENT & PLAN NOTE
"Strongly suspect asthma as patient has been diagnosed with "early asthma" previously  Since [place] is [place] a [place] zone I am starting therapy prior to studies.   "

## 2020-09-22 NOTE — PROGRESS NOTES
Subjective:       Patient ID: Nubia Riggs is a 55 y.o. male.    Chief Complaint:  Allergies (itchy eyes, fluid in ears), Sinus Problem (recurrent sinus infections), and Cough (SOB)      56 yo man presents for consult from Dr Jim Matamoros for cough. He states for 7-8 years he has had chronic cough. At first was mucus in chest, very thick and would cough until produce mucus. Not really SOB, cold still exercise. Had PFT normal, and saw pulm at  and normal. Saw allergies outside and then Dr Fernandez in 2016. Immunocaps negative, skin test all negative. He had immune labs normal except no protected to strep pneumoniae so was given pneumovax but never had repeat tiers done. He was getting constant sinus infections, on lots of antibiotics and steroid shots. That part has improved. He was on Mucinex and neti pot. In last few months he has less mucus, was more chest congested and cough. He was on montelukast and Breo but not sure helped [facility]. Saw pulmonologist and had complete PFT. After albuterol neb he felt great. Pulm prescribed this and he has used 3 times in last month and resolves chest congestion and cough. Still has some itchy eyes, itchy skin, ear fullness like fluid. Had recent sinus infection.       Environmental History: see history section for home environment  Review of Systems   Constitutional: Negative for activity change, appetite change, chills, fatigue, fever and unexpected weight change.   HENT: Positive for congestion, ear pain, postnasal drip and sinus pressure. Negative for ear discharge, facial swelling, hearing loss, mouth sores, nosebleeds, rhinorrhea, sneezing, sore throat, tinnitus, trouble swallowing and voice change.    Eyes: Positive for discharge and itching. Negative for redness and visual disturbance.   Respiratory: Positive for cough. Negative for chest tightness, shortness of breath and wheezing.    Cardiovascular: Negative for chest pain, palpitations and leg swelling.    Gastrointestinal: Negative for abdominal distention, abdominal pain, constipation, diarrhea, nausea and vomiting.   Genitourinary: Negative for difficulty urinating.   Musculoskeletal: Negative for arthralgias, back pain, joint swelling and myalgias.   Skin: Negative for color change, pallor and rash.   Neurological: Negative for dizziness, tremors, speech difficulty, weakness, light-headedness and headaches.   Hematological: Negative for adenopathy. Does not bruise/bleed easily.   Psychiatric/Behavioral: Negative for agitation, confusion, decreased concentration and sleep disturbance. The patient is not nervous/anxious.         Objective:      Physical Exam  Vitals signs and nursing note reviewed.   Constitutional:       General: He is not in acute distress.     Appearance: He is well-developed.   HENT:      Head: Normocephalic and atraumatic.      Right Ear: Hearing, tympanic membrane, ear canal and external ear normal.      Left Ear: Hearing, tympanic membrane, ear canal and external ear normal.      Nose: No septal deviation, mucosal edema (pink turbinates) or rhinorrhea.      Mouth/Throat:      Pharynx: No uvula swelling.   Eyes:      General:         Right eye: No discharge.         Left eye: No discharge.      Conjunctiva/sclera: Conjunctivae normal.   Neck:      Musculoskeletal: Normal range of motion.      Thyroid: No thyromegaly.   Cardiovascular:      Rate and Rhythm: Normal rate and regular rhythm.      Heart sounds: Normal heart sounds. No murmur.   Pulmonary:      Effort: Pulmonary effort is normal. No respiratory distress.      Breath sounds: Normal breath sounds. No wheezing.   Abdominal:      General: There is no distension.      Palpations: Abdomen is soft.      Tenderness: There is no abdominal tenderness.   Musculoskeletal: Normal range of motion.   Lymphadenopathy:      Cervical: No cervical adenopathy.   Skin:     General: Skin is warm and dry.      Findings: No erythema or rash.    Neurological:      Mental Status: He is alert and oriented to person, place, and time.      Coordination: Coordination normal.   Psychiatric:         Behavior: Behavior normal.         Thought Content: Thought content normal.         Judgment: Judgment normal.         Laboratory:   none performed   Assessment:       1. Recurrent sinus infections    2. Chronic cough    3. Chronic rhinitis         Plan:       1. advised pt chronic cough can be allergic rhinitis vs asthma vs reflux vs sinus issue. He has had negative skin test, normal PFT and scans. symptoms most consistent with RAD, can continue albuterol neb as needed, if more then 2 times per week then may papito to resume Breo  2. For rhinitis azelastine nose spray 2 SEN BID as needed and azelastine eye drop BID as needed  3. Labs to repeat immune check  4. Phone review  5. Dr Matamoros notified of completed consult via [facility]

## 2020-09-22 NOTE — LETTER
September 22, 2020      Bebo Matamoros MD  [address]  [facility] [address]           [facility]  [address]  [place] [address]  Phone: [phone]          Patient: Nubia Riggs   MR Number: [id number]   YOB: 1965   Date of Visit: 9/22/2020       Dear Dr. Bebo Matamoros:    Thank you for referring Nubia Riggs to me for evaluation. Attached you will find relevant portions of my assessment and plan of care.    If you have questions, please do not hesitate to call me. I look forward to following Nubia Riggs along with you.    Sincerely,    Nasreen Mcgarry MD    Enclosure  CC:  No Recipients    If you would like to receive this communication electronically, please contact [email or web address] or [phone] to request more information on [facility] [facility] Link access.    For providers and/or their staff who would like to refer a patient to [facility], please contact us through our one-stop-shop provider referral line, [facility], at [phone].    If you feel you have received this communication in error or would no longer like to receive these types of communications, please e-mail [email or web address]

## 2020-10-06 NOTE — PROGRESS NOTES
"  Subjective:      Nubia Riggs is a 55 y.o. male who was self-referred for ear pain.    Mr. Riggs reports right sided ear pain for the past 5 days that radiate down his jaw line and neck. He feels he has a swollen lymph node along right side of neck. He was evaluated by his dentist who noted a "cracked" tooth for which he is planning on seeing an orthodontist. He was also started on oral Amoxicillin and prednisone (still have 4 days left).     He reports a long history allergies and cough. He reports having ongoing sinus pressure and drainage with associated post-nasal drip and chest congestion. He is currently being managed by pulmonology for his cough and chest congestion. He feels his cough is related to his sinuses as he reports limited improvement with nebulizer medications.  He is also seeing Allergy. He has a history of UPPP with GNO snoring to help manage his RONALD.       Past Medical History  He has a past medical history of ADD (attention deficit disorder), Anemia, Bradycardia, and GERD (gastroesophageal reflux disease).    Past Surgical History  He has a past surgical history that includes Cholecystectomy; Rhinoplasty tip; Liposuction; implant; Tonsillectomy; Spine surgery; Spine surgery; Cervical spine surgery; and Back surgery.    Family History  His family history includes Cancer in his father and paternal grandmother; Heart disease in his paternal grandfather.    Social History  He reports that he quit smoking about 8 years ago. He has a 15.00 pack-year smoking history. He has never used smokeless tobacco. He reports that he does not drink alcohol or use drugs.    Allergies  He is allergic to meperidine and sulfa (sulfonamide antibiotics).    Medications  He has a current medication list which includes the following prescription(s): albuterol, albuterol, anastrozole, azelastine, azelastine, cholestyramine, coq10 (ubiquinol), breo ellipta, minoxidil, papaverine, papaverine, tadalafil, " testosterone cypionate, viibryd, zolpidem, montelukast, and tadalafil.    Review of Systems   Constitutional: Negative for chills, fatigue and fever.   HENT: Positive for ear pain, hearing loss and sinus pressure. Negative for congestion, facial swelling, nosebleeds, postnasal drip, rhinorrhea, sinus pain, sneezing, sore throat and tinnitus.    Eyes: Positive for itching. Negative for photophobia, redness and visual disturbance.   Respiratory: Positive for shortness of breath. Negative for apnea, cough, wheezing and stridor.    Cardiovascular: Negative.  Negative for chest pain and palpitations.   Gastrointestinal: Negative.  Negative for diarrhea, nausea and vomiting.   Endocrine: Negative.    Genitourinary: Negative.  Negative for decreased urine volume, dysuria and frequency.   Musculoskeletal: Negative.  Negative for arthralgias, myalgias and neck stiffness.   Skin: Negative.  Negative for rash and wound.   Allergic/Immunologic: Negative.  Negative for environmental allergies, food allergies and immunocompromised state.   Neurological: Negative.  Negative for dizziness, syncope, weakness, light-headedness and headaches.   Hematological: Negative for adenopathy. Does not bruise/bleed easily.   Psychiatric/Behavioral: Negative.  Negative for confusion, decreased concentration and sleep disturbance.          Objective:     BP (!) 154/76   Pulse 97      Constitutional:   He is oriented to person, place, and time. Vital signs are normal. He appears well-developed and well-nourished. He appears alert. Normal speech.      Head:  Normocephalic and atraumatic.     Ears:    Right Ear: No lacerations. No drainage, swelling or tenderness. No foreign bodies. No mastoid tenderness. Tympanic membrane is injected. Tympanic membrane is not scarred, not perforated, not erythematous, not retracted and not bulging. Tympanic membrane mobility is normal. No middle ear effusion. No hemotympanum.   Left Ear: No lacerations. No  drainage, swelling or tenderness. No foreign bodies. No mastoid tenderness. Tympanic membrane is injected. Tympanic membrane is not scarred, not perforated, not erythematous, not retracted and not bulging. Tympanic membrane mobility is normal.  No middle ear effusion. No hemotympanum.   Ears:      Nose:  No mucosal edema, rhinorrhea, nose lacerations, sinus tenderness, septal deviation, nasal septal hematoma or polyps. No epistaxis.  No foreign bodies. No turbinate hypertrophy.  Right sinus exhibits no maxillary sinus tenderness and no frontal sinus tenderness. Left sinus exhibits no maxillary sinus tenderness and no frontal sinus tenderness.     Mouth/Throat  Oropharynx clear and moist without lesions or asymmetry, normal uvula midline and lips, teeth, and gums normal. No uvula swelling, oral lesions, trismus, mucous membrane lesions or xerostomia. No oropharyngeal exudate or posterior oropharyngeal erythema.     Neck:  Neck normal without thyromegaly masses, asymmetry, normal tracheal structure, crepitus, and tenderness and no adenopathy.     Psychiatric:   He has a normal mood and affect. His speech is normal and behavior is normal.     Neurological:   He is alert and oriented to person, place, and time. No cranial nerve deficit.     Skin:   No abrasions, lacerations, lesions, or rashes.       Procedure    Cerumen removal performed.  See procedure note.      Data Reviewed    WBC (K/uL)   Date Value   08/27/2020 8.08     Platelets (K/uL)   Date Value   08/27/2020 280      Creatinine (mg/dl)   Date Value   05/25/2012 0.9     TSH (uIU/ml)   Date Value   05/25/2012 1.793     Glucose (mg/dl)   Date Value   05/25/2012 102     Hemoglobin A1C (%)   Date Value   05/25/2012 5.8          Assessment:     1. Bilateral impacted cerumen    2. Chronic cough    3. Sinus pressure         Plan:     I had a long discussion with the patient regarding his condition and the further workup and management options.    Nubia was seen  today for otalgia and neck pain.    Diagnoses and all orders for this visit:    Bilateral impacted cerumen  -     Ear Cerumen Removal    Chronic cough    Sinus pressure    if cough and sinus pressure persist after completion of oral amoxicillin and prednisone. Will consider CT sinus imaging.     Follow up in about 2 weeks (around 10/20/2020).

## 2020-10-06 NOTE — PROGRESS NOTES
"Patient was on the schedule to receive Prevnar. Patient did not get the injection. Patient is currently on antibiotics to treat " swollen nodes." The antibiotics were prescribed by his ENT.  Per Dr. Mcgarry, " patient would probably not respond well to the immunizations if given today while on antiboditics ." Patient will send a portal message to reschedule the injection when he is no longer on antibiotics. -JAMIL batista     "

## 2020-10-06 NOTE — PROCEDURES
Ear Cerumen Removal    Date/Time: 10/6/2020 3:00 PM  Performed by: Liane Shea NP  Authorized by: Liane Shea NP     Consent Done?:  Yes (Verbal)  Location details:  Both ears  Procedure type: curette    Cerumen  Removal Results:  Cerumen completely removed  Patient tolerance:  Patient tolerated the procedure well with no immediate complications     Procedure Note:    The patient was brought to the minor procedure room and placed under the operating microscope of the left ear canal which was cleaned of ceruminous debris. Using a combination of suction, curettes and cup forceps the patient's cerumen impaction was removed. The tympanic membrane was evaluated and was unremarkable. The patient tolerated the procedure well. There were no complications.  Procedure Note:    Patient was brought to the minor procedure room and using the operating microscope of the right ear canal which was cleaned of ceruminous debris. There was a significant cerumen impaction.  Using a combination of suction, curettes and cup forceps the patient's cerumen impaction was removed. Tympanic membrane intact. Pt tolerated well. There were no complications.

## 2020-10-06 NOTE — TELEPHONE ENCOUNTER
VM left informing pt he scheduled an appt with the wrong provider to be seen for ear symptoms. Informed pt I was able to rescheduled the appt with the ENT clinic for 3pm today.

## 2020-10-15 NOTE — PROGRESS NOTES
"Subjective:       Patient ID: Nubia Riggs is a 55 y.o. male.    Vitals:  height is 5' 10" (1.778 m) and weight is 87.1 kg (192 lb). His skin temperature is 97.9 °F (36.6 °C). His blood pressure is 135/82 and his pulse is 87. His respiration is 16 and oxygen saturation is 97%.     Chief Complaint: Loss of Consciousness, Dizziness, and URI    PT c/o sweats, chills, fatigue, syncope, dizziness, chest tightness, cough  and chest congestion  Pt states he was walking out of his bathroom passed out and woke up 1.5-2 hrs later ( 2 am Wednesday morning)     Provider Note Begins Below:  Patient presents to  with c/o chills, fatigue, dizziness, chest tightness, cough, chest congestion and a syncopal episode.  Patient has a history of asthma and is being seen by pulmonology.  Patient states that had a fever and believes that he was dehydrated which contributed to his syncopal episode.  Patient denies hitting his head.  Patient denies any sick contacts that he is aware.    Loss of Consciousness  This is a new problem. The current episode started in the past 7 days. The problem occurs constantly. The problem has been gradually improving. Exacerbated by: walking out of the bathroom  Associated symptoms include diaphoresis and dizziness. Pertinent negatives include no chest pain, fever, headaches, light-headedness, nausea, vertigo or vomiting. There is no history of a clotting disorder.   Dizziness:   Chronicity:  New  Onset:  In the past 7 days  Progression since onset:  Unchanged  Frequency - weeks/days included: Tuesday night.  Severity:  Mild  Dizziness characteristics:  Blacking out   Associated symptoms: diaphoresis and syncope.no ear pain, no fever, no headaches, no nausea, no vomiting, no light-headedness and no chest pain.  URI   This is a new problem. The current episode started in the past 7 days. The problem has been gradually improving. Associated symptoms include congestion (chest congestion ) and coughing. " Pertinent negatives include no chest pain, diarrhea, dysuria, ear pain, headaches, nausea, rash, sinus pain, sore throat, vomiting or wheezing. Treatments tried: sudafed, aleeve, mucinex        Constitution: Positive for chills, sweating and fatigue. Negative for fever.   HENT: Positive for congestion (chest congestion ). Negative for ear pain, sinus pain, sinus pressure, sore throat and voice change.    Neck: Negative for painful lymph nodes.   Cardiovascular: Positive for passing out. Negative for chest pain and leg swelling.   Eyes: Negative for eye redness, double vision and blurred vision.   Respiratory: Positive for chest tightness and cough. Negative for sputum production, bloody sputum, COPD, shortness of breath, stridor, wheezing and asthma.    Gastrointestinal: Negative for nausea, vomiting and diarrhea.   Genitourinary: Negative for dysuria, frequency and urgency.   Musculoskeletal: Negative for joint pain, joint swelling, muscle cramps and muscle ache.   Skin: Negative for color change, pale and rash.   Allergic/Immunologic: Negative for seasonal allergies and asthma.   Neurological: Positive for dizziness and loss of consciousness. Negative for history of vertigo, light-headedness, passing out and headaches.   Hematologic/Lymphatic: Negative for swollen lymph nodes, easy bruising/bleeding and history of blood clots. Does not bruise/bleed easily.   Psychiatric/Behavioral: Negative for nervous/anxious, sleep disturbance and depression. The patient is not nervous/anxious.        Objective:      Physical Exam   Constitutional: He is oriented to person, place, and time. He appears well-developed. He is cooperative.  Non-toxic appearance. He does not appear ill. No distress.   HENT:   Head: Normocephalic and atraumatic.   Ears:   Right Ear: Hearing, tympanic membrane, external ear and ear canal normal. Tympanic membrane is not erythematous, not retracted and not bulging. impacted cerumen  Left Ear: Hearing,  tympanic membrane, external ear and ear canal normal. Tympanic membrane is not erythematous, not retracted and not bulging. impacted cerumen  Nose: Nose normal. No mucosal edema, rhinorrhea, nasal deformity or congestion. No epistaxis. Right sinus exhibits no maxillary sinus tenderness and no frontal sinus tenderness. Left sinus exhibits no maxillary sinus tenderness and no frontal sinus tenderness.   Mouth/Throat: Uvula is midline, oropharynx is clear and moist and mucous membranes are normal. No trismus in the jaw. Normal dentition. No uvula swelling. No oropharyngeal exudate, posterior oropharyngeal edema or posterior oropharyngeal erythema.   Eyes: Conjunctivae and lids are normal. Right eye exhibits no discharge. No scleral icterus.   Neck: Trachea normal, full passive range of motion without pain and phonation normal. Neck supple. No neck rigidity. No edema and no erythema present.   Cardiovascular: Normal rate, regular rhythm, normal heart sounds and normal pulses. Exam reveals no gallop and no friction rub.   No murmur heard.  Pulmonary/Chest: Effort normal and breath sounds normal. No stridor. No respiratory distress. He has no decreased breath sounds. He has no wheezes. He has no rhonchi. He has no rales.   Breath sounds are vesicular and auscultated in all lung fields.  Clear to auscultation bilaterally.    Comments: Breath sounds are vesicular and auscultated in all lung fields.  Clear to auscultation bilaterally.    Abdominal: Normal appearance.   Musculoskeletal: Normal range of motion.         General: No deformity.   Lymphadenopathy:        Head (right side): No submandibular and no tonsillar adenopathy present.        Head (left side): No submandibular and no tonsillar adenopathy present.     He has no cervical adenopathy.        Right cervical: No superficial cervical and no posterior cervical adenopathy present.       Left cervical: No superficial cervical and no posterior cervical adenopathy  present.   Neurological: He is alert and oriented to person, place, and time. He exhibits normal muscle tone. Coordination normal.   Skin: Skin is warm, dry, intact, not diaphoretic and not pale. Psychiatric: His speech is normal and behavior is normal. Judgment and thought content normal.   Nursing note and vitals reviewed.        Results for orders placed or performed in visit on 10/15/20   POCT COVID-19 Rapid Screening   Result Value Ref Range    POC Rapid COVID Positive (A) Negative     Acceptable Yes        Assessment:       1. COVID-19    2. Cough        Plan:     vitals and cardiopulmonary exam within normal limits.  Recommended chest x-ray and EKG for further evaluation of patient's complaints however patient declined at this time and states he just recently had a workup by his pulmonologist.  Discussed ER precautions should symptomatology progress and worsen.  Patient verbalized understanding and all of his questions were answered.    Covid-19 molecular testing done today and is positive at this time. Reviewed results with patient. This is a viral infection and will require no antibiotics at this time for treatment. Discussed to quarantine for 10 days from symptom onset and the last 3 days must be fever free without fever reducing medicines before resuming normal daily activities. Discussed ER precautions with patient and should they develop any chest pain or shortness of breath that does not resolve with rest, they should go to the emergency room.     Rest and fluids are important.  Please take tylenol for help with fever, pain.  Mucinex can help with chest congestion.  Tessalon perrles can be used as directed as needed to help with cough.  Albuterol inhaler can be used as directed needed for wheezing as we have discussed.    If you develop worsening symptoms, especially shortness of breath or difficulty breathing, please go to the ED for further evaluation.      COVID-19    Cough  -      POCT COVID-19 Rapid Screening    Please follow up with your Primary care provider within 2-5 days if your signs and symptoms have not resolved or worsen.     If your condition worsens or fails to improve we recommend that you receive another evaluation at the emergency room immediately or contact your primary medical clinic to discuss your concerns.   You must understand that you have received an Urgent Care treatment only and that you may be released before all of your medical problems are known or treated. You, the patient, will arrange for follow up care as instructed.     RED FLAGS/WARNING SYMPTOMS DISCUSSED WITH PATIENT THAT WOULD WARRANT EMERGENT MEDICAL ATTENTION. PATIENT VERBALIZED UNDERSTANDING.

## 2020-10-16 NOTE — TELEPHONE ENCOUNTER
Spoke with pt . He said his Covid test came back Positive . He had fever today 100.5-101 . He took a shower and felt better . For his H/a he took aleive . He wanted to know what else he could take or do . Informed pt to self quarantine for 14 days .If his symtoms worsen call the Online Nurse . Verbalized understanding.

## 2020-10-16 NOTE — TELEPHONE ENCOUNTER
"    Reason for Disposition   [1] SEVERE headache (e.g., excruciating) AND [2] "worst headache" of life    Additional Information   Negative: Difficult to awaken or acting confused (e.g., disoriented, slurred speech)   Negative: [1] Weakness of the face, arm or leg on one side of the body AND [2] new onset   Negative: [1] Numbness of the face, arm or leg on one side of the body AND [2] new onset   Negative: [1] Loss of speech or garbled speech AND [2] new onset   Negative: Passed out (i.e., lost consciousness, collapsed and was not responding)   Negative: Sounds like a life-threatening emergency to the triager   Negative: Unable to walk, or can only walk with assistance (e.g., requires support)   Negative: Stiff neck (can't touch chin to chest)   Negative: Severe pain in one eye   Negative: [1] Other family members (or roommates) with headaches AND [2] possibility of carbon monoxide exposure    Protocols used: HEADACHE-A-AH    Patient has CVG (Cutis verticis gyrata) and the top of his head is throbbing with pain. 9/10 pain intensity. Patient has been taking alleve every 2-3 hours and Sudafed. He states he gets relief if he compresses the carotid artery or lie down. Patient had covid 19 diagnosis recently and he was advised to go to the ED now.   "

## 2020-10-17 NOTE — TELEPHONE ENCOUNTER
Pulse ox order placed at request of COVID-19 surveillance team for possible faulty personal oximeter.

## 2020-10-17 NOTE — TELEPHONE ENCOUNTER
Contacted pt for enrollment in Covid Surveillance program. Pt verified name and . Pt has MyChart and able to get consent submitted. Pt has his own pulse ox. Another was ordered for him that he can  on Monday. Gave number to pharmacy. Verified contact and emergency contact info. Went over surveillance program. Pt able to take VS and symptoms but tasks will not show up until tomorrow for pt to input them so took them over the phone. All VS and symptoms WNL so no further triage required at this time. Pt did admit to worsening cough but states it is when he is doing activity. Advised pt to continue taking meds as prescribed by  today. Pt can also take Mucinex and cough meds like Robitussin if he is having coughing spells or it interferes with daily activities. Advised pt to drink plenty of fluids and eat healthy well balanced meals. Advised to do deep breathing exercises throughout the day. Advised if SpO2 ever falls between 90-92 and does not increase after a few deep breaths to contact OOC. If it ever falls below 90 to call 911 or get to nearest ED. Gave number to [facility] if symptoms worsen or any further concerns. Pt has no further concerns at this time. Will continue to monitor.    Sp02: 96    Pulse: 84     Temperature: 99.7    SOB at rest (0-5): 1    SOB with activity (0-5): 2    Worsening symptoms: worsening cough when doing activity    Fever symptoms: no    Increased home oxygen: not on home O2    Called patient to review COVID-19 Surveillance Program enrollment process.    Smartphone: yes    [facility]: yes    Program consent: yes    Pulse oximeter status: yes    Verified emergency contacts: yes    Program Overview: Reviewed , no response process, and importance of correct emergency contacts in event that well-being check is warranted. Encouraged patient to call [phone]  to speak with an OnCall nurse, if needed.    Patient had no further questions.          Reason for  Disposition   Health Information question, no triage required and triager able to answer question    Protocols used: INFORMATION ONLY CALL - NO TRIAGE-A-AH

## 2020-10-17 NOTE — PATIENT INSTRUCTIONS
Treating Pneumonia  Pneumonia is an infection of one or both of the lungs. Pneumonia:  · Is usually caused by either a virus or a bacteria  · Can be very serious, especially in infants, young children, and older adults. Its also serious for those with other long-term health problems or weakened immune systems.  · Is sometimes treated at home and sometimes in the hospital    Antibiotic medicines  Antibiotics may be prescribed for pneumonia caused by bacteria. They may be pills (oral medicines), or shots (injections). Or they may be given by IV (intravenously) into a vein. If you are taking oral medicines at home:  · Fill your prescription and start taking your medicine as soon as you can.  · You will likely start to feel better in a day or 2, but dont stop taking the antibiotic.  · Use a pill organizer to help you remember to take your medicine.  · Let your healthcare provider know if you have side effects.  · Take your medicine exactly as directed on the label. Talk to your provider or pharmacist if you have any questions.  Antiviral medicines  Antiviral medicine may be prescribed for pneumonia caused by a virus. For example, antiviral medicine may be prescribed for pneumonia caused by the flu virus. Antibiotics do not work against viruses. If you are taking antiviral medicine at home:  · Fill your prescription and start taking your medicine as soon as you can.  · Talk with your provider or pharmacist about possible side effects.  · Take the medicine exactly as instructed.  To relieve symptoms  There are many medicines that can help relieve symptoms of pneumonia. Some are prescription and some are over-the-counter.  Your healthcare provider may recommend:  · Acetaminophen or ibuprofen to lower your fever and to lessen headache or other pain  · Cough medicine to loosen mucus or to reduce coughing  Make sure you check with your healthcare provider or pharmacist before taking any over-the-counter  medicines.  Special treatments  If you are hospitalized for pneumonia, you may have other therapies, including:  · Inhaled medicines to help with breathing or chest congestion  · Supplemental oxygen to increase low oxygen levels  Drink fluids and eat healthy  You should eat healthy to help your body fight the infection. Drinking a lot of fluids helps to replace fluids lost from fever and to loosen mucus in your chest.  · Diet. Make healthy food choices, including fruits and vegetables, lean meats and other proteins, 100% whole grain and low- or no-fat dairy products.  · Fluids. Drink at least 6 to 8 tall glasses a day. Water and 100% fruit or vegetable juice are best.  Get plenty of rest and sleep  You may be more tired than usual for a while. It is important to get enough sleep at night. Its also important to rest during the day. Talk with your healthcare provider if coughing or other symptoms are interfering with your sleep.  Preventing the spread of germs  The best thing you can do to prevent spreading germs is to wash your hands often. You should:  · Rub your hand with soap and water for 20 to 30 seconds.  · Clean in between your fingers, the backs of your hands, and around your nails.  · Dry your hands on a separate towel or use paper towels.  You should also:  · Keep alcohol-based hand  nearby.  · Make sure you also clean surfaces that you touch. Use a product that kills all types of germs.  · Stay away from others until you are feeling better.  When to call your healthcare provider  Call your healthcare provider if you have any of the following:  · Symptoms get worse  · Fever continues  · Shortness of breath gets worse  · Increased mucus or mucus that is darker in color  · Coughing gets worse  · Lips or fingers are bluish in color  · Side effects from your medicine   Date Last Reviewed: 12/1/2016  © 8202-8542 [employer]. [address] [place] [address], [place] [address]. All rights reserved.  This information is not intended as a substitute for professional medical care. Always follow your healthcare professional's instructions.

## 2020-10-18 PROBLEM — D69.6 THROMBOCYTOPENIA: Status: ACTIVE | Noted: 2020-01-01

## 2020-10-18 PROBLEM — J12.82 PNEUMONIA DUE TO COVID-19 VIRUS: Status: ACTIVE | Noted: 2020-01-01

## 2020-10-18 PROBLEM — U07.1 COVID-19: Status: ACTIVE | Noted: 2020-01-01

## 2020-10-18 PROBLEM — U07.1 PNEUMONIA DUE TO COVID-19 VIRUS: Status: ACTIVE | Noted: 2020-01-01

## 2020-10-18 PROBLEM — J96.01 ACUTE HYPOXEMIC RESPIRATORY FAILURE: Status: ACTIVE | Noted: 2020-01-01

## 2020-10-18 NOTE — ED PROVIDER NOTES
Encounter Date: 10/18/2020       History     Chief Complaint   Patient presents with    Covid Positive     shortness of breath     55-year-old male with past medical history of GERD, ADD presents the emergency department with chief complaint of shortness of breath.  Diagnosed with COVID-19 on 10/15.  Reports headache, fever (T-max 100.6°), productive cough, shortness of breath, diarrhea. Denies CP, abdominal pain, nausea, vomiting,dysuria. Reports presenting to urgent care yesterday due 89% pulse ox on room air. Started on levofloxacin and discharged home. Reports taking a dose yesterday and today. Reports using albuterol treatments at home without much relief. Denies other worsening or alleviating factors. This is the extent of the patient's complaints.         Review of patient's allergies indicates:   Allergen Reactions    Meperidine      Other reaction(s): violent behavior    Sulfa (sulfonamide antibiotics)      Other reaction(s): Unknown     Past Medical History:   Diagnosis Date    ADD (attention deficit disorder)     Anemia     Bradycardia 3/8/2018    3/8/2018: ECG: SB 46 bpm.    GERD (gastroesophageal reflux disease)      Past Surgical History:   Procedure Laterality Date    BACK SURGERY      CERVICAL SPINE SURGERY      facit repair    CHOLECYSTECTOMY      implant      chin    LIPOSUCTION      waiste    RHINOPLASTY TIP      SPINE SURGERY      l5 s1 micro discectomy    SPINE SURGERY      l5 s1 rodes placed    TONSILLECTOMY       Family History   Problem Relation Age of Onset    Cancer Father     Cancer Paternal Grandmother     Heart disease Paternal Grandfather     Diabetes Neg Hx     Colon polyps Neg Hx     Allergic rhinitis Neg Hx     Allergies Neg Hx     Angioedema Neg Hx     Asthma Neg Hx     Atopy Neg Hx     Rhinitis Neg Hx     Urticaria Neg Hx     Eczema Neg Hx     Immunodeficiency Neg Hx      Social History     Tobacco Use    Smoking status: Former Smoker     Packs/day:  1.00     Years: 15.00     Pack years: 15.00     Quit date: 2012     Years since quittin.5    Smokeless tobacco: Never Used   Substance Use Topics    Alcohol use: No    Drug use: No     Review of Systems   Constitutional: Negative for fever.   HENT: Negative for congestion and sore throat.    Respiratory: Positive for cough and shortness of breath.    Cardiovascular: Negative for chest pain.   Gastrointestinal: Positive for diarrhea. Negative for nausea.   Genitourinary: Negative for dysuria.   Musculoskeletal: Negative for back pain.   Skin: Negative for rash.   Neurological: Positive for headaches. Negative for weakness.   Hematological: Does not bruise/bleed easily.       Physical Exam     Initial Vitals [10/18/20 0725]   BP Pulse Resp Temp SpO2   (!) 155/75 81 (!) 24 98.3 °F (36.8 °C) (!) 89 %      MAP       --         Physical Exam    Nursing note and vitals reviewed.  Constitutional: He appears well-developed and well-nourished. He is not diaphoretic. No distress.   HENT:   Head: Normocephalic and atraumatic.   Mouth/Throat: Oropharynx is clear and moist.   Eyes: Conjunctivae and EOM are normal. Pupils are equal, round, and reactive to light.   Neck: Normal range of motion. Neck supple.   Cardiovascular: Normal rate, regular rhythm, normal heart sounds and intact distal pulses. Exam reveals no gallop and no friction rub.    No murmur heard.  Pulmonary/Chest: Tachypnea noted. He has no wheezes. He has rhonchi. He has no rales.   Abdominal: Soft. Bowel sounds are normal. There is no abdominal tenderness. There is no rebound and no guarding.   Musculoskeletal: Normal range of motion.   Neurological: He is alert and oriented to person, place, and time. He has normal strength. GCS score is 15. GCS eye subscore is 4. GCS verbal subscore is 5. GCS motor subscore is 6.   Skin: Skin is warm and dry. Capillary refill takes less than 2 seconds.   Psychiatric: He has a normal mood and affect. His behavior is  normal. Judgment and thought content normal.         ED Course   Procedures  Labs Reviewed   CBC W/ AUTO DIFFERENTIAL - Abnormal; Notable for the following components:       Result Value    Mean Corpuscular Hemoglobin 32.1 (*)     Platelets 119 (*)     Lymph # 0.5 (*)     Gran% 81.1 (*)     Lymph% 10.7 (*)     All other components within normal limits   COMPREHENSIVE METABOLIC PANEL - Abnormal; Notable for the following components:    Calcium 8.0 (*)     Albumin 3.0 (*)     Alkaline Phosphatase 36 (*)     AST 47 (*)     All other components within normal limits   C-REACTIVE PROTEIN - Abnormal; Notable for the following components:    CRP 56.2 (*)     All other components within normal limits   FERRITIN - Abnormal; Notable for the following components:    Ferritin 460 (*)     All other components within normal limits   CK - Abnormal; Notable for the following components:     (*)     All other components within normal limits   LACTATE DEHYDROGENASE - Abnormal; Notable for the following components:     (*)     All other components within normal limits   CULTURE, BLOOD   CULTURE, BLOOD   INFLUENZA A & B BY MOLECULAR   LACTIC ACID, PLASMA   TROPONIN I   PROCALCITONIN   VITAMIN D   B-TYPE NATRIURETIC PEPTIDE   HEMOGLOBIN A1C   D DIMER, QUANTITATIVE   PROCALCITONIN   VITAMIN D   POCT GLUCOSE, HAND-HELD DEVICE     EKG Readings: (Independently Interpreted)   Initial Reading: No STEMI. Rhythm: Normal Sinus Rhythm. Heart Rate: 75. Conduction: RBBB.     ECG Results          EKG 12-lead (In process)  Result time 10/18/20 08:49:18    In process by Interface, Lab In [facility] (10/18/20 08:49:18)                 Narrative:    Test Reason : Z20.828,    Vent. Rate : 075 BPM     Atrial Rate : 075 BPM     P-R Int : 160 ms          QRS Dur : 144 ms      QT Int : 406 ms       P-R-T Axes : 058 268 033 degrees     QTc Int : 453 ms    Normal sinus rhythm  Right atrial enlargement  Right bundle branch block  Abnormal ECG  When  compared with ECG of 17-NOV-2011 13:02,  Right bundle branch block is now Present    Referred By: [email or web address]   SELF           Confirmed By:                             Imaging Results          X-Ray Chest AP Portable (Final result)  Result time 10/18/20 08:39:58    Final result by Cecilio Westfall Jr., MD (10/18/20 08:39:58)                 Impression:      Increase in the parenchymal infiltrates bilaterally consistent with history of COVID-19 infection      Electronically signed by: Cecilio Westfall MD  Date:    10/18/2020  Time:    08:39             Narrative:    EXAMINATION:  XR CHEST AP PORTABLE    CLINICAL HISTORY:  Suspected Covid-19 Virus Infection;    TECHNIQUE:  Single frontal view of the chest was performed.    COMPARISON:  October 17, 2020.    FINDINGS:  Monitoring leads are in place.  Heart size pulmonary vessels are similar.  There are increasing patchy parenchymal infiltrates bilaterally.                              X-Rays:   Independently Interpreted Readings:   Chest X-Ray: Normal heart size. Bilateral infiltrates appearing more pronounced when compared to CXR performed yesterday.      Medical Decision Making:   History:   Old Medical Records: I decided to obtain old medical records.  Initial Assessment:   Emergent evaluation of a 55-year-old male who presents the emergency department with complaints of shortness of breath.  Diagnosed with COVID on 10/15.  Started on levofloxacin yesterday.  Patient is afebrile, tachypneic, hypoxic, and non toxic appearing. Will order labs, ekg, chest xray, oxygen, decadron, and continue to monitor.   Differential Diagnosis:   DDx includes but is not limited to COVID, pneumonia, ACS.   Independently Interpreted Test(s):   I have ordered and independently interpreted X-rays - see prior notes.  I have ordered and independently interpreted EKG Reading(s) - see prior notes  Clinical Tests:   Lab Tests: Ordered and Reviewed  Radiological Study: Ordered and Reviewed  Medical  Tests: Ordered and Reviewed  ED Management:  Initial O2 sats of 89% on room air. Will place on 2L nasal cannula.   CXR with worsening infiltrates compared to CXR yesterday.   CBC with lymphopenia.   Elevated alk phos and AST, no previous CMP for comparison.   Elevation in CRP, ferritin, CK.   Lactic acid 1.6.   Trop negative.   Admit to Medicine for acute hypoxemic respiratory failure due to COVID.   Discussed this plan with the patient who is agreeable.  All questions answered.  The patient's history, physical exam, and plan of care was discussed with and agreed upon with my supervising physician.                 Attending Attestation:     Physician Attestation Statement for NP/PA:   I discussed this assessment and plan of this patient with the NP/PA, but I did not personally examine the patient. The face to face encounter was performed by the NP/PA.                  ED Course as of Oct 18 1038   Sun Oct 18, 2020   0813 EKG by my independent interpretation is normal sinus rhythm at a rate of 75, there is a new right bundle-branch block, though the most recent prior is from 2011.    [EB]   0838 WBC: 4.28 [JM]   0838 Hemoglobin: 15.4 [JM]   0838 Hematocrit: 46.7 [JM]   0838 Platelets(!): 119 [JM]   0838 Lymph #(!): 0.5 [JM]   0839 Lactate, Leonides: 1.6 [JM]   0907 Sodium: 137 [JM]   0907 Potassium: 4.6 [JM]   0907 Calcium(!): 8.0 [JM]   0907 Albumin(!): 3.0 [JM]   0907 Alkaline Phosphatase(!): 36 [JM]   0907 AST(!): 47 [JM]   0907 Lactate, Leonides: 1.6 [JM]   0907 CRP(!): 56.2 [JM]   0907 Ferritin(!): 460 [JM]   0907 CPK(!): 495 [JM]   0907 Troponin I: 0.026 [JM]      ED Course User Index  [EB] Cassie Treviño MD  [JM] Skye Moran PA-C            Clinical Impression:       ICD-10-CM ICD-9-CM   1. COVID-19  U07.1 079.89   2. Hypoxia  R09.02 799.02   3. Suspected COVID-19 virus infection  Z20.828 V01.79   4. Multifocal pneumonia  J18.9 486   5. Acute hypoxemic respiratory failure due to COVID-19  U07.1 518.81     J96.01 079.89     799.02                          ED Disposition Condition    Admit                             Skye Moran PA-C  10/18/20 0952       Cassie Treviño MD  10/18/20 1039

## 2020-10-18 NOTE — H&P
Hospital Medicine  History and Physical  [facility]      Patient Name: Nubia Riggs  MRN:  [id number]  Hospital Medicine Team: [facility] HOSP MED R Liza Choi MD  Date of Admission:  10/18/2020     Length of Stay:  LOS: 0 days     Principal Problem: Suspected Covid-19 Virus Infection    Chief complaint  Shortness of breath x 1 day    HPI  55-year-old male with GERD presents the emergency department with chief complaint of shortness of breath. He reports subjective fever, headache, chills, myalgias, poor appetite, and sweats about 6 days ago. He was diagnosed with COVID-19 on 10/15.  Reports presenting to urgent care yesterday due to 89% pulse ox on room air. Started on levofloxacin and discharged home. Reports taking a dose yesterday and today. Reports using albuterol treatments at home without much relief. Yesterday he began to have cough productive of blood tinged sputum and shortness of breath so he presented to the ED. Denies other worsening or alleviating factors. Denies CP, abdominal pain, nausea, vomiting,dysuria.     Review of Systems    Constitutional: Positive for fever, chills, fatigue, poor appetite   HENT:  negative for trouble swallowing.    Eyes: Negative for photophobia, visual disturbance.   Respiratory: Positive for cough, shortness of breath  Cardiovascular: Negative for chest pain, palpitations, leg swelling.   Gastrointestinal: Negative for abdominal pain, constipation, nausea, vomiting.   Endocrine: Negative for cold intolerance, heat intolerance.   Genitourinary: Negative for dysuria, frequency.   Musculoskeletal: Negative for arthralgias, myalgias.   Skin: Negative for rash  Neurological: Negative for dizziness, syncope, light-headedness.   Psychiatric/Behavioral: Negative for confusion, hallucinations, anxiety    Past Medical History:   Diagnosis Date    ADD (attention deficit disorder)     Anemia     Bradycardia 3/8/2018    3/8/2018: ECG: SB 46 bpm.    GERD  (gastroesophageal reflux disease)      Past Surgical History:   Procedure Laterality Date    BACK SURGERY      CERVICAL SPINE SURGERY      facit repair    CHOLECYSTECTOMY      implant      chin    LIPOSUCTION      waiste    RHINOPLASTY TIP      SPINE SURGERY      l5 s1 micro discectomy    SPINE SURGERY      l5 s1 rodes placed    TONSILLECTOMY       Family History   Problem Relation Age of Onset    Cancer Father     Cancer Paternal Grandmother     Heart disease Paternal Grandfather     Diabetes Neg Hx     Colon polyps Neg Hx     Allergic rhinitis Neg Hx     Allergies Neg Hx     Angioedema Neg Hx     Asthma Neg Hx     Atopy Neg Hx     Rhinitis Neg Hx     Urticaria Neg Hx     Eczema Neg Hx     Immunodeficiency Neg Hx      Social History     Socioeconomic History    Marital status:      Spouse name: Not on file    Number of children: 4    Years of education: Not on file    Highest education level: Not on file   Occupational History    Occupation: Contractor     Employer: [employer]    Social Needs    Financial resource strain: Not on file    Food insecurity     Worry: Not on file     Inability: Not on file    Transportation needs     Medical: Not on file     Non-medical: Not on file   Tobacco Use    Smoking status: Former Smoker     Packs/day: 1.00     Years: 15.00     Pack years: 15.00     Quit date: 2012     Years since quittin.5    Smokeless tobacco: Never Used   Substance and Sexual Activity    Alcohol use: No    Drug use: No    Sexual activity: Not on file   Lifestyle    Physical activity     Days per week: Not on file     Minutes per session: Not on file    Stress: Not on file   Relationships    Social connections     Talks on phone: Not on file     Gets together: Not on file     Attends [employer] service: Not on file     Active member of club or organization: Not on file     Attends meetings of clubs or organizations: Not on file      Relationship status: Not on file   Other Topics Concern    Not on file   Social History Narrative    Not on file       Medications  No current facility-administered medications on file prior to encounter.      Current Outpatient Medications on File Prior to Encounter   Medication Sig Dispense Refill    albuterol (ACCUNEB) 1.25 mg/3 mL Nebu Take 3 mLs (1.25 mg total) by nebulization every 6 (six) hours as needed. Rescue 3 Box 6    albuterol (VENTOLIN HFA) 90 mcg/actuation inhaler Inhale 1-2 puffs into the lungs every 4 (four) hours as needed for Wheezing. Rescue 18 g 6    anastrozole (ARIMIDEX) 1 mg Tab       azelastine (ASTELIN) 137 mcg (0.1 %) nasal spray 2 sprays (274 mcg total) by Nasal route 2 (two) times daily. 30 mL 12    azelastine (OPTIVAR) 0.05 % ophthalmic solution Place 1 drop into both eyes 2 (two) times daily as needed. 6 mL 12    cholestyramine (QUESTRAN) 4 gram packet dissolve ONE PACKET in liquid AND TAKE BY MOUTH ONCE TO TWICE daily      coQ10, ubiquinol, 100 mg Cap Take 1 capsule by mouth once daily.        fluticasone furoate-vilanteroL (BREO ELLIPTA) 100-25 mcg/dose diskus inhaler Inhale 1 puff into the lungs once daily. Controller 60 each 11    levoFLOXacin (LEVAQUIN) 500 MG tablet Take 1 tablet (500 mg total) by mouth once daily. for 7 days 7 tablet 0    metoclopramide HCl (REGLAN) 10 MG tablet Take 1 tablet (10 mg total) by mouth every 6 (six) hours as needed (headache). 30 tablet 0    minoxidil (LONITEN) 2.5 MG tablet       montelukast (SINGULAIR) 10 mg tablet Take 10 mg by mouth daily as needed.      papaverine 30 mg/mL injection Add: Phentolamine 2 mg  Add: PGE1 40 mcg    Sig:  Inject 25 units (0.25 mls) as directed 10 mL 11    papaverine 30 mg/mL injection Papaverine 60mg/ml  Add: Phentolamine 20 mg  Add: PGE1 200 mcg  (60-2-20) Double Strength   Add Atropine 0.1mg/ml  Sig:  Inject 20 units (0.20 mls) as directed 10 mL 11    pulse oximeter (PULSE OXIMETER) device by Apply  Externally route 2 (two) times a day. Use twice daily at 8 AM and 3 PM and record the value in [employer] as directed. 1 each 0    tadalafil (CIALIS) 20 MG Tab Take 1 tablet (20 mg total) by mouth Every 3 (three) days. 10 tablet 11    tadalafil (CIALIS) 5 MG tablet Take 1 tablet (5 mg total) by mouth daily as needed for Erectile Dysfunction. 30 tablet 12    testosterone cypionate (DEPOTESTOTERONE CYPIONATE) 200 mg/mL injection       VIIBRYD 40 mg Tab tablet       zolpidem (AMBIEN) 5 MG Tab Take 5 mg by mouth nightly.         Allergies  Meperidine and Sulfa (sulfonamide antibiotics)    Physical Examination  Temp:  [97.8 °F (36.6 °C)-98.3 °F (36.8 °C)]   Pulse:  [70-81]   Resp:  [22-24]   BP: (116-157)/(62-89)   SpO2:  [89 %-96 %]     Gen: NAD, conversant  Head: NC, AT  Eyes: PERRLA, EOMI  Throat: MMM, OP clear  CV: RRR, no M/R/G, no peripheral edema, no JVD  Resp: coarse bilateral breath sounds, no increased work of breathing on 2 L  GI: Soft, NT, ND, +BS  Ext: MAEW, no c/c/e  Neuro: AAOx3, CN grossly intact, no focal neurologic deficits  Psychiatry: Normal mood, normal affect    Laboratory:  Recent Labs   Lab 10/18/20  0749   WBC 4.28   LYMPH 10.7*  0.5*   HGB 15.4   HCT 46.7   *     Recent Labs   Lab 10/18/20  0749      K 4.6      CO2 23   BUN 18   CREATININE 0.8   GLU 94   CALCIUM 8.0*     Recent Labs   Lab 10/18/20  0749   ALKPHOS 36*   ALT 28   AST 47*   ALBUMIN 3.0*   PROT 6.5   BILITOT 0.3      Recent Labs     10/18/20  0749 10/18/20  0942 10/18/20  1249   DDIMER  --   --  0.51*   FERRITIN 460*  --   --    CRP 56.2*  --   --    LDH  --  396*  --    BNP <10  --   --    TROPONINI 0.026  --   --    LACTATE 1.6  --   --        All labs within the last 24 hours were reviewed.     Microbiology:  Lab Results   Component Value Date    [id number] Positive (A) 10/17/2020       Microbiology Results (last 7 days)     Procedure Component Value Units Date/Time    Influenza A & B by Molecular  [[id number]] Collected: 10/18/20 1309    Order Status: Completed Specimen: Nasopharyngeal Swab Updated: 10/18/20 1409     Influenza A, Molecular Negative     Influenza B, Molecular Negative     Flu A & B Source Nasal swab    Blood culture x two cultures. Draw prior to antibiotics. [[id number]] Collected: 10/18/20 0749    Order Status: Sent Specimen: Blood from Peripheral, Antecubital, Right Updated: 10/18/20 0756    Blood culture x two cultures. Draw prior to antibiotics. [[id number]] Collected: 10/18/20 0751    Order Status: Sent Specimen: Blood from Peripheral, Antecubital, Right Updated: 10/18/20 0756            Imaging  ECG Results          EKG 12-lead (Final result)  Result time 10/18/20 12:16:47    Final result by Interface, Lab In [facility] (10/18/20 12:16:47)                 Narrative:    Test Reason : Z20.828,    Vent. Rate : 075 BPM     Atrial Rate : 075 BPM     P-R Int : 160 ms          QRS Dur : 144 ms      QT Int : 406 ms       P-R-T Axes : 058 268 033 degrees     QTc Int : 453 ms    Normal sinus rhythm  Left anterior fascicular block  Right bundle branch block  Abnormal ECG  When compared with ECG of 17-NOV-2011 13:02,  Right bundle branch block is now Present  Left anterior fascicular block Now present  Confirmed by FABIANA MILTON MD ([id number]) on 10/18/2020 12:16:38 PM    Referred By: [email or web address]   SELF           Confirmed By:FABIANA MILTON MD                              No results found for this or any previous visit.    X-Ray Chest AP Portable  Narrative: EXAMINATION:  XR CHEST AP PORTABLE    CLINICAL HISTORY:  Suspected Covid-19 Virus Infection;    TECHNIQUE:  Single frontal view of the chest was performed.    COMPARISON:  October 17, 2020.    FINDINGS:  Monitoring leads are in place.  Heart size pulmonary vessels are similar.  There are increasing patchy parenchymal infiltrates bilaterally.  Impression: Increase in the parenchymal infiltrates bilaterally consistent with history of COVID-19  infection    Electronically signed by: Cecilio Westfall MD  Date:    10/18/2020  Time:    08:39      All imaging within the last 24 hours was reviewed.       Assessment and Plan:    Active Hospital Problems    Diagnosis  POA    *Pneumonia due to COVID-19 virus [U07.1, J12.89]  Yes    Acute hypoxemic respiratory failure [J96.01]  Yes    Thrombocytopenia [D69.6]  Yes    BPH with urinary obstruction [N40.1, N13.8]  Yes      Resolved Hospital Problems   No resolved problems to display.       Suspected COVID-19 Virus Infection  Viral Pneumonia due to COVID-19  - COVID-19 testing: Collection Date: 9/1/2020 Collection Time:   8:30 AM   - Isolation: Airborne/Droplet. Surgical mask on patient. Notify Infection Control  - Diagnostics: Trend Q48hrs if stable, more frequently if patient decompensating     Laboratory/Study Frequency Result   CBC Admit & Q48h Lymphopenia, thrombocytopenia   CMP Admit & Q48h AST 47   Magnesium level Admit    D-dimer Admit & Q48h 0.5   Ferritin Admit & Q48h 460   CRP Admit & Q48h 56   CPK Admit & Q24h if elevated 495   LDH Admit & Q48h 396   Vitamin D Admit    BNP Admit    Troponin Admit & Q6h if elevated    Glucose-6-phos dehydrogenase Admit    Procalcitonin Admit    Lipid Panel If starting statin    Rapid influenza Admit    Resp Infection Panel Admit if BMT/organ transplant    Legionella Antigen Admit    Sputum Culture Admit    Blood Culture Admit    Urinalysis & Culture Admit    ECG Admit & Q48h if on HCQ    CXR Admit B/l opacities   Lymphopenia, hyponatremia, hyperferritinemia, elevated troponin, elevated d-dimer, age, and medical comorbidities are significant predictors of poor clinical outcome    - Management: Per [facility] COVID Treatment Protocol (4/15/20)    - Monitoring:   - Telemetry & Continuous Pulse Oximetry    - Nutrition:    - Multivitamin PO daily   - Add Boost supplement   - Vitamin D 1000IU daily if deficient   - Ascorbic acid 500mg PO bid    - Supportive Care:   - acetaminophen  650mg PO Q6hr PRN fever/headache   - loperamide PRN viral diarrhea   - IVF if indicated, restrictive strategy preferred, no maintenance IV if able   - VTE PPx: enoxaparin or heparin SQ unless contraindicated    - Antibiotics:  - if indications, CXR findings, elevated procal. See protocol for alternatives.   - Discontinue early if low concern for bacterial co-infection   - ceftriaxone 1g Q24h x 5 days  - azithromycin 500mg po x1, then 250mg po daily x 4 days    - Investigational Therapies:   - If patient meets criteria  - atorvastatin 40mg po daily  - dexamethasone & remdesivir initiated      Acute Hypoxemic Respiratory Failure  - Order RT consult via Respiratory Communication for COVID Protocols  - Order Incentive Spirometer Q4h  - Order Flutter Valve Q4h  - Continuous Pulse Oximetry  - Goal SpO2 92-96%  - Supplemental O2 via LFNC, VentiMask, or HFNC (see Respiratory Support Oxygen Therapies)  - If wheezing, albuterol INH Q6h scheduled & PRN  - Proning Protocol if patient is a candidate (see HM Proning Protocol)   - GCS >13, able to self-prone  - If deterioration, may warrant trial of NIPPV and transfer to [facility] pressure room or immediate ICU consult     Thrombocytopenia               Leukopenia    - secondary to acute infection; monitor daily            VTE High Risk Prophylaxis:   VTE Risk Mitigation (From admission, onward)         Ordered     enoxaparin injection 40 mg  Every 24 hours      10/18/20 0949     IP VTE HIGH RISK PATIENT  Once      10/18/20 0949                  High Risk Conditions:  Patient has a condition that poses threat to life and bodily function: COVID PNA & ACUTE HYPOXIC RESPIRATORY FAILURE        Liza Choi MD  Hospital Medicine Staff  [facility]

## 2020-10-18 NOTE — ED NOTES
Patient resting on stretcher able to voice all needs, patient states he feels better on the oxygen, cardiac monitoring remains in progress, patient informed of admission status and voiced understanding, vss, will continue to monitor

## 2020-10-18 NOTE — TELEPHONE ENCOUNTER
Pt contacted for Surveillance escalation / no response- In review of chart - NT noted pt currently in ED with pending admit. No contact protocol used.    Reason for Disposition   Patient already left for the hospital/clinic    Protocols used: NO CONTACT OR DUPLICATE CONTACT CALL-A-OH

## 2020-10-18 NOTE — ED NOTES
LOC: The patient is awake, alert and aware of environment with an appropriate affect, the patient is oriented x 3 and speaking appropriately.  APPEARANCE: patient is clean and well groomed, patient's clothing is properly fastened.  SKIN: The skin is warm and dry, color consistent with ethnicity, patient has normal skin turgor and moist mucus membranes, skin intact, no breakdown or bruising noted.  MUSCULOSKELETAL: Patient moving all extremities spontaneously, no obvious swelling or deformities noted.  ABDOMEN: Soft and non tender to palpation, no distention noted  NEUROLOGIC:  facial expression is symmetrical, patient moving all extremities spontaneously, normal sensation in all extremities when touched with a finger.  Follows all commands appropriately.    Patient states he was covid positive since the 10/15, patient was diagnosis yesterday with pneumonia and was told to come to er if his o2 stat continued to be below 90%, wife states the last 4 hours he has not been able to catch his breath even while resting and had a low o2 stat noted at home for the last few hours, patient placed on cardiac monitoring, vss, patient placed on 3L o2 via NC, will continue to monitor

## 2020-10-19 NOTE — PLAN OF CARE
This CM called and spoke with Mr Riggs in room [address]157 to complet discharge planning assessment. Per patient, he lives with his wife and 2 young children in a single family ome with 10 steps to point of entry. He is independent with all ADLs, WORKS /DRIVES/ DOES NOT use DME or other in home assistive equipment. He Reports taking all medication as prescribed/ keeping them refilled and has resources to meet all daily and prescriptive needs. Upon discharge, he will have help from his wife and other immediate family as necessary.  Questions answered / CM provided contact numbers. Will continue to follow for course of hospitalization    10/18/2020  7:27 AM   Hypoxia [R09.02]  Multifocal pneumonia [J18.9]  Acute hypoxemic respiratory failure due to COVID-19 [U07.1, J96.01]  Suspected COVID-19 virus infection [Z20.828]  COVID-19 [U07.1]       PCP:  REID PEGUERO MD    Pharmacy:  [facility] ([place]) - [place] - [address] [place] [address]  [address] [place] [address]  [place] [address]  Phone: [phone] Fax: [phone]    Emergency Contacts:  Extended Emergency Contact Information  Primary Emergency Contact: Liza Brody  Address: [address] [place] [address]  Mobile Phone: [phone]  Relation: Spouse    Insurance: Payor: [employer] CROSS BLUE SHIELD / Plan: BCBS BLUE SAVER PPO - HD / Product Type: PPO /       Emma Church RN  Case Management  Ext: [phone]       10/19/20 1328   Discharge Assessment   Assessment Type Discharge Planning Assessment   Confirmed/corrected address and phone number on facesheet? Yes   Assessment information obtained from? Patient   Expected Length of Stay (days) 5   Communicated expected length of stay with patient/caregiver yes   Prior to hospitilization cognitive status: Alert/Oriented;No Deficits   Prior to hospitalization functional status: Independent   Current cognitive status: Alert/Oriented;No Deficits   Current Functional Status: Independent   Facility  Arrived From: HOME   Lives With spouse;child(niki), dependent   Able to Return to Prior Arrangements yes   Is patient able to care for self after discharge? Yes   Who are your caregiver(s) and their phone number(s)? Liza Brody Spouse     [phone]   Patient's perception of discharge disposition home or selfcare   Readmission Within the Last 30 Days no previous admission in last 30 days   Equipment Currently Used at Home none   Is the patient taking medications as prescribed? yes   Does the patient have transportation home? Yes   Transportation Anticipated family or friend will provide   Dialysis Name and Scheduled days N/A   Does the patient receive services at the [facility]? No   Discharge Plan A Home   Discharge Plan B Home with family   DME Needed Upon Discharge  none   Patient/Family in Agreement with Plan yes

## 2020-10-19 NOTE — PLAN OF CARE
Patient alert and oriented x 4. Went from 2LNC to 4LNC overnight. Suffered from anxiety and overwhelming feelings regarding his covid diagnosis. One time dose of ativan ordered. Codeine and carmel pearls ordered for cough. Self care patient. Call bell within reach. Side rails up x 2. Will continue to monitor

## 2020-10-19 NOTE — PLAN OF CARE
Currently not stable for discharge. Presently on 10LNC - has agreed to take REMDESIVIR - will closely monitor labs for toxicity. Will have 6 minute walk test to determine home oxygen needs. Anticipate no needs with exception of possible home O2. Will continue to follow    Emma Church RN  Case Management  Ext [phone]       10/19/20 1697   Post-Acute Status   Post-Acute Authorization Other   Post-Acute Placement Status Awaiting Internal Medical Clearance   Discharge Plan   Discharge Plan A Home   Discharge Plan B Home with family

## 2020-10-19 NOTE — RESEARCH
Nubia Riggs is currently being screened for the COVID ATTACC Trial (2020.169, Dr Santos Padilla PI). This study randomizes patients between therapeutic dose heparin (enoxaparin preferred) and usual care (including prophylactic doses).  Dr. Choi, the attending, agrees that the patient is a good candidate for the study.     An update to eligibility and consent status will be made. Please contact [phone] Carlota Rock with questions.

## 2020-10-19 NOTE — NURSING
Encouraged patient to complete prone positioning.  Patient required consistant directing patient to stay in prone position.

## 2020-10-19 NOTE — RESEARCH
Date Consent signed: 10/19/2020    Sponsor: [facility]    Study Title/IRB Number: [id number]/ATTACC COVID    Principle Investigator: Dr Santos Padilla    Present for Discussion: Mr. Riggs, CRC Carlota Rock, CRC Nav Sommers (witness)    Is LAR Consenting for Subject: no    Prior to the Informed Consent (IC) being signed, or any study protocol required data collection, testing, procedure, or intervention being performed, the following was done and/or discussed:   Patient was given a copy of the IC for review    Purpose of the study and qualifications to participate    Study design, Follow up schedule, and tests or procedures done at each visit   Confidentiality and HIPAA Authorization for Release of Medical Records for the research trial/ subject's rights/research related injury   Risk, Benefits, Alternative Treatments, Compensation and Costs   Participation in the research trial is voluntary and patient may withdraw at anytime   Contact information for study related questions    Patient verbalizes understanding of the above: Yes  Contact information for CRC and PI given to patient: Yes  Patient able to adequately summarize: the purpose of the study, the risks associated with the study, and all procedures, testing, and follow-ups associated with the study: Yes    Mr. Riggs signed the informed consent form for the ATTACC research study.  Each page of the consent form was reviewed with the patient and all questions answered satisfactorily. He signed the consent form and received a copy of same. The original consent will be scanned into electronic medical records (EPIC) and filed into the subject's research study binder.    This study randomizes patients between therapeutic dose heparin (enoxaparin preferred) and usual care (including prophylactic doses). Mr. Riggs was randomized to the usual care arm of the study, so his heparin dose does not need to be changed. He was given the CRCs contact information, and  will reach out with any questions. Dr. Choi was also notified.    Please call [phone] Carlota Rock with any questions.

## 2020-10-19 NOTE — PROGRESS NOTES
Hospital Medicine  Progress Note  [facility]      Patient Name: Nubia Riggs  MRN:  [id number]  Hospital Medicine Team: [facility] HOSP MED R Liza Choi MD  Date of Admission:  10/18/2020     Length of Stay:  LOS: 1 day     Principal Problem: Suspected Covid-19 Virus Infection    Chief complaint  Shortness of breath x 1 day    HPI  55-year-old male with GERD presents the emergency department with chief complaint of shortness of breath. He reports subjective fever, headache, chills, myalgias, poor appetite, and sweats about 6 days ago. He was diagnosed with COVID-19 on 10/15.  Reports presenting to urgent care yesterday due to 89% pulse ox on room air. Started on levofloxacin and discharged home. Reports taking a dose yesterday and today. Reports using albuterol treatments at home without much relief. Yesterday he began to have cough productive of blood tinged sputum and shortness of breath so he presented to the ED. Denies other worsening or alleviating factors. Denies CP, abdominal pain, nausea, vomiting,dysuria.     Interval History  O2 requirements increased to 10 L overnight. Patient reports feeling anxious as a well. Reports shortness of breath and cough. Requesting to resume his home Viibryd.     Review of Systems    Constitutional: Positive for fever, chills, fatigue, poor appetite   HENT:  negative for trouble swallowing.    Eyes: Negative for photophobia, visual disturbance.   Respiratory: Positive for cough, shortness of breath  Cardiovascular: Negative for chest pain, palpitations, leg swelling.   Gastrointestinal: Negative for abdominal pain, constipation, nausea, vomiting.   Endocrine: Negative for cold intolerance, heat intolerance.   Genitourinary: Negative for dysuria, frequency.   Musculoskeletal: Negative for arthralgias, myalgias.   Skin: Negative for rash  Neurological: Negative for dizziness, syncope, light-headedness.   Psychiatric/Behavioral: Negative for confusion,  hallucinations, anxiety    Past Medical History:   Diagnosis Date    ADD (attention deficit disorder)     Anemia     Bradycardia 3/8/2018    3/8/2018: ECG: SB 46 bpm.    GERD (gastroesophageal reflux disease)      Past Surgical History:   Procedure Laterality Date    BACK SURGERY      CERVICAL SPINE SURGERY      facit repair    CHOLECYSTECTOMY      implant      chin    LIPOSUCTION      waiste    RHINOPLASTY TIP      SPINE SURGERY      l5 s1 micro discectomy    SPINE SURGERY      l5 s1 rodes placed    TONSILLECTOMY       Family History   Problem Relation Age of Onset    Cancer Father     Cancer Paternal Grandmother     Heart disease Paternal Grandfather     Diabetes Neg Hx     Colon polyps Neg Hx     Allergic rhinitis Neg Hx     Allergies Neg Hx     Angioedema Neg Hx     Asthma Neg Hx     Atopy Neg Hx     Rhinitis Neg Hx     Urticaria Neg Hx     Eczema Neg Hx     Immunodeficiency Neg Hx      Social History     Socioeconomic History    Marital status:      Spouse name: Not on file    Number of children: 4    Years of education: Not on file    Highest education level: Not on file   Occupational History    Occupation: Contractor     Employer: [employer]    Social Needs    Financial resource strain: Not on file    Food insecurity     Worry: Not on file     Inability: Not on file    Transportation needs     Medical: Not on file     Non-medical: Not on file   Tobacco Use    Smoking status: Former Smoker     Packs/day: 1.00     Years: 15.00     Pack years: 15.00     Quit date: 2012     Years since quittin.5    Smokeless tobacco: Never Used   Substance and Sexual Activity    Alcohol use: No    Drug use: No    Sexual activity: Not on file   Lifestyle    Physical activity     Days per week: Not on file     Minutes per session: Not on file    Stress: Not on file   Relationships    Social connections     Talks on phone: Not on file     Gets together: Not on  file     Attends Zoroastrianism service: Not on file     Active member of club or organization: Not on file     Attends meetings of clubs or organizations: Not on file     Relationship status: Not on file   Other Topics Concern    Not on file   Social History Narrative    Not on file       Medications  No current facility-administered medications on file prior to encounter.      Current Outpatient Medications on File Prior to Encounter   Medication Sig Dispense Refill    albuterol (ACCUNEB) 1.25 mg/3 mL Nebu Take 3 mLs (1.25 mg total) by nebulization every 6 (six) hours as needed. Rescue 3 Box 6    albuterol (VENTOLIN HFA) 90 mcg/actuation inhaler Inhale 1-2 puffs into the lungs every 4 (four) hours as needed for Wheezing. Rescue 18 g 6    anastrozole (ARIMIDEX) 1 mg Tab       azelastine (ASTELIN) 137 mcg (0.1 %) nasal spray 2 sprays (274 mcg total) by Nasal route 2 (two) times daily. 30 mL 12    azelastine (OPTIVAR) 0.05 % ophthalmic solution Place 1 drop into both eyes 2 (two) times daily as needed. 6 mL 12    cholestyramine (QUESTRAN) 4 gram packet dissolve ONE PACKET in liquid AND TAKE BY MOUTH ONCE TO TWICE daily      coQ10, ubiquinol, 100 mg Cap Take 1 capsule by mouth once daily.        fluticasone furoate-vilanteroL (BREO ELLIPTA) 100-25 mcg/dose diskus inhaler Inhale 1 puff into the lungs once daily. Controller 60 each 11    levoFLOXacin (LEVAQUIN) 500 MG tablet Take 1 tablet (500 mg total) by mouth once daily. for 7 days 7 tablet 0    metoclopramide HCl (REGLAN) 10 MG tablet Take 1 tablet (10 mg total) by mouth every 6 (six) hours as needed (headache). 30 tablet 0    minoxidil (LONITEN) 2.5 MG tablet       montelukast (SINGULAIR) 10 mg tablet Take 10 mg by mouth daily as needed.      papaverine 30 mg/mL injection Add: Phentolamine 2 mg  Add: PGE1 40 mcg    Sig:  Inject 25 units (0.25 mls) as directed 10 mL 11    papaverine 30 mg/mL injection Papaverine 60mg/ml  Add: Phentolamine 20 mg  Add: PGE1  200 mcg  (60-2-20) Double Strength   Add Atropine 0.1mg/ml  Sig:  Inject 20 units (0.20 mls) as directed 10 mL 11    pulse oximeter (PULSE OXIMETER) device by Apply Externally route 2 (two) times a day. Use twice daily at 8 AM and 3 PM and record the value in ticketstreethart as directed. 1 each 0    tadalafil (CIALIS) 20 MG Tab Take 1 tablet (20 mg total) by mouth Every 3 (three) days. 10 tablet 11    tadalafil (CIALIS) 5 MG tablet Take 1 tablet (5 mg total) by mouth daily as needed for Erectile Dysfunction. 30 tablet 12    testosterone cypionate (DEPOTESTOTERONE CYPIONATE) 200 mg/mL injection       VIIBRYD 40 mg Tab tablet       zolpidem (AMBIEN) 5 MG Tab Take 5 mg by mouth nightly.         Allergies  Meperidine and Sulfa (sulfonamide antibiotics)    Physical Examination  Temp:  [98.1 °F (36.7 °C)-99 °F (37.2 °C)]   Pulse:  [65-97]   Resp:  [18-24]   BP: (125-215)/(58-98)   SpO2:  [88 %-95 %]     Gen: NAD, conversant  Head: NC, AT  Eyes: PERRLA, EOMI  Throat: MMM, OP clear  CV: RRR, no M/R/G, no peripheral edema, no JVD  Resp: coarse bilateral breath sounds, on HFNC, short of breath  GI: Soft, NT, ND, +BS  Ext: MAEW, no c/c/e  Neuro: AAOx3, CN grossly intact, no focal neurologic deficits  Psychiatry: Normal mood, normal affect    Laboratory:  Recent Labs   Lab 10/18/20  0749 10/19/20  0530   WBC 4.28 5.43   LYMPH 10.7*  0.5* 8.8*  0.5*   HGB 15.4 14.7   HCT 46.7 45.1   * 146*     Recent Labs   Lab 10/18/20  0749 10/19/20  0530    138   K 4.6 4.1    102   CO2 23 27   BUN 18 21*   CREATININE 0.8 0.8   GLU 94 110   CALCIUM 8.0* 8.1*   MG  --  1.6   PHOS  --  3.1     Recent Labs   Lab 10/18/20  0749 10/19/20  0530 10/19/20  1017   ALKPHOS 36* 38*  --    ALT 28 25  --    AST 47* 40  --    ALBUMIN 3.0* 2.7*  --    PROT 6.5 5.9*  --    BILITOT 0.3 0.3  --    INR  --   --  1.0      Recent Labs     10/18/20  0749 10/18/20  0942  10/19/20  0530   DDIMER  --   --    < > 0.45   FERRITIN 460*  --   --   --     CRP 56.2*  --   --   --    LDH  --  396*  --   --    BNP <10  --   --   --    TROPONINI 0.026  --   --   --    LACTATE 1.6  --   --   --     < > = values in this interval not displayed.       All labs within the last 24 hours were reviewed.     Microbiology:  Lab Results   Component Value Date    [id number] Positive (A) 10/17/2020       Microbiology Results (last 7 days)     Procedure Component Value Units Date/Time    Blood culture x two cultures. Draw prior to antibiotics. [[id number]] Collected: 10/18/20 0751    Order Status: Completed Specimen: Blood from Peripheral, Antecubital, Right Updated: 10/19/20 1013     Blood Culture, Routine No Growth to date      No Growth to date    Narrative:      Aerobic and anaerobic    Blood culture x two cultures. Draw prior to antibiotics. [[id number]] Collected: 10/18/20 0749    Order Status: Completed Specimen: Blood from Peripheral, Antecubital, Right Updated: 10/19/20 1013     Blood Culture, Routine No Growth to date      No Growth to date    Narrative:      Aerobic and anaerobic    Influenza A & B by Molecular [[id number]] Collected: 10/18/20 1309    Order Status: Completed Specimen: Nasopharyngeal Swab Updated: 10/18/20 1409     Influenza A, Molecular Negative     Influenza B, Molecular Negative     Flu A & B Source Nasal swab            Imaging  ECG Results          EKG 12-lead (Final result)  Result time 10/18/20 12:16:47    Final result by Interface, Lab In [facility] (10/18/20 12:16:47)                 Narrative:    Test Reason : Z20.828,    Vent. Rate : 075 BPM     Atrial Rate : 075 BPM     P-R Int : 160 ms          QRS Dur : 144 ms      QT Int : 406 ms       P-R-T Axes : 058 268 033 degrees     QTc Int : 453 ms    Normal sinus rhythm  Left anterior fascicular block  Right bundle branch block  Abnormal ECG  When compared with ECG of 17-NOV-2011 13:02,  Right bundle branch block is now Present  Left anterior fascicular block Now present  Confirmed by KARINE GRIJALVA, FABIANA  ([id number]) on 10/18/2020 12:16:38 PM    Referred By: CHAD   SELF           Confirmed By:FABIANA MILTON MD                              No results found for this or any previous visit.    X-Ray Chest AP Portable  Narrative: EXAMINATION:  XR CHEST AP PORTABLE    CLINICAL HISTORY:  Suspected Covid-19 Virus Infection;    TECHNIQUE:  Single frontal view of the chest was performed.    COMPARISON:  October 17, 2020.    FINDINGS:  Monitoring leads are in place.  Heart size pulmonary vessels are similar.  There are increasing patchy parenchymal infiltrates bilaterally.  Impression: Increase in the parenchymal infiltrates bilaterally consistent with history of COVID-19 infection    Electronically signed by: Cecilio Westfall MD  Date:    10/18/2020  Time:    08:39      All imaging within the last 24 hours was reviewed.       Assessment and Plan:    Active Hospital Problems    Diagnosis  POA    *Pneumonia due to COVID-19 virus [U07.1, J12.89]  Yes    Acute hypoxemic respiratory failure [J96.01]  Yes    Thrombocytopenia [D69.6]  Yes    BPH with urinary obstruction [N40.1, N13.8]  Yes      Resolved Hospital Problems   No resolved problems to display.     Viral Pneumonia due to COVID-19  - COVID-19 testing: Collection Date: 9/1/2020 Collection Time:   8:30 AM   - Isolation: Airborne/Droplet. Surgical mask on patient. Notify Infection Control  - Diagnostics: Trend Q48hrs if stable, more frequently if patient decompensating     Laboratory/Study Frequency Result   CBC Admit & Q48h Lymphopenia, thrombocytopenia   CMP Admit & Q48h AST 47 > 40   Magnesium level Admit    D-dimer Admit & Q48h 0.5 > 0.4   Ferritin Admit & Q48h 460   CRP Admit & Q48h 56   CPK Admit & Q24h if elevated 495   LDH Admit & Q48h 396   Vitamin D Admit    BNP Admit    Troponin Admit & Q6h if elevated    Glucose-6-phos dehydrogenase Admit    Procalcitonin Admit    Lipid Panel If starting statin    Rapid influenza Admit    Resp Infection Panel Admit if BMT/organ  transplant    Legionella Antigen Admit    Sputum Culture Admit    Blood Culture Admit    Urinalysis & Culture Admit    ECG Admit & Q48h if on HCQ    CXR Admit B/l opacities   Lymphopenia, hyponatremia, hyperferritinemia, elevated troponin, elevated d-dimer, age, and medical comorbidities are significant predictors of poor clinical outcome    - Management: Per [facility] COVID Treatment Protocol (4/15/20)    - Monitoring:   - Telemetry & Continuous Pulse Oximetry    - Nutrition:    - Multivitamin PO daily   - Add Boost supplement   - Vitamin D 1000IU daily if deficient   - Ascorbic acid 500mg PO bid    - Supportive Care:   - acetaminophen 650mg PO Q6hr PRN fever/headache   - loperamide PRN viral diarrhea   - IVF if indicated, restrictive strategy preferred, no maintenance IV if able   - VTE PPx: enoxaparin or heparin SQ unless contraindicated    - Antibiotics:  - if indications, CXR findings, elevated procal. See protocol for alternatives.   - Discontinue early if low concern for bacterial co-infection   - ceftriaxone 1g Q24h x 5 days  - azithromycin 500mg po x1, then 250mg po daily x 4 days    - Investigational Therapies:   - If patient meets criteria  - atorvastatin 40mg po daily  - dexamethasone & remdesivir initiated      Acute Hypoxemic Respiratory Failure  - Order RT consult via Respiratory Communication for COVID Protocols  - Order Incentive Spirometer Q4h  - Order Flutter Valve Q4h  - Continuous Pulse Oximetry  - Goal SpO2 92-96%  - Supplemental O2 via LFNC, VentiMask, or HFNC (see Respiratory Support Oxygen Therapies)  - If wheezing, albuterol INH Q6h scheduled & PRN  - Proning Protocol if patient is a candidate (see  Proning Protocol)   - GCS >13, able to self-prone  - If deterioration, may warrant trial of NIPPV and transfer to [facility] pressure room or immediate ICU consult     Thrombocytopenia               Leukopenia    - secondary to acute infection; monitor daily            VTE High Risk Prophylaxis:   VTE  Risk Mitigation (From admission, onward)         Ordered     enoxaparin injection 40 mg  Every 24 hours      10/18/20 0949     IP VTE HIGH RISK PATIENT  Once      10/18/20 0949                  High Risk Conditions:  Patient has a condition that poses threat to life and bodily function: COVID PNA & ACUTE HYPOXIC RESPIRATORY FAILURE        Liza Choi MD  [facility] Medicine Staff  [facility]

## 2020-10-19 NOTE — PLAN OF CARE
10/19/20 1435   Post-Acute Status   Post-Acute Authorization Other   Other Status See Comments     SW following patient dc planning needs, Post acute needs to be determined. SW will continue to follow up.      Tahmina Tran LMSW  [facility]   [id number]

## 2020-10-20 NOTE — NURSING
"Alarms of disconnect going off. Upon entering room, patient had an episode of loose stools. Patient was found in bathroom with a duffle bag. He stated I cant find my clothes. O2 was on floor at bedside. I told him to come sit down in bed which he did. O2 applied, increased, and O2 monitor connected. O2 sat 80%. Patient instructed to breathe in through nose and out through mouth. O2 sat eventually came up to 96%. O2 lowered to original setting. Education provided on need to call and under no circumstance is the O2 to be removed. Patient states understanding.     Patient assisted in cleaning self and clothes change. Patient stated "Thanks man".   "

## 2020-10-20 NOTE — PLAN OF CARE
Problem: Adult Inpatient Plan of Care  Goal: Plan of Care Review  Outcome: Ongoing, Progressing  Goal: Patient-Specific Goal (Individualization)  Outcome: Ongoing, Progressing  Goal: Readiness for Transition of Care  Outcome: Ongoing, Progressing   POC reviewed with patient. Questions encouraged and answered. Goals of O2 titration education provided. Questions encouraged and answered. Patient states understanding. POC is ongoing.

## 2020-10-20 NOTE — PROGRESS NOTES
Hospital Medicine  Progress Note  [facility]      Patient Name: Nubia Riggs  MRN:  [id number]  Hospital Medicine Team: [facility] HOSP MED R Liza Choi MD  Date of Admission:  10/18/2020     Length of Stay:  LOS: 2 days     Principal Problem: Suspected Covid-19 Virus Infection    Chief complaint  Shortness of breath x 1 day    HPI  55-year-old male with GERD presents the emergency department with chief complaint of shortness of breath. He reports subjective fever, headache, chills, myalgias, poor appetite, and sweats about 6 days ago. He was diagnosed with COVID-19 on 10/15.  Reports presenting to urgent care yesterday due to 89% pulse ox on room air. Started on levofloxacin and discharged home. Reports taking a dose yesterday and today. Reports using albuterol treatments at home without much relief. Yesterday he began to have cough productive of blood tinged sputum and shortness of breath so he presented to the ED. Denies other worsening or alleviating factors. Denies CP, abdominal pain, nausea, vomiting,dysuria.     Interval History  O2 requirements stable at 10 L overnight. Had diarrhea overnight. Reports SoB is improved. Stable cough, associated w blood tinged sputum. Overall feeling better. Has been self-proning most of the day and throughout the night.    Review of Systems    Constitutional: Positive for fever, chills, fatigue, poor appetite   HENT:  negative for trouble swallowing.    Eyes: Negative for photophobia, visual disturbance.   Respiratory: Positive for cough, shortness of breath  Cardiovascular: Negative for chest pain, palpitations, leg swelling.   Gastrointestinal: Negative for abdominal pain, constipation, nausea, vomiting.   Endocrine: Negative for cold intolerance, heat intolerance.   Genitourinary: Negative for dysuria, frequency.   Musculoskeletal: Negative for arthralgias, myalgias.   Skin: Negative for rash  Neurological: Negative for dizziness, syncope,  light-headedness.   Psychiatric/Behavioral: Negative for confusion, hallucinations, anxiety    Past Medical History:   Diagnosis Date    ADD (attention deficit disorder)     Anemia     Bradycardia 3/8/2018    3/8/2018: ECG: SB 46 bpm.    GERD (gastroesophageal reflux disease)      Past Surgical History:   Procedure Laterality Date    BACK SURGERY      CERVICAL SPINE SURGERY      facit repair    CHOLECYSTECTOMY      implant      chin    LIPOSUCTION      waiste    RHINOPLASTY TIP      SPINE SURGERY      l5 s1 micro discectomy    SPINE SURGERY      l5 s1 rodes placed    TONSILLECTOMY       Family History   Problem Relation Age of Onset    Cancer Father     Cancer Paternal Grandmother     Heart disease Paternal Grandfather     Diabetes Neg Hx     Colon polyps Neg Hx     Allergic rhinitis Neg Hx     Allergies Neg Hx     Angioedema Neg Hx     Asthma Neg Hx     Atopy Neg Hx     Rhinitis Neg Hx     Urticaria Neg Hx     Eczema Neg Hx     Immunodeficiency Neg Hx      Social History     Socioeconomic History    Marital status:      Spouse name: Not on file    Number of children: 4    Years of education: Not on file    Highest education level: Not on file   Occupational History    Occupation: Contractor     Employer: [employer]    Social Needs    Financial resource strain: Not on file    Food insecurity     Worry: Not on file     Inability: Not on file    Transportation needs     Medical: Not on file     Non-medical: Not on file   Tobacco Use    Smoking status: Former Smoker     Packs/day: 1.00     Years: 15.00     Pack years: 15.00     Quit date: 2012     Years since quittin.5    Smokeless tobacco: Never Used   Substance and Sexual Activity    Alcohol use: No    Drug use: No    Sexual activity: Not on file   Lifestyle    Physical activity     Days per week: Not on file     Minutes per session: Not on file    Stress: Not on file   Relationships    Social  connections     Talks on phone: Not on file     Gets together: Not on file     Attends Faith service: Not on file     Active member of club or organization: Not on file     Attends meetings of clubs or organizations: Not on file     Relationship status: Not on file   Other Topics Concern    Not on file   Social History Narrative    Not on file       Medications  No current facility-administered medications on file prior to encounter.      Current Outpatient Medications on File Prior to Encounter   Medication Sig Dispense Refill    albuterol (ACCUNEB) 1.25 mg/3 mL Nebu Take 3 mLs (1.25 mg total) by nebulization every 6 (six) hours as needed. Rescue 3 Box 6    albuterol (VENTOLIN HFA) 90 mcg/actuation inhaler Inhale 1-2 puffs into the lungs every 4 (four) hours as needed for Wheezing. Rescue 18 g 6    anastrozole (ARIMIDEX) 1 mg Tab       azelastine (ASTELIN) 137 mcg (0.1 %) nasal spray 2 sprays (274 mcg total) by Nasal route 2 (two) times daily. 30 mL 12    azelastine (OPTIVAR) 0.05 % ophthalmic solution Place 1 drop into both eyes 2 (two) times daily as needed. 6 mL 12    cholestyramine (QUESTRAN) 4 gram packet dissolve ONE PACKET in liquid AND TAKE BY MOUTH ONCE TO TWICE daily      coQ10, ubiquinol, 100 mg Cap Take 1 capsule by mouth once daily.        fluticasone furoate-vilanteroL (BREO ELLIPTA) 100-25 mcg/dose diskus inhaler Inhale 1 puff into the lungs once daily. Controller 60 each 11    levoFLOXacin (LEVAQUIN) 500 MG tablet Take 1 tablet (500 mg total) by mouth once daily. for 7 days 7 tablet 0    metoclopramide HCl (REGLAN) 10 MG tablet Take 1 tablet (10 mg total) by mouth every 6 (six) hours as needed (headache). 30 tablet 0    minoxidil (LONITEN) 2.5 MG tablet       montelukast (SINGULAIR) 10 mg tablet Take 10 mg by mouth daily as needed.      papaverine 30 mg/mL injection Add: Phentolamine 2 mg  Add: PGE1 40 mcg    Sig:  Inject 25 units (0.25 mls) as directed 10 mL 11    papaverine 30  mg/mL injection Papaverine 60mg/ml  Add: Phentolamine 20 mg  Add: PGE1 200 mcg  (60-2-20) Double Strength   Add Atropine 0.1mg/ml  Sig:  Inject 20 units (0.20 mls) as directed 10 mL 11    pulse oximeter (PULSE OXIMETER) device by Apply Externally route 2 (two) times a day. Use twice daily at 8 AM and 3 PM and record the value in MyChart as directed. 1 each 0    tadalafil (CIALIS) 20 MG Tab Take 1 tablet (20 mg total) by mouth Every 3 (three) days. 10 tablet 11    tadalafil (CIALIS) 5 MG tablet Take 1 tablet (5 mg total) by mouth daily as needed for Erectile Dysfunction. 30 tablet 12    testosterone cypionate (DEPOTESTOTERONE CYPIONATE) 200 mg/mL injection       VIIBRYD 40 mg Tab tablet       zolpidem (AMBIEN) 5 MG Tab Take 5 mg by mouth nightly.         Allergies  Meperidine and Sulfa (sulfonamide antibiotics)    Physical Examination  Temp:  [98.1 °F (36.7 °C)-98.6 °F (37 °C)]   Pulse:  [55-87]   Resp:  [18-22]   BP: (128-149)/(58-86)   SpO2:  [88 %-100 %]     Gen: NAD, conversant  Head: NC, AT  Eyes: PERRLA, EOMI  Throat: MMM, OP clear  CV: RRR, no M/R/G, no peripheral edema, no JVD  Resp: coarse bilateral breath sounds, on HFNC  GI: Soft, NT, ND, +BS  Ext: MAEW, no c/c/e  Neuro: AAOx3, CN grossly intact, no focal neurologic deficits  Psychiatry: Normal mood, normal affect    Laboratory:  Recent Labs   Lab 10/18/20  0749 10/19/20  0530 10/20/20  0528   WBC 4.28 5.43 5.15   LYMPH 10.7*  0.5* 8.8*  0.5* 8.3*  0.4*   HGB 15.4 14.7 15.2   HCT 46.7 45.1 45.8   * 146* 169     Recent Labs   Lab 10/18/20  0749 10/19/20  0530 10/20/20  0528    138 136   K 4.6 4.1 4.0    102 100   CO2 23 27 26   BUN 18 21* 22*   CREATININE 0.8 0.8 0.8   GLU 94 110 117*   CALCIUM 8.0* 8.1* 8.3*   MG  --  1.6 1.7   PHOS  --  3.1 2.9     Recent Labs   Lab 10/18/20  0749 10/19/20  0530 10/19/20  1017 10/20/20  0528   ALKPHOS 36* 38*  --  48*   ALT 28 25  --  28   AST 47* 40  --  43*   ALBUMIN 3.0* 2.7*  --  2.7*    PROT 6.5 5.9*  --  6.0   BILITOT 0.3 0.3  --  0.5   INR  --   --  1.0  --       Recent Labs     10/18/20  0749 10/18/20  0942  10/20/20  0528 10/20/20  0953   DDIMER  --   --    < >  --  1.34*   FERRITIN 460*  --   --  477*  --    CRP 56.2*  --   --  72.2*  --    LDH  --  396*  --   --   --    BNP <10  --   --   --   --    TROPONINI 0.026  --   --   --   --    LACTATE 1.6  --   --   --   --     < > = values in this interval not displayed.       All labs within the last 24 hours were reviewed.     Microbiology:  Lab Results   Component Value Date    [id number] Positive (A) 10/17/2020       Microbiology Results (last 7 days)     Procedure Component Value Units Date/Time    Blood culture x two cultures. Draw prior to antibiotics. [[id number]] Collected: 10/18/20 0749    Order Status: Completed Specimen: Blood from Peripheral, Antecubital, Right Updated: 10/20/20 1012     Blood Culture, Routine No Growth to date      No Growth to date      No Growth to date    Narrative:      Aerobic and anaerobic    Blood culture x two cultures. Draw prior to antibiotics. [[id number]] Collected: 10/18/20 0751    Order Status: Completed Specimen: Blood from Peripheral, Antecubital, Right Updated: 10/20/20 1012     Blood Culture, Routine No Growth to date      No Growth to date      No Growth to date    Narrative:      Aerobic and anaerobic    Influenza A & B by Molecular [[id number]] Collected: 10/18/20 1309    Order Status: Completed Specimen: Nasopharyngeal Swab Updated: 10/18/20 1409     Influenza A, Molecular Negative     Influenza B, Molecular Negative     Flu A & B Source Nasal swab            Imaging  ECG Results          EKG 12-lead (Final result)  Result time 10/18/20 12:16:47    Final result by Interface, Lab In [facility] (10/18/20 12:16:47)                 Narrative:    Test Reason : Z20.828,    Vent. Rate : 075 BPM     Atrial Rate : 075 BPM     P-R Int : 160 ms          QRS Dur : 144 ms      QT Int : 406 ms       P-R-T Axes :  058 268 033 degrees     QTc Int : 453 ms    Normal sinus rhythm  Left anterior fascicular block  Right bundle branch block  Abnormal ECG  When compared with ECG of 17-NOV-2011 13:02,  Right bundle branch block is now Present  Left anterior fascicular block Now present  Confirmed by FABIANA MILTON MD ([id number]) on 10/18/2020 12:16:38 PM    Referred By: CHAD   SELF           Confirmed By:FABIANA MILTON MD                              No results found for this or any previous visit.    X-Ray Chest AP Portable  Narrative: EXAMINATION:  XR CHEST AP PORTABLE    CLINICAL HISTORY:  Suspected Covid-19 Virus Infection;    TECHNIQUE:  Single frontal view of the chest was performed.    COMPARISON:  October 17, 2020.    FINDINGS:  Monitoring leads are in place.  Heart size pulmonary vessels are similar.  There are increasing patchy parenchymal infiltrates bilaterally.  Impression: Increase in the parenchymal infiltrates bilaterally consistent with history of COVID-19 infection    Electronically signed by: Cecilio Westfall MD  Date:    10/18/2020  Time:    08:39      All imaging within the last 24 hours was reviewed.       Assessment and Plan:    Active Hospital Problems    Diagnosis  POA    *Pneumonia due to COVID-19 virus [U07.1, J12.89]  Yes    Acute hypoxemic respiratory failure [J96.01]  Yes    Thrombocytopenia [D69.6]  Yes    BPH with urinary obstruction [N40.1, N13.8]  Yes      Resolved Hospital Problems   No resolved problems to display.     Viral Pneumonia due to COVID-19  - COVID-19 testing: Collection Date: 9/1/2020 Collection Time:   8:30 AM   - Isolation: Airborne/Droplet. Surgical mask on patient. Notify Infection Control  - Diagnostics: Trend Q48hrs if stable, more frequently if patient decompensating     Laboratory/Study Frequency Result   CBC Admit & Q48h Lymphopenia, thrombocytopenia resolved   CMP Admit & Q48h AST 47 > 40   Magnesium level Admit    D-dimer Admit & Q48h 0.5 > 0.4   Ferritin Admit & Q48h 460,  477   CRP Admit & Q48h 56, 72   CPK Admit & Q24h if elevated 495   LDH Admit & Q48h 396   Vitamin D Admit    BNP Admit    Troponin Admit & Q6h if elevated    Glucose-6-phos dehydrogenase Admit    Procalcitonin Admit    Lipid Panel If starting statin    Rapid influenza Admit    Resp Infection Panel Admit if BMT/organ transplant    Legionella Antigen Admit    Sputum Culture Admit    Blood Culture Admit    Urinalysis & Culture Admit    ECG Admit & Q48h if on HCQ    CXR Admit B/l opacities   Lymphopenia, hyponatremia, hyperferritinemia, elevated troponin, elevated d-dimer, age, and medical comorbidities are significant predictors of poor clinical outcome    - Management: Per [facility] COVID Treatment Protocol (4/15/20)    - Monitoring:   - Telemetry & Continuous Pulse Oximetry    - Nutrition:    - Multivitamin PO daily   - Add Boost supplement   - Vitamin D 1000IU daily if deficient   - Ascorbic acid 500mg PO bid    - Supportive Care:   - acetaminophen 650mg PO Q6hr PRN fever/headache   - loperamide PRN viral diarrhea   - IVF if indicated, restrictive strategy preferred, no maintenance IV if able   - VTE PPx: enoxaparin or heparin SQ unless contraindicated    - Antibiotics:  - if indications, CXR findings, elevated procal. See protocol for alternatives.   - Discontinue early if low concern for bacterial co-infection   - ceftriaxone 1g Q24h x 5 days  - azithromycin 500mg po x1, then 250mg po daily x 4 days    - Investigational Therapies:   - If patient meets criteria  - atorvastatin 40mg po daily  - dexamethasone & remdesivir initiated      Acute Hypoxemic Respiratory Failure  - Order RT consult via Respiratory Communication for COVID Protocols  - Order Incentive Spirometer Q4h  - Order Flutter Valve Q4h  - Continuous Pulse Oximetry  - Goal SpO2 92-96%  - Supplemental O2 via LFNC, VentiMask, or HFNC (see Respiratory Support Oxygen Therapies)  - If wheezing, albuterol INH Q6h scheduled & PRN  - Proning Protocol if patient  is a candidate (see HM Proning Protocol)   - GCS >13, able to self-prone  - If deterioration, may warrant trial of NIPPV and transfer to neg pressure room or immediate ICU consult  - ABG unremarkable  - on 2 liters on admission; oxygen requirements have since increased to 10 liters; wean as tolerated; continue self proning protocol     Thrombocytopenia, resolved               Leukopenia    - secondary to acute infection; monitor daily            VTE High Risk Prophylaxis:   VTE Risk Mitigation (From admission, onward)         Ordered     enoxaparin injection 40 mg  Every 24 hours      10/18/20 0949     IP VTE HIGH RISK PATIENT  Once      10/18/20 0949                  High Risk Conditions:  Patient has a condition that poses threat to life and bodily function: COVID PNA & ACUTE HYPOXIC RESPIRATORY FAILURE        Liza Choi MD  Hospital Medicine Staff  [facility]

## 2020-10-21 NOTE — PLAN OF CARE
Problem: Adult Inpatient Plan of Care  Goal: Plan of Care Review  Outcome: Ongoing, Progressing     Problem: Skin Injury Risk Increased  Goal: Skin Health and Integrity  Outcome: Ongoing, Pro  POC reviewed with patient. Goals of shift is to wean O2 as appropriate, O2>92%. Questions encouraged and answered. Patient states understanding. POC is ongoing.

## 2020-10-21 NOTE — PLAN OF CARE
10/21/20 1354   Post-Acute Status   Post-Acute Authorization Other   Other Status See Comments     SW following patient dc planning needs, Post acute needs to be determined. SW will continue to follow up.     Tahmina Tran LMSW  [facility]   [id number]

## 2020-10-21 NOTE — PROGRESS NOTES
Hospital Medicine  Progress Note  [facility]      Patient Name: Nubia Riggs  MRN:  [id number]  Hospital Medicine Team: [facility] [place] HOSP MED R Liza Choi MD  Date of Admission:  10/18/2020     Length of Stay:  LOS: 3 days     Principal Problem: Suspected Covid-19 Virus Infection    Chief complaint  Shortness of breath x 1 day    HPI  55-year-old male with GERD presents the emergency department with chief complaint of shortness of breath. He reports subjective fever, headache, chills, myalgias, poor appetite, and sweats about 6 days ago. He was diagnosed with COVID-19 on 10/15.  Reports presenting to urgent care yesterday due to 89% pulse ox on room air. Started on levofloxacin and discharged home. Reports taking a dose yesterday and today. Reports using albuterol treatments at home without much relief. Yesterday he began to have cough productive of blood tinged sputum and shortness of breath so he presented to the ED. Denies other worsening or alleviating factors. Denies CP, abdominal pain, nausea, vomiting,dysuria.     Interval History  Improved from 10 L to 7 L today. Continuing to self prone. Improving appetite. SOB and cough improving. C/o congestion.     Review of Systems    Constitutional: Positive for fatigue, poor appetite   HENT:  negative for trouble swallowing.    Eyes: Negative for photophobia, visual disturbance.   Respiratory: Positive for cough, shortness of breath  Cardiovascular: Negative for chest pain, palpitations, leg swelling.   Gastrointestinal: Negative for abdominal pain, constipation, nausea, vomiting.   Endocrine: Negative for cold intolerance, heat intolerance.   Genitourinary: Negative for dysuria, frequency.   Musculoskeletal: Negative for arthralgias, myalgias.   Skin: Negative for rash  Neurological: Negative for dizziness, syncope, light-headedness.   Psychiatric/Behavioral: Negative for confusion, hallucinations, anxiety    Past Medical History:   Diagnosis  Date    ADD (attention deficit disorder)     Anemia     Bradycardia 3/8/2018    3/8/2018: ECG: SB 46 bpm.    GERD (gastroesophageal reflux disease)      Past Surgical History:   Procedure Laterality Date    BACK SURGERY      CERVICAL SPINE SURGERY      facit repair    CHOLECYSTECTOMY      implant      chin    LIPOSUCTION      waiste    RHINOPLASTY TIP      SPINE SURGERY      l5 s1 micro discectomy    SPINE SURGERY      l5 s1 rodes placed    TONSILLECTOMY       Family History   Problem Relation Age of Onset    Cancer Father     Cancer Paternal Grandmother     Heart disease Paternal Grandfather     Diabetes Neg Hx     Colon polyps Neg Hx     Allergic rhinitis Neg Hx     Allergies Neg Hx     Angioedema Neg Hx     Asthma Neg Hx     Atopy Neg Hx     Rhinitis Neg Hx     Urticaria Neg Hx     Eczema Neg Hx     Immunodeficiency Neg Hx      Social History     Socioeconomic History    Marital status:      Spouse name: Not on file    Number of children: 4    Years of education: Not on file    Highest education level: Not on file   Occupational History    Occupation: Contractor     Employer: [employer]    Social Needs    Financial resource strain: Not on file    Food insecurity     Worry: Not on file     Inability: Not on file    Transportation needs     Medical: Not on file     Non-medical: Not on file   Tobacco Use    Smoking status: Former Smoker     Packs/day: 1.00     Years: 15.00     Pack years: 15.00     Quit date: 2012     Years since quittin.5    Smokeless tobacco: Never Used   Substance and Sexual Activity    Alcohol use: No    Drug use: No    Sexual activity: Not on file   Lifestyle    Physical activity     Days per week: Not on file     Minutes per session: Not on file    Stress: Not on file   Relationships    Social connections     Talks on phone: Not on file     Gets together: Not on file     Attends Mu-ism service: Not on file     Active  member of club or organization: Not on file     Attends meetings of clubs or organizations: Not on file     Relationship status: Not on file   Other Topics Concern    Not on file   Social History Narrative    Not on file       Medications  No current facility-administered medications on file prior to encounter.      Current Outpatient Medications on File Prior to Encounter   Medication Sig Dispense Refill    albuterol (ACCUNEB) 1.25 mg/3 mL Nebu Take 3 mLs (1.25 mg total) by nebulization every 6 (six) hours as needed. Rescue 3 Box 6    albuterol (VENTOLIN HFA) 90 mcg/actuation inhaler Inhale 1-2 puffs into the lungs every 4 (four) hours as needed for Wheezing. Rescue 18 g 6    anastrozole (ARIMIDEX) 1 mg Tab       azelastine (ASTELIN) 137 mcg (0.1 %) nasal spray 2 sprays (274 mcg total) by Nasal route 2 (two) times daily. 30 mL 12    azelastine (OPTIVAR) 0.05 % ophthalmic solution Place 1 drop into both eyes 2 (two) times daily as needed. 6 mL 12    cholestyramine (QUESTRAN) 4 gram packet dissolve ONE PACKET in liquid AND TAKE BY MOUTH ONCE TO TWICE daily      coQ10, ubiquinol, 100 mg Cap Take 1 capsule by mouth once daily.        fluticasone furoate-vilanteroL (BREO ELLIPTA) 100-25 mcg/dose diskus inhaler Inhale 1 puff into the lungs once daily. Controller 60 each 11    levoFLOXacin (LEVAQUIN) 500 MG tablet Take 1 tablet (500 mg total) by mouth once daily. for 7 days 7 tablet 0    metoclopramide HCl (REGLAN) 10 MG tablet Take 1 tablet (10 mg total) by mouth every 6 (six) hours as needed (headache). 30 tablet 0    minoxidil (LONITEN) 2.5 MG tablet       montelukast (SINGULAIR) 10 mg tablet Take 10 mg by mouth daily as needed.      papaverine 30 mg/mL injection Add: Phentolamine 2 mg  Add: PGE1 40 mcg    Sig:  Inject 25 units (0.25 mls) as directed 10 mL 11    papaverine 30 mg/mL injection Papaverine 60mg/ml  Add: Phentolamine 20 mg  Add: PGE1 200 mcg  (60-2-20) Double Strength   Add Atropine  0.1mg/ml  Sig:  Inject 20 units (0.20 mls) as directed 10 mL 11    pulse oximeter (PULSE OXIMETER) device by Apply Externally route 2 (two) times a day. Use twice daily at 8 AM and 3 PM and record the value in MyChart as directed. 1 each 0    tadalafil (CIALIS) 20 MG Tab Take 1 tablet (20 mg total) by mouth Every 3 (three) days. 10 tablet 11    tadalafil (CIALIS) 5 MG tablet Take 1 tablet (5 mg total) by mouth daily as needed for Erectile Dysfunction. 30 tablet 12    testosterone cypionate (DEPOTESTOTERONE CYPIONATE) 200 mg/mL injection       VIIBRYD 40 mg Tab tablet       zolpidem (AMBIEN) 5 MG Tab Take 5 mg by mouth nightly.         Allergies  Meperidine and Sulfa (sulfonamide antibiotics)    Physical Examination  Temp:  [98.2 °F (36.8 °C)-98.9 °F (37.2 °C)]   Pulse:  [50-77]   Resp:  [18-24]   BP: (124-127)/(60-66)   SpO2:  [93 %-100 %]     Gen: NAD, conversant  Head: NC, AT  Eyes: PERRLA, EOMI  Throat: MMM, OP clear  CV: RRR, no M/R/G, no peripheral edema, no JVD  Resp: coarse bilateral breath sounds, on HFNC  GI: Soft, NT, ND, +BS  Ext: MAEW, no c/c/e  Neuro: AAOx3, CN grossly intact, no focal neurologic deficits  Psychiatry: Normal mood, normal affect    Laboratory:  Recent Labs   Lab 10/19/20  0530 10/20/20  0528 10/21/20  0225   WBC 5.43 5.15 5.55   LYMPH 8.8*  0.5* 8.3*  0.4* 9.5*  0.5*   HGB 14.7 15.2 15.2   HCT 45.1 45.8 46.4   * 169 187     Recent Labs   Lab 10/19/20  0530 10/20/20  0528 10/21/20  0225    136 140   K 4.1 4.0 4.5    100 103   CO2 27 26 27   BUN 21* 22* 22*   CREATININE 0.8 0.8 0.9    117* 107   CALCIUM 8.1* 8.3* 8.7   MG 1.6 1.7 1.7   PHOS 3.1 2.9 3.1     Recent Labs   Lab 10/19/20  0530 10/19/20  1017 10/20/20  0528 10/21/20  0225   ALKPHOS 38*  --  48* 59   ALT 25  --  28 27   AST 40  --  43* 43*   ALBUMIN 2.7*  --  2.7* 2.7*   PROT 5.9*  --  6.0 6.0   BILITOT 0.3  --  0.5 0.4   INR  --  1.0  --   --       Recent Labs     10/20/20  0528 10/20/20  0953    DDIMER  --  1.34*   FERRITIN 477*  --    CRP 72.2*  --        All labs within the last 24 hours were reviewed.     Microbiology:  Lab Results   Component Value Date    [id number] Positive (A) 10/17/2020       Microbiology Results (last 7 days)     Procedure Component Value Units Date/Time    Blood culture x two cultures. Draw prior to antibiotics. [[id number]] Collected: 10/18/20 0749    Order Status: Completed Specimen: Blood from Peripheral, Antecubital, Right Updated: 10/21/20 1012     Blood Culture, Routine No Growth to date      No Growth to date      No Growth to date      No Growth to date    Narrative:      Aerobic and anaerobic    Blood culture x two cultures. Draw prior to antibiotics. [[id number]] Collected: 10/18/20 0751    Order Status: Completed Specimen: Blood from Peripheral, Antecubital, Right Updated: 10/21/20 1012     Blood Culture, Routine No Growth to date      No Growth to date      No Growth to date      No Growth to date    Narrative:      Aerobic and anaerobic    Influenza A & B by Molecular [[id number]] Collected: 10/18/20 1309    Order Status: Completed Specimen: Nasopharyngeal Swab Updated: 10/18/20 1409     Influenza A, Molecular Negative     Influenza B, Molecular Negative     Flu A & B Source Nasal swab            Imaging  ECG Results          EKG 12-lead (Final result)  Result time 10/18/20 12:16:47    Final result by Interface, Lab In [facility] (10/18/20 12:16:47)                 Narrative:    Test Reason : Z20.828,    Vent. Rate : 075 BPM     Atrial Rate : 075 BPM     P-R Int : 160 ms          QRS Dur : 144 ms      QT Int : 406 ms       P-R-T Axes : 058 268 033 degrees     QTc Int : 453 ms    Normal sinus rhythm  Left anterior fascicular block  Right bundle branch block  Abnormal ECG  When compared with ECG of 17-NOV-2011 13:02,  Right bundle branch block is now Present  Left anterior fascicular block Now present  Confirmed by FABIANA MILTON MD ([id number]) on 10/18/2020 12:16:38  PM    Referred By: CHAD   SELF           Confirmed By:FABIANA MILTON MD                              No results found for this or any previous visit.    X-Ray Chest AP Portable  Narrative: EXAMINATION:  XR CHEST AP PORTABLE    CLINICAL HISTORY:  Suspected Covid-19 Virus Infection;    TECHNIQUE:  Single frontal view of the chest was performed.    COMPARISON:  October 17, 2020.    FINDINGS:  Monitoring leads are in place.  Heart size pulmonary vessels are similar.  There are increasing patchy parenchymal infiltrates bilaterally.  Impression: Increase in the parenchymal infiltrates bilaterally consistent with history of COVID-19 infection    Electronically signed by: Cecilio Westfall MD  Date:    10/18/2020  Time:    08:39      All imaging within the last 24 hours was reviewed.       Assessment and Plan:    Active Hospital Problems    Diagnosis  POA    *Pneumonia due to COVID-19 virus [U07.1, J12.89]  Yes    Acute hypoxemic respiratory failure [J96.01]  Yes    Thrombocytopenia [D69.6]  Yes    BPH with urinary obstruction [N40.1, N13.8]  Yes      Resolved Hospital Problems   No resolved problems to display.     Viral Pneumonia due to COVID-19  - COVID-19 testing: Collection Date: 9/1/2020 Collection Time:   8:30 AM   - Isolation: Airborne/Droplet. Surgical mask on patient. Notify Infection Control  - Diagnostics: Trend Q48hrs if stable, more frequently if patient decompensating     Laboratory/Study Frequency Result   CBC Admit & Q48h Lymphopenia, thrombocytopenia resolved   CMP Admit & Q48h AST 47 > 40 > 43   Magnesium level Admit    D-dimer Admit & Q48h 0.5 > 0.4 > 1.34   Ferritin Admit & Q48h 460, 477   CRP Admit & Q48h 56, 72   CPK Admit & Q24h if elevated 495   LDH Admit & Q48h 396   Vitamin D Admit    BNP Admit    Troponin Admit & Q6h if elevated    Glucose-6-phos dehydrogenase Admit    Procalcitonin Admit    Lipid Panel If starting statin    Rapid influenza Admit    Resp Infection Panel Admit if BMT/organ  transplant    Legionella Antigen Admit    Sputum Culture Admit    Blood Culture Admit    Urinalysis & Culture Admit    ECG Admit & Q48h if on HCQ    CXR Admit B/l opacities   Lymphopenia, hyponatremia, hyperferritinemia, elevated troponin, elevated d-dimer, age, and medical comorbidities are significant predictors of poor clinical outcome    - Management: Per [facility] COVID Treatment Protocol (4/15/20)    - Monitoring:   - Telemetry & Continuous Pulse Oximetry    - Nutrition:    - Multivitamin PO daily   - Add Boost supplement   - Vitamin D 1000IU daily if deficient   - Ascorbic acid 500mg PO bid    - Supportive Care:   - acetaminophen 650mg PO Q6hr PRN fever/headache   - loperamide PRN viral diarrhea   - IVF if indicated, restrictive strategy preferred, no maintenance IV if able   - VTE PPx: enoxaparin or heparin SQ unless contraindicated    - Antibiotics:  - if indications, CXR findings, elevated procal. See protocol for alternatives.   - Discontinue early if low concern for bacterial co-infection   - ceftriaxone 1g Q24h x 5 days  - azithromycin 500mg po x1, then 250mg po daily x 4 days    - Investigational Therapies:   - If patient meets criteria  - atorvastatin 40mg po daily  - dexamethasone & remdesivir initiated      Acute Hypoxemic Respiratory Failure  - Order RT consult via Respiratory Communication for COVID Protocols  - Order Incentive Spirometer Q4h  - Order Flutter Valve Q4h  - Continuous Pulse Oximetry  - Goal SpO2 92-96%  - Supplemental O2 via LFNC, VentiMask, or HFNC (see Respiratory Support Oxygen Therapies)  - If wheezing, albuterol INH Q6h scheduled & PRN  - Proning Protocol if patient is a candidate (see  Proning Protocol)   - GCS >13, able to self-prone  - If deterioration, may warrant trial of NIPPV and transfer to [facility] pressure room or immediate ICU consult  - ABG unremarkable  - on 2 liters on admission. Oxygen requirement increased to 10 liters by the following day. Now on 7 L. Wean as  tolerated; continue self proning protocol     Thrombocytopenia, resolved               Leukopenia    - secondary to acute infection; monitor daily            VTE High Risk Prophylaxis:   VTE Risk Mitigation (From admission, onward)         Ordered     enoxaparin injection 40 mg  Every 24 hours      10/18/20 0949     IP VTE HIGH RISK PATIENT  Once      10/18/20 0949                  High Risk Conditions:  Patient has a condition that poses threat to life and bodily function: COVID PNA & ACUTE HYPOXIC RESPIRATORY FAILURE        Liza Choi MD  Hospital Medicine Staff  [facility]

## 2020-10-22 NOTE — PLAN OF CARE
Currently not stable for discharge - presently on 6LHF O2 weaned down from 8L today. Last dose Remdesivir today. Anticipate no needs with exception of possible HOME OXYGEN to be determined with 6 minute walk test. Will continue to follow    Emma Church RN  Case Management  Ext [phone]       10/22/20 141   Discharge Reassessment   Assessment Type Discharge Planning Reassessment   Provided patient/caregiver education on the expected discharge date and the discharge plan Yes   Do you have any problems affording any of your prescribed medications? No   Discharge Plan A Home   Discharge Plan B Home with family   DME Needed Upon Discharge  none   Patient choice form signed by patient/caregiver N/A   Anticipated Discharge Disposition Home   Can the patient/caregiver answer the patient profile reliably? Yes, cognitively intact   How does the patient rate their overall health at the present time? Fair   Describe the patient's ability to walk at the present time. No restrictions

## 2020-10-22 NOTE — PROGRESS NOTES
Hospital Medicine  Progress Note  [facility]      Patient Name: Nubia Riggs  MRN:  [id number]  Hospital Medicine Team: [facility] HOSP MED R Remigio López MD  Date of Admission:  10/18/2020     Length of Stay:  LOS: 4 days     Principal Problem: Suspected Covid-19 Virus Infection    Chief complaint  Shortness of breath x 1 day    HPI  55-year-old male with GERD presents the emergency department with chief complaint of shortness of breath. He reports subjective fever, headache, chills, myalgias, poor appetite, and sweats about 6 days ago. He was diagnosed with COVID-19 on 10/15.  Reports presenting to urgent care yesterday due to 89% pulse ox on room air. Started on levofloxacin and discharged home. Reports taking a dose yesterday and today. Reports using albuterol treatments at home without much relief. Yesterday he began to have cough productive of blood tinged sputum and shortness of breath so he presented to the ED. Denies other worsening or alleviating factors. Denies CP, abdominal pain, nausea, vomiting,dysuria.     Interval History  Continuing to require 6-8 LPM. D-dimer 13.8 today. Started therapeutic lovenox. Pt continues to feel severely dyspneic at rest. Attempting prolonged proning and extensive use of IS.     Review of Systems    Constitutional: Positive for fatigue, poor appetite   HENT:  negative for trouble swallowing.    Eyes: Negative for photophobia, visual disturbance.   Respiratory: Positive for cough, shortness of breath  Cardiovascular: Negative for chest pain, palpitations, leg swelling.   Gastrointestinal: Negative for abdominal pain, constipation, nausea, vomiting.   Endocrine: Negative for cold intolerance, heat intolerance.   Genitourinary: Negative for dysuria, frequency.   Musculoskeletal: Negative for arthralgias, myalgias.   Skin: Negative for rash  Neurological: Negative for dizziness, syncope, light-headedness.   Psychiatric/Behavioral: Negative for  confusion, hallucinations, anxiety    Past Medical History:   Diagnosis Date    ADD (attention deficit disorder)     Anemia     Bradycardia 3/8/2018    3/8/2018: ECG: SB 46 bpm.    GERD (gastroesophageal reflux disease)      Past Surgical History:   Procedure Laterality Date    BACK SURGERY      CERVICAL SPINE SURGERY      facit repair    CHOLECYSTECTOMY      implant      chin    LIPOSUCTION      waiste    RHINOPLASTY TIP      SPINE SURGERY      l5 s1 micro discectomy    SPINE SURGERY      l5 s1 rodes placed    TONSILLECTOMY       Family History   Problem Relation Age of Onset    Cancer Father     Cancer Paternal Grandmother     Heart disease Paternal Grandfather     Diabetes Neg Hx     Colon polyps Neg Hx     Allergic rhinitis Neg Hx     Allergies Neg Hx     Angioedema Neg Hx     Asthma Neg Hx     Atopy Neg Hx     Rhinitis Neg Hx     Urticaria Neg Hx     Eczema Neg Hx     Immunodeficiency Neg Hx      Social History     Socioeconomic History    Marital status:      Spouse name: Not on file    Number of children: 4    Years of education: Not on file    Highest education level: Not on file   Occupational History    Occupation: Contractor     Employer: [employer]    Social Needs    Financial resource strain: Not on file    Food insecurity     Worry: Not on file     Inability: Not on file    Transportation needs     Medical: Not on file     Non-medical: Not on file   Tobacco Use    Smoking status: Former Smoker     Packs/day: 1.00     Years: 15.00     Pack years: 15.00     Quit date: 2012     Years since quittin.5    Smokeless tobacco: Never Used   Substance and Sexual Activity    Alcohol use: No    Drug use: No    Sexual activity: Not on file   Lifestyle    Physical activity     Days per week: Not on file     Minutes per session: Not on file    Stress: Not on file   Relationships    Social connections     Talks on phone: Not on file     Gets  together: Not on file     Attends [employer] service: Not on file     Active member of club or organization: Not on file     Attends meetings of clubs or organizations: Not on file     Relationship status: Not on file   Other Topics Concern    Not on file   Social History Narrative    Not on file       Medications  No current facility-administered medications on file prior to encounter.      Current Outpatient Medications on File Prior to Encounter   Medication Sig Dispense Refill    albuterol (ACCUNEB) 1.25 mg/3 mL Nebu Take 3 mLs (1.25 mg total) by nebulization every 6 (six) hours as needed. Rescue 3 Box 6    albuterol (VENTOLIN HFA) 90 mcg/actuation inhaler Inhale 1-2 puffs into the lungs every 4 (four) hours as needed for Wheezing. Rescue 18 g 6    anastrozole (ARIMIDEX) 1 mg Tab       azelastine (ASTELIN) 137 mcg (0.1 %) nasal spray 2 sprays (274 mcg total) by Nasal route 2 (two) times daily. 30 mL 12    azelastine (OPTIVAR) 0.05 % ophthalmic solution Place 1 drop into both eyes 2 (two) times daily as needed. 6 mL 12    cholestyramine (QUESTRAN) 4 gram packet dissolve ONE PACKET in liquid AND TAKE BY MOUTH ONCE TO TWICE daily      coQ10, ubiquinol, 100 mg Cap Take 1 capsule by mouth once daily.        fluticasone furoate-vilanteroL (BREO ELLIPTA) 100-25 mcg/dose diskus inhaler Inhale 1 puff into the lungs once daily. Controller 60 each 11    levoFLOXacin (LEVAQUIN) 500 MG tablet Take 1 tablet (500 mg total) by mouth once daily. for 7 days 7 tablet 0    metoclopramide HCl (REGLAN) 10 MG tablet Take 1 tablet (10 mg total) by mouth every 6 (six) hours as needed (headache). 30 tablet 0    minoxidil (LONITEN) 2.5 MG tablet       montelukast (SINGULAIR) 10 mg tablet Take 10 mg by mouth daily as needed.      papaverine 30 mg/mL injection Add: Phentolamine 2 mg  Add: PGE1 40 mcg    Sig:  Inject 25 units (0.25 mls) as directed 10 mL 11    papaverine 30 mg/mL injection Papaverine 60mg/ml  Add: Phentolamine  20 mg  Add: PGE1 200 mcg  (60-2-20) Double Strength   Add Atropine 0.1mg/ml  Sig:  Inject 20 units (0.20 mls) as directed 10 mL 11    pulse oximeter (PULSE OXIMETER) device by Apply Externally route 2 (two) times a day. Use twice daily at 8 AM and 3 PM and record the value in MyChart as directed. 1 each 0    tadalafil (CIALIS) 20 MG Tab Take 1 tablet (20 mg total) by mouth Every 3 (three) days. 10 tablet 11    tadalafil (CIALIS) 5 MG tablet Take 1 tablet (5 mg total) by mouth daily as needed for Erectile Dysfunction. 30 tablet 12    testosterone cypionate (DEPOTESTOTERONE CYPIONATE) 200 mg/mL injection       VIIBRYD 40 mg Tab tablet       zolpidem (AMBIEN) 5 MG Tab Take 5 mg by mouth nightly.         Allergies  Meperidine and Sulfa (sulfonamide antibiotics)    Physical Examination  Temp:  [98 °F (36.7 °C)-99.6 °F (37.6 °C)]   Pulse:  [46-75]   Resp:  [18-23]   BP: (134-151)/(62-81)   SpO2:  [92 %-100 %]     Gen: NAD, conversant  Head: NC, AT  Eyes: PERRLA, EOMI  Throat: MMM, OP clear  CV: RRR, no M/R/G, no peripheral edema, no JVD  Resp: coarse bilateral breath sounds, on HFNC  GI: Soft, NT, ND, +BS  Ext: MAEW, no c/c/e  Neuro: AAOx3, CN grossly intact, no focal neurologic deficits  Psychiatry: Normal mood, normal affect    Laboratory:  Recent Labs   Lab 10/20/20  0528 10/21/20  0225 10/22/20  0515   WBC 5.15 5.55 8.33   LYMPH 8.3*  0.4* 9.5*  0.5* 6.2*  0.5*   HGB 15.2 15.2 15.7   HCT 45.8 46.4 48.6    187 201     Recent Labs   Lab 10/20/20  0528 10/21/20  0225 10/22/20  0515    140 140   K 4.0 4.5 4.4    103 102   CO2 26 27 31*   BUN 22* 22* 26*   CREATININE 0.8 0.9 0.9   * 107 90   CALCIUM 8.3* 8.7 8.5*   MG 1.7 1.7 1.7   PHOS 2.9 3.1 3.6     Recent Labs   Lab 10/19/20  1017 10/20/20  0528 10/21/20  0225 10/22/20  0515   ALKPHOS  --  48* 59 71   ALT  --  28 27 32   AST  --  43* 43* 45*   ALBUMIN  --  2.7* 2.7* 2.7*   PROT  --  6.0 6.0 6.0   BILITOT  --  0.5 0.4 0.5   INR 1.0  --    --   --       Recent Labs     10/22/20  0515 10/22/20  0931   DDIMER  --  13.81*   FERRITIN 435*  --    CRP 36.5*  --        All labs within the last 24 hours were reviewed.     Microbiology:  Lab Results   Component Value Date    [id number] Positive (A) 10/17/2020       Microbiology Results (last 7 days)     Procedure Component Value Units Date/Time    Blood culture x two cultures. Draw prior to antibiotics. [[id number]] Collected: 10/18/20 0749    Order Status: Completed Specimen: Blood from Peripheral, Antecubital, Right Updated: 10/22/20 1012     Blood Culture, Routine No Growth to date      No Growth to date      No Growth to date      No Growth to date      No Growth to date    Narrative:      Aerobic and anaerobic    Blood culture x two cultures. Draw prior to antibiotics. [[id number]] Collected: 10/18/20 0751    Order Status: Completed Specimen: Blood from Peripheral, Antecubital, Right Updated: 10/22/20 1012     Blood Culture, Routine No Growth to date      No Growth to date      No Growth to date      No Growth to date      No Growth to date    Narrative:      Aerobic and anaerobic    Influenza A & B by Molecular [[id number]] Collected: 10/18/20 1309    Order Status: Completed Specimen: Nasopharyngeal Swab Updated: 10/18/20 1409     Influenza A, Molecular Negative     Influenza B, Molecular Negative     Flu A & B Source Nasal swab            Imaging  ECG Results          EKG 12-lead (Final result)  Result time 10/18/20 12:16:47    Final result by Interface, Lab In [facility] (10/18/20 12:16:47)                 Narrative:    Test Reason : Z20.828,    Vent. Rate : 075 BPM     Atrial Rate : 075 BPM     P-R Int : 160 ms          QRS Dur : 144 ms      QT Int : 406 ms       P-R-T Axes : 058 268 033 degrees     QTc Int : 453 ms    Normal sinus rhythm  Left anterior fascicular block  Right bundle branch block  Abnormal ECG  When compared with ECG of 17-NOV-2011 13:02,  Right bundle branch block is now Present  Left  anterior fascicular block Now present  Confirmed by FABIANA MILTON MD ([id number]) on 10/18/2020 12:16:38 PM    Referred By: CHAD   SELF           Confirmed By:FABIANA MILTON MD                              No results found for this or any previous visit.    X-Ray Chest AP Portable  Narrative: EXAMINATION:  XR CHEST AP PORTABLE    CLINICAL HISTORY:  Suspected Covid-19 Virus Infection;    TECHNIQUE:  Single frontal view of the chest was performed.    COMPARISON:  October 17, 2020.    FINDINGS:  Monitoring leads are in place.  Heart size pulmonary vessels are similar.  There are increasing patchy parenchymal infiltrates bilaterally.  Impression: Increase in the parenchymal infiltrates bilaterally consistent with history of COVID-19 infection    Electronically signed by: Cecilio Westfall MD  Date:    10/18/2020  Time:    08:39      All imaging within the last 24 hours was reviewed.       Assessment and Plan:    Active Hospital Problems    Diagnosis  POA    *Pneumonia due to COVID-19 virus [U07.1, J12.89]  Yes    Acute hypoxemic respiratory failure [J96.01]  Yes    Thrombocytopenia [D69.6]  Yes    BPH with urinary obstruction [N40.1, N13.8]  Yes      Resolved Hospital Problems   No resolved problems to display.     Viral Pneumonia due to COVID-19  - COVID-19 testing: Collection Date: 9/1/2020 Collection Time:   8:30 AM   - Isolation: Airborne/Droplet. Surgical mask on patient. Notify Infection Control  - Diagnostics: Trend Q48hrs if stable, more frequently if patient decompensating     Laboratory/Study Frequency Result   CBC Admit & Q48h Lymphopenia, thrombocytopenia resolved   CMP Admit & Q48h AST 47 > 40 > 43   Magnesium level Admit    D-dimer Admit & Q48h 0.5 > 0.4 > 1.34   Ferritin Admit & Q48h 460, 477   CRP Admit & Q48h 56, 72   CPK Admit & Q24h if elevated 495   LDH Admit & Q48h 396   Vitamin D Admit    BNP Admit    Troponin Admit & Q6h if elevated    Glucose-6-phos dehydrogenase Admit    Procalcitonin Admit     Lipid Panel If starting statin    Rapid influenza Admit    Resp Infection Panel Admit if BMT/organ transplant    Legionella Antigen Admit    Sputum Culture Admit    Blood Culture Admit    Urinalysis & Culture Admit    ECG Admit & Q48h if on HCQ    CXR Admit B/l opacities   Lymphopenia, hyponatremia, hyperferritinemia, elevated troponin, elevated d-dimer, age, and medical comorbidities are significant predictors of poor clinical outcome    - Management: Per [facility] COVID Treatment Protocol (4/15/20)    - Monitoring:   - Telemetry & Continuous Pulse Oximetry    - Nutrition:    - Multivitamin PO daily   - Add Boost supplement   - No vitamin D suppl as not deficient   - Ascorbic acid 500mg PO bid    - Supportive Care:   - acetaminophen 650mg PO Q6hr PRN fever/headache   - loperamide PRN viral diarrhea   - IVF if indicated, restrictive strategy preferred, no maintenance IV if able   - VTE PPx: enoxaparin or heparin SQ unless contraindicated    - Antibiotics:   - s/p ceftriaxone and azithromycin. No current evidence of bacterial coinfection.     - Investigational Therapies:   - If patient meets criteria  - atorvastatin 40mg po daily  - dexamethasone & remdesivir initiated  -Started therapeutic lovenox      Acute Hypoxemic Respiratory Failure  - Order RT consult via Respiratory Communication for COVID Protocols  - Order Incentive Spirometer Q4h  - Order Flutter Valve Q4h  - Continuous Pulse Oximetry  - Goal SpO2 92-96%  - Supplemental O2 via LFNC, VentiMask, or HFNC (see Respiratory Support Oxygen Therapies)  - If wheezing, albuterol INH Q6h scheduled & PRN  - Proning Protocol if patient is a candidate (see  Proning Protocol)   - GCS >13, able to self-prone  - If deterioration, may warrant trial of NIPPV and transfer to [facility] pressure room or immediate ICU consult  - ABG unremarkable  - on 2 liters on admission. Oxygen requirement increased to 10 liters by the following day. Now on 7 L. Wean as tolerated; continue self  proning protocol     Thrombocytopenia, resolved               Leukopenia    - secondary to acute infection; monitor daily            VTE High Risk Prophylaxis:   VTE Risk Mitigation (From admission, onward)         Ordered     enoxaparin injection 40 mg  Every 24 hours      10/18/20 0949     IP VTE HIGH RISK PATIENT  Once      10/18/20 0949                  High Risk Conditions:  Patient has a condition that poses threat to life and bodily function: COVID PNA & ACUTE HYPOXIC RESPIRATORY FAILURE        Remigio López MD  Hospital Medicine Staff  [facility]

## 2020-10-22 NOTE — PLAN OF CARE
Patient alert and oriented x 4. Currently on 6L HFNC due to anxiety and patient feeling like he is suffocating. Cough and deep breathing encouraged. Blood pressure and heart rate WNL. Patient is getting codeine for cough. Side rails up x 3. Will continue to monitor.

## 2020-10-22 NOTE — PROGRESS NOTES
The patient granting permission, Nubia Riggs was called today regarding the patient's participation in (IRB #[id number] PI: Jimena).   The Verbal Informed Consent was read and discussed by the consenter. The following was discussed:   Types of specimens to be collected   All medical information released to researchers will be stripped of identifiers and no patient information will be given to anyone outside of this research project.    Participating in a research study is not the same as getting regular medical care and will not improve the patient's health. The purpose of a research study is to gather information.  Being in this study does not interfere with your regular medical care.   The patient does not have to participate in this study. If they do not join, their care at [facility] will not be affected.  The person granting permission was provided adequate time to ask questions regarding the scope and purpose of the study.  Permission was obtained by telephone.   The above statements were read by the person obtaining permission to the person granting permission and witnessed by Tete BLUNT, who also confirmed the patient's consent to the study. The witness information was documented on the verbal consent form as well.  This Verbal Informed Consent process was conducted prior to initiation of any study procedures.

## 2020-10-23 NOTE — NURSING
Patient started to desat at around 0315. HFNC bumped up from 10 to 15 sats still around 88 percent. Next tried 15L non rebreather mask. No change. Called rapid team who assessed the patient. New orders for comfort flow and morphine for extreme anxiety. Rapid following. MD aware.    At the time of writing this the patient is resting peacefully. Side rails up x 2. Call bell within reach. No distress. Will continue to monitor.

## 2020-10-23 NOTE — TELEPHONE ENCOUNTER
Have checked on pt daily. Patient still in hospital and doesn't appear based on notes he will be out of hospital today. Will recheck next week to schedule pt/

## 2020-10-23 NOTE — PROGRESS NOTES
Hospital Medicine  Progress Note  [facility]      Patient Name: Nubia Riggs  MRN:  [id number]  Hospital Medicine Team: [facility] R Remigio López MD  Date of Admission:  10/18/2020     Length of Stay:  LOS: 5 days     Principal Problem: Suspected Covid-19 Virus Infection    Chief complaint  Shortness of breath x 1 day    HPI  55-year-old male with GERD presents the emergency department with chief complaint of shortness of breath. He reports subjective fever, headache, chills, myalgias, poor appetite, and sweats about 6 days ago. He was diagnosed with COVID-19 on 10/15.  Reports presenting to urgent care yesterday due to 89% pulse ox on room air. Started on levofloxacin and discharged home. Reports taking a dose yesterday and today. Reports using albuterol treatments at home without much relief. Yesterday he began to have cough productive of blood tinged sputum and shortness of breath so he presented to the ED. Denies other worsening or alleviating factors. Denies CP, abdominal pain, nausea, vomiting,dysuria.     D-dimer 13.8 on 10/22. Started therapeutic lovenox.     Interval History  Pt requiring increasing O2, up to 40 LPM HFNC at 100% O2. Respiratory therapist attempting nebulized medications rather than inhalers to see if this therapy improves symptoms. Pt upset that hypoxia is worsening. Feels anxious about dyspnea. Reports excellent compliance with IS and proning.     Review of Systems    Constitutional: Positive for fatigue, poor appetite   HENT:  negative for trouble swallowing.    Eyes: Negative for photophobia, visual disturbance.   Respiratory: Positive for cough, shortness of breath  Cardiovascular: Negative for chest pain, palpitations, leg swelling.   Gastrointestinal: Negative for abdominal pain, constipation, nausea, vomiting.   Endocrine: Negative for cold intolerance, heat intolerance.   Genitourinary: Negative for dysuria, frequency.   Musculoskeletal:  Negative for arthralgias, myalgias.   Skin: Negative for rash  Neurological: Negative for dizziness, syncope, light-headedness.   Psychiatric/Behavioral: Negative for confusion, hallucinations, anxiety    Past Medical History:   Diagnosis Date    ADD (attention deficit disorder)     Anemia     Bradycardia 3/8/2018    3/8/2018: ECG: SB 46 bpm.    GERD (gastroesophageal reflux disease)      Past Surgical History:   Procedure Laterality Date    BACK SURGERY      CERVICAL SPINE SURGERY      facit repair    CHOLECYSTECTOMY      implant      chin    LIPOSUCTION      waiste    RHINOPLASTY TIP      SPINE SURGERY      l5 s1 micro discectomy    SPINE SURGERY      l5 s1 rodes placed    TONSILLECTOMY       Family History   Problem Relation Age of Onset    Cancer Father     Cancer Paternal Grandmother     Heart disease Paternal Grandfather     Diabetes Neg Hx     Colon polyps Neg Hx     Allergic rhinitis Neg Hx     Allergies Neg Hx     Angioedema Neg Hx     Asthma Neg Hx     Atopy Neg Hx     Rhinitis Neg Hx     Urticaria Neg Hx     Eczema Neg Hx     Immunodeficiency Neg Hx      Social History     Socioeconomic History    Marital status:      Spouse name: Not on file    Number of children: 4    Years of education: Not on file    Highest education level: Not on file   Occupational History    Occupation: Contractor     Employer: [employer]    Social Needs    Financial resource strain: Not on file    Food insecurity     Worry: Not on file     Inability: Not on file    Transportation needs     Medical: Not on file     Non-medical: Not on file   Tobacco Use    Smoking status: Former Smoker     Packs/day: 1.00     Years: 15.00     Pack years: 15.00     Quit date: 2012     Years since quittin.5    Smokeless tobacco: Never Used   Substance and Sexual Activity    Alcohol use: No    Drug use: No    Sexual activity: Not on file   Lifestyle    Physical activity     Days  per week: Not on file     Minutes per session: Not on file    Stress: Not on file   Relationships    Social connections     Talks on phone: Not on file     Gets together: Not on file     Attends Church service: Not on file     Active member of club or organization: Not on file     Attends meetings of clubs or organizations: Not on file     Relationship status: Not on file   Other Topics Concern    Not on file   Social History Narrative    Not on file       Medications  No current facility-administered medications on file prior to encounter.      Current Outpatient Medications on File Prior to Encounter   Medication Sig Dispense Refill    albuterol (ACCUNEB) 1.25 mg/3 mL Nebu Take 3 mLs (1.25 mg total) by nebulization every 6 (six) hours as needed. Rescue 3 Box 6    albuterol (VENTOLIN HFA) 90 mcg/actuation inhaler Inhale 1-2 puffs into the lungs every 4 (four) hours as needed for Wheezing. Rescue 18 g 6    anastrozole (ARIMIDEX) 1 mg Tab       azelastine (ASTELIN) 137 mcg (0.1 %) nasal spray 2 sprays (274 mcg total) by Nasal route 2 (two) times daily. 30 mL 12    azelastine (OPTIVAR) 0.05 % ophthalmic solution Place 1 drop into both eyes 2 (two) times daily as needed. 6 mL 12    cholestyramine (QUESTRAN) 4 gram packet dissolve ONE PACKET in liquid AND TAKE BY MOUTH ONCE TO TWICE daily      coQ10, ubiquinol, 100 mg Cap Take 1 capsule by mouth once daily.        fluticasone furoate-vilanteroL (BREO ELLIPTA) 100-25 mcg/dose diskus inhaler Inhale 1 puff into the lungs once daily. Controller 60 each 11    levoFLOXacin (LEVAQUIN) 500 MG tablet Take 1 tablet (500 mg total) by mouth once daily. for 7 days 7 tablet 0    metoclopramide HCl (REGLAN) 10 MG tablet Take 1 tablet (10 mg total) by mouth every 6 (six) hours as needed (headache). 30 tablet 0    minoxidil (LONITEN) 2.5 MG tablet       montelukast (SINGULAIR) 10 mg tablet Take 10 mg by mouth daily as needed.      papaverine 30 mg/mL injection Add:  Phentolamine 2 mg  Add: PGE1 40 mcg    Sig:  Inject 25 units (0.25 mls) as directed 10 mL 11    papaverine 30 mg/mL injection Papaverine 60mg/ml  Add: Phentolamine 20 mg  Add: PGE1 200 mcg  (60-2-20) Double Strength   Add Atropine 0.1mg/ml  Sig:  Inject 20 units (0.20 mls) as directed 10 mL 11    pulse oximeter (PULSE OXIMETER) device by Apply Externally route 2 (two) times a day. Use twice daily at 8 AM and 3 PM and record the value in [employer] as directed. 1 each 0    tadalafil (CIALIS) 20 MG Tab Take 1 tablet (20 mg total) by mouth Every 3 (three) days. 10 tablet 11    tadalafil (CIALIS) 5 MG tablet Take 1 tablet (5 mg total) by mouth daily as needed for Erectile Dysfunction. 30 tablet 12    testosterone cypionate (DEPOTESTOTERONE CYPIONATE) 200 mg/mL injection       VIIBRYD 40 mg Tab tablet       zolpidem (AMBIEN) 5 MG Tab Take 5 mg by mouth nightly.         Allergies  Meperidine and Sulfa (sulfonamide antibiotics)    Physical Examination  Temp:  [98 °F (36.7 °C)-100 °F (37.8 °C)]   Pulse:  [48-82]   Resp:  [18-34]   BP: (121-155)/(60-68)   SpO2:  [74 %-97 %]     Gen: NAD, conversant  Head: NC, AT  Eyes: PERRLA, EOMI  Throat: MMM, OP clear  CV: RRR, no M/R/G, no peripheral edema, no JVD  Resp: coarse bilateral breath sounds, on HFNC  GI: Soft, NT, ND, +BS  Ext: MAEW, no c/c/e  Neuro: AAOx3, CN grossly intact, no focal neurologic deficits  Psychiatry: Normal mood, normal affect    Laboratory:  Recent Labs   Lab 10/21/20  0225 10/22/20  0515 10/23/20  0232   WBC 5.55 8.33 8.49   LYMPH 9.5*  0.5* 6.2*  0.5* 5.2*  0.4*   HGB 15.2 15.7 15.3   HCT 46.4 48.6 46.9    201 161     Recent Labs   Lab 10/21/20  0225 10/22/20  0515 10/23/20  0232    140 140   K 4.5 4.4 4.5    102 102   CO2 27 31* 27   BUN 22* 26* 26*   CREATININE 0.9 0.9 0.9    90 86   CALCIUM 8.7 8.5* 8.4*   MG 1.7 1.7 1.9   PHOS 3.1 3.6 3.0     Recent Labs   Lab 10/19/20  1017  10/21/20  0225 10/22/20  0515 10/23/20  0232    ALKPHOS  --    < > 59 71 76   ALT  --    < > 27 32 30   AST  --    < > 43* 45* 43*   ALBUMIN  --    < > 2.7* 2.7* 2.6*   PROT  --    < > 6.0 6.0 5.8*   BILITOT  --    < > 0.4 0.5 0.7   INR 1.0  --   --   --   --     < > = values in this interval not displayed.      Recent Labs     10/22/20  0515 10/22/20  0931   DDIMER  --  13.81*   FERRITIN 435*  --    CRP 36.5*  --        All labs within the last 24 hours were reviewed.     Microbiology:  Lab Results   Component Value Date    [id number] Positive (A) 10/17/2020       Microbiology Results (last 7 days)     Procedure Component Value Units Date/Time    Blood culture x two cultures. Draw prior to antibiotics. [[id number]] Collected: 10/18/20 0749    Order Status: Completed Specimen: Blood from Peripheral, Antecubital, Right Updated: 10/23/20 1012     Blood Culture, Routine No growth after 5 days.    Narrative:      Aerobic and anaerobic    Blood culture x two cultures. Draw prior to antibiotics. [[id number]] Collected: 10/18/20 0751    Order Status: Completed Specimen: Blood from Peripheral, Antecubital, Right Updated: 10/23/20 1012     Blood Culture, Routine No growth after 5 days.    Narrative:      Aerobic and anaerobic    Influenza A & B by Molecular [[id number]] Collected: 10/18/20 1309    Order Status: Completed Specimen: Nasopharyngeal Swab Updated: 10/18/20 1409     Influenza A, Molecular Negative     Influenza B, Molecular Negative     Flu A & B Source Nasal swab            Imaging  ECG Results          EKG 12-lead (Final result)  Result time 10/18/20 12:16:47    Final result by Interface, Lab In [facility] (10/18/20 12:16:47)                 Narrative:    Test Reason : Z20.828,    Vent. Rate : 075 BPM     Atrial Rate : 075 BPM     P-R Int : 160 ms          QRS Dur : 144 ms      QT Int : 406 ms       P-R-T Axes : 058 268 033 degrees     QTc Int : 453 ms    Normal sinus rhythm  Left anterior fascicular block  Right bundle branch block  Abnormal ECG  When compared  with ECG of 17-NOV-2011 13:02,  Right bundle branch block is now Present  Left anterior fascicular block Now present  Confirmed by FABIAAN MILTON MD ([id number]) on 10/18/2020 12:16:38 PM    Referred By: [facility]   SELF           Confirmed By:FABIANA MILTON MD                              No results found for this or any previous visit.    X-Ray Chest AP Portable  Narrative: EXAMINATION:  XR CHEST AP PORTABLE    CLINICAL HISTORY:  Suspected Covid-19 Virus Infection;    TECHNIQUE:  Single frontal view of the chest was performed.    COMPARISON:  October 17, 2020.    FINDINGS:  Monitoring leads are in place.  Heart size pulmonary vessels are similar.  There are increasing patchy parenchymal infiltrates bilaterally.  Impression: Increase in the parenchymal infiltrates bilaterally consistent with history of COVID-19 infection    Electronically signed by: Cecilio Westfall MD  Date:    10/18/2020  Time:    08:39      All imaging within the last 24 hours was reviewed.       Assessment and Plan:    Active Hospital Problems    Diagnosis  POA    *Pneumonia due to COVID-19 virus [U07.1, J12.89]  Yes    Acute hypoxemic respiratory failure [J96.01]  Yes    Thrombocytopenia [D69.6]  Yes    BPH with urinary obstruction [N40.1, N13.8]  Yes      Resolved Hospital Problems   No resolved problems to display.     Viral Pneumonia due to COVID-19  - COVID-19 testing: Collection Date: 9/1/2020 Collection Time:   8:30 AM   - Isolation: Airborne/Droplet. Surgical mask on patient. Notify Infection Control  - Diagnostics: Trend Q48hrs if stable, more frequently if patient decompensating     Laboratory/Study Frequency Result   CBC Admit & Q48h Lymphopenia, thrombocytopenia resolved   CMP Admit & Q48h AST 47 > 40 > 43   Magnesium level Admit    D-dimer Admit & Q48h 0.5 > 0.4 > 1.34   Ferritin Admit & Q48h 460, 477   CRP Admit & Q48h 56, 72   CPK Admit & Q24h if elevated 495   LDH Admit & Q48h 396   Vitamin D Admit    BNP Admit    Troponin Admit &  Q6h if elevated    Glucose-6-phos dehydrogenase Admit    Procalcitonin Admit    Lipid Panel If starting statin    Rapid influenza Admit    Resp Infection Panel Admit if BMT/organ transplant    Legionella Antigen Admit    Sputum Culture Admit    Blood Culture Admit    Urinalysis & Culture Admit    ECG Admit & Q48h if on HCQ    CXR Admit B/l opacities   Lymphopenia, hyponatremia, hyperferritinemia, elevated troponin, elevated d-dimer, age, and medical comorbidities are significant predictors of poor clinical outcome    - Management: Per [facility] COVID Treatment Protocol (4/15/20)    - Monitoring:   - Telemetry & Continuous Pulse Oximetry    - Nutrition:    - Multivitamin PO daily   - Add Boost supplement   - No vitamin D suppl as not deficient   - Ascorbic acid 500mg PO bid    - Supportive Care:   - acetaminophen 650mg PO Q6hr PRN fever/headache   - loperamide PRN viral diarrhea   - IVF if indicated, restrictive strategy preferred, no maintenance IV if able   - VTE PPx: enoxaparin or heparin SQ unless contraindicated    - Antibiotics:   - s/p ceftriaxone and azithromycin. No current evidence of bacterial coinfection.     - Investigational Therapies:  - atorvastatin 40mg po daily  - dexamethasone & remdesivir initiated. May need extended course.   - Started therapeutic lovenox. Will likely need at discharge given pulmonary embolus is probable.       Acute Hypoxemic Respiratory Failure  - Order RT consult via Respiratory Communication for COVID Protocols  - Order Incentive Spirometer Q4h  - Order Flutter Valve Q4h  - Continuous Pulse Oximetry  - Goal SpO2 92-96%  - Supplemental O2 via LFNC, VentiMask, or HFNC (see Respiratory Support Oxygen Therapies)  - If wheezing, albuterol INH Q6h scheduled & PRN  - Proning Protocol if patient is a candidate (see  Proning Protocol)   - GCS >13, able to self-prone  - If deterioration, may warrant trial of NIPPV and transfer to [facility] pressure room or immediate ICU consult  - ABG  unremarkable  - on 2 liters on admission. Oxygen requirement increased to 10 liters by the following day. Now on 40 LPM. continue self proning protocol     Thrombocytopenia, resolved               Leukopenia, resolved          VTE High Risk Prophylaxis:   VTE Risk Mitigation (From admission, onward)         Ordered     enoxaparin injection 80 mg  Every 12 hours (non-standard times)      10/22/20 1147     IP VTE HIGH RISK PATIENT  Once      10/18/20 0949                  High Risk Conditions:  Patient has a condition that poses threat to life and bodily function: COVID PNA & ACUTE HYPOXIC RESPIRATORY FAILURE    Discharge Planning   JOSEPH: 10/26/2020     Code Status: Full Code   Is the patient medically ready for discharge?:     Reason for patient still in hospital (select all that apply): Patient unstable, Patient trending condition and Treatment  Discharge Plan A: Home            Remigio López MD  [facility] Medicine Staff  [facility]

## 2020-10-23 NOTE — RESPIRATORY THERAPY
10/23/20 1409   PRE-TX-O2   O2 Device (Oxygen Therapy) Comfort Flow   $ Is the patient on High Flow Oxygen? Yes   Flow (L/min) 40   Oxygen Concentration (%) 90  (Titrated from 100)   SpO2 95 %   Pulse Oximetry Type Continuous   $ Pulse Oximetry - Multiple Charge Pulse Oximetry - Multiple   Pulse 63   Resp (!) 26   Positioning   Body Position prone   Head of Bed (HOB) HOB flat   Positioning/Transfer Devices pillows;in use     Patient oxygen saturation better when proned. Have done CPT percussions on back and tolerated well. RT and RN at bedside will continue to monitor

## 2020-10-23 NOTE — PLAN OF CARE
Patient resting quietly in bed. Chest rising and falling. All needs met. Will continue to monitor. See prior note for overnight events.

## 2020-10-23 NOTE — TELEPHONE ENCOUNTER
----- Message from Ashanti Keita sent at 10/19/2020  1:39 PM CDT -----  Contact: [phone]  Caller is requesting an earlier appt than we can schedule.  Caller declined first available appointment listed below. Caller will not accept being placed on the wait list and is requesting a message be sent to the provider.  When is the next available appointment:  December  Reason for the appointment:  Hospital follow up/ establish care  Patient preference of timeframe to be scheduled:  first week of November  Comments:  Please contact patient after discharged day 10/22

## 2020-10-23 NOTE — RESEARCH
Nubia Riggs is a participant in the COVID ATTACC Study ([id number], Dr Santos Padilla PI). This study randomizes patients between therapeutic and prophylactic dose heparin to see which best improves COVID outcomes. They consented to the study on 10/19/2020. They have been randomized to the usual care arm of the study.     This note is signifying research's acknowledgement of early termination of study drug dose. Mr. Riggs's D-Dimer increased significantly, and was subsequently placed on a therapeutic dose of lovenox. This has been discussed with Study PI and appropriate notes in the EDC have been made.     Please contact [phone] Carlota Rock about any future heparin dose changes or questions.

## 2020-10-23 NOTE — PLAN OF CARE
Patient AAOx4.VSS. Patient remains injury free. Patient refused breakfast but ate a jello and  chicken broth for lunch. No IV or Po pain medication rec'd today. New orders fro CPT. Remains on Comfort O2. Sats 99% lying in the prone position. No acute distress today. Patient sable.

## 2020-10-24 PROBLEM — U07.1 COVID-19: Status: ACTIVE | Noted: 2020-01-01

## 2020-10-24 PROBLEM — J45.909 REACTIVE AIRWAY DISEASE: Status: ACTIVE | Noted: 2020-01-01

## 2020-10-24 NOTE — NURSING
"This RN was notified by Charge RN, Ana, that patient experienced an episode of desaturation into 80's while at current O2 setting on Comfort Flow 40L / 100%. Patient became anxious when phlebotomy came to collect labs for this morning but patient had yet to recover from O2 desaturation. Charge RN stated that she had patient sit up in bed and elevated HOB and encouraged patient to take deep breaths. This RN immediately went to patient's room to assess patient and found patient to be sitting up in bed with O2 saturation in mid to high 80's. Patient encouraged to take, slow deep breaths. Vital signs hemodynamically stable. RT, Tanner, notified of patient's desaturation.    0529 RT at bedside assessing patient and encouraging patient to continue to take slow, deep breaths. Patient becoming anxious and upset due to O2 desaturation and stated, "I'm upset because I was doing so well. I feel fine. I didn't even go down overnight." Patient given reassurance by RT and this RN - tolerated reassurance well. Patient placed on Comfort Flow 60L / 100% at this time.    0534 15WT Rapid Reponse RN, León, notified of patient's change in status. Rapid Response RN en route to assess patient. Med Team R to be paged and notified of change in patient's status.    0539 First attempt to call Dr. Spencer. Awaiting call back.    0541 Dr. Spencer stat paged. Awaiting call back.    0544 Received call back from Dr. Spencer. Update provided regarding patient's change in status and increased oxygenation needs exceeding Comfort Flow. Dr. Spencer to assess patient.    0547 Dr. Spencer at bedside assessing patient. 16WT ICU Chief Resident to assess patient.    0600 16WT Chief Resident, Dr. Beatty, at bedside assessing patient. Patient to remain on unit at current Comfort Flow settings until day team arrives with Pulmonologist in the event patient requires intubation en route to ICU.     0615 Confirmed plan of care with Chief Resident and Rapid Response " RN. Rapid Response RN instructed this RN to monitor patient 1:1 and notify RRT if patient has any visible changes in current status.    0630 Patient intermittently began falling asleep while lying dormant in bed. Increased work of breathing, tachypnea with respirations in 40's-50's noted with desaturation into mid to low 80's from 92% oxygenation on Comfort Flow settings. This RN entered patient's room and woke patient up for assessment and encouraged deep breathing. Patient stated that he felt fine and began looking at monitor to see what O2 saturation was. This RN encouraged patient not to focus on numbers but to take slow, deep breaths and breathe as patient was becoming increasingly anxious. This RN exited room once patient felt reassured and monitored patient outside of room.    0655 Patient fell back asleep and began to increase work of breathing and desaturated to 85% from 90's while asleep. Central cyanosis noted. Respirations in 40's. This RN woke patient up again and called RRT. Notified Day Shift Charge RN, Briana, of call.    0652 15WT Rapid Response Nurse, León, on unit to assess patient. Patient seen desaturating into 80's at Comfort Flow settings. Confirmed patient's change in color to dusky with central cyanosis. No longer awaiting Day Shift Team's arrival. Patient to be transported with Day Shift Rapid Response RN, Day Shift RT and this RN to 16WT ICU.    0700 This RN, Day Shift Rapid Response Nurse, 15WT Day shift RT, at bedside placing patient on CPAP for transfer to ICU.    0706 Rapid Response RN's performed bedside report. Patient placed on CPAP and tolerating well. Patient to be transferred to ICU. Bedside report to be given to Receiving RN.    0730 Bedside report given to Receiving RNGill. Patient stable on CPAP and tolerating well with O2 in mid 90's. Vital signs stable and all belongings remain with patient.

## 2020-10-24 NOTE — NURSING
Patient tolerated high-flow x 2 hrs. Patient had several episodes of desaturation between 83-85%, patient placed back on Cpap @ 100%.Patient has a history of severe anxiety & ADD ,once these symptoms are controlled, patient respiratory status will improve.  Team # 2 notified per nursing and med's requested to assist patient with anxiety. Ativan  0.5 mg x 1 dose ordered.All safety measures in place, will continue to assess patient status.

## 2020-10-24 NOTE — CARE UPDATE
Rapid Response Respiratory Therapy Note      Code Status: Full Code   : 1965  Bed: 04830/94027 A:   MRN: [id number]  Time page Received: 0655  Time Rapid Response RT at Bedside: 0658  Time Rapid Response RT left Bedside: 0705  Report given to: Hira FOX     Evaluated patient for: Desaturation    BACKGROUND     Patient has a past medical history of ADD (attention deficit disorder), Anemia, Bradycardia, and GERD (gastroesophageal reflux disease).    ASSESSMENT/INTERVENTIONS     Upon arrival in room patient resting on HFNC (AirVo) 60L 100%. Patient began to desat below baseline and a rapid response was initiated. Patient's sats began to increase back to baseline. Waiting for critical care team to upgrade to 16R ICU.     Pulse: 64 Respiratory rate: 26 BP: BP: (!) 125/57 SpO2:92  Level of Consciousness: Level of Consciousness (AVPU): alert  Respiratory Effort: Respiratory Effort: Mild, Short of breath  Expansion/Accessory Muscle Usage: Expansion/Accessory Muscles/Retractions: no retractions, expansion symmetric  All Lung Field Breath Sounds: All Lung Fields Breath Sounds: Anterior:, Posterior:, diminished  RASHAD Breath Sounds: diminished  LLL Breath Sounds: diminished  RUL Breath Sounds: diminished  RML Breath Sounds: diminished  RLL Breath Sounds: diminished  O2 Device/Concentration: HFNC 60L 100%  Most recent blood gas: No results for input(s): PH, PCO2, PO2, HCO3, POCSATURATED, BE, LACTATE in the last 72 hours.  Current Respiratory Care Orders: Respiratory Orders  (From admission, onward)  Start   Ordered   10/23/20 1900  Inhalation Treatment Q6H Every 6 hours ( of 25 released)    Release    10/23/20 1341   10/23/20 1440  Oxygen Continuous Continuous     References: Oxygen Titration Protocol   Question Answer Comment   Device type: High flow    Device: Comfort Flow    LPM: 10-60    Titrate O2 per Oxygen Titration Protocol: Yes    To maintain SpO2 goal of: >= 88%    Notify MD of: Inability to achieve  desired SpO2; Sudden change in patient status and requires 20% increase in FiO2; Patient requires >60% FiO2        10/23/20 1439   10/23/20 1200  Chest physiotherapy Q4H Every 4 hours (10 of 39 released)    Release   Comments: Flutter or Percussions   Question: Indications: Answer: ATELECTASIS NONRESPONSIVE TO TX    10/23/20 0932   10/18/20 1600  Incentive spirometry Every 4 hours (39 of 56 released)    Release   References: [facility] Clinical Practice Guidelines    10/18/20 1441   10/18/20 1200  Please use the respiratory therapy protocols to escalate and de-escalate therapy: Airway Clearance Protocol, Airway Clearance Protocol, Atelectasis/Hyperinflation Protocol, Bronchodilator Protocol Every 4 hours (40 of 57 released)    Release   Comments: Assess patient q4h until patient is below 35% oxygen    10/18/20 0949   10/18/20 0945  Pulse Oximetry Continuous Continuous     Comments: Continuous if hypoxic or on >3LPM Nasal cannula    10/18/20 0949          NIPPV: No Surgical airway: No Vent: No  ETCO2 monitored:    Ambu at bedside: Ambu bag with the patient?: Yes, Adult Ambu    RECOMMENDATIONS   ?  We recommend: transport patient on bipap    ESCALATION      Discussed plan of care with primary RTHira.     FOLLOW-UP     Disposition: Remain in room 72115.    Please call back the Rapid Response RT, Liza German, LAUREN at x [phone] for any questions or concerns.

## 2020-10-24 NOTE — CARE UPDATE
"RAPID RESPONSE NURSE NOTE     Admit Date: 10/18/2020  LOS: 6  Code Status: Full Code   Date of Consult: 10/24/2020  : 1965  Age: 55 y.o.  Weight:   Wt Readings from Last 1 Encounters:   10/18/20 84.5 kg (186 lb 4.6 oz)     Sex: male  Race: White   Bed: 08732/96408 A:   MRN: [id number]  Time Rapid Response Team page Received: 0648  Time Rapid Response Team at Bedside: 0650  Time Rapid Response Team left Bedside: Pending transfer  Was the patient discharged from an ICU this admission?   no  Was the patient discharged from a PACU within last 24 hours?  no  Did the patient receive conscious sedation/general anesthesia within last 24 hours?  no  Was the patient in the ED within the past 24 hours?  no  Was the patient started on NIPPV within the past 24 hours?  yes  Did this progress into an ARC or CPA:  no  Attending Physician: Parminder Marin*  Primary Service: [facility] [place] [facility] R  Consult Requested By: Parminder Marin*     SITUATION     Notified by code pager.  Reason for alert: Respiratory  Called to evaluate the patient for Respiratory    BACKGROUND     Why is the patient in the hospital?: Pneumonia due to COVID-19 virus    Patient has a past medical history of ADD (attention deficit disorder), Anemia, Bradycardia, and GERD (gastroesophageal reflux disease).    ASSESSMENT/INTERVENTIONS     BP (!) 125/57 (BP Location: Left arm, Patient Position: Lying)   Pulse 64   Temp 98.7 °F (37.1 °C) (Oral)   Resp 14   Ht 5' 10" (1.778 m)   Wt 84.5 kg (186 lb 4.6 oz)   SpO2 (!) 92%   BMI 26.73 kg/m²     What did you find: Rapid response paged for desaturation to mid 80s. Pt recently evaluated by RRT and CCS, transfer order in place. Pt transitioned to Bipap with improvement of Spo2 to 95%.    RECOMMENDATIONS     We recommend: Pt to be transferred to ICU on Bipap.     FOLLOW-UP/CONTINGENCY PLAN     Call the Rapid Response Nurse, León Mejia RN at x [phone] for additional questions or " concerns.    PHYSICIAN ESCALATION     Orders received and case discussed with Dr. Beatty.    Disposition: Tx in ICU bed 39088.

## 2020-10-24 NOTE — HPI
55-year-old male with PMhx of GERD, allergic rinhitis, chronic cough (recently saw pulm and was diagnosed with reactive airway disease, on albuterol PRN) presented the emergency department on 10/18 with chief complaint of shortness of breath. He reports subjective fever, headache, chills, myalgias, poor appetite, and sweats about 6 days ago. He was diagnosed with COVID-19 on 10/15.  Reports presenting to urgent care 10/16 due to 89% pulse ox on room air. Started on levofloxacin and discharged home.  Reports using albuterol treatments at home without much relief.He noticed to have cough productive of blood tinged sputum and shortness of breath so he presented to the ED.     He received ceftria/azithro (x1 each in the ED). He started Remdesivir and completed 5 doses on 10/22, continues on dexamethasone. Patient is on his 7th day of hospital admission and has been requiring higher doses of O2 to keep sats >90%. Today he went up to 60L on 100% FiO2 and thus Critical Care was consulted.

## 2020-10-24 NOTE — PLAN OF CARE
Problem: Adult Inpatient Plan of Care  Goal: Plan of Care Review  Outcome: Ongoing, Progressing     Problem: Adult Inpatient Plan of Care  Goal: Optimal Comfort and Wellbeing  Outcome: Ongoing, Progressing     Problem: Skin Injury Risk Increased  Goal: Skin Health and Integrity  Outcome: Ongoing, Progressing     Problem: Fall Injury Risk  Goal: Absence of Fall and Fall-Related Injury  Outcome: Ongoing, Progressing

## 2020-10-24 NOTE — CARE UPDATE
"RAPID RESPONSE NURSE PROACTIVE ROUNDING NOTE     Time of Visit:     Admit Date: 10/18/2020  LOS: 5  Code Status: Full Code   Date of Visit: 10/23/2020  : 1965  Age: 55 y.o.  Sex: male  Race: White  Bed: 12514/53698 A:   MRN: [id number]  Was the patient discharged from an ICU this admission? no   Was the patient discharged from a PACU within last 24 hours?  no  Did the patient receive conscious sedation/general anesthesia in last 24 hours?  no  Was the patient in the ED within the past 24 hours?  no  Was the patient started on NIPPV within the past 24 hours?  no  Attending Physician: Remigio López MD  [place] [facility] [place] [facility] [place]    ASSESSMENT     Notified by charge RN via phone call.  Reason for alert: Respiratory Distress    Diagnosis: Pneumonia due to COVID-19 virus    Abnormal Vital Signs: BP (!) 141/67 (BP Location: Left arm, Patient Position: Sitting)   Pulse (!) 58   Temp 99.3 °F (37.4 °C) (Oral)   Resp 18   Ht 5' 10" (1.778 m)   Wt 84.5 kg (186 lb 4.6 oz)   SpO2 98%   BMI 26.73 kg/m²      Clinical Issues: Respiratory    Patient  has a past medical history of ADD (attention deficit disorder), Anemia, Bradycardia, and GERD (gastroesophageal reflux disease).      Asked to round on pt d/t high O2 demands. On assessment pt lying on side with no apparent distress. Pt endorses much improvement in breathing since application of comfort flow. Currently requiring 40L/100% to maintain Spo2 98%. RR 28-30, BP stable. HR 58-62 NSR.      INTERVENTIONS/ RECOMMENDATIONS     Continue with current plan of care. Utilize prn medications for cough and SOB. Pt oxygen requirements remain high. Low threshold for escalation of care. Please notify RRT with any increase in WOB or decrease in SpO2.     Discussed plan of care with RNAlexandra.    PHYSICIAN ESCALATION     Yes/No  no    Orders received and case discussed with NA.    Disposition: Remain in room 89939.    FOLLOW-UP     Call back the Rapid Response " Nurse, León Mejia RN at [phone] for additional questions or concerns.

## 2020-10-24 NOTE — ASSESSMENT & PLAN NOTE
55-year-old male with PMhx of GERD, allergic rinhitis, chronic cough/ reactive airway disease, presenting due to subjective fever, chills, myalgias, COVID positive.   Admitted to Hospital Medicine on 10/18 with following admission labs:  D-dimer Admit & Q48h 0.5   Ferritin Admit & Q48h 460   CRP Admit & Q48h 56   CPK Admit & Q24h if elevated 495   LDH Admit & Q48h 396     Patient has continued to deteriorate needing higher oxygen requirements and thus Critical Care Medicine was consulted. Currently on 60L and 100% FiO2/    - COVID-19 testing Collection Date: 9/1/2020 Collection Time:   8:30 AM     - Isolation:   - Airborne, Contact and Droplet Precautions  - Cohort patients into COVID units  - N95 mask, wear eye protection  - 20 second hand hygiene              - Limit visitors per hospital policy              - Consolidating lab draws, nursing care, provider visits, and interventions    - Diagnostics: (leukopenia, hyponatremia, hyperferritinemia, elevated troponin, elevated d-dimer, age, and comorbidities are significant predictors of poor clinical outcome)  CBC, CMP, Procalcitonin, Ferritin, CRP and Portable CXR    - Management:  - received Azithro and ceftriaxone (x1 each) in the ED on 10/18  - s/p remdesivir (10/18-10/22)  - Dexamethasone (day 7/10)  - Supplemental O2 to maintain SpO2 >92%  - Albuterol treatment PRN  - D-dimer doubled- Will start empirically therapeutic enoxaparin dose (1 mg/kg bid)  - Obtain CTA PE Protocol.   -Placed on CPAP 8/100% FiO2-->appears comfortable, sats 95-96% Will continue for now.   - Monitor closely for need for intubation.    Telemetry  Continuous/intermittent Pulse Ox      Advance Care Planning     Code Status: Full code

## 2020-10-24 NOTE — HOSPITAL COURSE
Patient admitted to RICU due to worsening respiratory status with increasing oxygen requirements. Completion of remdesivir on 10/22. On BiPAP with O2 saturation in high 70s-low 80s at rest and acute desaturation to 40s when biPAP removed to provide PO medications. He required intubation on 10/26. Central line and arterial line placed. Proning initiated on 10/27. YOVANI developed with inital response to lasix however, not clearing as hyperkalemia persists. UO decreasing on 10/30 despite multiple lasix doses, and concern for hematuria, CBC sent. Nephrology consult placed. Blood and sputum cultures on 10/30 NGTD, lactate normal, worsening WBC count. Heparin held overnight for hematuria and blood in sputum, restarted 10/31 for cessation of both. Vasopressor requirements improving, off vinay and vaso as of 11/01, WBC continues to increase, however not febrile and cultures are negative. Added fluconazole 11/1. Febrile on 11/4 with suspected aspiration event. Increase in FiO2 and pressor requirements. Oxygen saturations remained in the low 80's for most of the day. Family updated and code status changed to no shocks, no compressions. RRT started the evening of 11/4. Proned 36 hours for desaturations; difficult to increase PEEP as plateau pressures increase. Patient's significant other agreed to convalescent plasma; 2 units ordered 11/7/20.  CRRT clotted off 11/7 also, so citrate added per nephrology. ID recommended adjusting antimicrobial therapy to micafungin (from fluconazole), in addition to meropenam and renally dosed vancomycin in context of increased leukocytosis and increasing vasopressor support. ID also recommended CDiff panel (returned negative) and obtaining respiratory cultures. Feet cool to touch and weak pulses so dopplers ordered which showed no stenosis. He had a large clot removed from his CRRT line 11/7 which improved the RRt flow. Patient supinated at 11:00 AM on 11/09. He did not tolerate procedure. He  became tachycardic, hypotensive, and hypoxic. Patient required maximal pressor support, bicarb, increasing vent suppot, and increasing nitric. Resumed ongoing GOC conversation with wife following acute events of AM. I expressed my concern about patient's progressive decline and acute decompensations will likely increase in frequency. I expressed my concern that given his multi-organ failure and acute decompensations patient has a poor prognosis. Wife expressed her understanding and wished for continued maximal medical support and maneuvers. Patient later became bradycardic. Wife asked RN not to escalate care further. Notifed by RN of patient cardiac arrest.

## 2020-10-24 NOTE — SUBJECTIVE & OBJECTIVE
Interval History/Significant Events: Patient sleeping comfortably on bipap, mildly sedated on precedex. Was on comfort flow earlier today but with occasional desaturations, and was subsequently placed back on bipap.    Review of Systems   Constitutional: Negative for activity change, appetite change, fatigue and fever.   HENT: Negative for congestion and facial swelling.    Respiratory: Positive for cough and shortness of breath. Negative for wheezing and stridor.    Cardiovascular: Negative for chest pain, palpitations and leg swelling.   Gastrointestinal: Negative for abdominal pain, diarrhea, nausea and vomiting.   Endocrine: Negative.    Genitourinary: Negative for dysuria and hematuria.   Musculoskeletal: Negative.    Skin: Negative.    Neurological: Negative.    Psychiatric/Behavioral: Negative for suicidal ideas. The patient is nervous/anxious.      Objective:     Vital Signs (Most Recent):  Temp: 98.9 °F (37.2 °C) (10/24/20 1500)  Pulse: 91 (10/24/20 1502)  Resp: (!) 35 (10/24/20 1502)  BP: 134/65 (10/24/20 1502)  SpO2: (!) 92 % (10/24/20 1502) Vital Signs (24h Range):  Temp:  [98.7 °F (37.1 °C)-99 °F (37.2 °C)] 98.9 °F (37.2 °C)  Pulse:  [56-92] 91  Resp:  [14-42] 35  SpO2:  [85 %-98 %] 92 %  BP: (124-155)/(57-70) 134/65   Weight: 84.5 kg (186 lb 4.6 oz)  Body mass index is 26.73 kg/m².      Intake/Output Summary (Last 24 hours) at 10/24/2020 1554  Last data filed at 10/24/2020 1400  Gross per 24 hour   Intake 320 ml   Output 1750 ml   Net -1430 ml       Physical Exam  Vitals signs reviewed.   Constitutional:       General: He is not in acute distress.     Appearance: He is well-developed.   HENT:      Head: Normocephalic and atraumatic.   Eyes:      Pupils: Pupils are equal, round, and reactive to light.   Neck:      Musculoskeletal: Normal range of motion and neck supple.      Vascular: No JVD.   Cardiovascular:      Rate and Rhythm: Normal rate and regular rhythm.      Heart sounds: Normal heart sounds.  No murmur.   Pulmonary:      Effort: Pulmonary effort is normal. No respiratory distress.      Breath sounds: Rales (more prominent in R lower base) present. No wheezing.   Abdominal:      General: Bowel sounds are normal. There is no distension.      Palpations: Abdomen is soft.      Tenderness: There is no abdominal tenderness.   Musculoskeletal: Normal range of motion.         General: No deformity.   Skin:     General: Skin is warm.   Neurological:      Mental Status: He is alert and oriented to person, place, and time.      Comments: On precedex         Vents:  Oxygen Concentration (%): 100 (10/24/20 1502)  Lines/Drains/Airways     Peripheral Intravenous Line                 Peripheral IV - Single Lumen 10/18/20 0945 20 G Right Forearm 6 days              Significant Labs:    CBC/Anemia Profile:  Recent Labs   Lab 10/23/20  0232 10/24/20  0418 10/24/20  0701   WBC 8.49  --  9.63   HGB 15.3  --  14.7   HCT 46.9  --  43.5     --  156   MCV 97  --  95   RDW 12.4  --  12.5   FERRITIN  --  707*  --         Chemistries:  Recent Labs   Lab 10/23/20  0232 10/24/20  0701    138   K 4.5 4.0    102   CO2 27 27   BUN 26* 34*   CREATININE 0.9 0.8   CALCIUM 8.4* 8.2*   ALBUMIN 2.6* 2.3*   PROT 5.8* 5.5*   BILITOT 0.7 0.8   ALKPHOS 76 96   ALT 30 27   AST 43* 52*   MG 1.9  --    PHOS 3.0  --        ABGs:   Recent Labs   Lab 10/25/20  1336   PH 7.425   PCO2 37.2   HCO3 24.4   POCSATURATED 85*   BE 0     All pertinent labs within the past 24 hours have been reviewed.    Significant Imaging:  I have reviewed and interpreted all pertinent imaging results/findings within the past 24 hours.

## 2020-10-24 NOTE — H&P
[facility] - ICU 16   Critical Care Medicine  History & Physical    Patient Name: Nubia Riggs  MRN: [id number]  Admission Date: 10/18/2020  Hospital Length of Stay: 6 days  Code Status: Full Code  Attending Physician: Parminder Marin*   Primary Care Provider: Primary Doctor No   Principal Problem: Pneumonia due to COVID-19 virus    Subjective:     HPI:  55-year-old male with PMhx of GERD, allergic rinhitis, chronic cough (recently saw pulm and was diagnosed with reactive airway disease, on albuterol PRN) presented the emergency department on 10/18 with chief complaint of shortness of breath. He reports subjective fever, headache, chills, myalgias, poor appetite, and sweats about 6 days ago. He was diagnosed with COVID-19 on 10/15.  Reports presenting to urgent care 10/16 due to 89% pulse ox on room air. Started on levofloxacin and discharged home.  Reports using albuterol treatments at home without much relief.He noticed to have cough productive of blood tinged sputum and shortness of breath so he presented to the ED.     He received ceftria/azithro (x1 each in the ED). He started Remdesivir and completed 5 doses on 10/22, continues on dexamethasone. Patient is on his 7th day of hospital admission and has been requiring higher doses of O2 to keep sats >90%. Today he went up to 60L on 100% FiO2 and thus Critical Care was consulted.       Hospital/ICU Course:  Patient admitted to RICU due to worsening respiratory status with increasing oxygen requirements.      Past Medical History:   Diagnosis Date    ADD (attention deficit disorder)     Anemia     Bradycardia 3/8/2018    3/8/2018: ECG: SB 46 bpm.    GERD (gastroesophageal reflux disease)        Past Surgical History:   Procedure Laterality Date    BACK SURGERY      CERVICAL SPINE SURGERY      facit repair    CHOLECYSTECTOMY      implant      chin    LIPOSUCTION      waiste    RHINOPLASTY TIP      SPINE SURGERY      l5 s1 micro  discectomy    SPINE SURGERY      l5 s1 rodes placed    TONSILLECTOMY         Review of patient's allergies indicates:   Allergen Reactions    Meperidine      Other reaction(s): violent behavior    Sulfa (sulfonamide antibiotics)      Other reaction(s): Unknown       Family History     Problem Relation (Age of Onset)    Cancer Father, Paternal Grandmother    Heart disease Paternal Grandfather        Tobacco Use    Smoking status: Former Smoker     Packs/day: 1.00     Years: 15.00     Pack years: 15.00     Quit date: 2012     Years since quittin.5    Smokeless tobacco: Never Used   Substance and Sexual Activity    Alcohol use: No    Drug use: No    Sexual activity: Not on file      Review of Systems   Constitutional: Negative for activity change, appetite change, fatigue and fever.   HENT: Negative for congestion and facial swelling.    Respiratory: Positive for cough and shortness of breath (improved with CPAP). Negative for wheezing and stridor.    Cardiovascular: Negative for chest pain, palpitations and leg swelling.   Gastrointestinal: Negative for abdominal pain, diarrhea, nausea and vomiting.   Endocrine: Negative.    Genitourinary: Negative for dysuria and hematuria.   Musculoskeletal: Negative.    Skin: Negative.    Neurological: Negative.    Psychiatric/Behavioral: Negative for suicidal ideas. The patient is not nervous/anxious.      Objective:     Vital Signs (Most Recent):  Temp: 98.7 °F (37.1 °C) (10/24/20 0529)  Pulse: 67 (10/24/20 0751)  Resp: (!) 26 (10/24/20 075)  BP: 129/61 (10/24/20 0741)  SpO2: 96 % (10/24/20 0751) Vital Signs (24h Range):  Temp:  [98.7 °F (37.1 °C)-100 °F (37.8 °C)] 98.7 °F (37.1 °C)  Pulse:  [56-92] 67  Resp:  [14-28] 26  SpO2:  [87 %-98 %] 96 %  BP: (124-144)/(57-67) 129/61   Weight: 84.5 kg (186 lb 4.6 oz)  Body mass index is 26.73 kg/m².      Intake/Output Summary (Last 24 hours) at 10/24/2020 0803  Last data filed at 10/23/2020 1600  Gross per 24 hour    Intake 320 ml   Output 400 ml   Net -80 ml       Physical Exam  Vitals signs reviewed.   Constitutional:       General: He is not in acute distress.     Appearance: He is well-developed.   HENT:      Head: Normocephalic and atraumatic.   Eyes:      Pupils: Pupils are equal, round, and reactive to light.   Neck:      Musculoskeletal: Normal range of motion and neck supple.      Vascular: No JVD.   Cardiovascular:      Rate and Rhythm: Normal rate and regular rhythm.      Heart sounds: Normal heart sounds. No murmur.   Pulmonary:      Effort: Pulmonary effort is normal. No respiratory distress.      Breath sounds: Rales (more prominent in R lower base) present. No wheezing.   Abdominal:      General: Bowel sounds are normal. There is no distension.      Palpations: Abdomen is soft.      Tenderness: There is no abdominal tenderness.   Musculoskeletal: Normal range of motion.         General: No deformity.   Skin:     General: Skin is warm.   Neurological:      Mental Status: He is alert and oriented to person, place, and time.         Vents:  Oxygen Concentration (%): 100 (10/24/20 0741)  Lines/Drains/Airways     Peripheral Intravenous Line                 Peripheral IV - Single Lumen 10/18/20 0945 20 G Right Forearm 5 days              Significant Labs:    CBC/Anemia Profile:  Recent Labs   Lab 10/23/20  0232 10/24/20  0418 10/24/20  0701   WBC 8.49  --  9.63   HGB 15.3  --  14.7   HCT 46.9  --  43.5     --  156   MCV 97  --  95   RDW 12.4  --  12.5   FERRITIN  --  707*  --         Chemistries:  Recent Labs   Lab 10/23/20  0232      K 4.5      CO2 27   BUN 26*   CREATININE 0.9   CALCIUM 8.4*   ALBUMIN 2.6*   PROT 5.8*   BILITOT 0.7   ALKPHOS 76   ALT 30   AST 43*   MG 1.9   PHOS 3.0       ABGs: No results for input(s): PH, PCO2, HCO3, POCSATURATED, BE in the last 48 hours.  BMP:   Recent Labs   Lab 10/23/20  0232   GLU 86      K 4.5      CO2 27   BUN 26*   CREATININE 0.9   CALCIUM 8.4*    MG 1.9     CMP:   Recent Labs   Lab 10/23/20  0232      K 4.5      CO2 27   GLU 86   BUN 26*   CREATININE 0.9   CALCIUM 8.4*   PROT 5.8*   ALBUMIN 2.6*   BILITOT 0.7   ALKPHOS 76   AST 43*   ALT 30   ANIONGAP 11   EGFRNONAA >60.0       Significant Imaging: I have reviewed and interpreted all pertinent imaging results/findings within the past 24 hours.    Assessment/Plan:     Psychiatric  Anxiety  Continue home vilazodone.    Pulmonary  Reactive airway disease  Follows with Pulm as outpatient. Last seen 8/27/20, told he had RAD  He was also seen by a [facility] pulmonologist who did spirometry that reportedly showed borderline or mild asthma, and has been controlled with albuterol.   Former smoker, started at age 10-12 and quit 10 years ago    Acute hypoxemic respiratory failure  Secondary to COVID (see above)    Other  * Pneumonia due to COVID-19 virus  55-year-old male with PMhx of GERD, allergic rinhitis, chronic cough/ reactive airway disease, presenting due to subjective fever, chills, myalgias, COVID positive.   Admitted to Hospital Medicine on 10/18 with following admission labs:  D-dimer Admit & Q48h 0.5   Ferritin Admit & Q48h 460   CRP Admit & Q48h 56   CPK Admit & Q24h if elevated 495   LDH Admit & Q48h 396     Patient has continued to deteriorate needing higher oxygen requirements and thus Critical Care Medicine was consulted. Currently on 60L and 100% FiO2/    - COVID-19 testing Collection Date: 9/1/2020 Collection Time:   8:30 AM     - Isolation:   - Airborne, Contact and Droplet Precautions  - Cohort patients into COVID units  - N95 mask, wear eye protection  - 20 second hand hygiene              - Limit visitors per hospital policy              - Consolidating lab draws, nursing care, provider visits, and interventions    - Diagnostics: (leukopenia, hyponatremia, hyperferritinemia, elevated troponin, elevated d-dimer, age, and comorbidities are significant predictors of poor clinical  outcome)  CBC, CMP, Procalcitonin, Ferritin, CRP and Portable CXR    - Management:  - received Azithro and ceftriaxone (x1 each) in the ED on 10/18  - s/p remdesivir (10/18-10/22)  - Dexamethasone (day 7/10)  - Supplemental O2 to maintain SpO2 >92%  - Albuterol treatment PRN  - D-dimer doubled- Will start empirically therapeutic enoxaparin dose (1 mg/kg bid)  - Obtain CTA PE Protocol.   -Placed on CPAP 8/100% FiO2-->appears comfortable, sats 95-96% Will continue for now.   - Monitor closely for need for intubation.    Telemetry  Continuous/intermittent Pulse Ox      Advance Care Planning     Code Status: Full code      Critical Care Daily Checklist:    A: Awake: RASS Goal/Actual Goal:    Actual: Chang Agitation Sedation Scale (RASS): Alert and calm   B: Spontaneous Breathing Trial Performed?     C: SAT & SBT Coordinated?  NA                      D: Delirium: CAM-ICU Overall CAM-ICU: Negative   E: Early Mobility Performed? Yes   F: Feeding Goal:    Status:     Current Diet Order   Procedures    Diet Adult Regular (IDDSI Level 7)      AS: Analgesia/Sedation NA   T: Thromboembolic Prophylaxis Yes on enoxaparin   H: HOB > 300 Yes   U: Stress Ulcer Prophylaxis (if needed) NA   G: Glucose Control Yes   B: Bowel Function Stool Occurrence: 0   I: Indwelling Catheter (Lines & Kelley) Necessity Yes   D: De-escalation of Antimicrobials/Pharmacotherapies Yes    Plan for the day/ETD Continue dexamethasone, admit to MICU,     Code Status:  Family/Goals of Care: Full Code         Critical secondary to Patient has a condition that poses threat to life and bodily function: Severe Respiratory Distress     Critical care was time spent personally by me on the following activities: development of treatment plan with patient or surrogate and bedside caregivers, discussions with consultants, evaluation of patient's response to treatment, examination of patient, ordering and performing treatments and interventions, ordering and review  of laboratory studies, ordering and review of radiographic studies, pulse oximetry, re-evaluation of patient's condition. This critical care time did not overlap with that of any other provider or involve time for any procedures.     Christian Merritt MD  Critical Care Medicine  [facility] - ICU 16 WT

## 2020-10-24 NOTE — NURSING
55-year-old male with PMhx of GERD, allergic rinhitis, chronic cough (recently saw pulm and was diagnosed with reactive airway disease, on albuterol PRN) presented the emergency department on 10/18 with chief complaint of shortness of breath. He reports subjective fever, headache, chills, myalgias, poor appetite, and sweats about 6 days ago. He was diagnosed with COVID-19 on 10/15.  Reports presenting to urgent care 10/16 due to 89% pulse ox on room air. Started on levofloxacin and discharged home.   He started Remdesivir and completed 5 doses on 10/22, continues on dexamethasone.  Patient is on his 7th day of hospital admission and has been requiring higher doses of O2 to keep sats >90%.  Today he went up to 60L on 100% FiO2 and thus Critical Care was consulted.  Patient admitted to RICU due to worsening respiratory status with increasing oxygen requirements.    Patient is AA0X4 , denies pain @ present. Resp even non labored. Patient is positive for cough and shortness of breath, negative for wheezing and stridor.  He is not in acute distress   All v/s wnl & alarms on and audible.

## 2020-10-24 NOTE — CARE UPDATE
Rapid Response Nurse Follow-up Note     Followed up with patient for proactive rounding. Pt O2 requirements continues to increase currently requiring 60L/100%. Pt remains comfortable, with minimal tachypnea and WOB. CCS at bedside to assess pt. Decision made to upgrade pt to ICU.    No acute issues at this time. Reviewed plan of care with charge RNSelena, and bedside RN Alexandra.     Please call Rapid Response RN, León Mejia RN with any questions or concerns at [phone].

## 2020-10-24 NOTE — ASSESSMENT & PLAN NOTE
Follows with Pulm as outpatient. Last seen 8/27/20, told he had RAD  He was also seen by a [facility] pulmonologist who did spirometry that reportedly showed borderline or mild asthma, and has been controlled with albuterol.   Former smoker, started at age 10-12 and quit 10 years ago

## 2020-10-24 NOTE — SUBJECTIVE & OBJECTIVE
Past Medical History:   Diagnosis Date    ADD (attention deficit disorder)     Anemia     Bradycardia 3/8/2018    3/8/2018: ECG: SB 46 bpm.    GERD (gastroesophageal reflux disease)        Past Surgical History:   Procedure Laterality Date    BACK SURGERY      CERVICAL SPINE SURGERY      facit repair    CHOLECYSTECTOMY      implant      chin    LIPOSUCTION      waiste    RHINOPLASTY TIP      SPINE SURGERY      l5 s1 micro discectomy    SPINE SURGERY      l5 s1 rodes placed    TONSILLECTOMY         Review of patient's allergies indicates:   Allergen Reactions    Meperidine      Other reaction(s): violent behavior    Sulfa (sulfonamide antibiotics)      Other reaction(s): Unknown       Family History     Problem Relation (Age of Onset)    Cancer Father, Paternal Grandmother    Heart disease Paternal Grandfather        Tobacco Use    Smoking status: Former Smoker     Packs/day: 1.00     Years: 15.00     Pack years: 15.00     Quit date: 2012     Years since quittin.5    Smokeless tobacco: Never Used   Substance and Sexual Activity    Alcohol use: No    Drug use: No    Sexual activity: Not on file      Review of Systems   Constitutional: Negative for activity change, appetite change, fatigue and fever.   HENT: Negative for congestion and facial swelling.    Respiratory: Positive for cough and shortness of breath (improved with CPAP). Negative for wheezing and stridor.    Cardiovascular: Negative for chest pain, palpitations and leg swelling.   Gastrointestinal: Negative for abdominal pain, diarrhea, nausea and vomiting.   Endocrine: Negative.    Genitourinary: Negative for dysuria and hematuria.   Musculoskeletal: Negative.    Skin: Negative.    Neurological: Negative.    Psychiatric/Behavioral: Negative for suicidal ideas. The patient is not nervous/anxious.      Objective:     Vital Signs (Most Recent):  Temp: 98.7 °F (37.1 °C) (10/24/20 0515)  Pulse: 67 (10/24/20 0751)  Resp: (!) 26  (10/24/20 0751)  BP: 129/61 (10/24/20 0741)  SpO2: 96 % (10/24/20 0751) Vital Signs (24h Range):  Temp:  [98.7 °F (37.1 °C)-100 °F (37.8 °C)] 98.7 °F (37.1 °C)  Pulse:  [56-92] 67  Resp:  [14-28] 26  SpO2:  [87 %-98 %] 96 %  BP: (124-144)/(57-67) 129/61   Weight: 84.5 kg (186 lb 4.6 oz)  Body mass index is 26.73 kg/m².      Intake/Output Summary (Last 24 hours) at 10/24/2020 0803  Last data filed at 10/23/2020 1600  Gross per 24 hour   Intake 320 ml   Output 400 ml   Net -80 ml       Physical Exam  Vitals signs reviewed.   Constitutional:       General: He is not in acute distress.     Appearance: He is well-developed.   HENT:      Head: Normocephalic and atraumatic.   Eyes:      Pupils: Pupils are equal, round, and reactive to light.   Neck:      Musculoskeletal: Normal range of motion and neck supple.      Vascular: No JVD.   Cardiovascular:      Rate and Rhythm: Normal rate and regular rhythm.      Heart sounds: Normal heart sounds. No murmur.   Pulmonary:      Effort: Pulmonary effort is normal. No respiratory distress.      Breath sounds: Rales (more prominent in R lower base) present. No wheezing.   Abdominal:      General: Bowel sounds are normal. There is no distension.      Palpations: Abdomen is soft.      Tenderness: There is no abdominal tenderness.   Musculoskeletal: Normal range of motion.         General: No deformity.   Skin:     General: Skin is warm.   Neurological:      Mental Status: He is alert and oriented to person, place, and time.         Vents:  Oxygen Concentration (%): 100 (10/24/20 0741)  Lines/Drains/Airways     Peripheral Intravenous Line                 Peripheral IV - Single Lumen 10/18/20 0945 20 G Right Forearm 5 days              Significant Labs:    CBC/Anemia Profile:  Recent Labs   Lab 10/23/20  0232 10/24/20  0418 10/24/20  0701   WBC 8.49  --  9.63   HGB 15.3  --  14.7   HCT 46.9  --  43.5     --  156   MCV 97  --  95   RDW 12.4  --  12.5   FERRITIN  --  707*  --          Chemistries:  Recent Labs   Lab 10/23/20  0232      K 4.5      CO2 27   BUN 26*   CREATININE 0.9   CALCIUM 8.4*   ALBUMIN 2.6*   PROT 5.8*   BILITOT 0.7   ALKPHOS 76   ALT 30   AST 43*   MG 1.9   PHOS 3.0       ABGs: No results for input(s): PH, PCO2, HCO3, POCSATURATED, BE in the last 48 hours.  BMP:   Recent Labs   Lab 10/23/20  0232   GLU 86      K 4.5      CO2 27   BUN 26*   CREATININE 0.9   CALCIUM 8.4*   MG 1.9     CMP:   Recent Labs   Lab 10/23/20  0232      K 4.5      CO2 27   GLU 86   BUN 26*   CREATININE 0.9   CALCIUM 8.4*   PROT 5.8*   ALBUMIN 2.6*   BILITOT 0.7   ALKPHOS 76   AST 43*   ALT 30   ANIONGAP 11   EGFRNONAA >60.0       Significant Imaging: I have reviewed and interpreted all pertinent imaging results/findings within the past 24 hours.

## 2020-10-24 NOTE — CONSULTS
Patient on 60L and 100 % FiO2 Satting 87% now on CF and progressively worsening, will likely need intubation today.    -Will bring to MICU  -D-dimer increased to 13, will start AC, consider CTA when stable.         Santos Beatty MD  Internal Medicine  Assistant Staff, Chief Resident  [phone]

## 2020-10-25 NOTE — NURSING
Pt's resp rate has increased to between 40-50's/min, and SPO2 is in the high 80's. He does appear to be sleeping, but his work of breathing is worse than previous. I spoke with Dr. Henderson with the critical care team and he would like the pt to be switched over to bipap and called back to see how the pt is responding. He does think if bipap does not work, that the pt may need intubated. Will continue to monitor

## 2020-10-25 NOTE — ASSESSMENT & PLAN NOTE
Follows with Pulm as outpatient. Last seen 8/27/20, told he had RAD  He was also seen by a [facility] pulmonologist who did spirometry that reportedly showed borderline or mild asthma, and has been controlled with albuterol.   Former smoker, started at age 10-12 and quit 10 years ago  -- Continue inhalational treatments

## 2020-10-25 NOTE — ASSESSMENT & PLAN NOTE
55-year-old male with PMhx of GERD, allergic rinhitis, chronic cough/ reactive airway disease, presenting due to subjective fever, chills, myalgias, COVID positive.    Admitted to Hospital Medicine on 10/18 with following admission labs:  D-dimer Admit & Q48h 0.5   Ferritin Admit & Q48h 460   CRP Admit & Q48h 56   CPK Admit & Q24h if elevated 495   LDH Admit & Q48h 396     Patient has continued to deteriorate needing higher oxygen requirements and thus Critical Care Medicine was consulted. Currently on 60L and 100% FiO2/    - COVID-19 testing Collection Date: 9/1/2020 Collection Time:   8:30 AM     - Isolation:   - Airborne, Contact and Droplet Precautions  - Cohort patients into COVID units  - N95 mask, wear eye protection  - 20 second hand hygiene              - Limit visitors per hospital policy              - Consolidating lab draws, nursing care, provider visits, and interventions    - Diagnostics: (leukopenia, hyponatremia, hyperferritinemia, elevated troponin, elevated d-dimer, age, and comorbidities are significant predictors of poor clinical outcome)  CBC, CMP, Procalcitonin, Ferritin, CRP and Portable CXR    - Management:  - received Azithro and ceftriaxone (x1 each) in the ED on 10/18  - s/p remdesivir (10/18-10/22)  - Dexamethasone (day 7/10)  - Supplemental O2 to maintain SpO2 >92%  - Albuterol treatment PRN  - D-dimer doubled- On empiric therapeutic enoxaparin dose (1 mg/kg bid)  - Switched from CPAP -> BiPAP, now on 20/10, 90% FiO2  - Monitor closely for need for intubation  - Will start morphine 2 mg q4 PRN for tachypnea    Telemetry  Continuous/intermittent Pulse Ox      Advance Care Planning     Code Status: Full code

## 2020-10-25 NOTE — NURSING
Attempted to take cpap off pt for a short amount of time so that pt could eat some dinner, pt was only able to stay off cpap for about 20 minutes. Pt did desat into the 70's on heated high flow and non rebreather on top. Pt did not look to be in active resp distress and stated he felt fine despite SPO2 in the 70's. CPAP was restarted and pt was encouraged to lay prone while he sleeps to promote oxygenation. Spoke with Dr. Henderson in the critical care team about pts anxiety and that pt typically takes xanax multiple times a day and ambien at night but he does not have anything ordered prn. Dr. Henderson ordered for precedex gtt to be started. Will continue to monitor.

## 2020-10-25 NOTE — PROGRESS NOTES
[facility] - ICU 16   Critical Care Medicine  Progress Note    Patient Name: Nubia Riggs  MRN: [id number]  Admission Date: 10/18/2020  Hospital Length of Stay: 7 days  Code Status: Full Code  Attending Provider: Parminder Marin*  Primary Care Provider: Primary Doctor No   Principal Problem: Pneumonia due to COVID-19 virus    Subjective:     HPI:  55-year-old male with PMhx of GERD, allergic rinhitis, chronic cough (recently saw pulm and was diagnosed with reactive airway disease, on albuterol PRN) presented the emergency department on 10/18 with chief complaint of shortness of breath. He reports subjective fever, headache, chills, myalgias, poor appetite, and sweats about 6 days ago. He was diagnosed with COVID-19 on 10/15.  Reports presenting to urgent care 10/16 due to 89% pulse ox on room air. Started on levofloxacin and discharged home.  Reports using albuterol treatments at home without much relief.He noticed to have cough productive of blood tinged sputum and shortness of breath so he presented to the ED.     He received ceftria/azithro (x1 each in the ED). He started Remdesivir and completed 5 doses on 10/22, continues on dexamethasone. Patient is on his 7th day of hospital admission and has been requiring higher doses of O2 to keep sats >90%. Today he went up to 60L on 100% FiO2 and thus Critical Care was consulted.       Hospital/ICU Course:  Patient admitted to RICU due to worsening respiratory status with increasing oxygen requirements.     Interval History/Significant Events: Patient sleeping comfortably on bipap, mildly sedated on precedex. Was on comfort flow earlier today but with occasional desaturations, and was subsequently placed back on bipap.    Review of Systems   Constitutional: Negative for activity change, appetite change, fatigue and fever.   HENT: Negative for congestion and facial swelling.    Respiratory: Positive for cough and shortness of breath. Negative for  wheezing and stridor.    Cardiovascular: Negative for chest pain, palpitations and leg swelling.   Gastrointestinal: Negative for abdominal pain, diarrhea, nausea and vomiting.   Endocrine: Negative.    Genitourinary: Negative for dysuria and hematuria.   Musculoskeletal: Negative.    Skin: Negative.    Neurological: Negative.    Psychiatric/Behavioral: Negative for suicidal ideas. The patient is nervous/anxious.      Objective:     Vital Signs (Most Recent):  Temp: 98.9 °F (37.2 °C) (10/24/20 1500)  Pulse: 91 (10/24/20 1502)  Resp: (!) 35 (10/24/20 1502)  BP: 134/65 (10/24/20 1502)  SpO2: (!) 92 % (10/24/20 1502) Vital Signs (24h Range):  Temp:  [98.7 °F (37.1 °C)-99 °F (37.2 °C)] 98.9 °F (37.2 °C)  Pulse:  [56-92] 91  Resp:  [14-42] 35  SpO2:  [85 %-98 %] 92 %  BP: (124-155)/(57-70) 134/65   Weight: 84.5 kg (186 lb 4.6 oz)  Body mass index is 26.73 kg/m².      Intake/Output Summary (Last 24 hours) at 10/24/2020 1554  Last data filed at 10/24/2020 1400  Gross per 24 hour   Intake 320 ml   Output 1750 ml   Net -1430 ml       Physical Exam  Vitals signs reviewed.   Constitutional:       General: He is not in acute distress.     Appearance: He is well-developed.   HENT:      Head: Normocephalic and atraumatic.   Eyes:      Pupils: Pupils are equal, round, and reactive to light.   Neck:      Musculoskeletal: Normal range of motion and neck supple.      Vascular: No JVD.   Cardiovascular:      Rate and Rhythm: Normal rate and regular rhythm.      Heart sounds: Normal heart sounds. No murmur.   Pulmonary:      Effort: Pulmonary effort is normal. No respiratory distress.      Breath sounds: Rales (more prominent in R lower base) present. No wheezing.   Abdominal:      General: Bowel sounds are normal. There is no distension.      Palpations: Abdomen is soft.      Tenderness: There is no abdominal tenderness.   Musculoskeletal: Normal range of motion.         General: No deformity.   Skin:     General: Skin is warm.    Neurological:      Mental Status: He is alert and oriented to person, place, and time.      Comments: On precedex         Vents:  Oxygen Concentration (%): 100 (10/24/20 1502)  Lines/Drains/Airways     Peripheral Intravenous Line                 Peripheral IV - Single Lumen 10/18/20 0945 20 G Right Forearm 6 days              Significant Labs:    CBC/Anemia Profile:  Recent Labs   Lab 10/23/20  0232 10/24/20  0418 10/24/20  0701   WBC 8.49  --  9.63   HGB 15.3  --  14.7   HCT 46.9  --  43.5     --  156   MCV 97  --  95   RDW 12.4  --  12.5   FERRITIN  --  707*  --         Chemistries:  Recent Labs   Lab 10/23/20  0232 10/24/20  0701    138   K 4.5 4.0    102   CO2 27 27   BUN 26* 34*   CREATININE 0.9 0.8   CALCIUM 8.4* 8.2*   ALBUMIN 2.6* 2.3*   PROT 5.8* 5.5*   BILITOT 0.7 0.8   ALKPHOS 76 96   ALT 30 27   AST 43* 52*   MG 1.9  --    PHOS 3.0  --        ABGs:   Recent Labs   Lab 10/25/20  1336   PH 7.425   PCO2 37.2   HCO3 24.4   POCSATURATED 85*   BE 0     All pertinent labs within the past 24 hours have been reviewed.    Significant Imaging:  I have reviewed and interpreted all pertinent imaging results/findings within the past 24 hours.      ABG  Recent Labs   Lab 10/25/20  1336   PH 7.425   PO2 49   PCO2 37.2   HCO3 24.4   BE 0     Assessment/Plan:     Psychiatric  Anxiety  Continue home vilazodone.    Pulmonary  Reactive airway disease  Follows with Pulm as outpatient. Last seen 8/27/20, told he had RAD  He was also seen by a [facility] pulmonologist who did spirometry that reportedly showed borderline or mild asthma, and has been controlled with albuterol.   Former smoker, started at age 10-12 and quit 10 years ago  -- Continue inhalational treatments    Acute hypoxemic respiratory failure  Secondary to COVID (see above)    Other  * Pneumonia due to COVID-19 virus  55-year-old male with PMhx of GERD, allergic rinhitis, chronic cough/ reactive airway disease, presenting due to subjective fever,  chills, myalgias, COVID positive.    Admitted to Hospital Medicine on 10/18 with following admission labs:  D-dimer Admit & Q48h 0.5   Ferritin Admit & Q48h 460   CRP Admit & Q48h 56   CPK Admit & Q24h if elevated 495   LDH Admit & Q48h 396     Patient has continued to deteriorate needing higher oxygen requirements and thus Critical Care Medicine was consulted. Currently on 60L and 100% FiO2/    - COVID-19 testing Collection Date: 9/1/2020 Collection Time:   8:30 AM     - Isolation:   - Airborne, Contact and Droplet Precautions  - Cohort patients into COVID units  - N95 mask, wear eye protection  - 20 second hand hygiene              - Limit visitors per hospital policy              - Consolidating lab draws, nursing care, provider visits, and interventions    - Diagnostics: (leukopenia, hyponatremia, hyperferritinemia, elevated troponin, elevated d-dimer, age, and comorbidities are significant predictors of poor clinical outcome)  CBC, CMP, Procalcitonin, Ferritin, CRP and Portable CXR    - Management:  - received Azithro and ceftriaxone (x1 each) in the ED on 10/18  - s/p remdesivir (10/18-10/22)  - Dexamethasone (day 7/10)  - Supplemental O2 to maintain SpO2 >92%  - Albuterol treatment PRN  - D-dimer doubled- On empiric therapeutic enoxaparin dose (1 mg/kg bid)  - Switched from CPAP -> BiPAP, now on 20/10, 90% FiO2  - Monitor closely for need for intubation  - Will start morphine 2 mg q4 PRN for tachypnea    Telemetry  Continuous/intermittent Pulse Ox      Advance Care Planning     Code Status: Full code       Critical Care Daily Checklist:    A: Awake: RASS Goal/Actual Goal:    Actual: Chang Agitation Sedation Scale (RASS): Alert and calm   B: Spontaneous Breathing Trial Performed?     C: SAT & SBT Coordinated?  N/A                      D: Delirium: CAM-ICU Overall CAM-ICU: Negative   E: Early Mobility Performed? Yes   F: Feeding Goal:    Status:     Current Diet Order   Procedures    Diet Adult Regular  (IDDSI Level 7)      AS: Analgesia/Sedation Precedex   T: Thromboembolic Prophylaxis Lovenox   H: HOB > 300 Yes   U: Stress Ulcer Prophylaxis (if needed) N/A   G: Glucose Control N/A   B: Bowel Function Stool Occurrence: 2   I: Indwelling Catheter (Lines & Kelley) Necessity N/A   D: De-escalation of Antimicrobials/Pharmacotherapies N/A    Plan for the day/ETD Monitor    Code Status:  Family/Goals of Care: Full Code  Pall care consult for support       Critical secondary to Patient has a condition that poses threat to life and bodily function: Severe Respiratory Distress      Critical care was time spent personally by me on the following activities: development of treatment plan with patient or surrogate and bedside caregivers, discussions with consultants, evaluation of patient's response to treatment, examination of patient, ordering and performing treatments and interventions, ordering and review of laboratory studies, ordering and review of radiographic studies, pulse oximetry, re-evaluation of patient's condition. This critical care time did not overlap with that of any other provider or involve time for any procedures.     Alirio Rankin MD  Critical Care Medicine  [facility] - ICU 16 [facility]

## 2020-10-25 NOTE — PLAN OF CARE
Problem: Adult Inpatient Plan of Care  Goal: Plan of Care Review  Outcome: Ongoing, Progressing     Problem: Adult Inpatient Plan of Care  Goal: Patient-Specific Goal (Individualization)  Outcome: Ongoing, Progressing     Problem: Adult Inpatient Plan of Care  Goal: Optimal Comfort and Wellbeing  Outcome: Ongoing, Progressing     Problem: Adult Inpatient Plan of Care  Goal: Readiness for Transition of Care  Outcome: Ongoing, Progressing     Problem: Skin Injury Risk Increased  Goal: Skin Health and Integrity  Outcome: Ongoing, Progressing

## 2020-10-25 NOTE — NURSING
"Notified Dr. Rankin due to change in patient respiratory. Patient respiratory rate in the 50", sat's 85-88%. Patient states that he can't breath and wants to self prone to assist with oxygenation. Patient self proned. Bipap presently increased from 90 to 100%. All alarms on & audible. All safety measures in place. Will continue to assess patient.  "

## 2020-10-25 NOTE — NURSING
Fiona Winterbottom, APRN, CNS at bedside to assist with patient restless & agitation. Patient pulled out his IV x 1 and  Due to severe agitation, patient is refusing nursing staff to insert a new IV. Patient requesting to speak with his doctor. Patient is very agitated. Will continue to assess patient status.

## 2020-10-25 NOTE — PLAN OF CARE
Plan of care reviewed with patient     Problem: Adult Inpatient Plan of Care  Goal: Plan of Care Review  Outcome: Ongoing, Progressing  Goal: Patient-Specific Goal (Individualization)  Outcome: Ongoing, Progressing  Goal: Absence of Hospital-Acquired Illness or Injury  Outcome: Ongoing, Progressing  Goal: Optimal Comfort and Wellbeing  Outcome: Ongoing, Progressing  Goal: Readiness for Transition of Care  Outcome: Ongoing, Progressing  Goal: Rounds/Family Conference  Outcome: Ongoing, Progressing     Problem: Skin Injury Risk Increased  Goal: Skin Health and Integrity  Outcome: Ongoing, Progressing     Problem: Fall Injury Risk  Goal: Absence of Fall and Fall-Related Injury  Outcome: Ongoing, Progressing

## 2020-10-26 NOTE — ASSESSMENT & PLAN NOTE
55-year-old male with PMhx of GERD, allergic rinhitis, chronic cough/ reactive airway disease, presenting due to subjective fever, chills, myalgias, COVID positive.    Admitted to Hospital Medicine on 10/18 with following admission labs:  D-dimer Admit & Q48h 0.5   Ferritin Admit & Q48h 460   CRP Admit & Q48h 56   CPK Admit & Q24h if elevated 495   LDH Admit & Q48h 396     Patient has continued to deteriorate needing higher oxygen requirements and thus Critical Care Medicine was consulted. Currently on 60L and 100% FiO2 on biPAP. Intubation on 10/26 with Anesthesiology at bedside     - COVID-19 testing Collection Date: 9/1/2020 Collection Time:   8:30 AM     - Isolation:   - Airborne, Contact and Droplet Precautions  - Cohort patients into COVID units  - N95 mask, wear eye protection  - 20 second hand hygiene              - Limit visitors per hospital policy              - Consolidating lab draws, nursing care, provider visits, and interventions    - Diagnostics: (leukopenia, hyponatremia, hyperferritinemia, elevated troponin, elevated d-dimer, age, and comorbidities are significant predictors of poor clinical outcome)  CBC, CMP, Procalcitonin, Ferritin, CRP and Portable CXR    - Management:  - received Azithro and ceftriaxone (x1 each) in the ED on 10/18  - s/p remdesivir (10/18-10/22)  - Dexamethasone (day 8/10)  - Supplemental O2 to maintain SpO2 >92%  - Albuterol treatment PRN  - D-dimer >33 On empiric therapeutic enoxaparin dose (1 mg/kg bid)  - Switched from CPAP -> BiPAP, now on 20/10, 90% FiO2  -  morphine 2 mg q4 PRN for tachypnea    Telemetry  Continuous/intermittent Pulse Ox      Advance Care Planning     Code Status: Full code

## 2020-10-26 NOTE — ANESTHESIA PROCEDURE NOTES
Airway Management  Performed by: Cecilio Meza MD  Authorized by: Reno Cook MD     Indications:  Respiratory distress, respiratory failure and hypoxemia  Diagnosis:  Respiratory failure and hypoxia 2/2 COVID19  Patient Location:  ICU  Timeout:  10/26/2020 6:05 PM  Procedure Start Time:  10/26/2020 6:07 PM  Procedure End Time:  10/26/2020 6:15 PM  Staff:     patient identified, IV checked, site marked, risks and benefits discussed, surgical consent, monitors and equipment checked, pre-op evaluation, timeout performed and anesthesia consent      Anesthesiologist Present: Yes    Intubation:     Induction:  Intravenous    Intubated:  Postinduction    Mask Ventilation:  N/a    Attempts:  2    Attempted By:  Resident anesthesiologist    Method of Intubation:  Video laryngoscopy    Blade:  Glidescope 3    Laryngeal View Grade: Grade I - full view of chords      Attempted By (2nd Attempt):  Resident anesthesiologist    Method of Intubation (2nd Attempt):  Video laryngoscopy    Blade (2nd Attempt):  Glidescope 3    Laryngeal View Grade (2nd Attempt): Grade I - full view of cords      Difficult Airway Encountered?: No      Chest x-ray findings: Extremely dry mucosa which caused difficulty with passing ETT. Lube was used on tip of ETT with subsequent successful intubation.    Airway Device:  Oral endotracheal tube    Airway Device Size:  8.0    Style/Cuff Inflation:  Cuffed (inflated to minimal occlusive pressure)    Inflation Amount (mL):  10    Tube secured:  24    Secured at:  The teeth    Placement Verified By:  Capnometry and Revisualization with laryngoscopy    Complicating Factors:  None (Dry muscosal membranes)    Findings Post-Intubation:  Atraumatic/condition of teeth unchanged and BS equal bilateral  Notes:      I attest I was present and helped preform the above stated procedure.   Reno Cook MD

## 2020-10-26 NOTE — PLAN OF CARE
Currently not stable for discharge. Worsening respiratory status  on 10/24/2020 - placed on 60L/100% comfort flow.  Transferred to RICU - on continuous CPAP. Will continue to follow    Emma Church RN  Case Management  Ext [phone]       10/26/20 3746   Discharge Reassessment   Assessment Type Discharge Planning Reassessment   Provided patient/caregiver education on the expected discharge date and the discharge plan Yes   Do you have any problems affording any of your prescribed medications? No   Discharge Plan A Home   Discharge Plan B Home with family   DME Needed Upon Discharge  none

## 2020-10-26 NOTE — PROGRESS NOTES
[facility] - ICU 16   Critical Care Medicine  Progress Note    Patient Name: Nubia Riggs  MRN: [id number]  Admission Date: 10/18/2020  Hospital Length of Stay: 8 days  Code Status: Full Code  Attending Provider: Raad Martel MD  Primary Care Provider: Primary Doctor No   Principal Problem: Pneumonia due to COVID-19 virus    Subjective:     HPI:  55-year-old male with PMhx of GERD, allergic rinhitis, chronic cough (recently saw pulm and was diagnosed with reactive airway disease, on albuterol PRN) presented the emergency department on 10/18 with chief complaint of shortness of breath. He reports subjective fever, headache, chills, myalgias, poor appetite, and sweats about 6 days ago. He was diagnosed with COVID-19 on 10/15.  Reports presenting to urgent care 10/16 due to 89% pulse ox on room air. Started on levofloxacin and discharged home.  Reports using albuterol treatments at home without much relief.He noticed to have cough productive of blood tinged sputum and shortness of breath so he presented to the ED.     He received ceftria/azithro (x1 each in the ED). He started Remdesivir and completed 5 doses on 10/22, continues on dexamethasone. Patient is on his 7th day of hospital admission and has been requiring higher doses of O2 to keep sats >90%. Today he went up to 60L on 100% FiO2 and thus Critical Care was consulted.       Hospital/ICU Course:  Patient admitted to RICU due to worsening respiratory status with increasing oxygen requirements. Completion of remdesivir on 10/22. On BiPAP with O2 saturation in high 70s-low 80s at rest and acute desaturation to 40s when biPAP removed to provide PO medications. He  Required intubation on 10/26. Central line and arterial line placed.     Interval History/Significant Events: Patient on 100% biPAP but O2 saturation still in low 70s to mid 80s. Patient desaturated to 40s when biPAP removed to give medication. Intubation necessary at this point.  Patient and wife notified of plan. Central/arterial line consents signed. To proceed with intubation with anesthesiology.     Review of Systems   Unable to perform ROS: Severe respiratory distress     Objective:     Vital Signs (Most Recent):  Temp: 98.4 °F (36.9 °C) (10/26/20 1500)  Pulse: 72 (10/26/20 1700)  Resp: (!) 38 (10/26/20 1700)  BP: 105/73 (10/26/20 1700)  SpO2: (!) 89 % (10/26/20 1700) Vital Signs (24h Range):  Temp:  [97.7 °F (36.5 °C)-99 °F (37.2 °C)] 98.4 °F (36.9 °C)  Pulse:  [47-89] 72  Resp:  [26-80] 38  SpO2:  [38 %-97 %] 89 %  BP: ()/(54-73) 105/73   Weight: 84.4 kg (186 lb)  Body mass index is 26.69 kg/m².      Intake/Output Summary (Last 24 hours) at 10/26/2020 1758  Last data filed at 10/26/2020 1700  Gross per 24 hour   Intake 2123.5 ml   Output 3215 ml   Net -1091.5 ml       Physical Exam  Vitals signs reviewed.   Constitutional:       General: He is in acute distress.      Appearance: He is well-developed.   HENT:      Head: Normocephalic and atraumatic.   Eyes:      Pupils: Pupils are equal, round, and reactive to light.   Neck:      Musculoskeletal: Normal range of motion and neck supple.      Vascular: No JVD.   Cardiovascular:      Rate and Rhythm: Normal rate and regular rhythm.      Heart sounds: Normal heart sounds. No murmur.   Pulmonary:      Effort: Respiratory distress present.      Breath sounds: Rales (more prominent in R lower base) present. No wheezing.   Abdominal:      General: Bowel sounds are normal. There is no distension.      Palpations: Abdomen is soft.      Tenderness: There is no abdominal tenderness.   Musculoskeletal: Normal range of motion.         General: No tenderness or deformity.      Right lower leg: No edema.      Left lower leg: No edema.   Skin:     General: Skin is warm and dry.   Neurological:      General: No focal deficit present.      Mental Status: He is alert and oriented to person, place, and time.      Comments: On precedex   Psychiatric:          Behavior: Behavior normal.         Judgment: Judgment normal.         Vents:  Oxygen Concentration (%): 100 (10/26/20 1645)  Lines/Drains/Airways     Drain                 Urethral Catheter 10/26/20 0900 Non-latex 16 Fr. less than 1 day          Peripheral Intravenous Line                 Peripheral IV - Single Lumen 10/25/20 1845 22 G Anterior;Left Forearm less than 1 day         Peripheral IV - Single Lumen 10/26/20 0925 20 G;1 1/4 in Anterior;Distal;Right Upper Arm less than 1 day         Peripheral IV - Single Lumen 10/26/20 1633 20 G;1 1/4 in Anterior;Left;Proximal Forearm less than 1 day              Significant Labs:    CBC/Anemia Profile:  Recent Labs   Lab 10/25/20  0400 10/26/20  0351   WBC 10.95 12.87*   HGB 15.1 15.6   HCT 46.1 47.9   * 111*   MCV 95 95   RDW 12.3 12.3   FERRITIN  --  1,175*        Chemistries:  Recent Labs   Lab 10/25/20  0400 10/26/20  0351 10/26/20  1009    141  --    K 4.7 4.6  --     106  --    CO2 26 22*  --    BUN 36* 46*  --    CREATININE 0.8 0.8  --    CALCIUM 8.4* 8.2*  --    ALBUMIN 2.1* 1.9*  --    PROT 5.9* 6.0  --    BILITOT 0.7 0.6  --    ALKPHOS 157* 224*  --    ALT 31 29  --    AST 68* 73*  --    MG  --   --  2.0   PHOS  --   --  4.5       All pertinent labs within the past 24 hours have been reviewed.    Significant Imaging:  I have reviewed and interpreted all pertinent imaging results/findings within the past 24 hours.      ABG  Recent Labs   Lab 10/26/20  1645   PH 7.425   PO2 53*   PCO2 37.9   HCO3 24.8   BE 0     Assessment/Plan:     Psychiatric  Anxiety  Continue home vilazodone.  Morphine and ativan PRN ordered.     Pulmonary  Reactive airway disease  Follows with Pulm as outpatient. Last seen 8/27/20, told he had RAD  He was also seen by a [facility] pulmonologist who did spirometry that reportedly showed borderline or mild asthma, and has been controlled with albuterol.   Former smoker, started at age 10-12 and quit 10 years ago  -- Continue  inhalational treatments    Acute hypoxemic respiratory failure  Secondary to COVID (see above)    Other  * Pneumonia due to COVID-19 virus  55-year-old male with PMhx of GERD, allergic rinhitis, chronic cough/ reactive airway disease, presenting due to subjective fever, chills, myalgias, COVID positive.    Admitted to Hospital Medicine on 10/18 with following admission labs:  D-dimer Admit & Q48h 0.5   Ferritin Admit & Q48h 460   CRP Admit & Q48h 56   CPK Admit & Q24h if elevated 495   LDH Admit & Q48h 396     Patient has continued to deteriorate needing higher oxygen requirements and thus Critical Care Medicine was consulted. Currently on 60L and 100% FiO2 on biPAP. Intubation on 10/26 with Anesthesiology at bedside     - COVID-19 testing Collection Date: 9/1/2020 Collection Time:   8:30 AM     - Isolation:   - Airborne, Contact and Droplet Precautions  - Cohort patients into COVID units  - N95 mask, wear eye protection  - 20 second hand hygiene              - Limit visitors per hospital policy              - Consolidating lab draws, nursing care, provider visits, and interventions    - Diagnostics: (leukopenia, hyponatremia, hyperferritinemia, elevated troponin, elevated d-dimer, age, and comorbidities are significant predictors of poor clinical outcome)  CBC, CMP, Procalcitonin, Ferritin, CRP and Portable CXR    - Management:  - received Azithro and ceftriaxone (x1 each) in the ED on 10/18  - s/p remdesivir (10/18-10/22)  - Dexamethasone (day 8/10)  - Supplemental O2 to maintain SpO2 >92%  - Albuterol treatment PRN  - D-dimer >33 On empiric therapeutic enoxaparin dose (1 mg/kg bid)  - Switched from CPAP -> BiPAP, now on 20/10, 90% FiO2  -  morphine 2 mg q4 PRN for tachypnea    Telemetry  Continuous/intermittent Pulse Ox      Advance Care Planning     Code Status: Full code       Critical Care Daily Checklist:    A: Awake: RASS Goal/Actual Goal: RASS Goal: 0-->alert and calm  Actual: Chang Agitation Sedation  Scale (RASS): Drowsy   B: Spontaneous Breathing Trial Performed?     C: SAT & SBT Coordinated?  n/a                      D: Delirium: CAM-ICU Overall CAM-ICU: Negative   E: Early Mobility Performed? No   F: Feeding Goal:    Status:     Current Diet Order   Procedures    Diet NPO      AS: Analgesia/Sedation precedex   T: Thromboembolic Prophylaxis Therapeutic lovenox    H: HOB > 300 Yes   U: Stress Ulcer Prophylaxis (if needed) none   G: Glucose Control WNL    B: Bowel Function Stool Occurrence: 2   I: Indwelling Catheter (Lines & Kelley) Necessity necessary   D: De-escalation of Antimicrobials/Pharmacotherapies vanc and cefepime    Plan for the day/ETD Intubate; central and arterial line placement    Code Status:  Family/Goals of Care: Full Code         Critical secondary to Patient has a condition that poses threat to life and bodily function: Severe Respiratory Distress      Critical care was time spent personally by me on the following activities: development of treatment plan with patient or surrogate and bedside caregivers, discussions with consultants, evaluation of patient's response to treatment, examination of patient, ordering and performing treatments and interventions, ordering and review of laboratory studies, ordering and review of radiographic studies, pulse oximetry, re-evaluation of patient's condition. This critical care time did not overlap with that of any other provider or involve time for any procedures.     Blaine Berger MD  Critical Care Medicine  [facility] - ICU 16 WT

## 2020-10-26 NOTE — SUBJECTIVE & OBJECTIVE
Interval History/Significant Events: Patient on 100% biPAP but O2 saturation still in low 70s to mid 80s. Patient desaturated to 40s when biPAP removed to give medication. Intubation necessary at this point. Patient and wife notified of plan. Central/arterial line consents signed. To proceed with intubation with anesthesiology.     Review of Systems   Unable to perform ROS: Severe respiratory distress     Objective:     Vital Signs (Most Recent):  Temp: 98.4 °F (36.9 °C) (10/26/20 1500)  Pulse: 72 (10/26/20 1700)  Resp: (!) 38 (10/26/20 1700)  BP: 105/73 (10/26/20 1700)  SpO2: (!) 89 % (10/26/20 1700) Vital Signs (24h Range):  Temp:  [97.7 °F (36.5 °C)-99 °F (37.2 °C)] 98.4 °F (36.9 °C)  Pulse:  [47-89] 72  Resp:  [26-80] 38  SpO2:  [38 %-97 %] 89 %  BP: ()/(54-73) 105/73   Weight: 84.4 kg (186 lb)  Body mass index is 26.69 kg/m².      Intake/Output Summary (Last 24 hours) at 10/26/2020 1758  Last data filed at 10/26/2020 1700  Gross per 24 hour   Intake 2123.5 ml   Output 3215 ml   Net -1091.5 ml       Physical Exam  Vitals signs reviewed.   Constitutional:       General: He is in acute distress.      Appearance: He is well-developed.   HENT:      Head: Normocephalic and atraumatic.   Eyes:      Pupils: Pupils are equal, round, and reactive to light.   Neck:      Musculoskeletal: Normal range of motion and neck supple.      Vascular: No JVD.   Cardiovascular:      Rate and Rhythm: Normal rate and regular rhythm.      Heart sounds: Normal heart sounds. No murmur.   Pulmonary:      Effort: Respiratory distress present.      Breath sounds: Rales (more prominent in R lower base) present. No wheezing.   Abdominal:      General: Bowel sounds are normal. There is no distension.      Palpations: Abdomen is soft.      Tenderness: There is no abdominal tenderness.   Musculoskeletal: Normal range of motion.         General: No tenderness or deformity.      Right lower leg: No edema.      Left lower leg: No edema.    Skin:     General: Skin is warm and dry.   Neurological:      General: No focal deficit present.      Mental Status: He is alert and oriented to person, place, and time.      Comments: On precedex   Psychiatric:         Behavior: Behavior normal.         Judgment: Judgment normal.         Vents:  Oxygen Concentration (%): 100 (10/26/20 1645)  Lines/Drains/Airways     Drain                 Urethral Catheter 10/26/20 0900 Non-latex 16 Fr. less than 1 day          Peripheral Intravenous Line                 Peripheral IV - Single Lumen 10/25/20 1845 22 G Anterior;Left Forearm less than 1 day         Peripheral IV - Single Lumen 10/26/20 0925 20 G;1 1/4 in Anterior;Distal;Right Upper Arm less than 1 day         Peripheral IV - Single Lumen 10/26/20 1633 20 G;1 1/4 in Anterior;Left;Proximal Forearm less than 1 day              Significant Labs:    CBC/Anemia Profile:  Recent Labs   Lab 10/25/20  0400 10/26/20  0351   WBC 10.95 12.87*   HGB 15.1 15.6   HCT 46.1 47.9   * 111*   MCV 95 95   RDW 12.3 12.3   FERRITIN  --  1,175*        Chemistries:  Recent Labs   Lab 10/25/20  0400 10/26/20  0351 10/26/20  1009    141  --    K 4.7 4.6  --     106  --    CO2 26 22*  --    BUN 36* 46*  --    CREATININE 0.8 0.8  --    CALCIUM 8.4* 8.2*  --    ALBUMIN 2.1* 1.9*  --    PROT 5.9* 6.0  --    BILITOT 0.7 0.6  --    ALKPHOS 157* 224*  --    ALT 31 29  --    AST 68* 73*  --    MG  --   --  2.0   PHOS  --   --  4.5       All pertinent labs within the past 24 hours have been reviewed.    Significant Imaging:  I have reviewed and interpreted all pertinent imaging results/findings within the past 24 hours.

## 2020-10-26 NOTE — ASSESSMENT & PLAN NOTE
Follows with Pulm as outpatient. Last seen 8/27/20, told he had RAD  He was also seen by a [facility] pulmonologist who did spirometry that reportedly showed borderline or mild asthma, and has been controlled with albuterol.   Former smoker, started at age 10-12 and quit 10 years ago  -- Continue inhalational treatments

## 2020-10-26 NOTE — TELEPHONE ENCOUNTER
Have again check on patient this am, he remains in hospital. Will have  assist with scheduling before discharge

## 2020-10-26 NOTE — PROGRESS NOTES
Pharmacokinetic Initial Assessment: IV Vancomycin    Assessment/Plan:    Initiate IV vancomycin with loading dose of 1500 mg once followed by a maintenance dose of vancomycin 1250 mg IV every 12 hours    Draw vancomycin trough level prior to fourth dose on 10/28 0330. Desired empiric serum trough concentration is 15 to 20 mcg/mL.     Pharmacy will continue to follow and monitor vancomycin.      Please contact pharmacy at extension [phone] with any questions regarding this assessment.     Thank you for the consult,   Lavern Davis, PharmD, BCCCP             Patient brief summary:  Nubia Riggs is a 55 y.o. male initiated on antimicrobial therapy with IV vancomycin for treatment of suspected sepsis    Drug Allergies:   Review of patient's allergies indicates:   Allergen Reactions    Meperidine      Other reaction(s): violent behavior    Sulfa (sulfonamide antibiotics)      Other reaction(s): Unknown       Actual Body Weight:   84.4 kg     Renal Function:   Estimated Creatinine Clearance: 110.3 mL/min (based on SCr of 0.8 mg/dL).    Dialysis Method (if applicable):  N/A    CBC (last 72 hours):  Recent Labs   Lab Result Units 10/24/20  0701 10/25/20  0400 10/26/20  0351   WBC K/uL 9.63 10.95 12.87*   Hemoglobin g/dL 14.7 15.1 15.6   Hematocrit % 43.5 46.1 47.9   Platelets K/uL 156 140* 111*   Gran% % 94.2* 95.1* 95.3*   Lymph% % 3.2* 2.4* 2.6*   Mono% % 1.7* 1.7* 1.2*   Eosinophil% % 0.2 0.1 0.1   Basophil% % 0.2 0.1 0.1   Differential Method  Automated Automated Automated       Metabolic Panel (last 72 hours):  Recent Labs   Lab Result Units 10/24/20  0701 10/25/20  0400 10/26/20  0351 10/26/20  1009   Sodium mmol/L 138 141 141  --    Potassium mmol/L 4.0 4.7 4.6  --    Chloride mmol/L 102 105 106  --    CO2 mmol/L 27 26 22*  --    Glucose mg/dL 93 124* 134*  --    BUN, Bld mg/dL 34* 36* 46*  --    Creatinine mg/dL 0.8 0.8 0.8  --    Albumin g/dL 2.3* 2.1* 1.9*  --    Total Bilirubin mg/dL 0.8 0.7 0.6  --     Alkaline Phosphatase U/L 96 157* 224*  --    AST U/L 52* 68* 73*  --    ALT U/L 27 31 29  --    Magnesium mg/dL  --   --   --  2.0   Phosphorus mg/dL  --   --   --  4.5       Drug levels (last 3 results):  No results for input(s): VANCOMYCINRA, VANCOMYCINPE, VANCOMYCINTR in the last 72 hours.    Microbiologic Results:  Microbiology Results (last 7 days)     Procedure Component Value Units Date/Time    Blood culture x two cultures. Draw prior to antibiotics. [[id number]] Collected: 10/18/20 0749    Order Status: Completed Specimen: Blood from Peripheral, Antecubital, Right Updated: 10/23/20 1012     Blood Culture, Routine No growth after 5 days.    Narrative:      Aerobic and anaerobic    Blood culture x two cultures. Draw prior to antibiotics. [[id number]] Collected: 10/18/20 0751    Order Status: Completed Specimen: Blood from Peripheral, Antecubital, Right Updated: 10/23/20 1012     Blood Culture, Routine No growth after 5 days.    Narrative:      Aerobic and anaerobic

## 2020-10-26 NOTE — TELEPHONE ENCOUNTER
----- Message from Ashanti Keita sent at 10/19/2020  1:39 PM CDT -----  Contact: [phone]  Caller is requesting an earlier appt than we can schedule.  Caller declined first available appointment listed below. Caller will not accept being placed on the wait list and is requesting a message be sent to the provider.  When is the next available appointment:  December  Reason for the appointment:  Hospital follow up/ establish care  Patient preference of timeframe to be scheduled:  first week of November  Comments:  Please contact patient after discharged day 10/22

## 2020-10-27 PROBLEM — Z51.5 PALLIATIVE CARE ENCOUNTER: Status: ACTIVE | Noted: 2020-01-01

## 2020-10-27 PROBLEM — U07.1 ACUTE HYPOXEMIC RESPIRATORY FAILURE DUE TO COVID-19: Status: ACTIVE | Noted: 2020-01-01

## 2020-10-27 NOTE — CONSULTS
"  [facility] - ICU 16 WT  Adult Nutrition  Consult Note    SUMMARY     Recommendations    1. As medically able, recommend initiating TF of Peptamen Intense VHP and advancing as tolerated to goal rate of 55 mL/hr to provide 1988 kcal (with current propofol rate), 121 gm protein, and 1109 mL free fluid.    - When propofol discontinued, recommend TF of Impact Peptide 1.5 to goal rate of 1980 kcal, 124 gm protein, and 1016 mL free fluid.     2. As medically able, ADAT to regular with texture per SLP.     3. RD following.     Goals: receive nutrition by RD follow-up  Nutrition Goal Status: new  Communication of RD Recs: (POC)    Reason for Assessment    Reason For Assessment: consult  Diagnosis: (Pneumonia due to COVID-19 virus)  Relevant Medical History: GERD, allergic rinhitis, chronic cough (recently saw pulm and was diagnosed with reactive airway disease)  Interdisciplinary Rounds: did not attend  General Information Comments: Pt intubated, sedated, prone position. Per chart review, pt with decreased appetite ~ 1 week with no significant wt loss. Unable to complete NFPE 2/2 COVID isolation. Pt does not meet malnutrition criteria with current information. Will continue to monitor energy intake and wt changes.   Nutrition Discharge Planning: unable to determine at this time    Nutrition Risk Screen    Nutrition Risk Screen: other (see comments)(intubated, sedated, and paralyzed)    Nutrition/Diet History    Spiritual, Cultural Beliefs, Caodaism Practices, Values that Affect Care: no  Factors Affecting Nutritional Intake: NPO, on mechanical ventilation    Anthropometrics    Temp: 98.3 °F (36.8 °C)  Height: 5' 10" (177.8 cm)  Height (inches): 70 in  Weight Method: Standard Scale  Weight: 84.4 kg (186 lb)  Weight (lb): 186 lb  Ideal Body Weight (IBW), Male: 166 lb  % Ideal Body Weight, Male (lb): 112.05 %  BMI (Calculated): 26.7  BMI Grade: (P) 25 - 29.9 - overweight    Lab/Procedures/Meds    Pertinent Labs " Reviewed: reviewed  Pertinent Labs Comments: K 6.6, BUN 66, Cr 1.6, GFR 48.5, phos 9.3, AST 48  Pertinent Medications Reviewed: reviewed  Pertinent Medications Comments: vitamin C, statin, MVI, vitamin D    Estimated/Assessed Needs    Weight Used For Calorie Calculations: 84.4 kg (186 lb 1.1 oz)  Energy Calorie Requirements (kcal): 2174 kcal/day  Energy Need Method: [facility]  Protein Requirements: 101-127 gm/day(1.2-1.5 gm/kg)  Weight Used For Protein Calculations: 84.4 kg (186 lb 1.1 oz)  Fluid Requirements (mL): 1 mL/kcal or per MD     RDA Method (mL): 2174     Nutrition Prescription Ordered    Current Diet Order: NPO    Evaluation of Received Nutrient/Fluid Intake    Other Calories (kcal): 668(propofol )  Comments: LBM 10/24  % Intake of Estimated Energy Needs: 0 - 25 %  % Meal Intake: NPO    Nutrition Risk    Level of Risk/Frequency of Follow-up: (f/u 2 x wk)     Assessment and Plan    Nutrition Problem  Inadequate Energy Intake     Related to (etiology):   Inability to consume sufficient energy     Signs and Symptoms (as evidenced by):   NPO with no alternative means of nutrition      Interventions (treatment strategy):  Collaboration of nutrition care with other providers    Nutrition Diagnosis Status:   New     Monitor and Evaluation    Food and Nutrient Intake: energy intake  Food and Nutrient Adminstration: diet order, enteral and parenteral nutrition administration  Anthropometric Measurements: weight, weight change, body mass index  Biochemical Data, Medical Tests and Procedures: electrolyte and renal panel, gastrointestinal profile, glucose/endocrine profile, inflammatory profile, lipid profile  Nutrition-Focused Physical Findings: overall appearance     Nutrition Follow-Up    RD Follow-up?: Yes

## 2020-10-27 NOTE — PROGRESS NOTES
Brief consult note:     Discussed with primary team, patient, and sig other at the bedside.     Full consult note to follow.     Discussion patient and sig other to introduce the role of palliative medicine and supportive care in the ICU in the setting of a serious diagnosis of COVID-19. The family was able to demonstrate an excellent understanding of the diagnosis, current care plan, hope for improvement and concern for worsening of the pt's condition.    Experience with critical illness: none    Understanding of illness:  Understands most people do well, but if intubated may be a prolonged hospital course    Present for discussion: pt and sig other  Liza Brody Spouse     [phone]       Medicolegal: Pts sig other is MPOA per the pt; has ACP and asked wife to bring copy for chart completion    Goals of care:  Cure and functional restoration; would agree to invasive measures of support if it was felt to give him a chance to recover    Discussed with primary team and nursing    Thank you for the opportunity to care for this patient and family.     Please call with questions.     Romain Post MD  Palliative Medicine

## 2020-10-27 NOTE — SUBJECTIVE & OBJECTIVE
Interval History/Significant Events: Proned overnight     Review of Systems   Unable to perform ROS: Intubated     Objective:     Vital Signs (Most Recent):  Temp: 99.7 °F (37.6 °C) (10/27/20 1530)  Pulse: (!) 124 (10/27/20 1615)  Resp: (!) 35 (10/27/20 1615)  BP: (!) 101/54 (10/27/20 1600)  SpO2: (!) 92 % (10/27/20 1615) Vital Signs (24h Range):  Temp:  [98.3 °F (36.8 °C)-102.2 °F (39 °C)] 99.7 °F (37.6 °C)  Pulse:  [] 124  Resp:  [8-45] 35  SpO2:  [80 %-99 %] 92 %  BP: ()/(49-95) 101/54  Arterial Line BP: ()/(44-69) 100/52   Weight: 84.4 kg (186 lb)  Body mass index is 26.69 kg/m².      Intake/Output Summary (Last 24 hours) at 10/27/2020 1656  Last data filed at 10/27/2020 1600  Gross per 24 hour   Intake 3441.15 ml   Output 2350 ml   Net 1091.15 ml       Physical Exam  Vitals signs and nursing note reviewed.   Constitutional:       General: He is not in acute distress.     Appearance: Normal appearance. He is ill-appearing.      Interventions: He is sedated, chemically paralyzed and intubated.   Cardiovascular:      Rate and Rhythm: Regular rhythm. Tachycardia present.      Heart sounds: Normal heart sounds.   Pulmonary:      Effort: He is intubated.      Breath sounds: No wheezing, rhonchi or rales.      Comments: Coarse breath sounds  Abdominal:      General: Abdomen is flat. Bowel sounds are decreased.   Musculoskeletal:      Right lower leg: No edema.      Left lower leg: No edema.   Neurological:      Mental Status: He is unresponsive.      GCS: GCS eye subscore is 1. GCS verbal subscore is 1. GCS motor subscore is 1.      Comments: Patient on NMB         Vents:  Vent Mode: A/C (10/27/20 1527)  Set Rate: 35 BPM (10/27/20 1527)  Vt Set: 460 mL (10/27/20 1527)  PEEP/CPAP: 12 cmH20 (10/27/20 1527)  Oxygen Concentration (%): 80 (10/27/20 1615)  Peak Airway Pressure: 29 cmH2O (10/27/20 1527)  Plateau Pressure: 28 cmH20 (10/27/20 1527)  Total Ve: 17.1 mL (10/27/20 1527)  F/VT Ratio<105 (RSBI):  (!) 71.57 (10/27/20 1527)  Lines/Drains/Airways     Central Venous Catheter Line            Percutaneous Central Line Insertion/Assessment - Triple Lumen  10/26/20 1940 right internal jugular less than 1 day          Drain                 Urethral Catheter 10/26/20 0900 Non-latex 16 Fr. 1 day         NG/OG Tube 10/26/20 2000 orogastric 16 Fr. Left mouth less than 1 day          Airway                 Airway - Non-Surgical 10/26/20 1820 Endotracheal Tube less than 1 day       Airway Anesthesia 10/26/20 less than 1 day          Arterial Line            Arterial Line 10/26/20 1909 Left Radial less than 1 day          Peripheral Intravenous Line                 Peripheral IV - Single Lumen 10/25/20 1845 22 G Anterior;Left Forearm 1 day         Peripheral IV - Single Lumen 10/26/20 0925 20 G;1 1/4 in Anterior;Distal;Right Upper Arm 1 day         Peripheral IV - Single Lumen 10/26/20 1633 20 G;1 1/4 in Anterior;Left;Proximal Forearm 1 day              Significant Labs:    CBC/Anemia Profile:  Recent Labs   Lab 10/26/20  0351 10/26/20  2235 10/27/20  0403   WBC 12.87* 19.71* 15.40*   HGB 15.6 16.2 15.7   HCT 47.9 52.3 51.1   * 188 147*   MCV 95 101* 101*   RDW 12.3 12.6 12.5   FERRITIN 1,175*  --   --         Chemistries:  Recent Labs   Lab 10/26/20  0351  10/26/20  1009 10/27/20  0403 10/27/20  0740 10/27/20  0748     --   --  144  --   --    K 4.6  --   --  6.6* 5.7*  --      --   --  105  --   --    CO2 22*  --   --  26  --   --    BUN 46*  --   --  66*  --   --    CREATININE 0.8  --   --  1.6*  --   --    CALCIUM 8.2*  --   --  7.4*  --   --    ALBUMIN 1.9*  --   --  1.8*  --   --    PROT 6.0  --   --  6.2  --   --    BILITOT 0.6  --   --  0.2  --   --    ALKPHOS 224*  --   --  176*  --   --    ALT 29  --   --  33  --   --    AST 73*  --   --  48*  --   --    MG  --   --  2.0 2.5  --   --    PHOS  --    < > 4.5 9.3* 5.9* 6.0*    < > = values in this interval not displayed.       All pertinent labs  within the past 24 hours have been reviewed.    Significant Imaging:  I have reviewed all pertinent imaging results/findings within the past 24 hours.

## 2020-10-27 NOTE — CARE UPDATE
S/w patient's wife (Liza Brody). Updated wife that patient has been intubated and is required substantial breathing support. Also notified wife that he is prone and this optimizes breathing for certain COVID patients. Wife asked if testosterone can be checked, if Vitamin A and Vitamin D can be supplemented, and if he is on enough blood thinner. Added Vitamin D and notified wife that he is on heparin infusion which will treat any blood clots. She requests day team call her with updates.     Abi Hartman MD, PGY2  Internal Medicine

## 2020-10-27 NOTE — PLAN OF CARE
POC reviewed with patient's wife and discussed events of the shift. Patient is intubated, sedated on propofol, fentanyl, and precedex, paralyzed with nimbex and proned. Remains on levophed at this time as well as a heparin drip.     Problem: Adult Inpatient Plan of Care  Goal: Plan of Care Review  Outcome: Ongoing, Progressing  Goal: Patient-Specific Goal (Individualization)  Outcome: Ongoing, Progressing  Goal: Absence of Hospital-Acquired Illness or Injury  Outcome: Ongoing, Progressing  Goal: Optimal Comfort and Wellbeing  Outcome: Ongoing, Progressing  Goal: Readiness for Transition of Care  Outcome: Ongoing, Progressing  Goal: Rounds/Family Conference  Outcome: Ongoing, Progressing     Problem: Skin Injury Risk Increased  Goal: Skin Health and Integrity  Outcome: Ongoing, Progressing     Problem: Fall Injury Risk  Goal: Absence of Fall and Fall-Related Injury  Outcome: Ongoing, Progressing     Problem: Coping Ineffective  Goal: Effective Coping  Outcome: Ongoing, Progressing     Problem: Infection  Goal: Infection Symptom Resolution  Outcome: Ongoing, Progressing     Problem: Communication Impairment (Mechanical Ventilation, Invasive)  Goal: Effective Communication  Outcome: Ongoing, Progressing     Problem: Device-Related Complication Risk (Mechanical Ventilation, Invasive)  Goal: Optimal Device Function  Outcome: Ongoing, Progressing     Problem: Inability to Wean (Mechanical Ventilation, Invasive)  Goal: Mechanical Ventilation Liberation  Outcome: Ongoing, Progressing     Problem: Nutrition Impairment (Mechanical Ventilation, Invasive)  Goal: Optimal Nutrition Delivery  Outcome: Ongoing, Progressing     Problem: Skin and Tissue Injury (Mechanical Ventilation, Invasive)  Goal: Absence of Device-Related Skin and Tissue Injury  Outcome: Ongoing, Progressing     Problem: Ventilator-Induced Lung Injury (Mechanical Ventilation, Invasive)  Goal: Absence of Ventilator-Induced Lung Injury  Outcome: Ongoing,  Progressing     Problem: Communication Impairment (Artificial Airway)  Goal: Effective Communication  Outcome: Ongoing, Progressing     Problem: Device-Related Complication Risk (Artificial Airway)  Goal: Optimal Device Function  Outcome: Ongoing, Progressing     Problem: Skin and Tissue Injury (Artificial Airway)  Goal: Absence of Device-Related Skin or Tissue Injury  Outcome: Ongoing, Progressing

## 2020-10-27 NOTE — SIGNIFICANT EVENT
"Procedure Note  Prone Positioning  Critical Care Medicine         Chief Complaint: Pneumonia due to COVID-19 virus  MRN: [id number]  Sex: Male  Age: 55         Last ABG:   Recent Labs     10/27/20  0156   PH 7.143*   PCO2 89.5*   PO2 73*   HCO3 30.7*   POCSATURATED 87*   BE 2        Vital Signs (Most Recent):   BP (!) 107/54   Pulse 70   Temp 99.1 °F (37.3 °C) (Oral)   Resp (!) 35   Ht 5' 10" (1.778 m)   Wt 84.4 kg (186 lb)   SpO2 (!) 91%   BMI 26.69 kg/m²       Ventilator Settings:  Vent Mode: A/C  Oxygen Concentration (%):  [100] 100  Resp Rate Total:  [0 br/min-35 br/min] 35 br/min  Vt Set:  [390 mL-500 mL] 430 mL  PEEP/CPAP:  [9 rjE14-29 cmH20] 14 cmH20  Mean Airway Pressure:  [12 joL85-04 cmH20] 23 cmH20     Indication: Severe, refractory ARDS. High risk for deterioration during proning procedure in need of direct monitoring by Critical Care personnel.      Prior to beginning the procedure, all appropriate equipment and personnel were gathered in the room. Two individuals were placed on each side of the patient and one person stood at the head of the bed and was dedicated to the management of the head of the patient, the endotracheal tube, and the ventilator lines. The person at the head of the bed  coordinated the steps of the proning procedure with team team at the direction of Critical Care team members at the bedside. The patient was first log-rolled 90 degrees onto their side towards the direction of their central venous catheter. Cardiac electrodes were then moved from the patient's anterior to the posterior. The patients knees, forehead, chest, and iliac crests were protected using adhesive pads and/or foam pads to reduce the risk of skin breakdown. Once properly positioned in the bed, another 90 degree log roll was performed placing the patient into the prone position. The patient's arms were placed alongside the body. The patient's head should be turned alternately to the right or left every 2 " hours. The patient tolerated the procedure well.       Total Critical Care time (uninterrupted not including procedures): 30mins    Abiodun Buchanan M.D.   [facility]

## 2020-10-27 NOTE — PROGRESS NOTES
MD, Resident, RT, charge nurse, and 4 additional nurses in room at this time to position the patient prone. Patient tolerated well and all lines and positioning equipment were managed appropriately. Will continue to monitor

## 2020-10-27 NOTE — PROCEDURES
"Nubia Riggs is a 55 y.o. male patient.    Temp: 98.4 °F (36.9 °C) (10/26/20 1500)  Pulse: 73 (10/26/20 1911)  Resp: (!) 8 (10/26/20 1911)  BP: (!) 180/95 (10/26/20 1830)  SpO2: (!) 82 % (10/26/20 1911)  Weight: 84.4 kg (186 lb) (10/25/20 1120)  Height: 5' 10" (177.8 cm) (10/25/20 1120)       Arterial Line    Date/Time: 10/26/2020 8:16 PM  Location procedure was performed: [facility] CRITICAL CARE MEDICINE  Performed by: Michael De La Vega MD  Authorized by: Michael De La Vega MD   Consent Done: Yes  Consent: Written consent obtained.  Risks and benefits: risks, benefits and alternatives were discussed  Consent given by: spouse  Patient identity confirmed: , MRN and name  Time out: Immediately prior to procedure a "time out" was called to verify the correct patient, procedure, equipment, support staff and site/side marked as required.  Preparation: Patient was prepped and draped in the usual sterile fashion.  Indications: multiple ABGs, respiratory failure and hemodynamic monitoring  Location: left radial  Anesthesia: see MAR for details  Patient sedated: yes  Sedatives: propofol  Analgesia: fentanyl  Seldinger technique: Seldinger technique used  Number of attempts: 2  Complications: No  Estimated blood loss (mL): 10  Specimens: No  Implants: No  Post-procedure: line sutured and dressing applied  Patient tolerance: Patient tolerated the procedure well with no immediate complications          Michael De La Vega  10/26/2020  "

## 2020-10-27 NOTE — PLAN OF CARE
Pt on bipap @100% all day, pO2 50's this am and this evening, electively intubated @approximately 1800 per anesthesia, etomidate 30mg iv and succ 160 mg iv given, 8.0 ett 24 @lip, bilateral breath sounds/color change noted, left radial luna placed, pt on precedex gtt, propofol and fentanyl gtt added after paralytic wore off, pt desaturated, re-paralyzed with increase in o2 sat back to 90's on fio2 100% on vent, DR. De La Vega at bedside, reported off to night shift nurse

## 2020-10-27 NOTE — CARE UPDATE
Updated wife, Liza, via telephone regarding patient's clinical status. All questions answered and concerns addressed. Wife concerned regarding patient's testosterone levels as he was taking supplements as outpatient and also wanted to inquire as to 5 more days of remdesivir. Informed wife that we will follow up on both items.         Chris Gibbons NP  Pulmonary Critical Care Medicine  [facility]

## 2020-10-27 NOTE — PROGRESS NOTES
Completed central line placement, a-line placement, and OG placement. Awaiting xray to verify placement before use

## 2020-10-27 NOTE — SIGNIFICANT EVENT
Procedure Note  Supine Positioning  Critical Care Medicine       Chief Complaint: Pneumonia due to COVID-19 virus  MRN: [id number]  LOS: 9  Sex: male  Age: 55 y.o.      Last ABG:   Recent Labs   Lab 10/27/20  1534   PH 7.251*   PO2 65*   PCO2 60.9*   HCO3 26.8   BE 0       Vital Signs (Most Recent):   Temp: 99.7 °F (37.6 °C) (10/27/20 1530)  Pulse: (!) 124 (10/27/20 1615)  Resp: (!) 35 (10/27/20 1615)  BP: (!) 101/54 (10/27/20 1600)  SpO2: (!) 92 % (10/27/20 1615)    Ventilator Settings:  Vent Mode: A/C  Oxygen Concentration (%):  [] 80  Resp Rate Total:  [0 br/min-35 br/min] 35 br/min  Vt Set:  [390 mL-500 mL] 460 mL  PEEP/CPAP:  [9 iqS16-58 cmH20] 12 cmH20  Mean Airway Pressure:  [12 vdE44-22 cmH20] 21 cmH20        Indication: Severe, refractory ARDS. High risk for deterioration during supination procedure in need of direct monitoring by Critical Care personnel.     Prior to beginning the procedure, all appropriate equipment and personnel were gathered in the room. Two individuals were placed on each side of the patient and two respiratory therapists stood at the head of the bed and was dedicated to the management of the head of the patient, the endotracheal tube, and the ventilator lines. The person at the head of the bed coordinated the steps of the supination procedure at the direction of Critical Care team members at the bedside. The patient was first log-rolled 90 degrees onto their side away from the direction of their central venous catheter. Cardiac electrodes were then moved from the patient's posterior back to the anterior. Once properly positioned in the bed, another 90 degree log roll was performed placing the patient into the supine position. The patient's arms were placed alongside the body. Continuous pulse oximetry and blood pressure monitoring was performed without any desaturation or hemodynamic instability. The patient tolerated the procedure well.     Total Critical Care time (uninterrupted  not including procedures): 15 minutes

## 2020-10-27 NOTE — PROGRESS NOTES
MD at the bedside to assess patient. Abg's continue to be drawn due to abnormal critical values. Labs obtained, OG placed.

## 2020-10-27 NOTE — RESPIRATORY THERAPY
ABG Results     21:28  PH : 6.85  Co2: >130  PO2: 117    22.06  Ph: 6.95  CO2: > 130  P02: 94    23:15  PH: 6.98  CO2: >130  PO2: 93    23:38  PH: 7.0  CO2: >130  PO2: 93

## 2020-10-27 NOTE — PROGRESS NOTES
[facility] - ICU 16   Critical Care Medicine  Progress Note    Patient Name: Nubia Riggs  MRN: [id number]  Admission Date: 10/18/2020  Hospital Length of Stay: 9 days  Code Status: Full Code  Attending Provider: Raad Martel MD  Primary Care Provider: Primary Doctor No   Principal Problem: Pneumonia due to COVID-19 virus    Subjective:     HPI:  55-year-old male with PMhx of GERD, allergic rinhitis, chronic cough (recently saw pulm and was diagnosed with reactive airway disease, on albuterol PRN) presented the emergency department on 10/18 with chief complaint of shortness of breath. He reports subjective fever, headache, chills, myalgias, poor appetite, and sweats about 6 days ago. He was diagnosed with COVID-19 on 10/15.  Reports presenting to urgent care 10/16 due to 89% pulse ox on room air. Started on levofloxacin and discharged home.  Reports using albuterol treatments at home without much relief.He noticed to have cough productive of blood tinged sputum and shortness of breath so he presented to the ED.     He received ceftria/azithro (x1 each in the ED). He started Remdesivir and completed 5 doses on 10/22, continues on dexamethasone. Patient is on his 7th day of hospital admission and has been requiring higher doses of O2 to keep sats >90%. Today he went up to 60L on 100% FiO2 and thus Critical Care was consulted.       Hospital/ICU Course:  Patient admitted to RICU due to worsening respiratory status with increasing oxygen requirements. Completion of remdesivir on 10/22. On BiPAP with O2 saturation in high 70s-low 80s at rest and acute desaturation to 40s when biPAP removed to provide PO medications. He required intubation on 10/26. Central line and arterial line placed. Proned on 10/27. Blood cultures sent and worsening renal function noted on am labs    Interval History/Significant Events: Proned overnight     Review of Systems   Unable to perform ROS: Intubated     Objective:      Vital Signs (Most Recent):  Temp: 99.7 °F (37.6 °C) (10/27/20 1530)  Pulse: (!) 124 (10/27/20 1615)  Resp: (!) 35 (10/27/20 1615)  BP: (!) 101/54 (10/27/20 1600)  SpO2: (!) 92 % (10/27/20 1615) Vital Signs (24h Range):  Temp:  [98.3 °F (36.8 °C)-102.2 °F (39 °C)] 99.7 °F (37.6 °C)  Pulse:  [] 124  Resp:  [8-45] 35  SpO2:  [80 %-99 %] 92 %  BP: ()/(49-95) 101/54  Arterial Line BP: ()/(44-69) 100/52   Weight: 84.4 kg (186 lb)  Body mass index is 26.69 kg/m².      Intake/Output Summary (Last 24 hours) at 10/27/2020 1656  Last data filed at 10/27/2020 1600  Gross per 24 hour   Intake 3441.15 ml   Output 2350 ml   Net 1091.15 ml       Physical Exam  Vitals signs and nursing note reviewed.   Constitutional:       General: He is not in acute distress.     Appearance: Normal appearance. He is ill-appearing.      Interventions: He is sedated, chemically paralyzed and intubated.   Cardiovascular:      Rate and Rhythm: Regular rhythm. Tachycardia present.      Heart sounds: Normal heart sounds.   Pulmonary:      Effort: He is intubated.      Breath sounds: No wheezing, rhonchi or rales.      Comments: Coarse breath sounds  Abdominal:      General: Abdomen is flat. Bowel sounds are decreased.   Musculoskeletal:      Right lower leg: No edema.      Left lower leg: No edema.   Neurological:      Mental Status: He is unresponsive.      GCS: GCS eye subscore is 1. GCS verbal subscore is 1. GCS motor subscore is 1.      Comments: Patient on NMB         Vents:  Vent Mode: A/C (10/27/20 1527)  Set Rate: 35 BPM (10/27/20 1527)  Vt Set: 460 mL (10/27/20 1527)  PEEP/CPAP: 12 cmH20 (10/27/20 1527)  Oxygen Concentration (%): 80 (10/27/20 1615)  Peak Airway Pressure: 29 cmH2O (10/27/20 1527)  Plateau Pressure: 28 cmH20 (10/27/20 1527)  Total Ve: 17.1 mL (10/27/20 1527)  F/VT Ratio<105 (RSBI): (!) 71.57 (10/27/20 1527)  Lines/Drains/Airways     Central Venous Catheter Line            Percutaneous Central Line  Insertion/Assessment - Triple Lumen  10/26/20 1940 right internal jugular less than 1 day          Drain                 Urethral Catheter 10/26/20 0900 Non-latex 16 Fr. 1 day         NG/OG Tube 10/26/20 2000 orogastric 16 Fr. Left mouth less than 1 day          Airway                 Airway - Non-Surgical 10/26/20 1820 Endotracheal Tube less than 1 day       Airway Anesthesia 10/26/20 less than 1 day          Arterial Line            Arterial Line 10/26/20 1909 Left Radial less than 1 day          Peripheral Intravenous Line                 Peripheral IV - Single Lumen 10/25/20 1845 22 G Anterior;Left Forearm 1 day         Peripheral IV - Single Lumen 10/26/20 0925 20 G;1 1/4 in Anterior;Distal;Right Upper Arm 1 day         Peripheral IV - Single Lumen 10/26/20 1633 20 G;1 1/4 in Anterior;Left;Proximal Forearm 1 day              Significant Labs:    CBC/Anemia Profile:  Recent Labs   Lab 10/26/20  0351 10/26/20  2235 10/27/20  0403   WBC 12.87* 19.71* 15.40*   HGB 15.6 16.2 15.7   HCT 47.9 52.3 51.1   * 188 147*   MCV 95 101* 101*   RDW 12.3 12.6 12.5   FERRITIN 1,175*  --   --         Chemistries:  Recent Labs   Lab 10/26/20  0351  10/26/20  1009 10/27/20  0403 10/27/20  0740 10/27/20  0748     --   --  144  --   --    K 4.6  --   --  6.6* 5.7*  --      --   --  105  --   --    CO2 22*  --   --  26  --   --    BUN 46*  --   --  66*  --   --    CREATININE 0.8  --   --  1.6*  --   --    CALCIUM 8.2*  --   --  7.4*  --   --    ALBUMIN 1.9*  --   --  1.8*  --   --    PROT 6.0  --   --  6.2  --   --    BILITOT 0.6  --   --  0.2  --   --    ALKPHOS 224*  --   --  176*  --   --    ALT 29  --   --  33  --   --    AST 73*  --   --  48*  --   --    MG  --   --  2.0 2.5  --   --    PHOS  --    < > 4.5 9.3* 5.9* 6.0*    < > = values in this interval not displayed.       All pertinent labs within the past 24 hours have been reviewed.    Significant Imaging:  I have reviewed all pertinent imaging  results/findings within the past 24 hours.      ABG  Recent Labs   Lab 10/27/20  1534   PH 7.251*   PO2 65*   PCO2 60.9*   HCO3 26.8   BE 0     Assessment/Plan:     Psychiatric  Anxiety  Continue home vilazodone.  Morphine and ativan PRN ordered.     Pulmonary  Reactive airway disease  Follows with Pulm as outpatient. Last seen 8/27/20, told he had RAD  He was also seen by a [facility] pulmonologist who did spirometry that reportedly showed borderline or mild asthma, and has been controlled with albuterol.   Former smoker, started at age 10-12 and quit 10 years ago  -- Continue inhalational treatments    Acute hypoxemic respiratory failure  Secondary to COVID (see above)    Other  * Pneumonia due to COVID-19 virus  Patient admitted to hospital medicine and continued to deteriorate needing higher oxygen requirements and thus Critical Care Medicine was consulted. He was intubated and eventually placed on NMB and proned    - received Azithro and ceftriaxone (x1 each) in the ED on 10/18  - s/p remdesivir (10/18-10/22)  - s/p Dexamethasone (10/27)  - Albuterol treatment PRN  - D-dimer >33 continue Heparin gtt  - continue LPV  - ABG daily to assess PF ratio for proning criteria  - follow up Sputum culture ordered 10/27          Critical Care Time: 55 minutes  Critical secondary to Patient has a condition that poses threat to life and bodily function: Acute resp failure 2/2 COVID      Critical care was time spent personally by me on the following activities: development of treatment plan with patient or surrogate and bedside caregivers, discussions with consultants, evaluation of patient's response to treatment, examination of patient, ordering and performing treatments and interventions, ordering and review of laboratory studies, ordering and review of radiographic studies, pulse oximetry, re-evaluation of patient's condition. This critical care time did not overlap with that of any other provider or involve time for any  procedures.     Chris Gibbons NP  Critical Care Medicine  [facility] - ICU [facility]

## 2020-10-27 NOTE — PROCEDURES
"Nubia Riggs is a 55 y.o. male patient.    Temp: 98.4 °F (36.9 °C) (10/26/20 1500)  Pulse: 73 (10/26/20 1911)  Resp: (!) 8 (10/26/20 1911)  BP: (!) 180/95 (10/26/20 1830)  SpO2: (!) 82 % (10/26/20 1911)  Weight: 84.4 kg (186 lb) (10/25/20 1120)  Height: 5' 10" (177.8 cm) (10/25/20 1120)       Central Line    Date/Time: 10/26/2020 8:13 PM  Performed by: Michael De La Vega MD  Authorized by: Michael De La Vega MD     Location procedure was performed:  [facility] CRITICAL CARE MEDICINE  Consent Done ?:  Yes  Time out complete?: Verified correct patient, procedure, equipment, staff, and site/side    Indications:  Med administration and vascular access  Anesthesia:  General anesthesia and see MAR for details  Preparation:  Skin prepped with ChloraPrep  Location:  Right internal jugular  Catheter type:  Triple lumen  Ultrasound guidance: Yes    Vessel Caliber:  Large   patent  Comprressibility:  Normal  Needle advanced into vessel with real time ultrasound guidance.    Guidewire confirmed in vessel.    Steril sheath on probe.    Manometry: No    Number of attempts:  1  Securement:  Line sutured, chlorhexidine patch, sterile dressing applied and blood return through all ports  Complications: No    Estimated blood loss (mL):  5  Specimens: No    Implants: No    Adverse Events:  None        Michael De La Vega  10/26/2020  "

## 2020-10-27 NOTE — ASSESSMENT & PLAN NOTE
Patient admitted to hospital medicine and continued to deteriorate needing higher oxygen requirements and thus Critical Care Medicine was consulted. He was intubated and eventually placed on NMB and proned    - received Azithro and ceftriaxone (x1 each) in the ED on 10/18  - s/p remdesivir (10/18-10/22)  - s/p Dexamethasone (10/27)  - Albuterol treatment PRN  - D-dimer >33 continue Heparin gtt  - continue LPV  - ABG daily to assess PF ratio for proning criteria  - follow up Sputum culture ordered 10/27

## 2020-10-27 NOTE — PLAN OF CARE
Problem: Adult Inpatient Plan of Care  Goal: Plan of Care Review  Outcome: Ongoing, Progressing  Flowsheets (Taken 10/27/2020 1748)  Plan of Care Reviewed With: spouse  Goal: Absence of Hospital-Acquired Illness or Injury  Outcome: Ongoing, Progressing  Goal: Optimal Comfort and Wellbeing  Outcome: Ongoing, Progressing  Goal: Readiness for Transition of Care  Outcome: Ongoing, Progressing     Problem: Skin Injury Risk Increased  Goal: Skin Health and Integrity  Outcome: Ongoing, Progressing     Problem: Fall Injury Risk  Goal: Absence of Fall and Fall-Related Injury  Outcome: Ongoing, Progressing     Problem: Coping Ineffective  Goal: Effective Coping  Outcome: Ongoing, Progressing     Problem: Infection  Goal: Infection Symptom Resolution  Outcome: Ongoing, Progressing     Problem: Device-Related Complication Risk (Mechanical Ventilation, Invasive)  Goal: Optimal Device Function  Outcome: Ongoing, Progressing     Problem: Inability to Wean (Mechanical Ventilation, Invasive)  Goal: Mechanical Ventilation Liberation  Outcome: Ongoing, Progressing     Problem: Ventilator-Induced Lung Injury (Mechanical Ventilation, Invasive)  Goal: Absence of Ventilator-Induced Lung Injury  Outcome: Ongoing, Progressing     Problem: Device-Related Complication Risk (Artificial Airway)  Goal: Optimal Device Function  Outcome: Ongoing, Progressing     Problem: Skin and Tissue Injury (Artificial Airway)  Goal: Absence of Device-Related Skin or Tissue Injury  Outcome: Ongoing, Progressing   Pt. Remains intubated, sedated, and paralyzed. Currently Fio2 at 80% with 12 of PEEP. Oxygen saturation on monitor is 97%. Patient's wife updated on plan of care and patient's status at 1745.

## 2020-10-27 NOTE — PLAN OF CARE
Recommendations    1. As medically able, recommend initiating TF of Peptamen Intense VHP and advancing as tolerated to goal rate of 55 mL/hr to provide 1988 kcal (with current propofol rate), 121 gm protein, and 1109 mL free fluid.    - When propofol discontinued, recommend TF of Impact Peptide 1.5 to goal rate of 1980 kcal, 124 gm protein, and 1016 mL free fluid.     2. As medically able, ADAT to regular with texture per SLP.     3. RD following.     Goals: receive nutrition by RD follow-up  Nutrition Goal Status: new

## 2020-10-27 NOTE — RESPIRATORY THERAPY
Patient in supine position, ETT secured at the 24 cm lizzy at the lip. Patient tolerated mobility well. Nurses, 2 RTs and PA at bedside. I will continue to monitor patient closely.

## 2020-10-27 NOTE — EICU
Intervention Initiated From:  Bedside      Comments: Called to bedside for intubation.   1818- Etomidate 20 mg given IVP  1818-Succinylcholine 160 mg given IVP  1818- Etomidate 10 mg given IVP  1820-Intubated with #8.0 ETT by MD Meza. Color change noted to ETCO2 detector. Bedside confirmed bilateral air exchange. Tube secured at 24 cm to teeth. Pt tolerated procedure well. PCXR ordered. Propofol gtt initiated at 20 mcg/kg/min  1845-/594, O2 86%, Propofol infusing at 50 mcg/kg/min, Precedex infusing at 1.4 mcg/kg/hr  1846- Fentanyl drip initiated at 150 mcg/hr  1850 Left radial arterial line placed by MD De La Vega.

## 2020-10-27 NOTE — SIGNIFICANT EVENT
Procedure Note  Prone Positioning  Critical Care Medicine       Chief Complaint: Pneumonia due to COVID-19 virus  MRN: [id number]  LOS: 9  Sex: male  Age: 55 y.o.      Last ABG:   Recent Labs   Lab 10/27/20  1534   PH 7.251*   PO2 65*   PCO2 60.9*   HCO3 26.8   BE 0       Vital Signs (Most Recent):   Temp: 99.7 °F (37.6 °C) (10/27/20 1530)  Pulse: (!) 124 (10/27/20 1615)  Resp: (!) 35 (10/27/20 1615)  BP: (!) 101/54 (10/27/20 1600)  SpO2: (!) 92 % (10/27/20 1615)    Ventilator Settings:  Vent Mode: A/C  Oxygen Concentration (%):  [] 80  Resp Rate Total:  [0 br/min-35 br/min] 35 br/min  Vt Set:  [390 mL-500 mL] 460 mL  PEEP/CPAP:  [9 puJ39-19 cmH20] 12 cmH20  Mean Airway Pressure:  [12 cwW45-98 cmH20] 21 cmH20    Indication: Severe, refractory ARDS. High risk for deterioration during proning procedure in need of direct monitoring by Critical Care personnel.     Prior to beginning the procedure, all appropriate equipment and personnel were gathered in the room. Two individuals were placed on each side of the patient and one person stood at the head of the bed and was dedicated to the management of the head of the patient, the endotracheal tube, and the ventilator lines. The person at the head of the bed  coordinated the steps of the proning procedure with team team at the direction of Critical Care team members at the bedside. The patient was first log-rolled 90 degrees onto their side towards the direction of their central venous catheter. Cardiac electrodes were then moved from the patient's anterior to the posterior. The patient?s knees, forehead, chest, and iliac crests were protected using adhesive pads and/or foam pads to reduce the risk of skin breakdown. Once properly positioned in the bed, another 90 degree log roll was performed placing the patient into the prone position. The patient's arms were placed alongside the body. The patient's head should be turned alternately to the right or left every 2 hours.  The patient tolerated the procedure well.     Total Critical Care time (uninterrupted not including procedures): 15 minutes

## 2020-10-27 NOTE — PROGRESS NOTES
MD at the bedside again to assess patient. ABG's continue to be assessed. Ventilator settings were adjusted and patient to receive duoneb bronchodilator. ABG to be rechecked in 30 minutes to assess for proning.

## 2020-10-27 NOTE — PROGRESS NOTES
Pharmacokinetic Assessment Follow Up: IV Vancomycin    Vancomycin serum concentration assessment(s):    Vancomycin trough level resulted at 17.5 mcg/mL drawn appropriately. Goal level is 15 to 20 mcg/mL.     Serum creatinine has more than doubled since yesterday.     Drug levels (last 3 results):  Recent Labs   Lab Result Units 10/27/20  1612   Vancomycin, Random ug/mL 17.5     Vancomycin Regimen Plan:    Discontinue the scheduled vancomycin regimen and administer vancomycin  mg once. Redose when the random level is less than 20 mcg/mL. Next level to be drawn with morning labs on 10/28    Pharmacy will continue to follow and monitor vancomycin.    Please contact pharmacy at extension [phone] for questions regarding this assessment.    Thank you for the consult,   Lavern Davis, PharmD, BCCCP             Patient brief summary:  Nubia Riggs is a 55 y.o. male initiated on antimicrobial therapy with IV vancomycin for treatment of sepsis    Drug Allergies:   Review of patient's allergies indicates:   Allergen Reactions    Meperidine      Other reaction(s): violent behavior    Sulfa (sulfonamide antibiotics)      Other reaction(s): Unknown       Actual Body Weight:   84.4 kg     Renal Function:   Estimated Creatinine Clearance: 42 mL/min (A) (based on SCr of 2.1 mg/dL (H)).    Dialysis Method (if applicable):  N/A    CBC (last 72 hours):  Recent Labs   Lab Result Units 10/25/20  0400 10/26/20  0351 10/26/20  2235 10/27/20  0403   WBC K/uL 10.95 12.87* 19.71* 15.40*   Hemoglobin g/dL 15.1 15.6 16.2 15.7   Hematocrit % 46.1 47.9 52.3 51.1   Platelets K/uL 140* 111* 188 147*   Gran % % 95.1* 95.3* 90.1* 94.4*   Lymph % % 2.4* 2.6* 2.5* 2.4*   Mono % % 1.7* 1.2* 1.9* 1.8*   Eosinophil % % 0.1 0.1 0.1 0.0   Basophil % % 0.1 0.1 0.4 0.2   Differential Method  Automated Automated Automated Automated       Metabolic Panel (last 72 hours):  Recent Labs   Lab Result Units 10/25/20  0400 10/26/20  0351 10/26/20  1009  10/27/20  0403 10/27/20  0740 10/27/20  0748 10/27/20  1612   Sodium mmol/L 141 141  --  144  --   --  143   Potassium mmol/L 4.7 4.6  --  6.6* 5.7*  --  5.6*   Chloride mmol/L 105 106  --  105  --   --  106   CO2 mmol/L 26 22*  --  26  --   --  26   Glucose mg/dL 124* 134*  --  146*  --   --  214*   BUN mg/dL 36* 46*  --  66*  --   --  72*   Creatinine mg/dL 0.8 0.8  --  1.6*  --   --  2.1*   Albumin g/dL 2.1* 1.9*  --  1.8*  --   --  1.6*   Total Bilirubin mg/dL 0.7 0.6  --  0.2  --   --   --    Alkaline Phosphatase U/L 157* 224*  --  176*  --   --   --    AST U/L 68* 73*  --  48*  --   --   --    ALT U/L 31 29  --  33  --   --   --    Magnesium mg/dL  --   --  2.0 2.5  --   --   --    Phosphorus mg/dL  --   --  4.5 9.3* 5.9* 6.0* 4.7*       Vancomycin Administrations:  vancomycin given in the last 96 hours                   vancomycin 1.25 g in dextrose 5% 250 mL IVPB (ready to mix) (mg) 1,250 mg New Bag 10/27/20 0452    vancomycin 1.5 g in dextrose 5 % 250 mL IVPB (ready to mix) (mg) 1,500 mg New Bag 10/26/20 1611                Microbiologic Results:  Microbiology Results (last 7 days)     Procedure Component Value Units Date/Time    Culture, Respiratory with Gram Stain [[id number]]     Order Status: No result Specimen: Respiratory     Blood culture x two cultures. Draw prior to antibiotics. [[id number]] Collected: 10/18/20 0749    Order Status: Completed Specimen: Blood from Peripheral, Antecubital, Right Updated: 10/23/20 1012     Blood Culture, Routine No growth after 5 days.    Narrative:      Aerobic and anaerobic    Blood culture x two cultures. Draw prior to antibiotics. [[id number]] Collected: 10/18/20 0751    Order Status: Completed Specimen: Blood from Peripheral, Antecubital, Right Updated: 10/23/20 1012     Blood Culture, Routine No growth after 5 days.    Narrative:      Aerobic and anaerobic

## 2020-10-27 NOTE — NURSING
Patient became hypotensive and tachycardic in 130' s after ETT was suctioned by RT. Levophed increased per order. Will continue to monitor.

## 2020-10-27 NOTE — NURSING
Patient placed in prone position with assist x 2 respiratory therapist and 6 registered nurses. DILSHAD Perez at bedside.

## 2020-10-27 NOTE — CONSULTS
Palliative Care Acknowledgement of Consult - .date    Consult received. Palliative Care Provider:__Dr. Post________ will touch base with team prior to seeing patient. Full consult to follow.    Thank you for allowing us to be a part of the care of this patient.          Linda Carrion, GAYLA, ACHP-SW

## 2020-10-28 PROBLEM — E87.5 HYPERKALEMIA: Status: ACTIVE | Noted: 2020-01-01

## 2020-10-28 NOTE — PLAN OF CARE
POC reviewed, patient remains intubated, sedated, paralyzed, and proned. Remains on 0.02 of levophed for a map>65 and is appropriately turned and repositioned to minimize pressure ulcers. Mostly uneventful night, will continue to monitor.     Problem: Adult Inpatient Plan of Care  Goal: Plan of Care Review  Outcome: Ongoing, Progressing  Goal: Patient-Specific Goal (Individualization)  Outcome: Ongoing, Progressing  Goal: Absence of Hospital-Acquired Illness or Injury  Outcome: Ongoing, Progressing  Goal: Optimal Comfort and Wellbeing  Outcome: Ongoing, Progressing  Goal: Readiness for Transition of Care  Outcome: Ongoing, Progressing  Goal: Rounds/Family Conference  Outcome: Ongoing, Progressing     Problem: Skin Injury Risk Increased  Goal: Skin Health and Integrity  Outcome: Ongoing, Progressing     Problem: Fall Injury Risk  Goal: Absence of Fall and Fall-Related Injury  Outcome: Ongoing, Progressing     Problem: Coping Ineffective  Goal: Effective Coping  Outcome: Ongoing, Progressing     Problem: Infection  Goal: Infection Symptom Resolution  Outcome: Ongoing, Progressing     Problem: Communication Impairment (Mechanical Ventilation, Invasive)  Goal: Effective Communication  Outcome: Ongoing, Progressing     Problem: Device-Related Complication Risk (Mechanical Ventilation, Invasive)  Goal: Optimal Device Function  Outcome: Ongoing, Progressing     Problem: Inability to Wean (Mechanical Ventilation, Invasive)  Goal: Mechanical Ventilation Liberation  Outcome: Ongoing, Progressing     Problem: Nutrition Impairment (Mechanical Ventilation, Invasive)  Goal: Optimal Nutrition Delivery  Outcome: Ongoing, Progressing     Problem: Skin and Tissue Injury (Mechanical Ventilation, Invasive)  Goal: Absence of Device-Related Skin and Tissue Injury  Outcome: Ongoing, Progressing     Problem: Ventilator-Induced Lung Injury (Mechanical Ventilation, Invasive)  Goal: Absence of Ventilator-Induced Lung Injury  Outcome: Ongoing,  Progressing     Problem: Communication Impairment (Artificial Airway)  Goal: Effective Communication  Outcome: Ongoing, Progressing     Problem: Device-Related Complication Risk (Artificial Airway)  Goal: Optimal Device Function  Outcome: Ongoing, Progressing     Problem: Skin and Tissue Injury (Artificial Airway)  Goal: Absence of Device-Related Skin or Tissue Injury  Outcome: Ongoing, Progressing

## 2020-10-28 NOTE — PROGRESS NOTES
MD at the bedside to round on patient. Aware of temp 102. Tylenol given and patient packed with ice packs. Also aware of small amount of emesis. OG placed to low intermittent suction per orders. Will continue to monitor.

## 2020-10-28 NOTE — SIGNIFICANT EVENT
Procedure Note  Supine Positioning  Critical Care Medicine       Chief Complaint: Pneumonia due to COVID-19 virus  MRN: [id number]  LOS: 10  Sex: male  Age: 55 y.o.      Last ABG:   Recent Labs   Lab 10/28/20  1528   PH 7.346*   PO2 86   PCO2 52.3*   HCO3 28.6*   BE 3       Vital Signs (Most Recent):   Temp: 98.9 °F (37.2 °C) (10/28/20 1645)  Pulse: 82 (10/28/20 1800)  Resp: (!) 35 (10/28/20 1800)  BP: (!) 100/56 (10/28/20 1000)  SpO2: (!) 91 % (10/28/20 1800)    Ventilator Settings:  Vent Mode: A/C  Oxygen Concentration (%):  [] 70  Resp Rate Total:  [35 br/min] 35 br/min  Vt Set:  [460 mL] 460 mL  PEEP/CPAP:  [12 cmH20] 12 cmH20  Mean Airway Pressure:  [21 fjL26-49 cmH20] 21 cmH20        Indication: Severe, refractory ARDS. High risk for deterioration during supination procedure in need of direct monitoring by Critical Care personnel.     Prior to beginning the procedure, all appropriate equipment and personnel were gathered in the room. Two individuals were placed on each side of the patient and two respiratory therapists stood at the head of the bed and was dedicated to the management of the head of the patient, the endotracheal tube, and the ventilator lines. The person at the head of the bed coordinated the steps of the supination procedure at the direction of Critical Care team members at the bedside. The patient was first log-rolled 90 degrees onto their side away from the direction of their central venous catheter. Cardiac electrodes were then moved from the patient's posterior back to the anterior. Once properly positioned in the bed, another 90 degree log roll was performed placing the patient into the supine position. The patient's arms were placed alongside the body. Continuous pulse oximetry and blood pressure monitoring was performed without any desaturation or hemodynamic instability. The patient tolerated the procedure well.     Total Critical Care time (uninterrupted not including procedures):  15 minutes

## 2020-10-28 NOTE — SUBJECTIVE & OBJECTIVE
Interval History/Significant Events: proned overnight, sCr plateau'd, UO remains adequate. Oxygenation appears to improve imensely when prone and progress is lost upon supination.     Review of Systems   Unable to perform ROS: Intubated     Objective:     Vital Signs (Most Recent):  Temp: 98.9 °F (37.2 °C) (10/28/20 1645)  Pulse: 80 (10/28/20 1845)  Resp: (!) 35 (10/28/20 1845)  BP: (!) 100/56 (10/28/20 1000)  SpO2: (!) 91 % (10/28/20 1845) Vital Signs (24h Range):  Temp:  [98.2 °F (36.8 °C)-102 °F (38.9 °C)] 98.9 °F (37.2 °C)  Pulse:  [70-98] 80  Resp:  [34-37] 35  SpO2:  [89 %-98 %] 91 %  BP: ()/(51-80) 100/56  Arterial Line BP: ()/(47-62) 114/54   Weight: 84.4 kg (186 lb)  Body mass index is 26.69 kg/m².      Intake/Output Summary (Last 24 hours) at 10/28/2020 1856  Last data filed at 10/28/2020 1800  Gross per 24 hour   Intake 2349 ml   Output 2635 ml   Net -286 ml       Physical Exam  Vitals signs and nursing note reviewed.   Constitutional:       General: He is not in acute distress.     Appearance: Normal appearance. He is ill-appearing.      Interventions: He is sedated, chemically paralyzed and intubated.   Cardiovascular:      Rate and Rhythm: Regular rhythm. Tachycardia present.      Heart sounds: Normal heart sounds.   Pulmonary:      Effort: He is intubated.      Breath sounds: No wheezing, rhonchi or rales.      Comments: Coarse breath sounds  Abdominal:      General: Abdomen is flat. Bowel sounds are decreased.   Musculoskeletal:      Right lower leg: No edema.      Left lower leg: No edema.   Neurological:      Mental Status: He is unresponsive.      GCS: GCS eye subscore is 1. GCS verbal subscore is 1. GCS motor subscore is 1.      Comments: Patient on NMB         Vents:  Vent Mode: A/C (10/28/20 1708)  Set Rate: 35 BPM (10/28/20 1708)  Vt Set: 460 mL (10/28/20 1708)  PEEP/CPAP: 12 cmH20 (10/28/20 1708)  Oxygen Concentration (%): 70 (10/28/20 5815)  Peak Airway Pressure: 29 cmH2O  (10/28/20 1708)  Plateau Pressure: 28 cmH20 (10/28/20 1708)  Total Ve: 17.1 mL (10/28/20 1708)  F/VT Ratio<105 (RSBI): (!) 71.28 (10/28/20 1637)  Lines/Drains/Airways     Central Venous Catheter Line            Percutaneous Central Line Insertion/Assessment - Triple Lumen  10/26/20 1940 right internal jugular 1 day          Drain                 Urethral Catheter 10/26/20 0900 Non-latex 16 Fr. 2 days         NG/OG Tube 10/26/20 2000 orogastric 16 Fr. Left mouth 1 day          Airway                 Airway - Non-Surgical 10/26/20 1820 Endotracheal Tube 2 days       Airway Anesthesia 10/26/20 2 days          Arterial Line            Arterial Line 10/26/20 1909 Left Radial 1 day          Peripheral Intravenous Line                 Peripheral IV - Single Lumen 10/25/20 1845 22 G Anterior;Left Forearm 3 days         Peripheral IV - Single Lumen 10/26/20 0925 20 G;1 1/4 in Anterior;Distal;Right Upper Arm 2 days         Peripheral IV - Single Lumen 10/26/20 1633 20 G;1 1/4 in Anterior;Left;Proximal Forearm 2 days              Significant Labs:    CBC/Anemia Profile:  Recent Labs   Lab 10/26/20  2235 10/27/20  0403 10/28/20  0448   WBC 19.71* 15.40* 8.64   HGB 16.2 15.7 14.3   HCT 52.3 51.1 45.6    147* 141*   * 101* 100*   RDW 12.6 12.5 12.5        Chemistries:  Recent Labs   Lab 10/27/20  0403  10/27/20  0748 10/27/20  1612 10/28/20  0448 10/28/20  1518     --   --  143 143 142   K 6.6*   < >  --  5.6* 5.3* 5.3*     --   --  106 107 109   CO2 26  --   --  26 26 26   BUN 66*  --   --  72* 71* 70*   CREATININE 1.6*  --   --  2.1* 2.0* 1.9*   CALCIUM 7.4*  --   --  7.5* 7.5* 7.4*   ALBUMIN 1.8*  --   --  1.6* 1.5* 1.4*   PROT 6.2  --   --   --  5.5* 5.3*   BILITOT 0.2  --   --   --  0.2 0.2   ALKPHOS 176*  --   --   --  103 93   ALT 33  --   --   --  31 31   AST 48*  --   --   --  50* 62*   MG 2.5  --   --   --  3.2*  --    PHOS 9.3*   < > 6.0* 4.7* 4.9*  --     < > = values in this interval not  displayed.       All pertinent labs within the past 24 hours have been reviewed.    Significant Imaging:  I have reviewed all pertinent imaging results/findings within the past 24 hours.

## 2020-10-28 NOTE — SIGNIFICANT EVENT
Procedure Note  Prone Positioning  Critical Care Medicine       Chief Complaint: Pneumonia due to COVID-19 virus  MRN: [id number]  LOS: 10  Sex: male  Age: 55 y.o.      Last ABG:   Recent Labs   Lab 10/28/20  1528   PH 7.346*   PO2 86   PCO2 52.3*   HCO3 28.6*   BE 3       Vital Signs (Most Recent):   Temp: 98.9 °F (37.2 °C) (10/28/20 1645)  Pulse: 78 (10/28/20 1645)  Resp: (!) 35 (10/28/20 1645)  BP: (!) 100/56 (10/28/20 1000)  SpO2: (!) 94 % (10/28/20 1645)    Ventilator Settings:  Vent Mode: A/C  Oxygen Concentration (%):  [65-90] 90  Resp Rate Total:  [35 br/min] 35 br/min  Vt Set:  [460 mL] 460 mL  PEEP/CPAP:  [12 cmH20] 12 cmH20  Mean Airway Pressure:  [21 jvP43-60 cmH20] 21 cmH20    Indication: Severe, refractory ARDS. High risk for deterioration during proning procedure in need of direct monitoring by Critical Care personnel.     Prior to beginning the procedure, all appropriate equipment and personnel were gathered in the room. Two individuals were placed on each side of the patient and one person stood at the head of the bed and was dedicated to the management of the head of the patient, the endotracheal tube, and the ventilator lines. The person at the head of the bed  coordinated the steps of the proning procedure with team team at the direction of Critical Care team members at the bedside. The patient was first log-rolled 90 degrees onto their side towards the direction of their central venous catheter. Cardiac electrodes were then moved from the patient's anterior to the posterior. The patient?s knees, forehead, chest, and iliac crests were protected using adhesive pads and/or foam pads to reduce the risk of skin breakdown. Once properly positioned in the bed, another 90 degree log roll was performed placing the patient into the prone position. The patient's arms were placed alongside the body. The patient's head should be turned alternately to the right or left every 2 hours. The patient tolerated the  procedure well.     Total Critical Care time (uninterrupted not including procedures): 15 minutes

## 2020-10-28 NOTE — RESPIRATORY THERAPY
Patient proned, ETT secured at the 24 cm lizzy at the lip. Nurses, NP and 2 RTs at bedside. Patient tolerated procedure well. I will continue to monitor patient closely.

## 2020-10-28 NOTE — ASSESSMENT & PLAN NOTE
56 yo male with out significant phmx admitted for respiratory complication of COVID19, multilobar PNA, significant hypoxia and BIPAP dependent with impending concern for need for intubation.    1) Symptoms:  Severe dyspnea/delirium: despite low doses of morphine and ativan; continues to become increasingly symptomatic; primary team discussing with family the need for mechanical ventilation    2) Code status:  FC    3) Psychosocial:  Lives with sig other of >13 years though not ; has a 18 yo son from previous relationship; has two young daughter in home with partner; works in construction    4) Medicolegal:  Partner states the pt has ACP paperwork at home; asked to bring; pt states partner would be ALTHEALiza Costello Significant other     [phone]     5) Spiritual:  Scientologist; 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 The family was able to demonstrate an excellent understanding of the diagnosis, current care plan, hope for improvement and concern for worsening of the pt's condition.     Experience with critical illness: none     Understanding of illness:  Understands most people do well, but if intubated may be a prolonged hospital course     Present for discussion: pt and sig other  Liza Brody Spouse     [phone]        Medicolegal: Pts sig other is DEA per the pt; has ACP and asked wife to bring copy for chart completion     Goals of care:  Cure and functional restoration; would agree to invasive measures of support if it was felt to give him a chance to recover     7.) Plan:  -continue to follow for emotional support and to help optimize communication over the course of his illness  -aid in goals of care and medical decision making as appropriate     Discussed with primary team and nursing     Thank you for the  opportunity to care for this patient and family.      Please call with questions.      Romain Post MD  Palliative Medicin

## 2020-10-28 NOTE — ASSESSMENT & PLAN NOTE
Follows with Pulm as outpatient. Last seen 8/27/20, told he had RAD  He was also seen by a [facility] pulmonologist who did spirometry that reportedly showed borderline or mild asthma, and has been controlled with albuterol.   Former smoker, started at age 10-12 and quit 10 years ago  -- Continue inhalational treatments

## 2020-10-28 NOTE — HPI
55-year-old male with PMhx of GERD, allergic rinhitis, chronic cough (recently saw pulm and was diagnosed with reactive airway disease, on albuterol PRN) presented the emergency department on 10/18 with chief complaint of shortness of breath. He reports subjective fever, headache, chills, myalgias, poor appetite, and sweats about 6 days ago. He was diagnosed with COVID-19 on 10/15.  Reports presenting to urgent care 10/16 due to 89% pulse ox on room air. Started on levofloxacin and discharged home.  Reports using albuterol treatments at home without much relief.He noticed to have cough productive of blood tinged sputum and shortness of breath so he presented to the ED.      He received ceftria/azithro (x1 each in the ED). He started Remdesivir and completed 5 doses on 10/22, continues on dexamethasone. Patient is on his 7th day of hospital admission and has been requiring higher doses of O2 to keep sats >90%. Today he went up to 60L on 100% FiO2 and thus Critical Care was consulted.         Hospital/ICU Course:  Patient admitted to RICU due to worsening respiratory status with increasing oxygen requirements. Completion of remdesivir on 10/22. On BiPAP with O2 saturation in high 70s-low 80s at rest and acute desaturation to 40s when biPAP removed to provide PO medications.      Interval History/Significant Events: Patient on 100% biPAP but O2 saturation still in low 70s to mid 80s. Patient desaturated to 40s when biPAP removed to give medication. Intubation felt to be necessary at this point.     In this setting, palliative medicine was consulted to help with medical decision making and aid in the formation of goals of care.

## 2020-10-28 NOTE — ASSESSMENT & PLAN NOTE
Patient admitted to hospital medicine and continued to deteriorate needing higher oxygen requirements and thus Critical Care Medicine was consulted. He was intubated and eventually placed on NMB and proned    - received Azithro and ceftriaxone (x1 each) in the ED on 10/18  - s/p remdesivir (10/18-10/22)  - s/p Dexamethasone (10/27)  - Albuterol treatment PRN  - D-dimer >33 continue Heparin gtt  - continue LPV  - ABG daily to assess PF ratio for proning criteria  - Sputum culture sent 10/27 NGTD

## 2020-10-28 NOTE — SUBJECTIVE & OBJECTIVE
Interval History: pt seen at bedside with partner; discussed with primary team    Past Medical History:   Diagnosis Date    ADD (attention deficit disorder)     Anemia     Bradycardia 3/8/2018    3/8/2018: ECG: SB 46 bpm.    GERD (gastroesophageal reflux disease)        Past Surgical History:   Procedure Laterality Date    BACK SURGERY      CERVICAL SPINE SURGERY      facit repair    CHOLECYSTECTOMY      implant      chin    LIPOSUCTION      waiste    RHINOPLASTY TIP      SPINE SURGERY      l5 s1 micro discectomy    SPINE SURGERY      l5 s1 rodes placed    TONSILLECTOMY         Review of patient's allergies indicates:   Allergen Reactions    Meperidine      Other reaction(s): violent behavior    Sulfa (sulfonamide antibiotics)      Other reaction(s): Unknown       Medications:  Continuous Infusions:   cisatracurium (NIMBEX) infusion 1.5 mcg/kg/min (10/27/20 2000)    dexmedetomidine (PRECEDEX) infusion 1.2 mcg/kg/hr (10/27/20 2100)    fentanyl 200 mcg/hr (10/27/20 2000)    heparin (porcine) in D5W 14 Units/kg/hr (10/27/20 2100)    norepinephrine bitartrate-D5W 0.04 mcg/kg/min (10/27/20 2000)    propofoL 50 mcg/kg/min (10/27/20 2100)     Scheduled Meds:   albuterol-ipratropium  3 mL Nebulization Q4H    atorvastatin  40 mg Oral QHS    azithromycin  500 mg Intravenous Q24H    ceFEPime (MAXIPIME) IVPB  2 g Intravenous Q12H    famotidine (PF)  20 mg Intravenous BID    multivitamin  1 tablet Oral Daily    vitamin D  1,000 Units Oral Daily    white petrolatum-mineral oiL   Both Eyes Q8H     PRN Meds:acetaminophen, acetaminophen, albuterol, albuterol-ipratropium, benzonatate, dextrose 50%, dextrose 50%, glucagon (human recombinant), glucose, glucose, heparin (PORCINE), heparin (PORCINE), loperamide, lorazepam, morphine, ondansetron, promethazine (PHENERGAN) IVPB, promethazine-codeine 6.25-10 mg/5 ml, sodium chloride 0.9%, vancomycin - pharmacy to dose    Family History     Problem Relation (Age  of Onset)    Cancer Father, Paternal Grandmother    Heart disease Paternal Grandfather        Tobacco Use    Smoking status: Former Smoker     Packs/day: 1.00     Years: 15.00     Pack years: 15.00     Quit date: 2012     Years since quittin.5    Smokeless tobacco: Never Used   Substance and Sexual Activity    Alcohol use: No    Drug use: No    Sexual activity: Not on file       Review of Systems   Reason unable to perform ROS: ptagitated and restless on BIPAP.     Objective:     Vital Signs (Most Recent):  Temp: (!) 102 °F (38.9 °C) (10/27/20 1915)  Pulse: 91 (10/27/20 2145)  Resp: (!) 35 (10/27/20 2145)  BP: (!) 110/58 (10/27/20 1700)  SpO2: 95 % (10/27/20 2145) Vital Signs (24h Range):  Temp:  [98.3 °F (36.8 °C)-102.2 °F (39 °C)] 102 °F (38.9 °C)  Pulse:  [] 91  Resp:  [30-44] 35  SpO2:  [88 %-99 %] 95 %  BP: ()/(49-59) 110/58  Arterial Line BP: ()/(49-64) 122/60     Weight: 84.4 kg (186 lb)  Body mass index is 26.69 kg/m².    Physical Exam  Vitals signs and nursing note reviewed.   Constitutional:       Comments: Anxious appearing on BIPAP     HENT:      Right Ear: External ear normal.      Left Ear: External ear normal.      Nose:      Comments: Limited exam due to BIPAP       Mouth/Throat:      Mouth: Mucous membranes are dry.   Eyes:      Extraocular Movements: Extraocular movements intact.      Conjunctiva/sclera: Conjunctivae normal.      Pupils: Pupils are equal, round, and reactive to light.   Neck:      Musculoskeletal: Neck supple.      Comments: no JVD    Cardiovascular:      Rate and Rhythm: Normal rate and regular rhythm.   Pulmonary:      Effort: Respiratory distress present.      Breath sounds: Rales present.      Comments: tachypneic    Abdominal:      General: Abdomen is flat. Bowel sounds are normal.      Palpations: Abdomen is soft.   Skin:     General: Skin is warm and dry.   Neurological:      General: No focal deficit present.      Mental Status: He is alert.  He is disoriented.   Psychiatric:      Comments: Agitated; confabulating           Review of Symptoms    Symptom Assessment (ESAS 0-10 Scale)     CAM / Delirium:  Positive  Constipation:  Negative  Diarrhea:  Negative    Comments:  Unable to assess due to acuity of condition            Palliative Performance Scale: 100 at baseline.    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 health issues outside of asthma, never intubated or hospitalized    Lives with sig other; not ; has two young girls with his partner; also has a 20 yo from a previous relationship who lives in [place] and still speaks with occasionally    Spiritual:  F - Andree and Belief:  Mandaen  A - Address in Care:   offered      Advance Care Planning   Advance Directives:   Living Will: Yes (per wife yes at home)        Copy on chart: No    LaPOST: No    Do Not Resuscitate Status: No    Medical Power of : Yes (per wife he has paperwork at home; encouraged to bring to hospital)    Agent's Name:  Liza Brody Significant other     [phone]     Decision Making:  Patient answered questions and Family answered questions (pt showing signs of delirium though did confirm his sig other at bedside is decision maker)         Significant Labs:   CBC:   Recent Labs   Lab 10/26/20  0351 10/26/20  2235 10/27/20  0403   WBC 12.87* 19.71* 15.40*   HGB 15.6 16.2 15.7   HCT 47.9 52.3 51.1   * 188 147*     CMP:   Recent Labs   Lab 10/26/20  0351 10/27/20  0403 10/27/20  0740 10/27/20  1612    144  --  143   K 4.6 6.6* 5.7* 5.6*    105  --  106   CO2 22* 26  --  26   * 146*  --  214*   BUN 46* 66*  --  72*   CREATININE 0.8 1.6*  --  2.1*   CALCIUM 8.2* 7.4*  --  7.5*   PROT 6.0 6.2  --   --    ALBUMIN 1.9* 1.8*  --  1.6*   BILITOT 0.6 0.2  --   --    ALKPHOS 224* 176*  --   --    AST 73* 48*  --   --    ALT 29 33  --   --     ANIONGAP 13 13  --  11   EGFRNONAA >60.0 48.5*  --  34.9*     All pertinent labs within the past 24 hours have been reviewed.  CBC:   Recent Labs   Lab 10/27/20  0403   WBC 15.40*   HGB 15.7   HCT 51.1   *   *     BMP:  Recent Labs   Lab 10/27/20  0403  10/27/20  1612   *  --  214*     --  143   K 6.6*   < > 5.6*     --  106   CO2 26  --  26   BUN 66*  --  72*   CREATININE 1.6*  --  2.1*   CALCIUM 7.4*  --  7.5*   MG 2.5  --   --     < > = values in this interval not displayed.     LFT:  Lab Results   Component Value Date    AST 48 (H) 10/27/2020    GGT 28 01/31/2005    ALKPHOS 176 (H) 10/27/2020    BILITOT 0.2 10/27/2020     Albumin:   Albumin   Date Value Ref Range Status   10/27/2020 1.6 (L) 3.5 - 5.2 g/dL Final     Protein:   Total Protein   Date Value Ref Range Status   10/27/2020 6.2 6.0 - 8.4 g/dL Final     Lactic acid:   Lab Results   Component Value Date    LACTATE 1.3 10/26/2020    LACTATE 1.6 10/18/2020       Significant Imaging: I have reviewed all pertinent imaging results/findings within the past 24 hours.

## 2020-10-28 NOTE — NURSING
Patient vomited small amount of brown emesis. Critical care team notified. NGT connected to low wall suction.

## 2020-10-28 NOTE — SIGNIFICANT EVENT
Procedure Note  Prone Positioning  Critical Care Medicine       Chief Complaint: Pneumonia due to COVID-19 virus  MRN: [id number]  LOS: 9  Sex: male  Age: 55 y.o.      Last ABG:   Recent Labs   Lab 10/27/20  1534   PH 7.251*   PO2 65*   PCO2 60.9*   HCO3 26.8   BE 0       Vital Signs (Most Recent):   Temp: (!) 101 °F (38.3 °C) (10/27/20 1830)  Pulse: 100 (10/27/20 1845)  Resp: (!) 35 (10/27/20 1845)  BP: (!) 110/58 (10/27/20 1700)  SpO2: 97 % (10/27/20 1845)    Ventilator Settings:  Vent Mode: A/C  Oxygen Concentration (%):  [] 80  Resp Rate Total:  [16 br/min-35 br/min] 35 br/min  Vt Set:  [390 mL-460 mL] 460 mL  PEEP/CPAP:  [12 ivB52-71 cmH20] 12 cmH20  Mean Airway Pressure:  [20 hfP24-76 cmH20] 21 cmH20    Indication: Severe, refractory ARDS. High risk for deterioration during proning procedure in need of direct monitoring by Critical Care personnel.     Prior to beginning the procedure, all appropriate equipment and personnel were gathered in the room. Two individuals were placed on each side of the patient and one person stood at the head of the bed and was dedicated to the management of the head of the patient, the endotracheal tube, and the ventilator lines. The person at the head of the bed  coordinated the steps of the proning procedure with team team at the direction of Critical Care team members at the bedside. The patient was first log-rolled 90 degrees onto their side towards the direction of their central venous catheter. Cardiac electrodes were then moved from the patient's anterior to the posterior. The patient?s knees, forehead, chest, and iliac crests were protected using adhesive pads and/or foam pads to reduce the risk of skin breakdown. Once properly positioned in the bed, another 90 degree log roll was performed placing the patient into the prone position. The patient's arms were placed alongside the body. The patient's head should be turned alternately to the right or left every 2 hours.  The patient tolerated the procedure well.     Total Critical Care time (uninterrupted not including procedures): 15 minutes

## 2020-10-28 NOTE — CONSULTS
[facility] - ICU 16 WT  Palliative Medicine  Consult Note    Patient Name: Nubia Riggs  MRN: [id number]  Admission Date: 10/18/2020  Hospital Length of Stay: 9 days  Code Status: Full Code   Attending Provider: Raad Martel MD  Consulting Provider: Romain Post MD  Primary Care Physician: Primary Doctor No  Principal Problem:Pneumonia due to COVID-19 virus    Patient information was obtained from patient, spouse/SO and past medical records.      Consults  Assessment/Plan:     Palliative care encounter  56 yo male with out significant phmx admitted for respiratory complication of COVID19, multilobar PNA, significant hypoxia and BIPAP dependent with impending concern for need for intubation.    1) Symptoms:  Severe dyspnea/delirium: despite low doses of morphine and ativan; continues to become increasingly symptomatic; primary team discussing with family the need for mechanical ventilation    2) Code status:  FC    3) Psychosocial:  Lives with sig other of >13 years though not ; has a 20 yo son from previous relationship; has two young daughter in home with partner; works in construction    4) Medicolegal:  Partner states the pt has ACP paperwork at home; asked to bring; pt states partner would be Liza Simms Significant other     [phone]     5) Spiritual:  Church; 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 The family was able to demonstrate an excellent understanding of the diagnosis, current care plan, hope for improvement and concern for worsening of the pt's condition.     Experience with critical illness: none     Understanding of illness:  Understands most people do well, but if intubated may be a prolonged hospital course     Present for discussion: pt and sig other  Liza Brody Spouse      [phone]        Medicolegal: Pts sig other is MPOA per the pt; has ACP and asked wife to bring copy for chart completion     Goals of care:  Cure and functional restoration; would agree to invasive measures of support if it was felt to give him a chance to recover     7.) Plan:  -continue to follow for emotional support and to help optimize communication over the course of his illness  -aid in goals of care and medical decision making as appropriate     Discussed with primary team and nursing     Thank you for the opportunity to care for this patient and family.      Please call with questions.      Romain Post MD  Palliative Medicin        Thank you for your consult. I will follow-up with patient. Please contact us if you have any additional questions.    Subjective:     HPI:   55-year-old male with PMhx of GERD, allergic rinhitis, chronic cough (recently saw pulm and was diagnosed with reactive airway disease, on albuterol PRN) presented the emergency department on 10/18 with chief complaint of shortness of breath. He reports subjective fever, headache, chills, myalgias, poor appetite, and sweats about 6 days ago. He was diagnosed with COVID-19 on 10/15.  Reports presenting to urgent care 10/16 due to 89% pulse ox on room air. Started on levofloxacin and discharged home.  Reports using albuterol treatments at home without much relief.He noticed to have cough productive of blood tinged sputum and shortness of breath so he presented to the ED.      He received ceftria/azithro (x1 each in the ED). He started Remdesivir and completed 5 doses on 10/22, continues on dexamethasone. Patient is on his 7th day of hospital admission and has been requiring higher doses of O2 to keep sats >90%. Today he went up to 60L on 100% FiO2 and thus Critical Care was consulted.         Hospital/ICU Course:  Patient admitted to RICU due to worsening respiratory status with increasing oxygen requirements. Completion of remdesivir on  10/22. On BiPAP with O2 saturation in high 70s-low 80s at rest and acute desaturation to 40s when biPAP removed to provide PO medications.      Interval History/Significant Events: Patient on 100% biPAP but O2 saturation still in low 70s to mid 80s. Patient desaturated to 40s when biPAP removed to give medication. Intubation felt to be necessary at this point.     In this setting, palliative medicine was consulted to help with medical decision making and aid in the formation of goals of care.       Hospital Course:  No notes on file    Interval History: pt seen at bedside with partner; discussed with primary team    Past Medical History:   Diagnosis Date    ADD (attention deficit disorder)     Anemia     Bradycardia 3/8/2018    3/8/2018: ECG: SB 46 bpm.    GERD (gastroesophageal reflux disease)        Past Surgical History:   Procedure Laterality Date    BACK SURGERY      CERVICAL SPINE SURGERY      facit repair    CHOLECYSTECTOMY      implant      chin    LIPOSUCTION      waiste    RHINOPLASTY TIP      SPINE SURGERY      l5 s1 micro discectomy    SPINE SURGERY      l5 s1 rodes placed    TONSILLECTOMY         Review of patient's allergies indicates:   Allergen Reactions    Meperidine      Other reaction(s): violent behavior    Sulfa (sulfonamide antibiotics)      Other reaction(s): Unknown       Medications:  Continuous Infusions:   cisatracurium (NIMBEX) infusion 1.5 mcg/kg/min (10/27/20 2000)    dexmedetomidine (PRECEDEX) infusion 1.2 mcg/kg/hr (10/27/20 2100)    fentanyl 200 mcg/hr (10/27/20 2000)    heparin (porcine) in D5W 14 Units/kg/hr (10/27/20 2100)    norepinephrine bitartrate-D5W 0.04 mcg/kg/min (10/27/20 2000)    propofoL 50 mcg/kg/min (10/27/20 2100)     Scheduled Meds:   albuterol-ipratropium  3 mL Nebulization Q4H    atorvastatin  40 mg Oral QHS    azithromycin  500 mg Intravenous Q24H    ceFEPime (MAXIPIME) IVPB  2 g Intravenous Q12H    famotidine (PF)  20 mg Intravenous BID     multivitamin  1 tablet Oral Daily    vitamin D  1,000 Units Oral Daily    white petrolatum-mineral oiL   Both Eyes Q8H     PRN Meds:acetaminophen, acetaminophen, albuterol, albuterol-ipratropium, benzonatate, dextrose 50%, dextrose 50%, glucagon (human recombinant), glucose, glucose, heparin (PORCINE), heparin (PORCINE), loperamide, lorazepam, morphine, ondansetron, promethazine (PHENERGAN) IVPB, promethazine-codeine 6.25-10 mg/5 ml, sodium chloride 0.9%, vancomycin - pharmacy to dose    Family History     Problem Relation (Age of Onset)    Cancer Father, Paternal Grandmother    Heart disease Paternal Grandfather        Tobacco Use    Smoking status: Former Smoker     Packs/day: 1.00     Years: 15.00     Pack years: 15.00     Quit date: 2012     Years since quittin.5    Smokeless tobacco: Never Used   Substance and Sexual Activity    Alcohol use: No    Drug use: No    Sexual activity: Not on file       Review of Systems   Reason unable to perform ROS: ptagitated and restless on BIPAP.     Objective:     Vital Signs (Most Recent):  Temp: (!) 102 °F (38.9 °C) (10/27/20 1915)  Pulse: 91 (10/27/20 2145)  Resp: (!) 35 (10/27/20 2145)  BP: (!) 110/58 (10/27/20 1700)  SpO2: 95 % (10/27/20 2145) Vital Signs (24h Range):  Temp:  [98.3 °F (36.8 °C)-102.2 °F (39 °C)] 102 °F (38.9 °C)  Pulse:  [] 91  Resp:  [30-44] 35  SpO2:  [88 %-99 %] 95 %  BP: ()/(49-59) 110/58  Arterial Line BP: ()/(49-64) 122/60     Weight: 84.4 kg (186 lb)  Body mass index is 26.69 kg/m².    Physical Exam  Vitals signs and nursing note reviewed.   Constitutional:       Comments: Anxious appearing on BIPAP     HENT:      Right Ear: External ear normal.      Left Ear: External ear normal.      Nose:      Comments: Limited exam due to BIPAP       Mouth/Throat:      Mouth: Mucous membranes are dry.   Eyes:      Extraocular Movements: Extraocular movements intact.      Conjunctiva/sclera: Conjunctivae normal.      Pupils:  Pupils are equal, round, and reactive to light.   Neck:      Musculoskeletal: Neck supple.      Comments: no JVD    Cardiovascular:      Rate and Rhythm: Normal rate and regular rhythm.   Pulmonary:      Effort: Respiratory distress present.      Breath sounds: Rales present.      Comments: tachypneic    Abdominal:      General: Abdomen is flat. Bowel sounds are normal.      Palpations: Abdomen is soft.   Skin:     General: Skin is warm and dry.   Neurological:      General: No focal deficit present.      Mental Status: He is alert. He is disoriented.   Psychiatric:      Comments: Agitated; confabulating           Review of Symptoms    Symptom Assessment (ESAS 0-10 Scale)     CAM / Delirium:  Positive  Constipation:  Negative  Diarrhea:  Negative    Comments:  Unable to assess due to acuity of condition            Palliative Performance Scale: 100 at baseline.    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 health issues outside of asthma, never intubated or hospitalized    Lives with sig other; not ; has two young girls with his partner; also has a 18 yo from a previous relationship who lives in [place] and still speaks with occasionally    Spiritual:  F - Andree and Belief:  Oriental orthodox  A - Address in Care:   offered      Advance Care Planning   Advance Directives:   Living Will: Yes (per wife yes at home)        Copy on chart: No    LaPOST: No    Do Not Resuscitate Status: No    Medical Power of : Yes (per wife he has paperwork at home; encouraged to bring to hospital)    Agent's Name:  Liza Brody Significant other     [phone]     Decision Making:  Patient answered questions and Family answered questions (pt showing signs of delirium though did confirm his sig other at bedside is decision maker)         Significant Labs:   CBC:   Recent Labs   Lab 10/26/20  0351 10/26/20  1545  10/27/20  0403   WBC 12.87* 19.71* 15.40*   HGB 15.6 16.2 15.7   HCT 47.9 52.3 51.1   * 188 147*     CMP:   Recent Labs   Lab 10/26/20  0351 10/27/20  0403 10/27/20  0740 10/27/20  1612    144  --  143   K 4.6 6.6* 5.7* 5.6*    105  --  106   CO2 22* 26  --  26   * 146*  --  214*   BUN 46* 66*  --  72*   CREATININE 0.8 1.6*  --  2.1*   CALCIUM 8.2* 7.4*  --  7.5*   PROT 6.0 6.2  --   --    ALBUMIN 1.9* 1.8*  --  1.6*   BILITOT 0.6 0.2  --   --    ALKPHOS 224* 176*  --   --    AST 73* 48*  --   --    ALT 29 33  --   --    ANIONGAP 13 13  --  11   EGFRNONAA >60.0 48.5*  --  34.9*     All pertinent labs within the past 24 hours have been reviewed.  CBC:   Recent Labs   Lab 10/27/20  0403   WBC 15.40*   HGB 15.7   HCT 51.1   *   *     BMP:  Recent Labs   Lab 10/27/20  0403  10/27/20  1612   *  --  214*     --  143   K 6.6*   < > 5.6*     --  106   CO2 26  --  26   BUN 66*  --  72*   CREATININE 1.6*  --  2.1*   CALCIUM 7.4*  --  7.5*   MG 2.5  --   --     < > = values in this interval not displayed.     LFT:  Lab Results   Component Value Date    AST 48 (H) 10/27/2020    GGT 28 01/31/2005    ALKPHOS 176 (H) 10/27/2020    BILITOT 0.2 10/27/2020     Albumin:   Albumin   Date Value Ref Range Status   10/27/2020 1.6 (L) 3.5 - 5.2 g/dL Final     Protein:   Total Protein   Date Value Ref Range Status   10/27/2020 6.2 6.0 - 8.4 g/dL Final     Lactic acid:   Lab Results   Component Value Date    LACTATE 1.3 10/26/2020    LACTATE 1.6 10/18/2020       Significant Imaging: I have reviewed all pertinent imaging results/findings within the past 24 hours.      > 50% of 80 min visit spent in chart review, face to face discussion of goals of care,  symptom assessment, coordination of care and emotional support.    Romain Post MD  Palliative Medicine  [facility] - ICU 16 WT

## 2020-10-28 NOTE — PROGRESS NOTES
Pharmacokinetic Assessment Follow Up: IV Vancomycin    Vancomycin Regimen Assessment/Plan:    - Vancomycin random level resulted as 11.3 mg/dl, drawn ~ 11 hours after the previous dose.  - Patient with worsening YOVANI, continue pulse dosing in the setting of unstable renal function.  - Administer vancomycin 1000 mg x 1 dose.   - A random level is ordered with AM labs tomorrow.  Pharmacy to re-dose based on level and renal function.    Drug levels (last 3 results):  Recent Labs   Lab Result Units 10/27/20  1612 10/28/20  0448   Vancomycin, Random ug/mL 17.5 11.3       Pharmacy will continue to follow and monitor vancomycin.    Please contact pharmacy at extension [phone] for questions regarding this assessment.    Thank you for the consult,   Christine Meyers, PharmD, BCCCP       Patient brief summary:  Nubia Riggs is a 55 y.o. male initiated on antimicrobial therapy with IV Vancomycin for treatment of sepsis    The patient's current regimen is pulse dosing.     Drug Allergies:   Review of patient's allergies indicates:   Allergen Reactions    Meperidine      Other reaction(s): violent behavior    Sulfa (sulfonamide antibiotics)      Other reaction(s): Unknown       Actual Body Weight:   84.4 kg    Renal Function:   Estimated Creatinine Clearance: 44.1 mL/min (A) (based on SCr of 2 mg/dL (H)).,     Dialysis Method (if applicable):  N/A    CBC (last 72 hours):  Recent Labs   Lab Result Units 10/26/20  0351 10/26/20  2235 10/27/20  0403 10/28/20  0448   WBC K/uL 12.87* 19.71* 15.40* 8.64   Hemoglobin g/dL 15.6 16.2 15.7 14.3   Hematocrit % 47.9 52.3 51.1 45.6   Platelets K/uL 111* 188 147* 141*   Gran % % 95.3* 90.1* 94.4* 90.4*   Lymph % % 2.6* 2.5* 2.4* 3.7*   Mono % % 1.2* 1.9* 1.8* 4.3   Eosinophil % % 0.1 0.1 0.0 0.0   Basophil % % 0.1 0.4 0.2 0.2   Differential Method  Automated Automated Automated Automated       Metabolic Panel (last 72 hours):  Recent Labs   Lab Result Units 10/26/20  0351  10/26/20  1009 10/27/20  0403 10/27/20  0740 10/27/20  0748 10/27/20  1612 10/27/20  2020 10/28/20  0448   Sodium mmol/L 141  --  144  --   --  143  --  143   Potassium mmol/L 4.6  --  6.6* 5.7*  --  5.6*  --  5.3*   Chloride mmol/L 106  --  105  --   --  106  --  107   CO2 mmol/L 22*  --  26  --   --  26  --  26   Glucose mg/dL 134*  --  146*  --   --  214*  --  185*   Glucose, UA   --   --   --   --   --   --  Negative  --    BUN mg/dL 46*  --  66*  --   --  72*  --  71*   Creatinine mg/dL 0.8  --  1.6*  --   --  2.1*  --  2.0*   Albumin g/dL 1.9*  --  1.8*  --   --  1.6*  --  1.5*   Total Bilirubin mg/dL 0.6  --  0.2  --   --   --   --  0.2   Alkaline Phosphatase U/L 224*  --  176*  --   --   --   --  103   AST U/L 73*  --  48*  --   --   --   --  50*   ALT U/L 29  --  33  --   --   --   --  31   Magnesium mg/dL  --  2.0 2.5  --   --   --   --  3.2*   Phosphorus mg/dL  --  4.5 9.3* 5.9* 6.0* 4.7*  --  4.9*       Vancomycin Administrations:  vancomycin given in the last 96 hours                   vancomycin in dextrose 5 % 1 gram/250 mL IVPB 1,000 mg (mg) 1,000 mg New Bag 10/28/20 0820    vancomycin (VANCOCIN) 500 mg in dextrose 5 % 100 mL IVPB (mg) 500 mg New Bag 10/27/20 1811    vancomycin 1.25 g in dextrose 5% 250 mL IVPB (ready to mix) (mg) 1,250 mg New Bag 10/27/20 0452    vancomycin 1.5 g in dextrose 5 % 250 mL IVPB (ready to mix) (mg) 1,500 mg New Bag 10/26/20 1611                Microbiologic Results:  Microbiology Results (last 7 days)     Procedure Component Value Units Date/Time    Blood culture [[id number]] Collected: 10/27/20 2011    Order Status: Completed Specimen: Blood from Peripheral, Forearm, Right Updated: 10/28/20 0515     Blood Culture, Routine No Growth to date    Narrative:      Blood cultures from 2 different sites. 4 bottles total.  Please draw before starting antibiotics.    Blood culture [[id number]] Collected: 10/27/20 2011    Order Status: Completed Specimen: Blood from Peripheral,  Forearm, Left Updated: 10/28/20 0515     Blood Culture, Routine No Growth to date    Narrative:      Blood cultures x 2 different sites. 4 bottles total. Please  draw cultures before administering antibiotics.    Urine culture [[id number]] Collected: 10/27/20 2020    Order Status: No result Specimen: Urine Updated: 10/27/20 2141    Blood culture x two cultures. Draw prior to antibiotics. [[id number]] Collected: 10/18/20 0749    Order Status: Completed Specimen: Blood from Peripheral, Antecubital, Right Updated: 10/23/20 1012     Blood Culture, Routine No growth after 5 days.    Narrative:      Aerobic and anaerobic    Blood culture x two cultures. Draw prior to antibiotics. [[id number]] Collected: 10/18/20 0751    Order Status: Completed Specimen: Blood from Peripheral, Antecubital, Right Updated: 10/23/20 1012     Blood Culture, Routine No growth after 5 days.    Narrative:      Aerobic and anaerobic

## 2020-10-28 NOTE — CARE UPDATE
Updated wife, Liza, at bedside regarding patient's clinical status. All questions answered and concerns addressed.        Chris Gibbons NP  Pulmonary Critical Care Medicine  [facility]

## 2020-10-29 NOTE — RESPIRATORY THERAPY
Patient proned, ETT secured ta the 27 cm lizzy at the lip. Nurses, Pa and 2 RTs at bedside. Patient tolerated mobility well. I will continue to monitor patient closely.

## 2020-10-29 NOTE — CONSULTS
Wound consult received on patient's skin breakdown to bridge of nose. Patient intubated. Patient alternating from prone to supine positioning. Light to dark purple discolored tissue noted over bridge of nose, skin intact. Discoloration appears may be pressure related.  Recommendations:  Twice a week or prn- Bridge of nose: clean with sterile normal saline, apply sensicare skin barrier wipe, dress with comfeel hydrocolloid(cut to fit).    Nursing to continue care. Wound care to follow prn.

## 2020-10-29 NOTE — PROGRESS NOTES
[facility] - ICU 16   Critical Care Medicine  Progress Note    Patient Name: Nubia Riggs  MRN: [id number]  Admission Date: 10/18/2020  Hospital Length of Stay: 11 days  Code Status: Full Code  Attending Provider: Raad Martel MD  Primary Care Provider: Primary Doctor No   Principal Problem: Pneumonia due to COVID-19 virus    Subjective:     HPI:  55-year-old male with PMhx of GERD, allergic rinhitis, chronic cough (recently saw pulm and was diagnosed with reactive airway disease, on albuterol PRN) presented the emergency department on 10/18 with chief complaint of shortness of breath. He reports subjective fever, headache, chills, myalgias, poor appetite, and sweats about 6 days ago. He was diagnosed with COVID-19 on 10/15.  Reports presenting to urgent care 10/16 due to 89% pulse ox on room air. Started on levofloxacin and discharged home.  Reports using albuterol treatments at home without much relief.He noticed to have cough productive of blood tinged sputum and shortness of breath so he presented to the ED.     He received ceftria/azithro (x1 each in the ED). He started Remdesivir and completed 5 doses on 10/22, continues on dexamethasone. Patient is on his 7th day of hospital admission and has been requiring higher doses of O2 to keep sats >90%. Today he went up to 60L on 100% FiO2 and thus Critical Care was consulted.       Hospital/ICU Course:  Patient admitted to RICU due to worsening respiratory status with increasing oxygen requirements. Completion of remdesivir on 10/22. On BiPAP with O2 saturation in high 70s-low 80s at rest and acute desaturation to 40s when biPAP removed to provide PO medications. He required intubation on 10/26. Central line and arterial line placed. Proning initiated on 10/27. YOVANI developed with moderated UOP present.     Interval History/Significant Events: Hyperkalemic overnight requiring D50/insulin and furosemide given with appropriate response. YOVANI steady  with sCr 2.0. Remains on mechanical ventilation at 70%, 10 PEEP and chemically paralyzed.     Review of Systems   Unable to perform ROS: Intubated     Objective:     Vital Signs (Most Recent):  Temp: 98.3 °F (36.8 °C) (10/29/20 1500)  Pulse: (!) 122 (10/29/20 1800)  Resp: (!) 34 (10/29/20 1800)  BP: (!) 112/58 (10/29/20 1500)  SpO2: (!) 91 % (10/29/20 1800) Vital Signs (24h Range):  Temp:  [98.2 °F (36.8 °C)-99.5 °F (37.5 °C)] 98.3 °F (36.8 °C)  Pulse:  [] 122  Resp:  [20-35] 34  SpO2:  [88 %-99 %] 91 %  BP: (110-112)/(56-58) 112/58  Arterial Line BP: ()/(46-64) 114/63   Weight: 84.4 kg (186 lb)  Body mass index is 26.69 kg/m².      Intake/Output Summary (Last 24 hours) at 10/29/2020 1836  Last data filed at 10/29/2020 1800  Gross per 24 hour   Intake 2567.88 ml   Output 2410 ml   Net 157.88 ml       Physical Exam  Vitals signs reviewed.   Constitutional:       Appearance: He is well-developed.      Interventions: He is sedated, chemically paralyzed and intubated.   HENT:      Head: Normocephalic and atraumatic.   Eyes:      Pupils: Pupils are equal, round, and reactive to light.   Neck:      Thyroid: No thyromegaly.      Trachea: No tracheal deviation.      Comments: [facility] TLC in place.   Cardiovascular:      Rate and Rhythm: Regular rhythm. Tachycardia present.      Heart sounds: Normal heart sounds. No murmur. No friction rub. No gallop.    Pulmonary:      Effort: Pulmonary effort is normal. He is intubated.      Breath sounds: Examination of the right-upper field reveals rhonchi. Examination of the left-upper field reveals rhonchi. Decreased breath sounds (throughout) and rhonchi present. No wheezing or rales.   Abdominal:      General: Bowel sounds are normal.      Palpations: Abdomen is soft.   Genitourinary:     Comments: Kelley in place.   Musculoskeletal: Normal range of motion.   Skin:     General: Skin is warm and dry.   Neurological:      Comments: Heavily sedated and paralyzed. SUDEEP        Vents:  Vent Mode: A/C (10/29/20 1759)  Set Rate: 35 BPM (10/29/20 1759)  Vt Set: 450 mL (10/29/20 1759)  PEEP/CPAP: 12 cmH20 (10/29/20 1759)  Oxygen Concentration (%): 80 (10/29/20 1800)  Peak Airway Pressure: 30 cmH2O (10/29/20 1759)  Plateau Pressure: 31 cmH20 (10/29/20 1759)  Total Ve: 16.1 mL (10/29/20 1759)  F/VT Ratio<105 (RSBI): (!) 73.91 (10/29/20 1759)  Lines/Drains/Airways     Central Venous Catheter Line            Percutaneous Central Line Insertion/Assessment - Triple Lumen  10/26/20 1940 right internal jugular 2 days          Drain                 Urethral Catheter 10/26/20 0900 Non-latex 16 Fr. 3 days         NG/OG Tube 10/26/20 2000 orogastric 16 Fr. Left mouth 2 days          Airway                 Airway - Non-Surgical 10/26/20 1820 Endotracheal Tube 3 days       Airway Anesthesia 10/26/20 3 days          Arterial Line            Arterial Line 10/26/20 1909 Left Radial 2 days          Peripheral Intravenous Line                 Peripheral IV - Single Lumen 10/26/20 0925 20 G;1 1/4 in Anterior;Distal;Right Upper Arm 3 days         Peripheral IV - Single Lumen 10/29/20 1445 20 G Anterior;Right Forearm less than 1 day              Significant Labs:    CBC/Anemia Profile:  Recent Labs   Lab 10/28/20  0448 10/29/20  0423   WBC 8.64 11.62   HGB 14.3 14.2   HCT 45.6 46.0   * 160   * 100*   RDW 12.5 12.4        Chemistries:  Recent Labs   Lab 10/28/20  0448 10/28/20  1518 10/29/20  0423 10/29/20  0606 10/29/20  1233 10/29/20  1439    142 143 144 141 142   K 5.3* 5.3* 6.3* 6.1* 5.3* 5.5*    109 107 108 107 106   CO2 26 26 29 27 29 29   BUN 71* 70* 78* 80* 80* 82*   CREATININE 2.0* 1.9* 1.9* 2.0* 1.9* 2.0*   CALCIUM 7.5* 7.4* 7.7* 7.7* 7.7* 7.8*   ALBUMIN 1.5* 1.4* 1.4*  --   --  1.3*   PROT 5.5* 5.3* 5.7*  --   --  5.7*   BILITOT 0.2 0.2 0.3  --   --  0.3   ALKPHOS 103 93 95  --   --  107   ALT 31 31 33  --   --  36   AST 50* 62* 79*  --   --  74*   MG 3.2*  --  3.2*  --    --   --    PHOS 4.9*  --  3.6  --   --   --        All pertinent labs within the past 24 hours have been reviewed.    Significant Imaging:  I have reviewed and interpreted all pertinent imaging results/findings within the past 24 hours.      ABG  Recent Labs   Lab 10/29/20  1436   PH 7.301*   PO2 66*   PCO2 60.7*   HCO3 30.0*   BE 4     Assessment/Plan:     Psychiatric  Anxiety  Holding home vilazodone due heavy sedation requirements      Pulmonary  Reactive airway disease  Follows with Pulm as outpatient. Last seen 8/27/20, told he had RAD  He was also seen by a [facility] pulmonologist who did spirometry that reportedly showed borderline or mild asthma, and has been controlled with albuterol.   Former smoker, started at age 10-12 and quit 10 years ago  -- Continue inhalational treatments    Renal/  Hyperkalemia  Noted 10/29. No EKG changes. Shifted with D50/insulin and furosemide with appropriate response    Other  * Pneumonia due to COVID-19 virus  COVID positive on 10/15. Patient admitted to hospital medicine and continued to deteriorate needing higher oxygen requirements and thus Critical Care Medicine was consulted. Intubated 10/26 and eventually placed on NMB and proned    - s/p remdesivir (10/18-10/22)  - s/p Dexamethasone (10/27)  - Albuterol treatment PRN  - D-dimer >33 continue Heparin gtt  - continue LPV  - ABG daily to assess PF ratio for proning criteria  - Sputum culture sent 10/27 NGTD      Palliative care encounter  Palliative consulted for potential for prolonged hospitalization and following along.     Acute hypoxemic respiratory failure due to COVID-19  Secondary to COVID (see above)       Critical Care Daily Checklist:    A: Awake: RASS Goal/Actual Goal: RASS Goal: -5-->unarousable  Actual: Chang Agitation Sedation Scale (RASS): Unarousable   B: Spontaneous Breathing Trial Performed?     C: SAT & SBT Coordinated?  N/A                     D: Delirium: CAM-ICU Overall CAM-ICU: Positive   E: Early  Mobility Performed? Yes   F: Feeding Goal: Goals: Pt to meet >75% EEN and EPN by RD follow up.  Status: Nutrition Goal Status: progressing towards goal, new   Current Diet Order   Procedures    Diet NPO      AS: Analgesia/Sedation Fentanyl and propofol   T: Thromboembolic Prophylaxis Heparin gtt   H: HOB > 300 Yes   U: Stress Ulcer Prophylaxis (if needed) famotidine   G: Glucose Control At goal 140-180   B: Bowel Function Stool Occurrence: 2   I: Indwelling Catheter (Lines & Conway) Necessity RIJ TLC, arterial line, conway   D: De-escalation of Antimicrobials/Pharmacotherapies Cefepime; D/c vancomycin    Plan for the day/ETD Supportive care    Code Status:  Family/Goals of Care: Full Code       Discussed with Dr. Martel.     Critical Care Time: 60 minutes  Critical secondary to Patient has a condition that poses threat to life and bodily function: acute hypoxemic respiratory failure requiring mechanical ventilation      Critical care was time spent personally by me on the following activities: development of treatment plan with patient or surrogate and bedside caregivers, discussions with consultants, evaluation of patient's response to treatment, examination of patient, ordering and performing treatments and interventions, ordering and review of laboratory studies, ordering and review of radiographic studies, pulse oximetry, re-evaluation of patient's condition. This critical care time did not overlap with that of any other provider or involve time for any procedures.     Nydia Collado PA-C  Critical Care Medicine  [facility] - ICU 16 [facility]

## 2020-10-29 NOTE — ASSESSMENT & PLAN NOTE
COVID positive on 10/15. Patient admitted to hospital medicine and continued to deteriorate needing higher oxygen requirements and thus Critical Care Medicine was consulted. Intubated 10/26 and eventually placed on NMB and proned    - s/p remdesivir (10/18-10/22)  - s/p Dexamethasone (10/27)  - Albuterol treatment PRN  - D-dimer >33 continue Heparin gtt  - continue LPV  - ABG daily to assess PF ratio for proning criteria  - Sputum culture sent 10/27 NGTD

## 2020-10-29 NOTE — SUBJECTIVE & OBJECTIVE
Interval History/Significant Events: Hyperkalemic overnight requiring D50/insulin and furosemide given with appropriate response. YOVANI steady with sCr 2.0. Remains on mechanical ventilation at 70%, 10 PEEP and chemically paralyzed.     Review of Systems   Unable to perform ROS: Intubated     Objective:     Vital Signs (Most Recent):  Temp: 98.3 °F (36.8 °C) (10/29/20 1500)  Pulse: (!) 122 (10/29/20 1800)  Resp: (!) 34 (10/29/20 1800)  BP: (!) 112/58 (10/29/20 1500)  SpO2: (!) 91 % (10/29/20 1800) Vital Signs (24h Range):  Temp:  [98.2 °F (36.8 °C)-99.5 °F (37.5 °C)] 98.3 °F (36.8 °C)  Pulse:  [] 122  Resp:  [20-35] 34  SpO2:  [88 %-99 %] 91 %  BP: (110-112)/(56-58) 112/58  Arterial Line BP: ()/(46-64) 114/63   Weight: 84.4 kg (186 lb)  Body mass index is 26.69 kg/m².      Intake/Output Summary (Last 24 hours) at 10/29/2020 1836  Last data filed at 10/29/2020 1800  Gross per 24 hour   Intake 2567.88 ml   Output 2410 ml   Net 157.88 ml       Physical Exam  Vitals signs reviewed.   Constitutional:       Appearance: He is well-developed.      Interventions: He is sedated, chemically paralyzed and intubated.   HENT:      Head: Normocephalic and atraumatic.   Eyes:      Pupils: Pupils are equal, round, and reactive to light.   Neck:      Thyroid: No thyromegaly.      Trachea: No tracheal deviation.      Comments: RIJ TLC in place.   Cardiovascular:      Rate and Rhythm: Regular rhythm. Tachycardia present.      Heart sounds: Normal heart sounds. No murmur. No friction rub. No gallop.    Pulmonary:      Effort: Pulmonary effort is normal. He is intubated.      Breath sounds: Examination of the right-upper field reveals rhonchi. Examination of the left-upper field reveals rhonchi. Decreased breath sounds (throughout) and rhonchi present. No wheezing or rales.   Abdominal:      General: Bowel sounds are normal.      Palpations: Abdomen is soft.   Genitourinary:     Comments: Kelley in place.   Musculoskeletal:  Normal range of motion.   Skin:     General: Skin is warm and dry.   Neurological:      Comments: Heavily sedated and paralyzed. PERRLA       Vents:  Vent Mode: A/C (10/29/20 1759)  Set Rate: 35 BPM (10/29/20 1759)  Vt Set: 450 mL (10/29/20 1759)  PEEP/CPAP: 12 cmH20 (10/29/20 1759)  Oxygen Concentration (%): 80 (10/29/20 1800)  Peak Airway Pressure: 30 cmH2O (10/29/20 1759)  Plateau Pressure: 31 cmH20 (10/29/20 1759)  Total Ve: 16.1 mL (10/29/20 1759)  F/VT Ratio<105 (RSBI): (!) 73.91 (10/29/20 1759)  Lines/Drains/Airways     Central Venous Catheter Line            Percutaneous Central Line Insertion/Assessment - Triple Lumen  10/26/20 1940 right internal jugular 2 days          Drain                 Urethral Catheter 10/26/20 0900 Non-latex 16 Fr. 3 days         NG/OG Tube 10/26/20 2000 orogastric 16 Fr. Left mouth 2 days          Airway                 Airway - Non-Surgical 10/26/20 1820 Endotracheal Tube 3 days       Airway Anesthesia 10/26/20 3 days          Arterial Line            Arterial Line 10/26/20 1909 Left Radial 2 days          Peripheral Intravenous Line                 Peripheral IV - Single Lumen 10/26/20 0925 20 G;1 1/4 in Anterior;Distal;Right Upper Arm 3 days         Peripheral IV - Single Lumen 10/29/20 1445 20 G Anterior;Right Forearm less than 1 day              Significant Labs:    CBC/Anemia Profile:  Recent Labs   Lab 10/28/20  0448 10/29/20  0423   WBC 8.64 11.62   HGB 14.3 14.2   HCT 45.6 46.0   * 160   * 100*   RDW 12.5 12.4        Chemistries:  Recent Labs   Lab 10/28/20  0448 10/28/20  1518 10/29/20  0423 10/29/20  0606 10/29/20  1233 10/29/20  1439    142 143 144 141 142   K 5.3* 5.3* 6.3* 6.1* 5.3* 5.5*    109 107 108 107 106   CO2 26 26 29 27 29 29   BUN 71* 70* 78* 80* 80* 82*   CREATININE 2.0* 1.9* 1.9* 2.0* 1.9* 2.0*   CALCIUM 7.5* 7.4* 7.7* 7.7* 7.7* 7.8*   ALBUMIN 1.5* 1.4* 1.4*  --   --  1.3*   PROT 5.5* 5.3* 5.7*  --   --  5.7*   BILITOT 0.2 0.2 0.3   --   --  0.3   ALKPHOS 103 93 95  --   --  107   ALT 31 31 33  --   --  36   AST 50* 62* 79*  --   --  74*   MG 3.2*  --  3.2*  --   --   --    PHOS 4.9*  --  3.6  --   --   --        All pertinent labs within the past 24 hours have been reviewed.    Significant Imaging:  I have reviewed and interpreted all pertinent imaging results/findings within the past 24 hours.

## 2020-10-29 NOTE — RESPIRATORY THERAPY
Patient supinated, ETT secured at the 25 cm lizzy at the lip. Nurses, NP and 2 RTs at bedside. Patient tolerated procedure well. I will continue to monitor patient closely.

## 2020-10-29 NOTE — PROCEDURES
Procedure Note  Prone Positioning  Critical Care Medicine       Chief Complaint: Pneumonia due to COVID-19 virus  MRN: [id number]  LOS: 11  Sex: male  Age: 55 y.o.      Last ABG:   Recent Labs   Lab 10/29/20  1436   PH 7.301*   PO2 66*   PCO2 60.7*   HCO3 30.0*   BE 4       Vital Signs (Most Recent):   Temp: 98.3 °F (36.8 °C) (10/29/20 1500)  Pulse: 108 (10/29/20 1615)  Resp: (!) 35 (10/29/20 1615)  BP: (!) 112/58 (10/29/20 1500)  SpO2: (!) 92 % (10/29/20 1615)    Ventilator Settings:  Vent Mode: A/C  Oxygen Concentration (%):  [] 90  Resp Rate Total:  [35 br/min-37 br/min] 35 br/min  Vt Set:  [450 mL-460 mL] 450 mL  PEEP/CPAP:  [10 ofE24-62 cmH20] 12 cmH20  Mean Airway Pressure:  [19 oxD64-57 cmH20] 21 cmH20    Indication: Severe, refractory ARDS. High risk for deterioration during proning procedure in need of direct monitoring by Critical Care personnel.     Prior to beginning the procedure, all appropriate equipment and personnel were gathered in the room. Two individuals were placed on each side of the patient and one person stood at the head of the bed and was dedicated to the management of the head of the patient, the endotracheal tube, and the ventilator lines. The person at the head of the bed  coordinated the steps of the proning procedure with team team at the direction of Critical Care team members at the bedside. The patient was first log-rolled 90 degrees onto their side towards the direction of their central venous catheter. Cardiac electrodes were then moved from the patient's anterior to the posterior. The patient?s knees, forehead, chest, and iliac crests were protected using adhesive pads and/or foam pads to reduce the risk of skin breakdown. Once properly positioned in the bed, another 90 degree log roll was performed placing the patient into the prone position. The patient's arms were placed alongside the body. The patient's head should be turned alternately to the right or left every 2 hours.  The patient tolerated the procedure well.     Of note, ET tube at 27 cm at the lip pre- and post-procedure.     Total Critical Care time (uninterrupted not including procedures): 15 minutes    Nydia Collado PA-C  Critical Care Medicine  10/29/2020   4:31 PM

## 2020-10-29 NOTE — PROGRESS NOTES
Pharmacokinetic Assessment Follow Up: IV Vancomycin    Vancomycin Regimen Assessment/Plan:    - Vancomycin random level resulted as 10 mg/dl, drawn ~ 20 hours after the previous dose.  - Patient with YOVANI, SCr 2 mg/dl from baseline ~ 0.8-0.9 mg/dl. He is making urine.  Will continue to closely monitor.   - Schedule vancomycin 1250 mg every 24 hours.  - A trough level is ordered on 10/31 at 08:30. Goal 15-20 mg/dl.  Pharmacy to draw trough earlier if there is a significant worsening of renal function.     Drug levels (last 3 results):  Recent Labs   Lab Result Units 10/27/20  1612 10/28/20  0448 10/29/20  0423   Vancomycin, Random ug/mL 17.5 11.3 10.0       Pharmacy will continue to follow and monitor vancomycin.    Please contact pharmacy at extension [phone] for questions regarding this assessment.    Thank you for the consult,   Christine Meyers, PharmD, BCCCP       Patient brief summary:  Nubia Riggs is a 55 y.o. male initiated on antimicrobial therapy with IV Vancomycin for treatment of sepsis.     Drug Allergies:   Review of patient's allergies indicates:   Allergen Reactions    Meperidine      Other reaction(s): violent behavior    Sulfa (sulfonamide antibiotics)      Other reaction(s): Unknown       Actual Body Weight:   84.4 kg    Renal Function:   Estimated Creatinine Clearance: 44.1 mL/min (A) (based on SCr of 2 mg/dL (H)).,     Dialysis Method (if applicable):  N/A    CBC (last 72 hours):  Recent Labs   Lab Result Units 10/26/20  2235 10/27/20  0403 10/28/20  0448 10/29/20  0423   WBC K/uL 19.71* 15.40* 8.64 11.62   Hemoglobin g/dL 16.2 15.7 14.3 14.2   Hematocrit % 52.3 51.1 45.6 46.0   Platelets K/uL 188 147* 141* 160   Gran % % 90.1* 94.4* 90.4* 86.6*   Lymph % % 2.5* 2.4* 3.7* 4.0*   Mono % % 1.9* 1.8* 4.3 6.0   Eosinophil % % 0.1 0.0 0.0 1.8   Basophil % % 0.4 0.2 0.2 0.2   Differential Method  Automated Automated Automated Automated       Metabolic Panel (last 72 hours):  Recent Labs   Lab  Result Units 10/26/20  1009 10/27/20  0403 10/27/20  0740 10/27/20  0748 10/27/20  1612 10/27/20  2020 10/28/20  0448 10/28/20  1518 10/29/20  0423 10/29/20  0606   Sodium mmol/L  --  144  --   --  143  --  143 142 143 144   Potassium mmol/L  --  6.6* 5.7*  --  5.6*  --  5.3* 5.3* 6.3* 6.1*   Chloride mmol/L  --  105  --   --  106  --  107 109 107 108   CO2 mmol/L  --  26  --   --  26  --  26 26 29 27   Glucose mg/dL  --  146*  --   --  214*  --  185* 154* 149* 149*   Glucose, UA   --   --   --   --   --  Negative  --   --   --   --    BUN mg/dL  --  66*  --   --  72*  --  71* 70* 78* 80*   Creatinine mg/dL  --  1.6*  --   --  2.1*  --  2.0* 1.9* 1.9* 2.0*   Albumin g/dL  --  1.8*  --   --  1.6*  --  1.5* 1.4* 1.4*  --    Total Bilirubin mg/dL  --  0.2  --   --   --   --  0.2 0.2 0.3  --    Alkaline Phosphatase U/L  --  176*  --   --   --   --  103 93 95  --    AST U/L  --  48*  --   --   --   --  50* 62* 79*  --    ALT U/L  --  33  --   --   --   --  31 31 33  --    Magnesium mg/dL 2.0 2.5  --   --   --   --  3.2*  --  3.2*  --    Phosphorus mg/dL 4.5 9.3* 5.9* 6.0* 4.7*  --  4.9*  --  3.6  --        Vancomycin Administrations:  vancomycin given in the last 96 hours                   vancomycin in dextrose 5 % 1 gram/250 mL IVPB 1,000 mg (mg) 1,000 mg New Bag 10/28/20 0820    vancomycin (VANCOCIN) 500 mg in dextrose 5 % 100 mL IVPB (mg) 500 mg New Bag 10/27/20 1811    vancomycin 1.25 g in dextrose 5% 250 mL IVPB (ready to mix) (mg) 1,250 mg New Bag 10/27/20 0452    vancomycin 1.5 g in dextrose 5 % 250 mL IVPB (ready to mix) (mg) 1,500 mg New Bag 10/26/20 1611                Microbiologic Results:  Microbiology Results (last 7 days)     Procedure Component Value Units Date/Time    Culture, Respiratory with Gram Stain [[id number]] Collected: 10/28/20 1100    Order Status: Completed Specimen: Respiratory from Endotracheal Aspirate Updated: 10/28/20 7961     Gram Stain (Respiratory) <10 epithelial cells per low power  field.     Gram Stain (Respiratory) Few WBC's     Gram Stain (Respiratory) No organisms seen    Urine culture [[id number]] Collected: 10/27/20 2020    Order Status: Completed Specimen: Urine Updated: 10/28/20 2303     Urine Culture, Routine No growth    Narrative:      Specimen Source->Urine    Blood culture [[id number]] Collected: 10/27/20 2011    Order Status: Completed Specimen: Blood from Peripheral, Forearm, Right Updated: 10/28/20 2212     Blood Culture, Routine No Growth to date      No Growth to date    Narrative:      Blood cultures from 2 different sites. 4 bottles total.  Please draw before starting antibiotics.    Blood culture [[id number]] Collected: 10/27/20 2011    Order Status: Completed Specimen: Blood from Peripheral, Forearm, Left Updated: 10/28/20 2212     Blood Culture, Routine No Growth to date      No Growth to date    Narrative:      Blood cultures x 2 different sites. 4 bottles total. Please  draw cultures before administering antibiotics.    Blood culture x two cultures. Draw prior to antibiotics. [[id number]] Collected: 10/18/20 0749    Order Status: Completed Specimen: Blood from Peripheral, Antecubital, Right Updated: 10/23/20 1012     Blood Culture, Routine No growth after 5 days.    Narrative:      Aerobic and anaerobic    Blood culture x two cultures. Draw prior to antibiotics. [[id number]] Collected: 10/18/20 0751    Order Status: Completed Specimen: Blood from Peripheral, Antecubital, Right Updated: 10/23/20 1012     Blood Culture, Routine No growth after 5 days.    Narrative:      Aerobic and anaerobic

## 2020-10-29 NOTE — CARE UPDATE
Updated wife, Liza, via telephone regarding patient's clinical status. All questions answered and concerns addressed.      Nydia Collado PA-C  Critical Care Medicine  10/29/2020   5:53 PM

## 2020-10-29 NOTE — ASSESSMENT & PLAN NOTE
Follows with Pulm as outpatient. Last seen 8/27/20, told he had RAD  He was also seen by a [facility] pulmonologist who did spirometry that reportedly showed borderline or mild asthma, and has been controlled with albuterol.   Former smoker, started at age 10-12 and quit 10 years ago  -- Continue inhalational treatments

## 2020-10-29 NOTE — CONSULTS
"  [facility] - ICU 16 WT  Adult Nutrition  Consult Note    SUMMARY     Recommendations    1. Recommend modifying TF to Peptamen Intense VHP and advancing as tolerated to goal rate of 55 mL/hr to provide 1988 kcal (with current propofol rate), 121 gm protein, and 1109 mL free fluid.               - When propofol discontinued, recommend TF of Impact Peptide 1.5 to goal rate of 1980 kcal, 124 gm protein, and 1016 mL free fluid.      2. RD following.     Goals: Pt to meet >75% EEN and EPN by RD follow up.  Nutrition Goal Status: new  Communication of RD Recs: (POC)    Reason for Assessment    Reason For Assessment: consult  Diagnosis: (Pneumonia due to COVID-19 virus)  Relevant Medical History: GERD, allergic rinhitis, chronic cough (recently saw pulm and was diagnosed with reactive airway disease)  Interdisciplinary Rounds: did not attend  General Information Comments: Pt intubated, sedated, prone position. Trickle feeds of Novasource Renal ordered today, have not been started yet. Unable to complete NFPE 2/2 COVID isolation. Pt does not meet malnutrition criteria with current information. Will continue to monitor energy intake and wt changes.     Nutrition Discharge Planning: unable to determine at this time    Nutrition Risk Screen    Nutrition Risk Screen: tube feeding or parenteral nutrition    Nutrition/Diet History    Spiritual, Cultural Beliefs, Moravian Practices, Values that Affect Care: no  Factors Affecting Nutritional Intake: NPO, on mechanical ventilation    Anthropometrics    Temp: 98.4 °F (36.9 °C)  Height: 5' 10" (177.8 cm)  Height (inches): 70 in  Weight Method: Standard Scale  Weight: 84.4 kg (186 lb)  Weight (lb): 186 lb  Ideal Body Weight (IBW), Male: 166 lb  % Ideal Body Weight, Male (lb): 112.05 %  BMI (Calculated): 26.7  BMI Grade: (P) 25 - 29.9 - overweight       Lab/Procedures/Meds    Pertinent Labs Reviewed: reviewed  Pertinent Labs Comments: K 6.1, BUN80, Cre 2, Gluc 149, Ca " 7.7  Pertinent Medications Reviewed: reviewed  Pertinent Medications Comments: famotidine, furosemide, MVI, insulin, vit D, fentanyl, propofol      Estimated/Assessed Needs    Weight Used For Calorie Calculations: 84.4 kg (186 lb 1.1 oz)  Energy Calorie Requirements (kcal): 2174 kcal/day  Energy Need Method: [facility]  Protein Requirements: 101-127 gm/day(1.2-1.5 gm/kg)  Weight Used For Protein Calculations: 84.4 kg (186 lb 1.1 oz)  Fluid Requirements (mL): 1 mL/kcal or per MD     RDA Method (mL): 2174           Nutrition Prescription Ordered    Current Diet Order: NPO    Evaluation of Received Nutrient/Fluid Intake    Other Calories (kcal): 668(propofol )  I/O: +1.7L since admit  Comments: LBM 10/24  % Intake of Estimated Energy Needs: 0 - 25 %  % Meal Intake: NPO    Nutrition Risk    Level of Risk/Frequency of Follow-up: (f/u 2 x wk)     Assessment and Plan    Nutrition Problem  Inadequate Energy Intake      Related to (etiology):   Inability to consume sufficient energy      Signs and Symptoms (as evidenced by):   Pt intubated, NPO      Interventions (treatment strategy):  Collaboration of nutrition care with other providers     Nutrition Diagnosis Status:   New        Monitor and Evaluation    Food and Nutrient Intake: energy intake  Food and Nutrient Adminstration: enteral and parenteral nutrition administration  Anthropometric Measurements: weight, weight change, body mass index  Biochemical Data, Medical Tests and Procedures: electrolyte and renal panel, gastrointestinal profile, glucose/endocrine profile, inflammatory profile, lipid profile  Nutrition-Focused Physical Findings: overall appearance          Nutrition Follow-Up    RD Follow-up?: Yes

## 2020-10-29 NOTE — PLAN OF CARE
"      SICU PLAN OF CARE NOTE    Dx: Pneumonia due to COVID-19 virus    Shift Events: Patient shifted for elevated K, given Insulin and D50, lasix push administered.  Patient being alternated from prone to supine, last proned at 1630. Tube feedings started, tolerating well. Left foot cool to touch, pulses being dopplered, MD aware. Red ting urine noted after being proned, MD notified and PTT drawn, therapeutic. HR also elevated after being proned, 115-120, bolus of fentanyl given with no change, BIS remained in mid 40s throughout. No new orders at this time.     Goals of Care: MAP >65, O2 >88.     Neuro: Unresponsive, paralyzed and sedated.     Vital Signs: BP (!) 112/58 (BP Location: Right arm, Patient Position: Lying)   Pulse (!) 114   Temp 98.3 °F (36.8 °C) (Oral)   Resp (!) 35   Ht 5' 10" (1.778 m)   Wt 84.4 kg (186 lb)   SpO2 (!) 92%   BMI 26.69 kg/m²     Respiratory: Ventilator 80% / 12 PEEP AC/VC    Diet: Tube Feeds    Gtts: Propfol, Fentanyl, Norepinephrine, Cisatricurium and Heparin    Urine Output: Urinary Catheter 1435 cc/shift    Drains: NG/OG Tube 10 cc /  hour of tube feeding    Labs/Accuchecks: Monitoring CMP [id number].    Skin: New wound noted, wound care consulted. Foam pads in use on pressure points/bony prominences. Weight shifted q2. Head turned q2 while proned.        "

## 2020-10-29 NOTE — PROCEDURES
Procedure Note  Supine Positioning  Critical Care Medicine       Chief Complaint: Pneumonia due to COVID-19 virus  MRN: [id number]  LOS: 11  Sex: male  Age: 55 y.o.      Last ABG:   Recent Labs   Lab 10/29/20  0437   PH 7.279*   PO2 70*   PCO2 62.6*   HCO3 29.3*   BE 3       Vital Signs (Most Recent):   Temp: 98.2 °F (36.8 °C) (10/29/20 0800)  Pulse: 85 (10/29/20 1000)  Resp: (!) 35 (10/29/20 1000)  BP: (!) 100/56 (10/28/20 1000)  SpO2: (!) 90 % (10/29/20 1000)    Ventilator Settings:  Vent Mode: A/C  Oxygen Concentration (%):  [] 70  Resp Rate Total:  [35 br/min-37 br/min] 35 br/min  Vt Set:  [460 mL] 460 mL  PEEP/CPAP:  [10 [id number] cmH20] 10 cmH20  Mean Airway Pressure:  [19 vbL11-36 cmH20] 20 cmH20        Indication: Severe, refractory ARDS. High risk for deterioration during supination procedure in need of direct monitoring by Critical Care personnel.     Prior to beginning the procedure, all appropriate equipment and personnel were gathered in the room. Two individuals were placed on each side of the patient and two respiratory therapists stood at the head of the bed and was dedicated to the management of the head of the patient, the endotracheal tube, and the ventilator lines. The person at the head of the bed coordinated the steps of the supination procedure at the direction of Critical Care team members at the bedside. The patient was first log-rolled 90 degrees onto their side away from the direction of their central venous catheter. Cardiac electrodes were then moved from the patient's posterior back to the anterior. Once properly positioned in the bed, another 90 degree log roll was performed placing the patient into the supine position. The patient's arms were placed alongside the body. Continuous pulse oximetry and blood pressure monitoring was performed without any desaturation or hemodynamic instability. The patient tolerated the procedure well.     Of note, ET tube at 25 cm at the lip pre- and  post-procedure. CXR ordered to evaluate ET tube placement. Repeat ABG ordered for 1500.     Total Critical Care time (uninterrupted not including procedures): 15 minutes    Nydia Collado PA-C  Critical Care Medicine  10/29/2020   10:20 AM

## 2020-10-29 NOTE — RESPIRATORY THERAPY
Patient's head repositioned. ETT secured at the 24 cm lizzy at the lip. Nurse at bedside. Patient tolerated procedure well. I will continue to monitor patient closely.

## 2020-10-30 PROBLEM — N17.9 AKI (ACUTE KIDNEY INJURY): Status: ACTIVE | Noted: 2020-01-01

## 2020-10-30 NOTE — CONSULTS
Wound consult received on patient's LUQ abdomen.  Light purple/redden discolored tissue, slightly raised noted to LUQ abdomen. Possibly pressure related.  Patient alternating from supine to prone positioning.     Recommendations:  LUQ abdomen: BID clean with sterile normal saline, apply venelex ointment, cover with foam dressing    Nursing to continue care. Wound care to follow prn.    LUQ: Approximately 2cm x 3cm

## 2020-10-30 NOTE — ASSESSMENT & PLAN NOTE
COVID positive on 10/15. Patient admitted to hospital medicine and continued to deteriorate needing higher oxygen requirements and thus Critical Care Medicine was consulted. Intubated 10/26 and eventually placed on NMB and proned. Peak and plateau pressures remain at goal    Day 6 of intubation and Day 6 of NMB Last CXR 10/30    - s/p remdesivir (10/18-10/22)  - s/p Dexamethasone (10/27)  - Albuterol treatment PRN   - continue Heparin gtt D-dimer >33  - continue LPV and pronation for PF <150 with FiO2 50% and PEEP 10  - Sputum culture sent 10/28 NGTD, reordered 10/30 for leukocytosis and febrile  - continue vanc, broaden cefepime to zosyn

## 2020-10-30 NOTE — ASSESSMENT & PLAN NOTE
Non-Oliguric YOVANI on Normal Renal Fx  YOVANI 2/2 ATN from septic shock from COVID-019 infection.  disproportionate rise in BUN to Cr with possible component of volume depletion.    Plan/Recommendations:  -Urine lytes  -recommend 1L LR + 120 mg of IV lasix to aid in potassium clearance  -1L LR post lasix as patients HR and BP responded well after IV fluid bolus  -K monitoring q 8 hours  -attempt to use lasix/fluids as needed for potassium elimination  -continue strict I/O's  -no emergent need for RRT at this time. Will continue following closely.

## 2020-10-30 NOTE — PROGRESS NOTES
[facility] - ICU 16   Critical Care Medicine  Progress Note    Patient Name: Nubia Riggs  MRN: [id number]  Admission Date: 10/18/2020  Hospital Length of Stay: 12 days  Code Status: Full Code  Attending Provider: Raad Martel MD  Primary Care Provider: Primary Doctor No   Principal Problem: Pneumonia due to COVID-19 virus    Subjective:     HPI:  55-year-old male with PMhx of GERD, allergic rinhitis, chronic cough (recently saw pulm and was diagnosed with reactive airway disease, on albuterol PRN) presented the emergency department on 10/18 with chief complaint of shortness of breath. He reports subjective fever, headache, chills, myalgias, poor appetite, and sweats about 6 days ago. He was diagnosed with COVID-19 on 10/15.  Reports presenting to urgent care 10/16 due to 89% pulse ox on room air. Started on levofloxacin and discharged home.  Reports using albuterol treatments at home without much relief.He noticed to have cough productive of blood tinged sputum and shortness of breath so he presented to the ED.     He received ceftria/azithro (x1 each in the ED). He started Remdesivir and completed 5 doses on 10/22, continues on dexamethasone. Patient is on his 7th day of hospital admission and has been requiring higher doses of O2 to keep sats >90%. Today he went up to 60L on 100% FiO2 and thus Critical Care was consulted.       Hospital/ICU Course:  Patient admitted to RICU due to worsening respiratory status with increasing oxygen requirements. Completion of remdesivir on 10/22. On BiPAP with O2 saturation in high 70s-low 80s at rest and acute desaturation to 40s when biPAP removed to provide PO medications. He required intubation on 10/26. Central line and arterial line placed. Proning initiated on 10/27. YOVANI developed with inital response to lasix however, not clearing as hyperkalemia persists. UO decreasing on 10/30 despite multiple lasix doses, and concern for hematuria, CBC sent.  Nephrology consult placed. Resend blood and sputum cultures for rising WBC count and check lactate.    Interval History/Significant Events: UO decreasing despite multiple lasix doses, and this am concern for hematuria, repeat CBC sent. Nephrology consult placed. Shifted overnight for hyperkalemia. Resend blood and sputum cultures for rising WBC count and check lactate.      Review of Systems   Unable to perform ROS: Intubated     Objective:     Vital Signs (Most Recent):  Temp: (!) 101.2 °F (38.4 °C) (10/30/20 0823)  Pulse: (!) 114 (10/30/20 0823)  Resp: (!) 35 (10/30/20 0823)  BP: (!) 112/58 (10/29/20 1500)  SpO2: (!) 93 % (10/30/20 0823) Vital Signs (24h Range):  Temp:  [98.3 °F (36.8 °C)-101.2 °F (38.4 °C)] 101.2 °F (38.4 °C)  Pulse:  [] 114  Resp:  [20-35] 35  SpO2:  [88 %-99 %] 93 %  BP: (110-112)/(56-58) 112/58  Arterial Line BP: ()/(46-67) 94/59   Weight: 84.4 kg (186 lb)  Body mass index is 26.69 kg/m².      Intake/Output Summary (Last 24 hours) at 10/30/2020 0859  Last data filed at 10/30/2020 0600  Gross per 24 hour   Intake 2360.7 ml   Output 1920 ml   Net 440.7 ml       Physical Exam  Vitals signs and nursing note reviewed.   Constitutional:       General: He is not in acute distress.     Appearance: Normal appearance. He is ill-appearing.      Interventions: He is sedated, chemically paralyzed and intubated.   Cardiovascular:      Rate and Rhythm: Regular rhythm. Tachycardia present.      Heart sounds: Normal heart sounds.   Pulmonary:      Effort: He is intubated.      Breath sounds: No wheezing, rhonchi or rales.      Comments: Coarse breath sounds  Abdominal:      Comments: Patient proned for exam   Musculoskeletal:      Right lower leg: No edema.      Left lower leg: No edema.   Skin:     Coloration: Skin is mottled (lright foot 4th digit, left foot, back of great toe).   Neurological:      Mental Status: He is unresponsive.      GCS: GCS eye subscore is 1. GCS verbal subscore is 1. GCS  motor subscore is 1.      Comments: Patient on NMB         Vents:  Vent Mode: A/C (10/30/20 0823)  Set Rate: 35 BPM (10/30/20 0823)  Vt Set: 450 mL (10/30/20 0823)  PEEP/CPAP: 12 cmH20 (10/30/20 0823)  Oxygen Concentration (%): 80 (10/30/20 0823)  Peak Airway Pressure: 31 cmH2O (10/30/20 0823)  Plateau Pressure: 31 cmH20 (10/30/20 0823)  Total Ve: 15.9 mL (10/30/20 0823)  F/VT Ratio<105 (RSBI): (!) 76.75 (10/30/20 0823)  Lines/Drains/Airways     Central Venous Catheter Line            Percutaneous Central Line Insertion/Assessment - Triple Lumen  10/26/20 1940 right internal jugular 3 days          Drain                 NG/OG Tube 10/26/20 2000 orogastric 16 Fr. Left mouth 3 days         Urethral Catheter 10/26/20 0900 Non-latex 16 Fr. 3 days          Airway                 Airway - Non-Surgical 10/26/20 1820 Endotracheal Tube 3 days       Airway Anesthesia 10/26/20 3 days          Arterial Line            Arterial Line 10/26/20 1909 Left Radial 3 days          Peripheral Intravenous Line                 Peripheral IV - Single Lumen 10/26/20 0925 20 G;1 1/4 in Anterior;Distal;Right Upper Arm 3 days         Peripheral IV - Single Lumen 10/29/20 1445 20 G Anterior;Right Forearm less than 1 day              Significant Labs:    CBC/Anemia Profile:  Recent Labs   Lab 10/29/20  0423 10/30/20  0427   WBC 11.62 14.78*   HGB 14.2 15.0   HCT 46.0 48.9    197   * 98   RDW 12.4 12.6        Chemistries:  Recent Labs   Lab 10/29/20  0423  10/30/20  0010 10/30/20  0130 10/30/20  0427      < > 142 142 142   K 6.3*   < > 7.0* 6.9* 6.4*      < > 106 105 104   CO2 29   < > 26 29 30*   BUN 78*   < > 96* 98* 101*   CREATININE 1.9*   < > 2.4* 2.5* 2.6*   CALCIUM 7.7*   < > 8.0* 8.1* 8.3*   ALBUMIN 1.4*   < > 1.4* 1.4* 1.2*   PROT 5.7*   < > 6.2 6.2 6.0   BILITOT 0.3   < > 0.3 0.3 0.3   ALKPHOS 95   < > 111 109 111   ALT 33   < > 43 46* 53*   AST 79*   < > 81* 80* 88*   MG 3.2*  --   --   --  3.2*   PHOS 3.6  --    --   --  4.6*    < > = values in this interval not displayed.       All pertinent labs within the past 24 hours have been reviewed.    Significant Imaging:  I have reviewed all pertinent imaging results/findings within the past 24 hours.      ABG  Recent Labs   Lab 10/30/20  0452   PH 7.291*   PO2 84   PCO2 67.9*   HCO3 32.7*   BE 6     Assessment/Plan:     Psychiatric  Anxiety  Holding home vilazodone due heavy sedation requirements      Pulmonary  Reactive airway disease  Follows with Pulm as outpatient. Last seen 8/27/20, told he had RAD  He was also seen by a [facility] pulmonologist who did spirometry that reportedly showed borderline or mild asthma, and has been controlled with albuterol.   Former smoker, started at age 10-12 and quit 10 years ago  -- Continue duo nebs q4    Renal/  YOVANI (acute kidney injury)  No history of prior kidney disease; sCr on admission 0.8 -->2.6 as of 10/30, with UO less responsive to multiple doses of 40 mg lasix which he was initially responsive to    --retroperitoneal u/s  --send urine lytes  --nephrology consult for possible RRT need    Hyperkalemia  Noted 10/29. No EKG changes. Shifted with D50/insulin and furosemide which temporarily improved potassium    --see YOVAIN    Other  * Pneumonia due to COVID-19 virus  COVID positive on 10/15. Patient admitted to hospital medicine and continued to deteriorate needing higher oxygen requirements and thus Critical Care Medicine was consulted. Intubated 10/26 and eventually placed on NMB and proned. Peak and plateau pressures remain at goal    Day 5 of intubation and Day 5 of NMB Last CXR 10/30    - s/p remdesivir (10/18-10/22)  - s/p Dexamethasone (10/27)  - Albuterol treatment PRN   - continue Heparin gtt D-dimer >33  - continue LPV and pronation for PF <150 with FiO2 50% and PEEP 10  - Sputum culture sent 10/28 NGTD, reordered 10/30 for leukocytosis and febrile  - continue cefepime and restart vanc    COVID-19  See resp failure    Palliative  care encounter  Palliative consulted for potential for prolonged hospitalization and following along.     Acute hypoxemic respiratory failure due to COVID-19  Secondary to COVID (see above)        Critical Care Time: 60 minutes  Critical secondary to Patient has a condition that poses threat to life and bodily function: Acute Renal Failure and Acute resp failure 2/2 COVID      Critical care was time spent personally by me on the following activities: development of treatment plan with patient or surrogate and bedside caregivers, discussions with consultants, evaluation of patient's response to treatment, examination of patient, ordering and performing treatments and interventions, ordering and review of laboratory studies, ordering and review of radiographic studies, pulse oximetry, re-evaluation of patient's condition. This critical care time did not overlap with that of any other provider or involve time for any procedures.     Chris Gibbons NP  Critical Care Medicine  [facility] - ICU 16 [facility]

## 2020-10-30 NOTE — ASSESSMENT & PLAN NOTE
Noted 10/29. No EKG changes. Shifted with D50/insulin and furosemide which temporarily improved potassium    --see YOVANI

## 2020-10-30 NOTE — CONSULTS
[facility] - ICU [facility]  Nephrology  Consult Note    Patient Name: Nubia Riggs  MRN: [id number]  Admission Date: 10/18/2020  Hospital Length of Stay: 12 days  Attending Provider: Raad Martel MD   Primary Care Physician: Primary Doctor No  Principal Problem:Pneumonia due to COVID-19 virus    Inpatient consult to Nephrology  Consult performed by: Kamaljit Egan NP  Consult ordered by: Chris Gibbons NP  Reason for consult: YOVANI        Subjective:     HPI: Mr. Nubia Riggs is a 54 yo male with GERD and RAD with COVID-19 dx on 10/15 who presented to [facility] on 10/18 with worsening hypoxia and SOB at home.  He was orginally admitted to hospital medicine, but continued to have increase in oxygen requirements and severe hypoxia off of BiPAP and he was transferred to [facility]ICU on 10/24 and later intubated on the 26th.  Kidney function had remained stable during the admission, noting an YOVANI on 10/27 with a rise in Scr to 1.6.  On 10/26, he was noted to have episodes of hypotension and hypoxia during intubation and later having an increase in sCr with hyperkalemia that has been medically treated with lasix and insulin/dextrose without resolution.  Kidney function has continued to decline since intubation and Nephrology was consulted for evaluation.    Past Medical History:   Diagnosis Date    ADD (attention deficit disorder)     Anemia     Bradycardia 3/8/2018    3/8/2018: ECG: SB 46 bpm.    GERD (gastroesophageal reflux disease)        Past Surgical History:   Procedure Laterality Date    BACK SURGERY      CERVICAL SPINE SURGERY      facit repair    CHOLECYSTECTOMY      implant      chin    LIPOSUCTION      waiste    RHINOPLASTY TIP      SPINE SURGERY      l5 s1 micro discectomy    SPINE SURGERY      l5 s1 rodes placed    TONSILLECTOMY         Review of patient's allergies indicates:   Allergen Reactions    Meperidine      Other reaction(s): violent behavior    Sulfa (sulfonamide  antibiotics)      Other reaction(s): Unknown     Current Facility-Administered Medications   Medication Frequency    acetaminophen oral solution 650 mg Q6H PRN    albuterol-ipratropium 2.5 mg-0.5 mg/3 mL nebulizer solution 3 mL Q4H    albuterol-ipratropium 2.5 mg-0.5 mg/3 mL nebulizer solution 3 mL Q6H PRN    atorvastatin tablet 40 mg QHS    calcium gluconate 1g in dextrose 5% 100mL (ready to mix system) Q10 Min PRN    calcium gluconate 1g in dextrose 5% 100mL (ready to mix system) Q10 Min PRN    ceFEPIme injection 2 g Q12H    cisatracurium (NIMBEX) 200 mg in dextrose 5 % 100 mL infusion Continuous    dextrose 50% injection 12.5 g PRN    dextrose 50% injection 25 g PRN    dextrose 50% injection 25 g PRN    dextrose 50% injection 25 g PRN    famotidine (PF) injection 20 mg Daily    fentaNYL 2500 mcg in 0.9% sodium chloride 250 mL infusion premix (titrating) Continuous    glucagon (human recombinant) injection 1 mg PRN    glucose chewable tablet 16 g PRN    glucose chewable tablet 24 g PRN    heparin 25,000 units in dextrose 5% (100 units/ml) IV bolus from bag - ADDITIONAL PRN BOLUS - 30 units/kg PRN    heparin 25,000 units in dextrose 5% (100 units/ml) IV bolus from bag - ADDITIONAL PRN BOLUS - 60 units/kg PRN    heparin 25,000 units in dextrose 5% 250 mL (100 units/mL) infusion LOW INTENSITY nomogram - [facility] Continuous    lactated ringers bolus 1,000 mL Once    loperamide capsule 2 mg Q6H PRN    lorazepam injection 1 mg Q6H PRN    morphine injection 2 mg Q4H PRN    multivitamin liquid no.118 per dose 10 mL Daily    norepinephrine 4 mg in dextrose 5% 250 mL infusion (premix) (titrating) Continuous    ondansetron injection 4 mg Q8H PRN    promethazine (PHENERGAN) 6.25 mg in dextrose 5 % 50 mL IVPB Q6H PRN    promethazine-codeine 6.25-10 mg/5 ml syrup 5 mL Q6H PRN    propofol (DIPRIVAN) 10 mg/mL infusion Continuous    sodium chloride 0.9% flush 10 mL PRN    vancomycin - pharmacy to dose  pharmacy to manage frequency    vitamin D 1000 units tablet 1,000 Units Daily    white petrolatum-mineral oil (LUBIFRESH P.M.) ophthalmic ointment Q8H     Family History     Problem Relation (Age of Onset)    Cancer Father, Paternal Grandmother    Heart disease Paternal Grandfather        Tobacco Use    Smoking status: Former Smoker     Packs/day: 1.00     Years: 15.00     Pack years: 15.00     Quit date: 2012     Years since quittin.5    Smokeless tobacco: Never Used   Substance and Sexual Activity    Alcohol use: No    Drug use: No    Sexual activity: Not on file     Review of Systems   Unable to perform ROS: Intubated     Objective:     Vital Signs (Most Recent):  Temp: 99.1 °F (37.3 °C) (10/30/20 1200)  Pulse: (!) 120 (10/30/20 1500)  Resp: (!) 35 (10/30/20 1500)  BP: 138/63 (10/30/20 1500)  SpO2: (!) 93 % (10/30/20 1500)  O2 Device (Oxygen Therapy): ventilator (10/30/20 1500) Vital Signs (24h Range):  Temp:  [98.4 °F (36.9 °C)-101.2 °F (38.4 °C)] 99.1 °F (37.3 °C)  Pulse:  [] 120  Resp:  [11-35] 35  SpO2:  [89 %-99 %] 93 %  BP: (138)/(63) 138/63  Arterial Line BP: ()/(52-67) 97/59     Weight: 84.4 kg (186 lb) (10/25/20 1120)  Body mass index is 26.69 kg/m².  Body surface area is 2.04 meters squared.    I/O last 3 completed shifts:  In: 3846.7 [I.V.:3206.7; NG/GT:340; IV Piggyback:300]  Out: 2910 [Urine:2910]    Physical Exam  Vitals signs and nursing note reviewed.   Constitutional:       Appearance: He is ill-appearing. He is not diaphoretic.      Interventions: He is sedated and intubated.      Comments: COVID-19 Precautions maintained.     HENT:      Head: Normocephalic and atraumatic.   Cardiovascular:      Rate and Rhythm: Regular rhythm. Tachycardia present.   Pulmonary:      Effort: He is intubated.      Breath sounds: No rhonchi.   Abdominal:      Comments: proned   Musculoskeletal:      Right lower leg: No edema.      Left lower leg: No edema.   Skin:     General: Skin is  warm and dry.         Significant Labs:  ABGs:   Recent Labs   Lab 10/30/20  0452   PH 7.291*   PCO2 67.9*   HCO3 32.7*   POCSATURATED 94*   BE 6     CBC:   Recent Labs   Lab 10/30/20  0902   WBC 15.93*   RBC 4.67   HGB 14.4   HCT 46.6      *   MCH 30.8   MCHC 30.9*     CMP:   Recent Labs   Lab 10/30/20  0849      CALCIUM 8.1*   ALBUMIN 1.3*   PROT 5.9*      K 5.9*   CO2 29      *   CREATININE 2.7*   ALKPHOS 147*   ALT 61*   *   BILITOT 0.3           Assessment/Plan:     YOVANI (acute kidney injury)  Non-Oliguric YOVANI on Normal Renal Fx  YOVANI 2/2 ATN from septic shock from COVID-019 infection.  disproportionate rise in BUN to Cr with possible component of volume depletion.    Plan/Recommendations:  -Urine lytes  -recommend 1L LR + 120 mg of IV lasix to aid in potassium clearance  -1L LR post lasix as patients HR and BP responded well after IV fluid bolus  -K monitoring q 8 hours  -attempt to use lasix/fluids as needed for potassium elimination  -continue strict I/O's  -no emergent need for RRT at this time. Will continue following closely.    Hyperkalemia  2/2 Rocuronium from intubation   Decreased potassium excretion from YOVANI    -1L LR + 120 mg of IV lasix  -additional liter of LR post lasix dose.  -monitor q 8 hours, try to defer shifting unless EKG changes.  Would recommend using Lasix for potassium removal.  -recheck CPK in AM      Kamaljit Easton NP  Nephrology  [facility] - ICU [facility]

## 2020-10-30 NOTE — CARE UPDATE
Updated wife at bedside regarding patient's clinical status. All questions answered and concerns addressed.        Chris Gibbons NP  Pulmonary Critical Care Medicine  [facility]

## 2020-10-30 NOTE — NURSING
"0730: Report received and assessment complete. See flowsheet. Blood-like clots noted to conway catheter during assessment.     0800: 101.2 axillary temperature; tylenol per order to be given.    0900: JERRI Gibbons NP at bedside. Notified of temperature with tylenol given; notified of blood like clots in the conway catheter. Heparin stopped at 0853 per Chris due to the blood like clots. STAT cbc ordered. Ck ordered and obtained. Purple area noted to right foot middle toe; instructed to "keep an eye on it".     0914: Labs ordered. Heparin turned back on per Chris until we receive results from labs that have been ordered.     1035: Patient supinated with 2 RRT at [facility], NP at [facility], 6 RNs at bedside. Patient tolerated well with vitals remaining stable. Reddened area noted to left abdomen/base of ribs. Appears to be a DTI r/t pressure from a device. Arterial line dressing changed.    1255: CL dressing changed to right IJ triple lumen.     1435: Removed left radial arterial line due to bleeding per verbal order of Chris Gibbons NP. Held pressure for 15 minutes due to bleeding and bleeding risks. Sutures removed with Chris Gibbons NP, overlooking. NP gathering supplies to place a new arterial line.     1530: New arterial line placed. Time out prior to initiation of the procedure. Chris Gibbons NP, placed arterial line with RN at bedside.    1620: Patient proned with 2 RRT at [facility], NP at [facility], 6 RNs at bedside. Patient tolerated well with vitals remaining stable.     1837: Spoke with Chris Gibbons NP about restarting the heparin. Instructed to keep heparin off tonight and do not draw the aPTT. We can discuss in the AM about restarting the heparin and watch for additional bleeding tonight.    Care Plan    POC reviewed with Nubia YOVANNY Oneil's significant other. Questions and concerns addressed. Will continue to monitor. See below and flowsheets for full assessment and VS info.       Neuro:  Vale Coma Scale  Best Eye Response: " 1-->(E1) none  Best Motor Response: 1-->(M1) none  Best Verbal Response: 1-->(V1) none  Vale Coma Scale Score: 3  Assessment Qualifiers: patient intubated, patient chemically sedated or paralyzed  Pupil PERRLA: yes  24 hr Temp:  [98.4 °F (36.9 °C)-101.2 °F (38.4 °C)]      CV:  Rhythm: sinus tachycardia  DVT prophylaxis: VTE Required Core Measure: (SCDs) Sequential compression device initiated/maintained, Pharmacological prophylaxis initiated/maintained    Resp:  O2 Device (Oxygen Therapy): ventilator  Vent Mode: A/C  Set Rate: 35 BPM  Oxygen Concentration (%): 80  Vt Set: 450 mL  PEEP/CPAP: 12 cmH20    GI/:  GI prophylaxis: yes  Diet/Nutrition Received: tube feeding  Last Bowel Movement: 10/24/20  Voiding Characteristics: urethral catheter (bladder)       Urethral Catheter 10/26/20 0900 Non-latex 16 Fr.-Reason for Continuing Urinary Catheterization: Critically ill in ICU requiring intensive monitoring     Intake/Output Summary (Last 24 hours) at 10/30/2020 1820  Last data filed at 10/30/2020 1800  Gross per 24 hour   Intake 5144.35 ml   Output 2645 ml   Net 2499.35 ml       Labs/Accuchecks:  Recent Labs   Lab 10/30/20  0902   WBC 15.93*   RBC 4.67   HGB 14.4   HCT 46.6         Recent Labs   Lab 10/30/20  1557      K 5.4*   CO2 31*      *   CREATININE 2.8*   ALKPHOS 219*   ALT 81*   *   BILITOT 0.3      Recent Labs   Lab 10/26/20  2235  10/30/20  0427   INR 1.3*  --   --    APTT 42.7*   < > 43.4*    < > = values in this interval not displayed.      Recent Labs   Lab 10/30/20  0849   *       Electrolytes: No replacement orders  Accuchecks: Q4H    Gtts/LDAs:   cisatracurium (NIMBEX) infusion 2.014 mcg/kg/min (10/30/20 1800)    fentanyl 25 mL/hr at 10/30/20 1800    heparin (porcine) in D5W Stopped (10/30/20 1040)    norepinephrine bitartrate-D5W 0.18 mcg/kg/min (10/30/20 1800)    propofoL 50 mcg/kg/min (10/30/20 1800)       Lines/Drains/Airways     Central Venous  Catheter Line            Percutaneous Central Line Insertion/Assessment - Triple Lumen  10/26/20 1940 right internal jugular 3 days          Drain                 Urethral Catheter 10/26/20 0900 Non-latex 16 Fr. 4 days         NG/OG Tube 10/26/20 2000 orogastric 16 Fr. Left mouth 3 days          Airway                 Airway - Non-Surgical 10/26/20 1820 Endotracheal Tube 4 days       Airway Anesthesia 10/26/20 4 days          Arterial Line            Arterial Line 10/30/20 1556 Right Radial less than 1 day          Peripheral Intravenous Line                 Peripheral IV - Single Lumen 10/29/20 1445 20 G Anterior;Right Forearm 1 day                Skin/Wounds:  Bathing/Skin Care: bath, partial   Wounds: Yes  Wound care consulted: Yes, new orders per wound care

## 2020-10-30 NOTE — PROGRESS NOTES
Pharmacokinetic Assessment Initial: IV Vancomycin    Vancomycin Regimen Assessment/Plan:    - Pharmacy re-consulted for vancomycin dosing.  Patient received his dose on 10/29 at 09:00.  - Vancomycin random level resulted as 12.3 mg/dl, drawn ~ 25.5 hours after the previous dose.  - Patient with worsening YOVANI, SCr 2.7 mg/dl from baseline ~ 0.8-0.9 mg/dl.   - Administer vancomycin 750 mg x 1 dose.  - A random level is ordered with AM labs tomorrow.  Pharmacy to re-dose based on level and renal function.      Drug levels (last 3 results):  Recent Labs   Lab Result Units 10/28/20  0448 10/29/20  0423 10/30/20  1020   Vancomycin, Random ug/mL 11.3 10.0 12.3       Pharmacy will continue to follow and monitor vancomycin.    Please contact pharmacy at extension [phone] for questions regarding this assessment.    Thank you for the consult,   Christine Meyers, PharmD, [facility]       Patient brief summary:  Nubia Riggs is a 55 y.o. male initiated on antimicrobial therapy with IV Vancomycin for treatment of sepsis.     Drug Allergies:   Review of patient's allergies indicates:   Allergen Reactions    Meperidine      Other reaction(s): violent behavior    Sulfa (sulfonamide antibiotics)      Other reaction(s): Unknown       Actual Body Weight:   84.4 kg    Renal Function:   Estimated Creatinine Clearance: 32.7 mL/min (A) (based on SCr of 2.7 mg/dL (H)).,     Dialysis Method (if applicable):  N/A    CBC (last 72 hours):  Recent Labs   Lab Result Units 10/28/20  0448 10/29/20  0423 10/30/20  0427 10/30/20  0902   WBC K/uL 8.64 11.62 14.78* 15.93*   Hemoglobin g/dL 14.3 14.2 15.0 14.4   Hematocrit % 45.6 46.0 48.9 46.6   Platelets K/uL 141* 160 197 201   Gran % % 90.4* 86.6* 85.8* 80.9*   Lymph % % 3.7* 4.0* 3.3* 3.9*   Mono % % 4.3 6.0 7.1 10.0   Eosinophil % % 0.0 1.8 0.1 0.2   Basophil % % 0.2 0.2 0.7 0.6   Differential Method  Automated Automated Automated Automated       Metabolic Panel (last 72 hours):  Recent Labs    Lab Result Units 10/27/20  1612 10/27/20  2020 10/28/20  0448 10/28/20  1518 10/29/20  0423 10/29/20  0606 10/29/20  0836 10/29/20  1233 10/29/20  1439 10/30/20  0010 10/30/20  0130 10/30/20  0427 10/30/20  0849 10/30/20  0957   Sodium mmol/L 143  --  143 142 143 144  --  141 142 142 142 142 143  --    Sodium, Urine mmol/L  --   --   --   --   --   --   --   --   --   --   --   --   --  62   Potassium mmol/L 5.6*  --  5.3* 5.3* 6.3* 6.1*  --  5.3* 5.5* 7.0* 6.9* 6.4* 5.9*  --    Potassium, Urine mmol/L  --   --   --   --   --   --   --   --   --   --   --   --   --  37   Chloride mmol/L 106  --  107 109 107 108  --  107 106 106 105 104 105  --    Chloride, Urine mmol/L  --   --   --   --   --   --   --   --   --   --   --   --   --  91   CO2 mmol/L 26  --  26 26 29 27  --  29 29 26 29 30* 29  --    Glucose mg/dL 214*  --  185* 154* 149* 149*  --  165* 129* 166* 174* 166* 109  --    Glucose, UA   --  Negative  --   --   --   --   --   --   --   --   --   --   --   --    BUN mg/dL 72*  --  71* 70* 78* 80*  --  80* 82* 96* 98* 101* 104*  --    Creatinine mg/dL 2.1*  --  2.0* 1.9* 1.9* 2.0*  --  1.9* 2.0* 2.4* 2.5* 2.6* 2.7*  --    Creatinine, Urine mg/dL  --   --   --   --   --   --  28.0  --   --   --   --   --   --  40.0   Albumin g/dL 1.6*  --  1.5* 1.4* 1.4*  --   --   --  1.3* 1.4* 1.4* 1.2* 1.3*  --    Total Bilirubin mg/dL  --   --  0.2 0.2 0.3  --   --   --  0.3 0.3 0.3 0.3 0.3  --    Alkaline Phosphatase U/L  --   --  103 93 95  --   --   --  107 111 109 111 147*  --    AST U/L  --   --  50* 62* 79*  --   --   --  74* 81* 80* 88* 103*  --    ALT U/L  --   --  31 31 33  --   --   --  36 43 46* 53* 61*  --    Magnesium mg/dL  --   --  3.2*  --  3.2*  --   --   --   --   --   --  3.2*  --   --    Phosphorus mg/dL 4.7*  --  4.9*  --  3.6  --   --   --   --   --   --  4.6*  --   --        Vancomycin Administrations:  vancomycin given in the last 96 hours                   vancomycin in dextrose 5 % 1 gram/250 mL  IVPB 1,000 mg (mg) 1,000 mg New Bag 10/28/20 0820    vancomycin (VANCOCIN) 500 mg in dextrose 5 % 100 mL IVPB (mg) 500 mg New Bag 10/27/20 1811    vancomycin 1.25 g in dextrose 5% 250 mL IVPB (ready to mix) (mg) 1,250 mg New Bag 10/27/20 0452    vancomycin 1.5 g in dextrose 5 % 250 mL IVPB (ready to mix) (mg) 1,500 mg New Bag 10/26/20 1611                Microbiologic Results:  Microbiology Results (last 7 days)     Procedure Component Value Units Date/Time    Culture, Respiratory with Gram Stain [[id number]] Collected: 10/30/20 1040    Order Status: Sent Specimen: Respiratory from Endotracheal Aspirate Updated: 10/30/20 1159    Blood culture [[id number]] Collected: 10/30/20 1015    Order Status: Sent Specimen: Blood from Peripheral, Upper Arm, Right Updated: 10/30/20 1039    Blood culture [[id number]] Collected: 10/30/20 1011    Order Status: Sent Specimen: Blood from Peripheral, Forearm, Left Updated: 10/30/20 1039    Culture, Respiratory with Gram Stain [[id number]] Collected: 10/28/20 1100    Order Status: Completed Specimen: Respiratory from Endotracheal Aspirate Updated: 10/30/20 0818     Respiratory Culture Normal respiratory alejandra     Gram Stain (Respiratory) <10 epithelial cells per low power field.     Gram Stain (Respiratory) Few WBC's     Gram Stain (Respiratory) No organisms seen    Blood culture [[id number]] Collected: 10/27/20 2011    Order Status: Completed Specimen: Blood from Peripheral, Forearm, Right Updated: 10/29/20 2212     Blood Culture, Routine No Growth to date      No Growth to date      No Growth to date    Narrative:      Blood cultures from 2 different sites. 4 bottles total.  Please draw before starting antibiotics.    Blood culture [[id number]] Collected: 10/27/20 2011    Order Status: Completed Specimen: Blood from Peripheral, Forearm, Left Updated: 10/29/20 2212     Blood Culture, Routine No Growth to date      No Growth to date      No Growth to date    Narrative:      Blood cultures x 2  different sites. 4 bottles total. Please  draw cultures before administering antibiotics.    Urine culture [[id number]] Collected: 10/27/20 2020    Order Status: Completed Specimen: Urine Updated: 10/28/20 2303     Urine Culture, Routine No growth    Narrative:      Specimen Source->Urine

## 2020-10-30 NOTE — ASSESSMENT & PLAN NOTE
Follows with Pulm as outpatient. Last seen 8/27/20, told he had RAD  He was also seen by a [facility] pulmonologist who did spirometry that reportedly showed borderline or mild asthma, and has been controlled with albuterol.   Former smoker, started at age 10-12 and quit 10 years ago  -- Continue duo nebs q4

## 2020-10-30 NOTE — ASSESSMENT & PLAN NOTE
No history of prior kidney disease; sCr on admission 0.8 -->2.6 as of 10/30, with UO less responsive to multiple doses of 40 mg lasix which he was initially responsive to    --retroperitoneal u/s  --send urine lytes  --nephrology consult

## 2020-10-30 NOTE — PROCEDURES
"Nubia Riggs is a 55 y.o. male patient.    Temp: 100.3 °F (37.9 °C) (10/30/20 1600)  Pulse: (!) 117 (10/30/20 1800)  Resp: (!) 35 (10/30/20 1800)  BP: 115/62 (10/30/20 1800)  SpO2: 96 % (10/30/20 1800)  Weight: 84.4 kg (186 lb) (10/25/20 1120)  Height: 5' 10" (177.8 cm) (10/30/20 1527)       Arterial Line    Date/Time: 10/30/2020 6:03 PM  Location procedure was performed: [facility] RESPIRATORY ICU  Performed by: Chris Gibbons NP  Authorized by: Chris Gibbons NP   Pre-op Diagnosis: shock  Post-operative diagnosis: shock  Consent Done: Yes  Consent: Written consent obtained.  Consent given by: spouse  Patient identity confirmed: , MRN and name  Time out: Immediately prior to procedure a "time out" was called to verify the correct patient, procedure, equipment, support staff and site/side marked as required.  Preparation: Patient was prepped and draped in the usual sterile fashion.  Indications: multiple ABGs, respiratory failure and hemodynamic monitoring  Location: right radial  Needle gauge: 20  Seldinger technique: Seldinger technique used  Number of attempts: 1  Post-procedure: line sutured and dressing applied  Patient tolerance: Patient tolerated the procedure well with no immediate complications          Chris Gibbons  10/30/2020  "

## 2020-10-30 NOTE — ASSESSMENT & PLAN NOTE
2/2 Rocuronium from intubation   Decreased potassium excretion from YOVANI    -1L LR + 120 mg of IV lasix  -additional liter of LR post lasix dose.  -monitor q 8 hours, try to defer shifting unless EKG changes.  Would recommend using Lasix for potassium removal.  -recheck CPK in AM

## 2020-10-30 NOTE — SUBJECTIVE & OBJECTIVE
Interval History/Significant Events: UO decreasing despite multiple lasix doses, and this am concern for hematuria, repeat CBC sent. Nephrology consult placed. Shifted overnight for hyperkalemia. Resend blood and sputum cultures for rising WBC count and check lactate.      Review of Systems   Unable to perform ROS: Intubated     Objective:     Vital Signs (Most Recent):  Temp: (!) 101.2 °F (38.4 °C) (10/30/20 0823)  Pulse: (!) 114 (10/30/20 0823)  Resp: (!) 35 (10/30/20 0823)  BP: (!) 112/58 (10/29/20 1500)  SpO2: (!) 93 % (10/30/20 0823) Vital Signs (24h Range):  Temp:  [98.3 °F (36.8 °C)-101.2 °F (38.4 °C)] 101.2 °F (38.4 °C)  Pulse:  [] 114  Resp:  [20-35] 35  SpO2:  [88 %-99 %] 93 %  BP: (110-112)/(56-58) 112/58  Arterial Line BP: ()/(46-67) 94/59   Weight: 84.4 kg (186 lb)  Body mass index is 26.69 kg/m².      Intake/Output Summary (Last 24 hours) at 10/30/2020 0859  Last data filed at 10/30/2020 0600  Gross per 24 hour   Intake 2360.7 ml   Output 1920 ml   Net 440.7 ml       Physical Exam  Vitals signs and nursing note reviewed.   Constitutional:       General: He is not in acute distress.     Appearance: Normal appearance. He is ill-appearing.      Interventions: He is sedated, chemically paralyzed and intubated.   Cardiovascular:      Rate and Rhythm: Regular rhythm. Tachycardia present.      Heart sounds: Normal heart sounds.   Pulmonary:      Effort: He is intubated.      Breath sounds: No wheezing, rhonchi or rales.      Comments: Coarse breath sounds  Abdominal:      Comments: Patient proned for exam   Musculoskeletal:      Right lower leg: No edema.      Left lower leg: No edema.   Skin:     Coloration: Skin is mottled (lright foot 4th digit, left foot, back of great toe).   Neurological:      Mental Status: He is unresponsive.      GCS: GCS eye subscore is 1. GCS verbal subscore is 1. GCS motor subscore is 1.      Comments: Patient on NMB         Vents:  Vent Mode: A/C (10/30/20 9892)  Set  Rate: 35 BPM (10/30/20 0823)  Vt Set: 450 mL (10/30/20 0823)  PEEP/CPAP: 12 cmH20 (10/30/20 0823)  Oxygen Concentration (%): 80 (10/30/20 0823)  Peak Airway Pressure: 31 cmH2O (10/30/20 0823)  Plateau Pressure: 31 cmH20 (10/30/20 0823)  Total Ve: 15.9 mL (10/30/20 0823)  F/VT Ratio<105 (RSBI): (!) 76.75 (10/30/20 0823)  Lines/Drains/Airways     Central Venous Catheter Line            Percutaneous Central Line Insertion/Assessment - Triple Lumen  10/26/20 1940 right internal jugular 3 days          Drain                 NG/OG Tube 10/26/20 2000 orogastric 16 Fr. Left mouth 3 days         Urethral Catheter 10/26/20 0900 Non-latex 16 Fr. 3 days          Airway                 Airway - Non-Surgical 10/26/20 1820 Endotracheal Tube 3 days       Airway Anesthesia 10/26/20 3 days          Arterial Line            Arterial Line 10/26/20 1909 Left Radial 3 days          Peripheral Intravenous Line                 Peripheral IV - Single Lumen 10/26/20 0925 20 G;1 1/4 in Anterior;Distal;Right Upper Arm 3 days         Peripheral IV - Single Lumen 10/29/20 1445 20 G Anterior;Right Forearm less than 1 day              Significant Labs:    CBC/Anemia Profile:  Recent Labs   Lab 10/29/20  0423 10/30/20  0427   WBC 11.62 14.78*   HGB 14.2 15.0   HCT 46.0 48.9    197   * 98   RDW 12.4 12.6        Chemistries:  Recent Labs   Lab 10/29/20  0423  10/30/20  0010 10/30/20  0130 10/30/20  0427      < > 142 142 142   K 6.3*   < > 7.0* 6.9* 6.4*      < > 106 105 104   CO2 29   < > 26 29 30*   BUN 78*   < > 96* 98* 101*   CREATININE 1.9*   < > 2.4* 2.5* 2.6*   CALCIUM 7.7*   < > 8.0* 8.1* 8.3*   ALBUMIN 1.4*   < > 1.4* 1.4* 1.2*   PROT 5.7*   < > 6.2 6.2 6.0   BILITOT 0.3   < > 0.3 0.3 0.3   ALKPHOS 95   < > 111 109 111   ALT 33   < > 43 46* 53*   AST 79*   < > 81* 80* 88*   MG 3.2*  --   --   --  3.2*   PHOS 3.6  --   --   --  4.6*    < > = values in this interval not displayed.       All pertinent labs within the  past 24 hours have been reviewed.    Significant Imaging:  I have reviewed all pertinent imaging results/findings within the past 24 hours.

## 2020-10-30 NOTE — ASSESSMENT & PLAN NOTE
54 yo male with out significant phmx admitted for respiratory complication of COVID19, multilobar PNA, significant hypoxia and BIPAP dependent with impending concern for need for intubation.    1) Symptoms:  Intubated and sedated    2) Code status:  FC    3) Psychosocial:  Lives with sig other of >13 years though not ; has a 18 yo son from previous relationship; has two young daughter in home with partner; works in construction    4) Medicolegal:  Partner states the pt has ACP paperwork at home; asked to bring; pt states partner would be DEA  Liza Brody Significant other     [phone]     5) Spiritual:  Jain; no existential distress    6) Goals of care/discussion: 10/30 Discussed with wife at bedside and opportunity to ask questions.  Discussed how she and the kids are coping with the current situation.  She states they are well supported by family and the community.  Recommended she speak with the  and counselors so they are aware.  Offered and described child life specialists.     Understands this will be a long hospital course and positive changes are gauged days to weeks.      Open to ongoing support.     10.26  Discussion patient and sig other to introduce the role of palliative medicine and supportive care in the ICU in the setting of a serious diagnosis of COVID-19. The family was able to demonstrate an excellent understanding of the diagnosis, current care plan, hope for improvement and concern for worsening of the pt's condition.     Experience with critical illness: none     Understanding of illness:  Understands most people do well, but if intubated may be a prolonged hospital course     Present for discussion: pt and sig other  Liza Brody Spouse     [phone]        Medicolegal: Pts sig other is DEA per the pt; has ACP and asked wife to bring copy for chart completion     Goals of care:  Cure and functional restoration; would agree to invasive measures of  support if it was felt to give him a chance to recover     7.) Plan:  -continue to follow for emotional support and to help optimize communication over the course of his illness  -aid in goals of care and medical decision making as appropriate     Discussed with primary team and nursing     Thank you for the opportunity to care for this patient and family.      Please call with questions.      Romain Post MD  Palliative Medicin

## 2020-10-30 NOTE — ASSESSMENT & PLAN NOTE
COVID positive on 10/15. Patient admitted to hospital medicine and continued to deteriorate needing higher oxygen requirements and thus Critical Care Medicine was consulted. Intubated 10/26 and eventually placed on NMB and proned. Peak and plateau pressures remain at goal    Day 5 of intubation and Day 5 of NMB Last CXR 10/30    - s/p remdesivir (10/18-10/22)  - s/p Dexamethasone (10/27)  - Albuterol treatment PRN   - continue Heparin gtt D-dimer >33  - continue LPV and pronation for PF <150 with FiO2 50% and PEEP 10  - Sputum culture sent 10/28 NGTD

## 2020-10-30 NOTE — SIGNIFICANT EVENT
Procedure Note  Prone Positioning  Critical Care Medicine       Chief Complaint: Pneumonia due to COVID-19 virus  MRN: [id number]  LOS: 12  Sex: male  Age: 55 y.o.      Last ABG:   Recent Labs   Lab 10/30/20  0452   PH 7.291*   PO2 84   PCO2 67.9*   HCO3 32.7*   BE 6       Vital Signs (Most Recent):   Temp: 99.1 °F (37.3 °C) (10/30/20 1200)  Pulse: (!) 119 (10/30/20 1610)  Resp: (!) 35 (10/30/20 1610)  BP: 138/63 (10/30/20 1500)  SpO2: (!) 94 % (10/30/20 1610)    Ventilator Settings:  Vent Mode: A/C  Oxygen Concentration (%):  [] 80  Resp Rate Total:  [35 br/min] 35 br/min  Vt Set:  [450 mL] 450 mL  PEEP/CPAP:  [12 cmH20] 12 cmH20  Mean Airway Pressure:  [20 byM96-84 cmH20] 21 cmH20    Indication: Severe, refractory ARDS. High risk for deterioration during proning procedure in need of direct monitoring by Critical Care personnel.     Prior to beginning the procedure, all appropriate equipment and personnel were gathered in the room. Two individuals were placed on each side of the patient and one person stood at the head of the bed and was dedicated to the management of the head of the patient, the endotracheal tube, and the ventilator lines. The person at the head of the bed  coordinated the steps of the proning procedure with team team at the direction of Critical Care team members at the bedside. The patient was first log-rolled 90 degrees onto their side towards the direction of their central venous catheter. Cardiac electrodes were then moved from the patient's anterior to the posterior. The patient?s knees, forehead, chest, and iliac crests were protected using adhesive pads and/or foam pads to reduce the risk of skin breakdown. Once properly positioned in the bed, another 90 degree log roll was performed placing the patient into the prone position. The patient's arms were placed alongside the body. The patient's head should be turned alternately to the right or left every 2 hours. The patient tolerated the  procedure well.     ETT 27 cm at the lip pre and post procedure    Total Critical Care time (uninterrupted not including procedures): 15 minutes

## 2020-10-30 NOTE — PROGRESS NOTES
[facility] - ICU 16 WT  Palliative Medicine  Progress Note    Patient Name: Nubia Riggs  MRN: [id number]  Admission Date: 10/18/2020  Hospital Length of Stay: 12 days  Code Status: Full Code   Attending Provider: Raad Martel MD  Consulting Provider: Romain Post MD  Primary Care Physician: Primary Doctor No  Principal Problem:Pneumonia due to COVID-19 virus    Patient information was obtained from spouse/SO and past medical records.      Assessment/Plan:     * Pneumonia due to COVID-19 virus  Severe sepsis related to the above; well supported on pressors    YOVANI (acute kidney injury)  Worsening YOVANI; nephrology following      COVID-19  Multifocal airspace disease      Palliative care encounter  54 yo male with out significant phmx admitted for respiratory complication of COVID19, multilobar PNA, significant hypoxia and BIPAP dependent with impending concern for need for intubation.    1) Symptoms:  Intubated and sedated    2) Code status:  FC    3) Psychosocial:  Lives with sig other of >13 years though not ; has a 18 yo son from previous relationship; has two young daughter in home with partner; works in construction    4) Medicolegal:  Partner states the pt has ACP paperwork at home; asked to bring; pt states partner would be Liza Simms Significant other     [phone]     5) Spiritual:  Jainism; no existential distress    6) Goals of care/discussion: 10/30 Discussed with wife at bedside and opportunity to ask questions.  Discussed how she and the kids are coping with the current situation.  She states they are well supported by family and the community.  Recommended she speak with the  and counselors so they are aware.  Offered and described child life specialists.     Understands this will be a long hospital course and positive changes are gauged days to weeks.      Open to ongoing support.     10.26  Discussion patient and sig other to introduce the  role of palliative medicine and supportive care in the ICU in the setting of a serious diagnosis of COVID-19. The family was able to demonstrate an excellent understanding of the diagnosis, current care plan, hope for improvement and concern for worsening of the pt's condition.     Experience with critical illness: none     Understanding of illness:  Understands most people do well, but if intubated may be a prolonged hospital course     Present for discussion: pt and sig other  Liza Brody Spouse     [phone]        Medicolegal: Pts sig other is MPOA per the pt; has ACP and asked wife to bring copy for chart completion     Goals of care:  Cure and functional restoration; would agree to invasive measures of support if it was felt to give him a chance to recover     7.) Plan:  -continue to follow for emotional support and to help optimize communication over the course of his illness  -aid in goals of care and medical decision making as appropriate     Discussed with primary team and nursing     Thank you for the opportunity to care for this patient and family.      Please call with questions.      Romain Post MD  Palliative Medicin        I will follow-up with patient. Please contact us if you have any additional questions.    Subjective:     Chief Complaint:   Chief Complaint   Patient presents with    Covid Positive     shortness of breath       HPI:   55-year-old male with PMhx of GERD, allergic rinhitis, chronic cough (recently saw pulm and was diagnosed with reactive airway disease, on albuterol PRN) presented the emergency department on 10/18 with chief complaint of shortness of breath. He reports subjective fever, headache, chills, myalgias, poor appetite, and sweats about 6 days ago. He was diagnosed with COVID-19 on 10/15.  Reports presenting to urgent care 10/16 due to 89% pulse ox on room air. Started on levofloxacin and discharged home.  Reports using albuterol treatments at home without much  relief.He noticed to have cough productive of blood tinged sputum and shortness of breath so he presented to the ED.      He received ceftria/azithro (x1 each in the ED). He started Remdesivir and completed 5 doses on 10/22, continues on dexamethasone. Patient is on his 7th day of hospital admission and has been requiring higher doses of O2 to keep sats >90%. Today he went up to 60L on 100% FiO2 and thus Critical Care was consulted.         Hospital/ICU Course:  Patient admitted to RICU due to worsening respiratory status with increasing oxygen requirements. Completion of remdesivir on 10/22. On BiPAP with O2 saturation in high 70s-low 80s at rest and acute desaturation to 40s when biPAP removed to provide PO medications.      Interval History/Significant Events: Patient on 100% biPAP but O2 saturation still in low 70s to mid 80s. Patient desaturated to 40s when biPAP removed to give medication. Intubation felt to be necessary at this point.     In this setting, palliative medicine was consulted to help with medical decision making and aid in the formation of goals of care.       Hospital Course:  No notes on file    Interval History: pt seen at bedside with partner present; discussed with primary team and nursing    Interval changes noted; intubated and paralyzed    Past Medical History:   Diagnosis Date    ADD (attention deficit disorder)     Anemia     Bradycardia 3/8/2018    3/8/2018: ECG: SB 46 bpm.    GERD (gastroesophageal reflux disease)        Past Surgical History:   Procedure Laterality Date    BACK SURGERY      CERVICAL SPINE SURGERY      facit repair    CHOLECYSTECTOMY      implant      chin    LIPOSUCTION      waiste    RHINOPLASTY TIP      SPINE SURGERY      l5 s1 micro discectomy    SPINE SURGERY      l5 s1 rodes placed    TONSILLECTOMY         Review of patient's allergies indicates:   Allergen Reactions    Meperidine      Other reaction(s): violent behavior    Sulfa (sulfonamide  antibiotics)      Other reaction(s): Unknown       Medications:  Continuous Infusions:   cisatracurium (NIMBEX) infusion 2.014 mcg/kg/min (10/30/20 1500)    fentanyl 25 mL/hr at 10/30/20 1500    heparin (porcine) in D5W Stopped (10/30/20 1040)    norepinephrine bitartrate-D5W 0.18 mcg/kg/min (10/30/20 1638)    propofoL 49.961 mcg/kg/min (10/30/20 1500)     Scheduled Meds:   albuterol-ipratropium  3 mL Nebulization Q4H    atorvastatin  40 mg Per NG tube QHS    ceFEPime (MAXIPIME) IVPB  2 g Intravenous Q12H    famotidine (PF)  20 mg Intravenous Daily    lactated ringers  1,000 mL Intravenous Once    multivitamin liquid no.118  10 mL Per NG tube Daily    vitamin D  1,000 Units Per NG tube Daily    white petrolatum-mineral oiL   Both Eyes Q8H     PRN Meds:acetaminophen, albuterol-ipratropium, [COMPLETED] calcium gluconate IVPB **AND** calcium gluconate IVPB, [COMPLETED] calcium gluconate IVPB **AND** calcium gluconate IVPB, dextrose 50%, dextrose 50%, [COMPLETED] dextrose 50% **AND** dextrose 50% **AND** [COMPLETED] insulin regular, [COMPLETED] dextrose 50% **AND** dextrose 50% **AND** [COMPLETED] insulin regular, glucagon (human recombinant), glucose, glucose, heparin (PORCINE), heparin (PORCINE), loperamide, lorazepam, morphine, ondansetron, promethazine (PHENERGAN) IVPB, promethazine-codeine 6.25-10 mg/5 ml, sodium chloride 0.9%, Pharmacy to dose Vancomycin consult **AND** vancomycin - pharmacy to dose    Family History     Problem Relation (Age of Onset)    Cancer Father, Paternal Grandmother    Heart disease Paternal Grandfather        Tobacco Use    Smoking status: Former Smoker     Packs/day: 1.00     Years: 15.00     Pack years: 15.00     Quit date: 2012     Years since quittin.5    Smokeless tobacco: Never Used   Substance and Sexual Activity    Alcohol use: No    Drug use: No    Sexual activity: Not on file       Review of Systems   Unable to perform ROS: Acuity of condition      Objective:     Vital Signs (Most Recent):  Temp: 100.3 °F (37.9 °C) (10/30/20 1600)  Pulse: (!) 119 (10/30/20 1615)  Resp: (!) 35 (10/30/20 1615)  BP: 124/61 (10/30/20 1600)  SpO2: (!) 94 % (10/30/20 1615) Vital Signs (24h Range):  Temp:  [98.4 °F (36.9 °C)-101.2 °F (38.4 °C)] 100.3 °F (37.9 °C)  Pulse:  [109-131] 119  Resp:  [11-35] 35  SpO2:  [89 %-96 %] 94 %  BP: (124-138)/(61-63) 124/61  Arterial Line BP: ()/(50-67) 89/50     Weight: 84.4 kg (186 lb)  Body mass index is 26.69 kg/m².    Physical Exam  Vitals signs and nursing note reviewed.   Constitutional:       Comments: Intubated and sedated     HENT:      Right Ear: External ear normal.      Left Ear: External ear normal.      Nose:      Comments: Limited exam due to BIPAP       Mouth/Throat:      Mouth: Mucous membranes are dry.   Eyes:      Extraocular Movements: Extraocular movements intact.      Conjunctiva/sclera: Conjunctivae normal.      Pupils: Pupils are equal, round, and reactive to light.   Neck:      Musculoskeletal: Neck supple.      Comments: no JVD    Cardiovascular:      Rate and Rhythm: Normal rate and regular rhythm.   Pulmonary:      Breath sounds: Rales present.      Comments: No vent dyssynchrony    Abdominal:      General: Abdomen is flat. Bowel sounds are normal.      Palpations: Abdomen is soft.   Musculoskeletal:         General: No swelling.      Right lower leg: No edema.      Left lower leg: No edema.   Skin:     General: Skin is warm and dry.   Neurological:      Comments: Paralyzed     Psychiatric:      Comments: Agitated; confabulating           Review of Symptoms    Symptom Assessment (ESAS 0-10 Scale)     CAM / Delirium:  Positive  Constipation:  Negative  Diarrhea:  Negative    Comments:  Unable to assess due to acuity of condition            Palliative Performance Scale: 100 at baseline.    Living Arrangements:  Lives with family and Lives with spouse    Psychosocial/Cultural: Works in construction; has never had  any significant health issues outside of asthma, never intubated or hospitalized    Lives with sig other; not ; has two young girls with his partner; also has a 18 yo from a previous relationship who lives in [place] and still speaks with occasionally    Spiritual:  F - Andree and Belief:  Oriental orthodox  A - Address in Care:   offered      Advance Care Planning   Advance Directives:   Living Will: Yes (per wife yes at home)        Copy on chart: No    LaPOST: No    Do Not Resuscitate Status: No    Medical Power of : Yes (per wife he has paperwork at home; encouraged to bring to hospital)    Agent's Name:  Liza Brody Significant other     [phone]     Decision Making:  Patient answered questions and Family answered questions (pt showing signs of delirium though did confirm his sig other at bedside is decision maker)         Significant Labs:   CBC:   Recent Labs   Lab 10/29/20  0423 10/30/20  0427 10/30/20  0902   WBC 11.62 14.78* 15.93*   HGB 14.2 15.0 14.4   HCT 46.0 48.9 46.6    197 201     CMP:   Recent Labs   Lab 10/30/20  0427 10/30/20  0849 10/30/20  1557    143 141   K 6.4* 5.9* 5.4*    105 103   CO2 30* 29 31*   * 109 173*   * 104* 107*   CREATININE 2.6* 2.7* 2.8*   CALCIUM 8.3* 8.1* 8.1*   PROT 6.0 5.9* 5.7*   ALBUMIN 1.2* 1.3* 1.3*   BILITOT 0.3 0.3 0.3   ALKPHOS 111 147* 219*   AST 88* 103* 121*   ALT 53* 61* 81*   ANIONGAP 8 9 7*   EGFRNONAA 26.9* 25.7* 24.6*     All pertinent labs within the past 24 hours have been reviewed.  CBC:   Recent Labs   Lab 10/30/20  0902   WBC 15.93*   HGB 14.4   HCT 46.6   *        BMP:  Recent Labs   Lab 10/30/20  0427  10/30/20  1557   *   < > 173*      < > 141   K 6.4*   < > 5.4*      < > 103   CO2 30*   < > 31*   *   < > 107*   CREATININE 2.6*   < > 2.8*   CALCIUM 8.3*   < > 8.1*   MG 3.2*  --   --     < > = values in this interval not displayed.     LFT:  Lab Results    Component Value Date     (H) 10/30/2020    GGT 28 01/31/2005    ALKPHOS 219 (H) 10/30/2020    BILITOT 0.3 10/30/2020     Albumin:   Albumin   Date Value Ref Range Status   10/30/2020 1.3 (L) 3.5 - 5.2 g/dL Final     Protein:   Total Protein   Date Value Ref Range Status   10/30/2020 5.7 (L) 6.0 - 8.4 g/dL Final     Lactic acid:   Lab Results   Component Value Date    LACTATE 1.5 10/30/2020    LACTATE 1.3 10/26/2020       Significant Imaging: I have reviewed all pertinent imaging results/findings within the past 24 hours.      > 50% of 40 min visit spent in chart review, face to face discussion of goals of care,  symptom assessment, coordination of care and emotional support.    Romain Post MD  Palliative Medicine  [facility] - ICU 16 WT

## 2020-10-30 NOTE — PROGRESS NOTES
VANCOMYCIN DOSING BY PHARMACY DISCONTINUATION NOTE    Nubia Riggs is a 55 y.o. male who had been consulted for vancomycin dosing.    The pharmacy consult for vancomycin dosing has been discontinued.     Vancomycin Dosing by Pharmacy Consult will sign-off. Please reconsult if necessary. Thank you for allowing us to participate in this patient's care.       Sophia Ge, PharmD  [id number]

## 2020-10-30 NOTE — CARE UPDATE
ett 26 @ lips. Per notes, ett was 27 @ lips. Supinating pt @ 10/cxr ordered @ 11 to determine tube position.

## 2020-10-30 NOTE — SUBJECTIVE & OBJECTIVE
Interval History: pt seen at bedside with partner present; discussed with primary team and nursing    Interval changes noted; intubated and paralyzed    Past Medical History:   Diagnosis Date    ADD (attention deficit disorder)     Anemia     Bradycardia 3/8/2018    3/8/2018: ECG: SB 46 bpm.    GERD (gastroesophageal reflux disease)        Past Surgical History:   Procedure Laterality Date    BACK SURGERY      CERVICAL SPINE SURGERY      facit repair    CHOLECYSTECTOMY      implant      chin    LIPOSUCTION      waiste    RHINOPLASTY TIP      SPINE SURGERY      l5 s1 micro discectomy    SPINE SURGERY      l5 s1 rodes placed    TONSILLECTOMY         Review of patient's allergies indicates:   Allergen Reactions    Meperidine      Other reaction(s): violent behavior    Sulfa (sulfonamide antibiotics)      Other reaction(s): Unknown       Medications:  Continuous Infusions:   cisatracurium (NIMBEX) infusion 2.014 mcg/kg/min (10/30/20 1500)    fentanyl 25 mL/hr at 10/30/20 1500    heparin (porcine) in D5W Stopped (10/30/20 1040)    norepinephrine bitartrate-D5W 0.18 mcg/kg/min (10/30/20 1638)    propofoL 49.961 mcg/kg/min (10/30/20 1500)     Scheduled Meds:   albuterol-ipratropium  3 mL Nebulization Q4H    atorvastatin  40 mg Per NG tube QHS    ceFEPime (MAXIPIME) IVPB  2 g Intravenous Q12H    famotidine (PF)  20 mg Intravenous Daily    lactated ringers  1,000 mL Intravenous Once    multivitamin liquid no.118  10 mL Per NG tube Daily    vitamin D  1,000 Units Per NG tube Daily    white petrolatum-mineral oiL   Both Eyes Q8H     PRN Meds:acetaminophen, albuterol-ipratropium, [COMPLETED] calcium gluconate IVPB **AND** calcium gluconate IVPB, [COMPLETED] calcium gluconate IVPB **AND** calcium gluconate IVPB, dextrose 50%, dextrose 50%, [COMPLETED] dextrose 50% **AND** dextrose 50% **AND** [COMPLETED] insulin regular, [COMPLETED] dextrose 50% **AND** dextrose 50% **AND** [COMPLETED] insulin  regular, glucagon (human recombinant), glucose, glucose, heparin (PORCINE), heparin (PORCINE), loperamide, lorazepam, morphine, ondansetron, promethazine (PHENERGAN) IVPB, promethazine-codeine 6.25-10 mg/5 ml, sodium chloride 0.9%, Pharmacy to dose Vancomycin consult **AND** vancomycin - pharmacy to dose    Family History     Problem Relation (Age of Onset)    Cancer Father, Paternal Grandmother    Heart disease Paternal Grandfather        Tobacco Use    Smoking status: Former Smoker     Packs/day: 1.00     Years: 15.00     Pack years: 15.00     Quit date: 2012     Years since quittin.5    Smokeless tobacco: Never Used   Substance and Sexual Activity    Alcohol use: No    Drug use: No    Sexual activity: Not on file       Review of Systems   Unable to perform ROS: Acuity of condition     Objective:     Vital Signs (Most Recent):  Temp: 100.3 °F (37.9 °C) (10/30/20 1600)  Pulse: (!) 119 (10/30/20 1615)  Resp: (!) 35 (10/30/20 1615)  BP: 124/61 (10/30/20 1600)  SpO2: (!) 94 % (10/30/20 1615) Vital Signs (24h Range):  Temp:  [98.4 °F (36.9 °C)-101.2 °F (38.4 °C)] 100.3 °F (37.9 °C)  Pulse:  [109-131] 119  Resp:  [11-35] 35  SpO2:  [89 %-96 %] 94 %  BP: (124-138)/(61-63) 124/61  Arterial Line BP: ()/(50-67) 89/50     Weight: 84.4 kg (186 lb)  Body mass index is 26.69 kg/m².    Physical Exam  Vitals signs and nursing note reviewed.   Constitutional:       Comments: Intubated and sedated     HENT:      Right Ear: External ear normal.      Left Ear: External ear normal.      Nose:      Comments: Limited exam due to BIPAP       Mouth/Throat:      Mouth: Mucous membranes are dry.   Eyes:      Extraocular Movements: Extraocular movements intact.      Conjunctiva/sclera: Conjunctivae normal.      Pupils: Pupils are equal, round, and reactive to light.   Neck:      Musculoskeletal: Neck supple.      Comments: no JVD    Cardiovascular:      Rate and Rhythm: Normal rate and regular rhythm.   Pulmonary:       Breath sounds: Rales present.      Comments: No vent dyssynchrony    Abdominal:      General: Abdomen is flat. Bowel sounds are normal.      Palpations: Abdomen is soft.   Musculoskeletal:         General: No swelling.      Right lower leg: No edema.      Left lower leg: No edema.   Skin:     General: Skin is warm and dry.   Neurological:      Comments: Paralyzed     Psychiatric:      Comments: Agitated; confabulating           Review of Symptoms    Symptom Assessment (ESAS 0-10 Scale)     CAM / Delirium:  Positive  Constipation:  Negative  Diarrhea:  Negative    Comments:  Unable to assess due to acuity of condition            Palliative Performance Scale: 100 at baseline.    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 health issues outside of asthma, never intubated or hospitalized    Lives with sig other; not ; has two young girls with his partner; also has a 20 yo from a previous relationship who lives in [place] and still speaks with occasionally    Spiritual:  F - Andree and Belief:  Moravian  A - Address in Care:   offered      Advance Care Planning   Advance Directives:   Living Will: Yes (per wife yes at home)        Copy on chart: No    LaPOST: No    Do Not Resuscitate Status: No    Medical Power of : Yes (per wife he has paperwork at home; encouraged to bring to hospital)    Agent's Name:  Liza Brody Significant other     [phone]     Decision Making:  Patient answered questions and Family answered questions (pt showing signs of delirium though did confirm his sig other at bedside is decision maker)         Significant Labs:   CBC:   Recent Labs   Lab 10/29/20  0423 10/30/20  0427 10/30/20  0902   WBC 11.62 14.78* 15.93*   HGB 14.2 15.0 14.4   HCT 46.0 48.9 46.6    197 201     CMP:   Recent Labs   Lab 10/30/20  0427 10/30/20  0849 10/30/20  1557    143 141   K 6.4*  5.9* 5.4*    105 103   CO2 30* 29 31*   * 109 173*   * 104* 107*   CREATININE 2.6* 2.7* 2.8*   CALCIUM 8.3* 8.1* 8.1*   PROT 6.0 5.9* 5.7*   ALBUMIN 1.2* 1.3* 1.3*   BILITOT 0.3 0.3 0.3   ALKPHOS 111 147* 219*   AST 88* 103* 121*   ALT 53* 61* 81*   ANIONGAP 8 9 7*   EGFRNONAA 26.9* 25.7* 24.6*     All pertinent labs within the past 24 hours have been reviewed.  CBC:   Recent Labs   Lab 10/30/20  0902   WBC 15.93*   HGB 14.4   HCT 46.6   *        BMP:  Recent Labs   Lab 10/30/20  0427  10/30/20  1557   *   < > 173*      < > 141   K 6.4*   < > 5.4*      < > 103   CO2 30*   < > 31*   *   < > 107*   CREATININE 2.6*   < > 2.8*   CALCIUM 8.3*   < > 8.1*   MG 3.2*  --   --     < > = values in this interval not displayed.     LFT:  Lab Results   Component Value Date     (H) 10/30/2020    GGT 28 01/31/2005    ALKPHOS 219 (H) 10/30/2020    BILITOT 0.3 10/30/2020     Albumin:   Albumin   Date Value Ref Range Status   10/30/2020 1.3 (L) 3.5 - 5.2 g/dL Final     Protein:   Total Protein   Date Value Ref Range Status   10/30/2020 5.7 (L) 6.0 - 8.4 g/dL Final     Lactic acid:   Lab Results   Component Value Date    LACTATE 1.5 10/30/2020    LACTATE 1.3 10/26/2020       Significant Imaging: I have reviewed all pertinent imaging results/findings within the past 24 hours.

## 2020-10-30 NOTE — HPI
Mr. Nubia Riggs is a 56 yo male with GERD and RAD with COVID-19 dx on 10/15 who presented to [facility] on 10/18 with worsening hypoxia and SOB at home.  He was orginally admitted to hospital medicine, but continued to have increase in oxygen requirements and severe hypoxia off of BiPAP and he was transferred to COVID-ICU on 10/24 and later intubated on the 26th.  Kidney function had remained stable during the admission, noting an YOVANI on 10/27 with a rise in Scr to 1.6.  On 10/26, he was noted to have episodes of hypotension and hypoxia during intubation and later having an increase in sCr with hyperkalemia that has been medically treated with lasix and insulin/dextrose without resolution.  Kidney function has continued to decline since intubation and Nephrology was consulted for evaluation.

## 2020-10-30 NOTE — SUBJECTIVE & OBJECTIVE
Past Medical History:   Diagnosis Date    ADD (attention deficit disorder)     Anemia     Bradycardia 3/8/2018    3/8/2018: ECG: SB 46 bpm.    GERD (gastroesophageal reflux disease)        Past Surgical History:   Procedure Laterality Date    BACK SURGERY      CERVICAL SPINE SURGERY      facit repair    CHOLECYSTECTOMY      implant      chin    LIPOSUCTION      waiste    RHINOPLASTY TIP      SPINE SURGERY      l5 s1 micro discectomy    SPINE SURGERY      l5 s1 rodes placed    TONSILLECTOMY         Review of patient's allergies indicates:   Allergen Reactions    Meperidine      Other reaction(s): violent behavior    Sulfa (sulfonamide antibiotics)      Other reaction(s): Unknown     Current Facility-Administered Medications   Medication Frequency    acetaminophen oral solution 650 mg Q6H PRN    albuterol-ipratropium 2.5 mg-0.5 mg/3 mL nebulizer solution 3 mL Q4H    albuterol-ipratropium 2.5 mg-0.5 mg/3 mL nebulizer solution 3 mL Q6H PRN    atorvastatin tablet 40 mg QHS    calcium gluconate 1g in dextrose 5% 100mL (ready to mix system) Q10 Min PRN    calcium gluconate 1g in dextrose 5% 100mL (ready to mix system) Q10 Min PRN    ceFEPIme injection 2 g Q12H    cisatracurium (NIMBEX) 200 mg in dextrose 5 % 100 mL infusion Continuous    dextrose 50% injection 12.5 g PRN    dextrose 50% injection 25 g PRN    dextrose 50% injection 25 g PRN    dextrose 50% injection 25 g PRN    famotidine (PF) injection 20 mg Daily    fentaNYL 2500 mcg in 0.9% sodium chloride 250 mL infusion premix (titrating) Continuous    glucagon (human recombinant) injection 1 mg PRN    glucose chewable tablet 16 g PRN    glucose chewable tablet 24 g PRN    heparin 25,000 units in dextrose 5% (100 units/ml) IV bolus from bag - ADDITIONAL PRN BOLUS - 30 units/kg PRN    heparin 25,000 units in dextrose 5% (100 units/ml) IV bolus from bag - ADDITIONAL PRN BOLUS - 60 units/kg PRN    heparin 25,000 units in dextrose 5% 250  mL (100 units/mL) infusion LOW INTENSITY nomogram - [facility] Continuous    lactated ringers bolus 1,000 mL Once    loperamide capsule 2 mg Q6H PRN    lorazepam injection 1 mg Q6H PRN    morphine injection 2 mg Q4H PRN    multivitamin liquid no.118 per dose 10 mL Daily    norepinephrine 4 mg in dextrose 5% 250 mL infusion (premix) (titrating) Continuous    ondansetron injection 4 mg Q8H PRN    promethazine (PHENERGAN) 6.25 mg in dextrose 5 % 50 mL IVPB Q6H PRN    promethazine-codeine 6.25-10 mg/5 ml syrup 5 mL Q6H PRN    propofol (DIPRIVAN) 10 mg/mL infusion Continuous    sodium chloride 0.9% flush 10 mL PRN    vancomycin - pharmacy to dose pharmacy to manage frequency    vitamin D 1000 units tablet 1,000 Units Daily    white petrolatum-mineral oil (LUBIFRESH P.M.) ophthalmic ointment Q8H     Family History     Problem Relation (Age of Onset)    Cancer Father, Paternal Grandmother    Heart disease Paternal Grandfather        Tobacco Use    Smoking status: Former Smoker     Packs/day: 1.00     Years: 15.00     Pack years: 15.00     Quit date: 2012     Years since quittin.5    Smokeless tobacco: Never Used   Substance and Sexual Activity    Alcohol use: No    Drug use: No    Sexual activity: Not on file     Review of Systems   Unable to perform ROS: Intubated     Objective:     Vital Signs (Most Recent):  Temp: 99.1 °F (37.3 °C) (10/30/20 1200)  Pulse: (!) 120 (10/30/20 1500)  Resp: (!) 35 (10/30/20 1500)  BP: 138/63 (10/30/20 1500)  SpO2: (!) 93 % (10/30/20 1500)  O2 Device (Oxygen Therapy): ventilator (10/30/20 1500) Vital Signs (24h Range):  Temp:  [98.4 °F (36.9 °C)-101.2 °F (38.4 °C)] 99.1 °F (37.3 °C)  Pulse:  [] 120  Resp:  [11-35] 35  SpO2:  [89 %-99 %] 93 %  BP: (138)/(63) 138/63  Arterial Line BP: ()/(52-67) 97/59     Weight: 84.4 kg (186 lb) (10/25/20 1120)  Body mass index is 26.69 kg/m².  Body surface area is 2.04 meters squared.    I/O last 3 completed shifts:  In: 3846.7  [I.V.:3206.7; NG/GT:340; IV Piggyback:300]  Out: 2910 [Urine:2910]    Physical Exam  Vitals signs and nursing note reviewed.   Constitutional:       Appearance: He is ill-appearing. He is not diaphoretic.      Interventions: He is sedated and intubated.      Comments: COVID-19 Precautions maintained.     HENT:      Head: Normocephalic and atraumatic.   Cardiovascular:      Rate and Rhythm: Regular rhythm. Tachycardia present.   Pulmonary:      Effort: He is intubated.      Breath sounds: No rhonchi.   Abdominal:      Comments: proned   Musculoskeletal:      Right lower leg: No edema.      Left lower leg: No edema.   Skin:     General: Skin is warm and dry.         Significant Labs:  ABGs:   Recent Labs   Lab 10/30/20  0452   PH 7.291*   PCO2 67.9*   HCO3 32.7*   POCSATURATED 94*   BE 6     CBC:   Recent Labs   Lab 10/30/20  0902   WBC 15.93*   RBC 4.67   HGB 14.4   HCT 46.6      *   MCH 30.8   MCHC 30.9*     CMP:   Recent Labs   Lab 10/30/20  0849      CALCIUM 8.1*   ALBUMIN 1.3*   PROT 5.9*      K 5.9*   CO2 29      *   CREATININE 2.7*   ALKPHOS 147*   ALT 61*   *   BILITOT 0.3

## 2020-10-30 NOTE — PLAN OF CARE
Pt medically not stable for discharge. Intubated and sedated.  UO decreasing despite multiple Lasix doses, and this am concern for hematuria, repeat CBC sent. Nephrology consult placed. Shifted overnight for hyperkalemia. Resend blood and sputum cultures for rising WBC count and check lactate.    CM to follow for discharge planning needs.  LUPILLO Melgar RN  Case Management  [id number]      10/30/20 9534   Discharge Reassessment   Assessment Type Discharge Planning Reassessment   Provided patient/caregiver education on the expected discharge date and the discharge plan Yes   Discharge Plan A Long-term acute care facility (LTAC)   Discharge Plan B Home with family;Home Health   Anticipated Discharge Disposition LTAC   Can the patient/caregiver answer the patient profile reliably? No, cognitively impaired  (I&S)   Post-Acute Status   Post-Acute Authorization Placement   Post-Acute Placement Status Awaiting Internal Medical Clearance   Discharge Delays None known at this time

## 2020-10-30 NOTE — SIGNIFICANT EVENT
Procedure Note  Supine Positioning  Critical Care Medicine       Chief Complaint: Pneumonia due to COVID-19 virus  MRN: [id number]  LOS: 12  Sex: male  Age: 55 y.o.      Last ABG:   Recent Labs   Lab 10/30/20  0452   PH 7.291*   PO2 84   PCO2 67.9*   HCO3 32.7*   BE 6       Vital Signs (Most Recent):   Temp: (!) 101.2 °F (38.4 °C) (10/30/20 0823)  Pulse: 109 (10/30/20 0900)  Resp: (!) 35 (10/30/20 0900)  BP: (!) 112/58 (10/29/20 1500)  SpO2: 95 % (10/30/20 0900)    Ventilator Settings:  Vent Mode: A/C  Oxygen Concentration (%):  [] 80  Resp Rate Total:  [35 br/min] 35 br/min  Vt Set:  [450 mL-460 mL] 450 mL  PEEP/CPAP:  [10 tqS59-91 cmH20] 12 cmH20  Mean Airway Pressure:  [19 qrI40-74 cmH20] 21 cmH20        Indication: Severe, refractory ARDS. High risk for deterioration during supination procedure in need of direct monitoring by Critical Care personnel.     Prior to beginning the procedure, all appropriate equipment and personnel were gathered in the room. Two individuals were placed on each side of the patient and two respiratory therapists stood at the head of the bed and was dedicated to the management of the head of the patient, the endotracheal tube, and the ventilator lines. The person at the head of the bed coordinated the steps of the supination procedure at the direction of Critical Care team members at the bedside. The patient was first log-rolled 90 degrees onto their side away from the direction of their central venous catheter. Cardiac electrodes were then moved from the patient's posterior back to the anterior. Once properly positioned in the bed, another 90 degree log roll was performed placing the patient into the supine position. The patient's arms were placed alongside the body. Continuous pulse oximetry and blood pressure monitoring was performed without any desaturation or hemodynamic instability. The patient tolerated the procedure well.     ETT 26 cm @ lip    Total Critical Care time  (uninterrupted not including procedures): 15 minutes

## 2020-10-31 NOTE — CARE UPDATE
Patient is prone. RN at bedside to help with- turned head at 2000. ETT and circuit in place. No complications or disconnections.

## 2020-10-31 NOTE — SUBJECTIVE & OBJECTIVE
Interval History: NAEON. Stable potassium level on AM lab. UOP of ~4.2L reported.     Review of patient's allergies indicates:   Allergen Reactions    Meperidine      Other reaction(s): violent behavior    Sulfa (sulfonamide antibiotics)      Other reaction(s): Unknown     Current Facility-Administered Medications   Medication Frequency    acetaminophen oral solution 650 mg Q6H PRN    albuterol-ipratropium 2.5 mg-0.5 mg/3 mL nebulizer solution 3 mL Q4H    albuterol-ipratropium 2.5 mg-0.5 mg/3 mL nebulizer solution 3 mL Q6H PRN    balsam peru-castor oiL Oint BID    calcium gluconate 1g in dextrose 5% 100mL (ready to mix system) Q10 Min PRN    calcium gluconate 1g in dextrose 5% 100mL (ready to mix system) Q10 Min PRN    cisatracurium (NIMBEX) 200 mg in dextrose 5 % 100 mL infusion Continuous    dextrose 50% injection 12.5 g PRN    dextrose 50% injection 25 g PRN    famotidine (PF) injection 20 mg Daily    fentaNYL 2500 mcg in 0.9% sodium chloride 250 mL infusion premix (titrating) Continuous    glucagon (human recombinant) injection 1 mg PRN    glucose chewable tablet 16 g PRN    glucose chewable tablet 24 g PRN    heparin 25,000 units in dextrose 5% (100 units/ml) IV bolus from bag - ADDITIONAL PRN BOLUS - 30 units/kg PRN    heparin 25,000 units in dextrose 5% (100 units/ml) IV bolus from bag - ADDITIONAL PRN BOLUS - 60 units/kg PRN    heparin 25,000 units in dextrose 5% 250 mL (100 units/mL) infusion LOW INTENSITY nomogram - [facility] Continuous    loperamide capsule 2 mg Q6H PRN    lorazepam injection 1 mg Q6H PRN    morphine injection 2 mg Q4H PRN    multivitamin liquid no.118 per dose 10 mL Daily    norepinephrine 1 mg/mL injection     norepinephrine 32 mg in dextrose 5 % 250 mL infusion Continuous    norepinephrine bitartrate-D5W 4 mg/250 mL (16 mcg/mL) infusion Soln     ondansetron injection 4 mg Q8H PRN    PHENYLephrine (LAINEY-SYNEPHRINE) 20 mg in sodium chloride 0.9% 250ml (titrating)  Continuous    piperacillin-tazobactam 4.5 g in sodium chloride 0.9% 100 mL IVPB (ready to mix system) Q8H    promethazine (PHENERGAN) 6.25 mg in dextrose 5 % 50 mL IVPB Q6H PRN    promethazine-codeine 6.25-10 mg/5 ml syrup 5 mL Q6H PRN    propofol (DIPRIVAN) 10 mg/mL infusion Continuous    sodium chloride 0.9% flush 10 mL PRN    vancomycin - pharmacy to dose pharmacy to manage frequency    vasopressin (PITRESSIN) 0.2 Units/mL in dextrose 5 % 100 mL infusion Continuous    vitamin D 1000 units tablet 1,000 Units Daily    white petrolatum-mineral oil (LUBIFRESH P.M.) ophthalmic ointment Q8H       Objective:     Vital Signs (Most Recent):  Temp: 99.8 °F (37.7 °C) (10/31/20 1600)  Pulse: 83 (10/31/20 1650)  Resp: (!) 35 (10/31/20 1650)  BP: 131/65 (10/31/20 1650)  SpO2: (!) 93 % (10/31/20 1650)  O2 Device (Oxygen Therapy): ventilator (10/31/20 1650) Vital Signs (24h Range):  Temp:  [99.2 °F (37.3 °C)-100.2 °F (37.9 °C)] 99.8 °F (37.7 °C)  Pulse:  [] 83  Resp:  [9-36] 35  SpO2:  [92 %-99 %] 93 %  BP: ()/(49-70) 131/65  Arterial Line BP: ()/(46-75) 123/59     Weight: 84.4 kg (186 lb) (10/25/20 1120)  Body mass index is 26.69 kg/m².  Body surface area is 2.04 meters squared.    I/O last 3 completed shifts:  In: 6488.1 [I.V.:3038.1; NG/GT:1000; IV Piggyback:2450]  Out: 4770 [Urine:4770]    Physical Exam  Vitals signs and nursing note reviewed.   Constitutional:       Appearance: He is ill-appearing. He is not diaphoretic.      Interventions: He is sedated and intubated.      Comments: COVID-19 Precautions maintained.     HENT:      Head: Normocephalic and atraumatic.   Cardiovascular:      Rate and Rhythm: Regular rhythm.   Pulmonary:      Effort: He is intubated.      Breath sounds: No rhonchi.   Abdominal:      Comments: proned   Musculoskeletal:      Right lower leg: No edema.      Left lower leg: No edema.   Skin:     General: Skin is warm and dry.         Significant Labs:  CBC:   Recent Labs    Lab 10/31/20  1112   WBC 18.53*   RBC 3.85*   HGB 12.2*   HCT 38.9*      *   MCH 31.7*   MCHC 31.4*     CMP:   Recent Labs   Lab 10/31/20  0952 10/31/20  1112   *  178* 157*   CALCIUM 7.8*  7.8* 7.5*   ALBUMIN 1.2*  --    PROT 5.4*  --      141 143   K 4.9  4.9 4.8   CO2 32*  32* 28     101 103   *  112* 113*   CREATININE 2.9*  2.9* 2.8*   ALKPHOS 222*  --    ALT 77*  --    *  --    BILITOT 0.4  --      All labs within the past 24 hours have been reviewed.

## 2020-10-31 NOTE — SIGNIFICANT EVENT
Procedure Note  Supine Positioning  Critical Care Medicine       Chief Complaint: Pneumonia due to COVID-19 virus  MRN: [id number]  LOS: 13  Sex: male  Age: 55 y.o.      Last ABG:   Recent Labs   Lab 10/31/20  1110   PH 7.303*   PO2 132*   PCO2 46.6*   HCO3 23.1*   BE -3       Vital Signs (Most Recent):   Temp: 100.2 °F (37.9 °C) (10/31/20 0800)  Pulse: 91 (10/31/20 1110)  Resp: (!) 35 (10/31/20 1110)  BP: (!) 88/53 (10/31/20 1110)  SpO2: 96 % (10/31/20 1110)    Ventilator Settings:  Vent Mode: A/C  Oxygen Concentration (%):  [] 100  Resp Rate Total:  [35 br/min] 35 br/min  Vt Set:  [450 mL] 450 mL  PEEP/CPAP:  [12 cmH20] 12 cmH20  Mean Airway Pressure:  [20 mcU01-72 cmH20] 20 cmH20        Indication: Severe, refractory ARDS. High risk for deterioration during supination procedure in need of direct monitoring by Critical Care personnel.     Prior to beginning the procedure, all appropriate equipment and personnel were gathered in the room. Two individuals were placed on each side of the patient and two respiratory therapists stood at the head of the bed and was dedicated to the management of the head of the patient, the endotracheal tube, and the ventilator lines. The person at the head of the bed coordinated the steps of the supination procedure at the direction of Critical Care team members at the bedside. The patient was first log-rolled 90 degrees onto their side away from the direction of their central venous catheter. Cardiac electrodes were then moved from the patient's posterior back to the anterior. Once properly positioned in the bed, another 90 degree log roll was performed placing the patient into the supine position. The patient's arms were placed alongside the body. Continuous pulse oximetry and blood pressure monitoring was performed without any desaturation or hemodynamic instability. The patient tolerated the procedure well.     Total Critical Care time (uninterrupted not including procedures):  15 minutes    EET noted to be 26 cm at the lip pre and 27 cm post proceudre

## 2020-10-31 NOTE — SUBJECTIVE & OBJECTIVE
Interval History/Significant Events: increasing levophed requirements so broadening from cefepime to zosyn. Hyperkalemia transiently improving with insulin shifts. Worsening BUN and renal failure despite fluids and diuretics, nephrology following.     Review of Systems   Unable to perform ROS: Intubated     Objective:     Vital Signs (Most Recent):  Temp: 100.2 °F (37.9 °C) (10/31/20 0800)  Pulse: 105 (10/31/20 0807)  Resp: (!) 35 (10/31/20 0807)  BP: (!) 101/58 (10/31/20 0800)  SpO2: (!) 93 % (10/31/20 0807) Vital Signs (24h Range):  Temp:  [99.1 °F (37.3 °C)-100.3 °F (37.9 °C)] 100.2 °F (37.9 °C)  Pulse:  [105-126] 105  Resp:  [11-36] 35  SpO2:  [92 %-99 %] 93 %  BP: ()/(54-70) 101/58  Arterial Line BP: ()/(50-75) 107/55   Weight: 84.4 kg (186 lb)  Body mass index is 26.69 kg/m².      Intake/Output Summary (Last 24 hours) at 10/31/2020 0920  Last data filed at 10/31/2020 0800  Gross per 24 hour   Intake 4850.49 ml   Output 3915 ml   Net 935.49 ml       Physical Exam  Vitals signs and nursing note reviewed.   Constitutional:       General: He is not in acute distress.     Appearance: Normal appearance. He is ill-appearing.      Interventions: He is sedated, chemically paralyzed and intubated.   Cardiovascular:      Rate and Rhythm: Regular rhythm. Tachycardia present.      Heart sounds: Normal heart sounds.   Pulmonary:      Effort: He is intubated.      Breath sounds: No wheezing, rhonchi or rales.      Comments: Coarse breath sounds  Abdominal:      Comments: Patient proned for exam   Musculoskeletal:      Right lower leg: No edema.      Left lower leg: No edema.   Skin:     Coloration: Skin is mottled (lright foot 4th digit, left foot, back of great toe).   Neurological:      Mental Status: He is unresponsive.      GCS: GCS eye subscore is 1. GCS verbal subscore is 1. GCS motor subscore is 1.      Comments: Patient on NMB         Vents:  Vent Mode: A/C (10/31/20 0807)  Set Rate: 35 BPM (10/31/20  0807)  Vt Set: 450 mL (10/31/20 0807)  PEEP/CPAP: 12 cmH20 (10/31/20 0807)  Oxygen Concentration (%): 80 (10/31/20 0807)  Peak Airway Pressure: 31 cmH2O (10/31/20 0807)  Plateau Pressure: 28 cmH20 (10/31/20 0807)  Total Ve: 16.2 mL (10/31/20 0807)  F/VT Ratio<105 (RSBI): (!) 75.59 (10/31/20 0807)  Lines/Drains/Airways     Central Venous Catheter Line            Percutaneous Central Line Insertion/Assessment - Triple Lumen  10/26/20 1940 right internal jugular 4 days          Drain                 Urethral Catheter 10/26/20 0900 Non-latex 16 Fr. 5 days         NG/OG Tube 10/26/20 2000 orogastric 16 Fr. Left mouth 4 days          Airway                 Airway - Non-Surgical 10/26/20 1820 Endotracheal Tube 4 days       Airway Anesthesia 10/26/20 4 days          Arterial Line            Arterial Line 10/30/20 1556 Right Radial less than 1 day          Peripheral Intravenous Line                 Peripheral IV - Single Lumen 10/29/20 1445 20 G Anterior;Right Forearm 1 day              Significant Labs:    CBC/Anemia Profile:  Recent Labs   Lab 10/30/20  0427 10/30/20  0902 10/31/20  0428   WBC 14.78* 15.93* 18.04*   HGB 15.0 14.4 13.8*   HCT 48.9 46.6 45.1    201 253   MCV 98 100* 101*   RDW 12.6 12.7 13.1        Chemistries:  Recent Labs   Lab 10/30/20  0427 10/30/20  0849 10/30/20  1557 10/31/20  0056 10/31/20  0428    143 141 141 142   K 6.4* 5.9* 5.4* 5.7* 5.2*  5.2*    105 103 102 102   CO2 30* 29 31* 31* 31*   * 104* 107* 111* 114*   CREATININE 2.6* 2.7* 2.8* 2.9* 3.0*   CALCIUM 8.3* 8.1* 8.1* 7.9* 7.7*   ALBUMIN 1.2* 1.3* 1.3* 1.3*  --    PROT 6.0 5.9* 5.7* 5.8*  --    BILITOT 0.3 0.3 0.3 0.3  --    ALKPHOS 111 147* 219* 219*  --    ALT 53* 61* 81* 85*  --    AST 88* 103* 121* 109*  --    MG 3.2*  --   --   --  2.7*   PHOS 4.6*  --   --   --  5.3*       All pertinent labs within the past 24 hours have been reviewed.    Significant Imaging:  I have reviewed all pertinent imaging  results/findings within the past 24 hours.

## 2020-10-31 NOTE — ASSESSMENT & PLAN NOTE
No history of prior kidney disease; sCr on admission 0.8 -->2.6 as of 10/30, with UO less responsive to multiple doses of 40 mg lasix which he was initially responsive to; however, BUN rising and he is not clearing in his urine. Retroperitoneal u/s with bilateral medical renal diseased and no hydronephrosis. Fe Urea 26.4% (<35%) indicative of prerenal disease    --nephrology following  --appreciate recs

## 2020-10-31 NOTE — NURSING
Order is incorrect for titration of vinay. Corrected titration order is 0.5 mcg/kg/min every 5 minutes.

## 2020-10-31 NOTE — PROGRESS NOTES
[facility] - ICU 16 WT  Nephrology  Progress Note    Patient Name: Nubia Riggs  MRN: [id number]  Admission Date: 10/18/2020  Hospital Length of Stay: 13 days  Attending Provider: Raad Martel MD   Primary Care Physician: Primary Doctor No  Principal Problem:Pneumonia due to COVID-19 virus    Subjective:     HPI: Mr. Nubia Riggs is a 56 yo male with GERD and RAD with COVID-19 dx on 10/15 who [place] [facility] [place] with worsening hypoxia and SOB at home.  He was orginally admitted to hospital medicine, but continued to have increase in oxygen requirements and severe hypoxia off of BiPAP and he was transferred to COVID-ICU on 10/24 and later intubated on the 26th.  Kidney function had remained stable during the admission, noting an YOVANI on 10/27 with a rise in Scr to 1.6.  On 10/26, he was noted to have episodes of hypotension and hypoxia during intubation and later having an increase in sCr with hyperkalemia that has been medically treated with lasix and insulin/dextrose without resolution.  Kidney function has continued to decline since intubation and Nephrology was consulted for evaluation.    Interval History: NAEON. Stable potassium level on AM lab. UOP of ~4.2L reported.     Review of patient's allergies indicates:   Allergen Reactions    Meperidine      Other reaction(s): violent behavior    Sulfa (sulfonamide antibiotics)      Other reaction(s): Unknown     Current Facility-Administered Medications   Medication Frequency    acetaminophen oral solution 650 mg Q6H PRN    albuterol-ipratropium 2.5 mg-0.5 mg/3 mL nebulizer solution 3 mL Q4H    albuterol-ipratropium 2.5 mg-0.5 mg/3 mL nebulizer solution 3 mL Q6H PRN    balsam peru-castor oiL Oint BID    calcium gluconate 1g in dextrose 5% 100mL (ready to mix system) Q10 Min PRN    calcium gluconate 1g in dextrose 5% 100mL (ready to mix system) Q10 Min PRN    cisatracurium (NIMBEX) 200 mg in dextrose 5 % 100 mL infusion Continuous     dextrose 50% injection 12.5 g PRN    dextrose 50% injection 25 g PRN    famotidine (PF) injection 20 mg Daily    fentaNYL 2500 mcg in 0.9% sodium chloride 250 mL infusion premix (titrating) Continuous    glucagon (human recombinant) injection 1 mg PRN    glucose chewable tablet 16 g PRN    glucose chewable tablet 24 g PRN    heparin 25,000 units in dextrose 5% (100 units/ml) IV bolus from bag - ADDITIONAL PRN BOLUS - 30 units/kg PRN    heparin 25,000 units in dextrose 5% (100 units/ml) IV bolus from bag - ADDITIONAL PRN BOLUS - 60 units/kg PRN    heparin 25,000 units in dextrose 5% 250 mL (100 units/mL) infusion LOW INTENSITY nomogram - [facility] Continuous    loperamide capsule 2 mg Q6H PRN    lorazepam injection 1 mg Q6H PRN    morphine injection 2 mg Q4H PRN    multivitamin liquid no.118 per dose 10 mL Daily    norepinephrine 1 mg/mL injection     norepinephrine 32 mg in dextrose 5 % 250 mL infusion Continuous    norepinephrine bitartrate-D5W 4 mg/250 mL (16 mcg/mL) infusion Soln     ondansetron injection 4 mg Q8H PRN    PHENYLephrine (LAINEY-SYNEPHRINE) 20 mg in sodium chloride 0.9% 250ml (titrating) Continuous    piperacillin-tazobactam 4.5 g in sodium chloride 0.9% 100 mL IVPB (ready to mix system) Q8H    promethazine (PHENERGAN) 6.25 mg in dextrose 5 % 50 mL IVPB Q6H PRN    promethazine-codeine 6.25-10 mg/5 ml syrup 5 mL Q6H PRN    propofol (DIPRIVAN) 10 mg/mL infusion Continuous    sodium chloride 0.9% flush 10 mL PRN    vancomycin - pharmacy to dose pharmacy to manage frequency    vasopressin (PITRESSIN) 0.2 Units/mL in dextrose 5 % 100 mL infusion Continuous    vitamin D 1000 units tablet 1,000 Units Daily    white petrolatum-mineral oil (LUBIFRESH P.M.) ophthalmic ointment Q8H       Objective:     Vital Signs (Most Recent):  Temp: 99.8 °F (37.7 °C) (10/31/20 1600)  Pulse: 83 (10/31/20 1650)  Resp: (!) 35 (10/31/20 1650)  BP: 131/65 (10/31/20 1650)  SpO2: (!) 93 % (10/31/20 1650)  O2  Device (Oxygen Therapy): ventilator (10/31/20 1650) Vital Signs (24h Range):  Temp:  [99.2 °F (37.3 °C)-100.2 °F (37.9 °C)] 99.8 °F (37.7 °C)  Pulse:  [] 83  Resp:  [9-36] 35  SpO2:  [92 %-99 %] 93 %  BP: ()/(49-70) 131/65  Arterial Line BP: ()/(46-75) 123/59     Weight: 84.4 kg (186 lb) (10/25/20 1120)  Body mass index is 26.69 kg/m².  Body surface area is 2.04 meters squared.    I/O last 3 completed shifts:  In: 6488.1 [I.V.:3038.1; NG/GT:1000; IV Piggyback:2450]  Out: 4770 [Urine:4770]    Physical Exam  Vitals signs and nursing note reviewed.   Constitutional:       Appearance: He is ill-appearing. He is not diaphoretic.      Interventions: He is sedated and intubated.      Comments: COVID-19 Precautions maintained.     HENT:      Head: Normocephalic and atraumatic.   Cardiovascular:      Rate and Rhythm: Regular rhythm.   Pulmonary:      Effort: He is intubated.      Breath sounds: No rhonchi.   Abdominal:      Comments: proned   Musculoskeletal:      Right lower leg: No edema.      Left lower leg: No edema.   Skin:     General: Skin is warm and dry.         Significant Labs:  CBC:   Recent Labs   Lab 10/31/20  1112   WBC 18.53*   RBC 3.85*   HGB 12.2*   HCT 38.9*      *   MCH 31.7*   MCHC 31.4*     CMP:   Recent Labs   Lab 10/31/20  0952 10/31/20  1112   *  178* 157*   CALCIUM 7.8*  7.8* 7.5*   ALBUMIN 1.2*  --    PROT 5.4*  --      141 143   K 4.9  4.9 4.8   CO2 32*  32* 28     101 103   *  112* 113*   CREATININE 2.9*  2.9* 2.8*   ALKPHOS 222*  --    ALT 77*  --    *  --    BILITOT 0.4  --      All labs within the past 24 hours have been reviewed.     Assessment/Plan:     YOVANI (acute kidney injury)  Non-Oliguric YOVANI on Normal Renal Fx  YOVANI 2/2 ATN from septic shock from COVID-019 infection.  disproportionate rise in BUN to Cr with possible component of volume depletion.    Plan/Recommendations:  - No need for RRT. Continue bolus of IV  lasix for volume removal as necessary. Patient with adequate response to previous doses.   -continue strict I/O's  Will continue following closely.    Hyperkalemia  2/2 Rocuronium from intubation   Decreased potassium excretion from YOVANI    Rec:  - Continue monitoring with labs as scheduled   -  Lasix for potassium removal + bolus of LR if necessary.        Thank you for your consult. I will follow-up with patient. Please contact us if you have any additional questions.    Virgil Henderson MD  Nephrology  [facility] - ICU 16 WT

## 2020-10-31 NOTE — ASSESSMENT & PLAN NOTE
2/2 Rocuronium from intubation   Decreased potassium excretion from YOVANI    Rec:  - Continue monitoring with labs as scheduled   -  Lasix for potassium removal + bolus of LR if necessary.

## 2020-10-31 NOTE — ASSESSMENT & PLAN NOTE
Follows with Pulm as outpatient. Last seen 8/27/20, told he had RAD  He was also seen by a [facility] pulmonologist who did spirometry that reportedly showed borderline or mild asthma, and has been controlled with albuterol.   Former smoker, started at age 10-12 and quit 10 years ago  -- Continue duo nebs q4

## 2020-10-31 NOTE — CARE UPDATE
Updated wife, Liza, at [facility] regarding patient's clinical status. All questions answered and concerns addressed.        Chris Gibbons NP  Pulmonary Critical Care Medicine  [facility]

## 2020-10-31 NOTE — NURSING
0825: Significant other, Liza, at bedside visiting with patient.    0900: New foam placed to sacrum for protection.    0915: Per Chris Gibbons NP draw an aPTT and restart heparin at 14; then follow nomogram.     1000: Patient BP requiring higher volumes of levophed. NP ordered 32mg concentrated levo for this reason. Started 32mg levo at 0.02mcg/kg/min; patient BP did not respond as MAPs continued to drop. MAPs dropped into 50s with Chris Gibbons NP at bedside. Multiple titrations of 32mg levo during episode per MD and NP order (not documented on MAR). Multiple orders given for vinay and vaso drips as well as epi and vinay pushes per Chris Gibbons NP, who is at bedside. MD Osbaldo, at bedside overseeing. 0.25mg epi given x 2, vinay bump x 2, vinay 20mg/250mL infusion mixed and started, and vasopressin started.    1140: Significant other, Liza, leaving for the day.     1200: Patient supinated with RRT x 2 at [facility], NP at [facility], and 5 RN at bedside. Patient tolerated supination well with vitals remaining stable.     1300: New conway placed per sterile technique.     1429: Clean catch obtained from new conway catheter.     1554: BMP not drawn as potassium is less than 5 mmol/L (most recent - 4.8mmol/L). CMP obtained and sent to lab via tube system.    1640: Patient proned with RRT x 2 at [facility], fellow MD at [facility], and 4 RN at bedside. Patient tolerated pronation well with vitals remaining stable.     Care Plan    POC reviewed with Nubia Riggs's significant other. Questions and concerns addressed. Pt progressing toward goals. Will continue to monitor. See below and flowsheets for full assessment and VS info.       Neuro:  [place] Coma Scale  Best Eye Response: 1-->(E1) none  Best Motor Response: 1-->(M1) none  Best Verbal Response: 1-->(V1) none  [place] Coma Scale Score: 3  Assessment Qualifiers: patient intubated, patient chemically sedated or paralyzed  Pupil PERRLA: yes  24 hr Temp:  [99.2 °F (37.3 °C)-100.2 °F (37.9  °C)]      CV:  Rhythm: sinus tachycardia  DVT prophylaxis: VTE Required Core Measure: (SCDs) Sequential compression device initiated/maintained, Pharmacological prophylaxis initiated/maintained    Resp:  O2 Device (Oxygen Therapy): ventilator  Vent Mode: A/C  Set Rate: 35 BPM  Oxygen Concentration (%): 80  Vt Set: 450 mL  PEEP/CPAP: 12 cmH20    GI/:  GI prophylaxis: yes  Diet/Nutrition Received: tube feeding  Last Bowel Movement: 10/24/20  Voiding Characteristics: urethral catheter (bladder)  [REMOVED]      Urethral Catheter 10/26/20 0900 Non-latex 16 Fr.-Reason for Continuing Urinary Catheterization: Critically ill in ICU requiring intensive monitoring     Intake/Output Summary (Last 24 hours) at 10/31/2020 1819  Last data filed at 10/31/2020 1800  Gross per 24 hour   Intake 3185.88 ml   Output 2830 ml   Net 355.88 ml       Labs/Accuchecks:  Recent Labs   Lab 10/31/20  1112   WBC 18.53*   RBC 3.85*   HGB 12.2*   HCT 38.9*         Recent Labs   Lab 10/31/20  1738      K 4.8   CO2 31*      *   CREATININE 2.5*   ALKPHOS 214*   ALT 80*   AST 97*   BILITOT 0.5      Recent Labs   Lab 10/26/20  2235  10/31/20  1727   INR 1.3*  --   --    APTT 42.7*   < > 43.1*    < > = values in this interval not displayed.      Recent Labs   Lab 10/31/20  0428   *       Electrolytes: No replacement orders  Accuchecks: Q4H    Gtts/LDAs:   cisatracurium (NIMBEX) infusion 2.014 mcg/kg/min (10/31/20 1800)    fentanyl 25 mL/hr at 10/31/20 1800    heparin (porcine) in D5W 15.979 Units/kg/hr (10/31/20 1800)    norepinephrine bitartrate-D5W 0.22 mcg/kg/min (10/31/20 1800)    phenylephrine 0.9 mcg/kg/min (10/31/20 1800)    propofoL 50 mcg/kg/min (10/31/20 1814)    vasopressin (PITRESSIN) infusion 0.04 Units/min (10/31/20 1800)       Lines/Drains/Airways     Central Venous Catheter Line            Percutaneous Central Line Insertion/Assessment - Triple Lumen  10/26/20 1940 right internal jugular 4 days           Drain                 NG/OG Tube 10/26/20 2000 orogastric 16 Fr. Left mouth 4 days         Urethral Catheter 10/31/20 1300 Non-latex;Straight-tip 16 Fr. less than 1 day          Airway               Airway Anesthesia 10/26/20 5 days         Airway - Non-Surgical 10/26/20 1820 Endotracheal Tube 4 days          Arterial Line            Arterial Line 10/30/20 1556 Right Radial 1 day          Peripheral Intravenous Line                 Peripheral IV - Single Lumen 10/31/20 1630 20 G Anterior;Right Forearm less than 1 day                Skin/Wounds:  Bathing/Skin Care: bath, partial Date: 10.31.20  Wounds: Yes  Wound care consulted: Yes

## 2020-10-31 NOTE — PROGRESS NOTES
[facility] - ICU 16   Critical Care Medicine  Progress Note    Patient Name: Nubia Riggs  MRN: [id number]  Admission Date: 10/18/2020  Hospital Length of Stay: 13 days  Code Status: Full Code  Attending Provider: Raad Martel MD  Primary Care Provider: Primary Doctor No   Principal Problem: Pneumonia due to COVID-19 virus    Subjective:     HPI:  55-year-old male with PMhx of GERD, allergic rinhitis, chronic cough (recently saw pulm and was diagnosed with reactive airway disease, on albuterol PRN) presented the emergency department on 10/18 with chief complaint of shortness of breath. He reports subjective fever, headache, chills, myalgias, poor appetite, and sweats about 6 days ago. He was diagnosed with COVID-19 on 10/15.  Reports presenting to urgent care 10/16 due to 89% pulse ox on room air. Started on levofloxacin and discharged home.  Reports using albuterol treatments at home without much relief.He noticed to have cough productive of blood tinged sputum and shortness of breath so he presented to the ED.     He received ceftria/azithro (x1 each in the ED). He started Remdesivir and completed 5 doses on 10/22, continues on dexamethasone. Patient is on his 7th day of hospital admission and has been requiring higher doses of O2 to keep sats >90%. Today he went up to 60L on 100% FiO2 and thus Critical Care was consulted.       Hospital/ICU Course:  Patient admitted to RICU due to worsening respiratory status with increasing oxygen requirements. Completion of remdesivir on 10/22. On BiPAP with O2 saturation in high 70s-low 80s at rest and acute desaturation to 40s when biPAP removed to provide PO medications. He required intubation on 10/26. Central line and arterial line placed. Proning initiated on 10/27. YOVANI developed with inital response to lasix however, not clearing as hyperkalemia persists. UO decreasing on 10/30 despite multiple lasix doses, and concern for hematuria, CBC sent.  Nephrology consult placed. Blood and sputum cultures on 10/30 NGTD, lactate normal, worsening WBC count. Heparin held overnight for hematuria and blood in sputum, restarted 10/31 for cessation of both.     Interval History/Significant Events: increasing levophed requirements so broadening from cefepime to zosyn. Hyperkalemia transiently improving with insulin shifts. Worsening BUN and renal failure despite fluids and diuretics, nephrology following.     Review of Systems   Unable to perform ROS: Intubated     Objective:     Vital Signs (Most Recent):  Temp: 100.2 °F (37.9 °C) (10/31/20 0800)  Pulse: 105 (10/31/20 0807)  Resp: (!) 35 (10/31/20 0807)  BP: (!) 101/58 (10/31/20 0800)  SpO2: (!) 93 % (10/31/20 0807) Vital Signs (24h Range):  Temp:  [99.1 °F (37.3 °C)-100.3 °F (37.9 °C)] 100.2 °F (37.9 °C)  Pulse:  [105-126] 105  Resp:  [11-36] 35  SpO2:  [92 %-99 %] 93 %  BP: ()/(54-70) 101/58  Arterial Line BP: ()/(50-75) 107/55   Weight: 84.4 kg (186 lb)  Body mass index is 26.69 kg/m².      Intake/Output Summary (Last 24 hours) at 10/31/2020 0920  Last data filed at 10/31/2020 0800  Gross per 24 hour   Intake 4850.49 ml   Output 3915 ml   Net 935.49 ml       Physical Exam  Vitals signs and nursing note reviewed.   Constitutional:       General: He is not in acute distress.     Appearance: Normal appearance. He is ill-appearing.      Interventions: He is sedated, chemically paralyzed and intubated.   Cardiovascular:      Rate and Rhythm: Regular rhythm. Tachycardia present.      Heart sounds: Normal heart sounds.   Pulmonary:      Effort: He is intubated.      Breath sounds: No wheezing, rhonchi or rales.      Comments: Coarse breath sounds  Abdominal:      Comments: Patient proned for exam   Musculoskeletal:      Right lower leg: No edema.      Left lower leg: No edema.   Skin:     Coloration: Skin is mottled (lright foot 4th digit, left foot, back of great toe).   Neurological:      Mental Status: He is  unresponsive.      GCS: GCS eye subscore is 1. GCS verbal subscore is 1. GCS motor subscore is 1.      Comments: Patient on NMB         Vents:  Vent Mode: A/C (10/31/20 0807)  Set Rate: 35 BPM (10/31/20 0807)  Vt Set: 450 mL (10/31/20 0807)  PEEP/CPAP: 12 cmH20 (10/31/20 0807)  Oxygen Concentration (%): 80 (10/31/20 0807)  Peak Airway Pressure: 31 cmH2O (10/31/20 0807)  Plateau Pressure: 28 cmH20 (10/31/20 0807)  Total Ve: 16.2 mL (10/31/20 0807)  F/VT Ratio<105 (RSBI): (!) 75.59 (10/31/20 0807)  Lines/Drains/Airways     Central Venous Catheter Line            Percutaneous Central Line Insertion/Assessment - Triple Lumen  10/26/20 1940 right internal jugular 4 days          Drain                 Urethral Catheter 10/26/20 0900 Non-latex 16 Fr. 5 days         NG/OG Tube 10/26/20 2000 orogastric 16 Fr. Left mouth 4 days          Airway                 Airway - Non-Surgical 10/26/20 1820 Endotracheal Tube 4 days       Airway Anesthesia 10/26/20 4 days          Arterial Line            Arterial Line 10/30/20 1556 Right Radial less than 1 day          Peripheral Intravenous Line                 Peripheral IV - Single Lumen 10/29/20 1445 20 G Anterior;Right Forearm 1 day              Significant Labs:    CBC/Anemia Profile:  Recent Labs   Lab 10/30/20  0427 10/30/20  0902 10/31/20  0428   WBC 14.78* 15.93* 18.04*   HGB 15.0 14.4 13.8*   HCT 48.9 46.6 45.1    201 253   MCV 98 100* 101*   RDW 12.6 12.7 13.1        Chemistries:  Recent Labs   Lab 10/30/20  0427 10/30/20  0849 10/30/20  1557 10/31/20  0056 10/31/20  0428    143 141 141 142   K 6.4* 5.9* 5.4* 5.7* 5.2*  5.2*    105 103 102 102   CO2 30* 29 31* 31* 31*   * 104* 107* 111* 114*   CREATININE 2.6* 2.7* 2.8* 2.9* 3.0*   CALCIUM 8.3* 8.1* 8.1* 7.9* 7.7*   ALBUMIN 1.2* 1.3* 1.3* 1.3*  --    PROT 6.0 5.9* 5.7* 5.8*  --    BILITOT 0.3 0.3 0.3 0.3  --    ALKPHOS 111 147* 219* 219*  --    ALT 53* 61* 81* 85*  --    AST 88* 103* 121* 109*  --     MG 3.2*  --   --   --  2.7*   PHOS 4.6*  --   --   --  5.3*       All pertinent labs within the past 24 hours have been reviewed.    Significant Imaging:  I have reviewed all pertinent imaging results/findings within the past 24 hours.      ABG  Recent Labs   Lab 10/31/20  1110   PH 7.303*   PO2 132*   PCO2 46.6*   HCO3 23.1*   BE -3     Assessment/Plan:     Psychiatric  Anxiety  Holding home vilazodone due heavy sedation requirements      Pulmonary  Reactive airway disease  Follows with Pulm as outpatient. Last seen 8/27/20, told he had RAD  He was also seen by a [facility] pulmonologist who did spirometry that reportedly showed borderline or mild asthma, and has been controlled with albuterol.   Former smoker, started at age 10-12 and quit 10 years ago  -- Continue duo nebs q4    Renal/  YOVANI (acute kidney injury)  No history of prior kidney disease; sCr on admission 0.8 -->2.6 as of 10/30, with UO less responsive to multiple doses of 40 mg lasix which he was initially responsive to; however, BUN rising and he is not clearing in his urine. Retroperitoneal u/s with bilateral medical renal diseased and no hydronephrosis. Fe Urea 26.4% (<35%) indicative of prerenal disease    --nephrology following, do not recommend acute dialysis at this time  --BMP q6  --continue Lasix and LR as needed for potassium removal.    Hyperkalemia  Noted 10/29. No EKG changes. Shifted with D50/insulin and furosemide which temporarily improved potassium    --see YOVANI        Other  * Pneumonia due to COVID-19 virus  COVID positive on 10/15. Patient admitted to hospital medicine and continued to deteriorate needing higher oxygen requirements and thus Critical Care Medicine was consulted. Intubated 10/26 and eventually placed on NMB and proned. Peak and plateau pressures remain at goal    Day 6 of intubation and Day 6 of NMB Last CXR 10/30    - s/p remdesivir (10/18-10/22)  - s/p Dexamethasone (10/27)  - Albuterol treatment PRN   - continue  Heparin gtt D-dimer >33  - continue LPV and pronation for PF <150 with FiO2 50% and PEEP 10  - Sputum culture sent 10/28 NGTD, reordered 10/30 for leukocytosis and febrile  - continue vanc, broaden cefepime to zosyn     COVID-19  See resp failure    Palliative care encounter  Palliative consulted for potential for prolonged hospitalization and following along.     Acute hypoxemic respiratory failure due to COVID-19  Secondary to COVID (see above)        Critical Care Time: 80 minutes  Critical secondary to Patient has a condition that poses threat to life and bodily function: Acute resp failure 2/2 COVID     Critical care was time spent personally by me on the following activities: development of treatment plan with patient or surrogate and bedside caregivers, discussions with consultants, evaluation of patient's response to treatment, examination of patient, ordering and performing treatments and interventions, ordering and review of laboratory studies, ordering and review of radiographic studies, pulse oximetry, re-evaluation of patient's condition. This critical care time did not overlap with that of any other provider or involve time for any procedures.     Chris Gibbons NP  Critical Care Medicine  [facility] - ICU [facility]

## 2020-10-31 NOTE — PROGRESS NOTES
Pharmacokinetic Assessment Follow Up: IV Vancomycin    Vancomycin serum concentration assessment(s):    · The random level of 13.3mcg/mL was an approximately 15 hour post dose level.   · The measurement is below the desired definitive target range of 15 to 20 mcg/mL.  Patient;s SCr continues to rise from 2.8mcg/mL to 3mcg/mL, however patient had a urine output of 2.645L yesterday and 1.9L thus far today and is clearing vancomycin per the 15 hour level,     Vancomycin Regimen Plan:    · Continue pulse dosing as SCR continues to rise and continue to monitor renal function and urine output.   · Redose vancomycin 1250mg (15mg/kg) x 1 dose today and redraw random level with AM labs tomorrow 11/1. Redose per level (if <20mcg/mL) and renal function.     Drug levels (last 3 results):  Recent Labs   Lab Result Units 10/29/20  0423 10/30/20  1020 10/31/20  0428   Vancomycin, Random ug/mL 10.0 12.3 13.3       Pharmacy will continue to follow and monitor vancomycin.    Please contact pharmacy at extension [phone] for questions regarding this assessment.    Thank you for the consult,   Abi Geiger, PharmD, BCPS, Cardiology Clinical Pharmacy Specialist  EXT [phone]       Patient brief summary:  Nubia Riggs is a 55 y.o. male initiated on antimicrobial therapy with IV Vancomycin for treatment of lower respiratory infection    The patient's current regimen is pulse dosing.     Drug Allergies:   Review of patient's allergies indicates:   Allergen Reactions    Meperidine      Other reaction(s): violent behavior    Sulfa (sulfonamide antibiotics)      Other reaction(s): Unknown       Actual Body Weight:   84.4kg    Renal Function:   Estimated Creatinine Clearance: 29.4 mL/min (A) (based on SCr of 3 mg/dL (H)).,     Dialysis Method (if applicable):  No dialysis     CBC (last 72 hours):  Recent Labs   Lab Result Units 10/29/20  0423 10/30/20  0427 10/30/20  0902 10/31/20  0428   WBC K/uL 11.62 14.78* 15.93* 18.04*    Hemoglobin g/dL 14.2 15.0 14.4 13.8*   Hematocrit % 46.0 48.9 46.6 45.1   Platelets K/uL 160 197 201 253   Gran % % 86.6* 85.8* 80.9* 84.0*   Lymph % % 4.0* 3.3* 3.9* 3.0*   Mono % % 6.0 7.1 10.0 8.0   Eosinophil % % 1.8 0.1 0.2 0.0   Basophil % % 0.2 0.7 0.6 1.0   Differential Method  Automated Automated Automated Manual       Metabolic Panel (last 72 hours):  Recent Labs   Lab Result Units 10/28/20  1518 10/29/20  0423 10/29/20  0606 10/29/20  0836 10/29/20  1233 10/29/20  1439 10/30/20  0010 10/30/20  0130 10/30/20  0427 10/30/20  0849 10/30/20  0957 10/30/20  1557 10/31/20  0056 10/31/20  0428   Sodium mmol/L 142 143 144  --  141 142 142 142 142 143  --  141 141 142   Sodium, Urine mmol/L  --   --   --   --   --   --   --   --   --   --  62  --   --   --    Potassium mmol/L 5.3* 6.3* 6.1*  --  5.3* 5.5* 7.0* 6.9* 6.4* 5.9*  --  5.4* 5.7* 5.2*  5.2*   Potassium, Urine mmol/L  --   --   --   --   --   --   --   --   --   --  37  --   --   --    Chloride mmol/L 109 107 108  --  107 106 106 105 104 105  --  103 102 102   Chloride, Urine mmol/L  --   --   --   --   --   --   --   --   --   --  91  --   --   --    CO2 mmol/L 26 29 27  --  29 29 26 29 30* 29  --  31* 31* 31*   Glucose mg/dL 154* 149* 149*  --  165* 129* 166* 174* 166* 109  --  173* 164* 159*   BUN mg/dL 70* 78* 80*  --  80* 82* 96* 98* 101* 104*  --  107* 111* 114*   Creatinine mg/dL 1.9* 1.9* 2.0*  --  1.9* 2.0* 2.4* 2.5* 2.6* 2.7*  --  2.8* 2.9* 3.0*   Creatinine, Urine mg/dL  --   --   --  28.0  --   --   --   --   --   --  40.0  --   --   --    Albumin g/dL 1.4* 1.4*  --   --   --  1.3* 1.4* 1.4* 1.2* 1.3*  --  1.3* 1.3*  --    Total Bilirubin mg/dL 0.2 0.3  --   --   --  0.3 0.3 0.3 0.3 0.3  --  0.3 0.3  --    Alkaline Phosphatase U/L 93 95  --   --   --  107 111 109 111 147*  --  219* 219*  --    AST U/L 62* 79*  --   --   --  74* 81* 80* 88* 103*  --  121* 109*  --    ALT U/L 31 33  --   --   --  36 43 46* 53* 61*  --  81* 85*  --     Magnesium mg/dL  --  3.2*  --   --   --   --   --   --  3.2*  --   --   --   --  2.7*   Phosphorus mg/dL  --  3.6  --   --   --   --   --   --  4.6*  --   --   --   --  5.3*       Vancomycin Administrations:  vancomycin given in the last 96 hours                   vancomycin 750 mg in dextrose 5 % 250 mL IVPB (ready to mix system) (mg) 750 mg New Bag 10/30/20 1339    vancomycin 1.25 g in dextrose 5% 250 mL IVPB (ready to mix) (mg) 1,250 mg New Bag 10/29/20 0900    vancomycin in dextrose 5 % 1 gram/250 mL IVPB 1,000 mg (mg) 1,000 mg New Bag 10/28/20 0820    vancomycin (VANCOCIN) 500 mg in dextrose 5 % 100 mL IVPB (mg) 500 mg New Bag 10/27/20 1811                Microbiologic Results:  Microbiology Results (last 7 days)     Procedure Component Value Units Date/Time    Blood culture [[id number]] Collected: 10/27/20 2011    Order Status: Completed Specimen: Blood from Peripheral, Forearm, Right Updated: 10/30/20 2212     Blood Culture, Routine No Growth to date      No Growth to date      No Growth to date      No Growth to date    Narrative:      Blood cultures from 2 different sites. 4 bottles total.  Please draw before starting antibiotics.    Blood culture [[id number]] Collected: 10/27/20 2011    Order Status: Completed Specimen: Blood from Peripheral, Forearm, Left Updated: 10/30/20 2212     Blood Culture, Routine No Growth to date      No Growth to date      No Growth to date      No Growth to date    Narrative:      Blood cultures x 2 different sites. 4 bottles total. Please  draw cultures before administering antibiotics.    Blood culture [[id number]] Collected: 10/30/20 1011    Order Status: Completed Specimen: Blood from Peripheral, Forearm, Left Updated: 10/30/20 1745     Blood Culture, Routine No Growth to date    Narrative:      Blood cultures x 2 different sites. 4 bottles total. Please  draw cultures before administering antibiotics.    Blood culture [[id number]] Collected: 10/30/20 1015    Order Status:  Completed Specimen: Blood from Peripheral, Upper Arm, Right Updated: 10/30/20 1745     Blood Culture, Routine No Growth to date    Narrative:      Blood cultures from 2 different sites. 4 bottles total.  Please draw before starting antibiotics.    Culture, Respiratory with Gram Stain [[id number]] Collected: 10/30/20 1040    Order Status: Completed Specimen: Respiratory from Endotracheal Aspirate Updated: 10/30/20 1521     Gram Stain (Respiratory) Rare WBC's     Gram Stain (Respiratory) No organisms seen    Narrative:      Mini-BAL. Before antibiotics.    Culture, Respiratory with Gram Stain [[id number]] Collected: 10/28/20 1100    Order Status: Completed Specimen: Respiratory from Endotracheal Aspirate Updated: 10/30/20 0818     Respiratory Culture Normal respiratory alejandra     Gram Stain (Respiratory) <10 epithelial cells per low power field.     Gram Stain (Respiratory) Few WBC's     Gram Stain (Respiratory) No organisms seen    Urine culture [[id number]] Collected: 10/27/20 2020    Order Status: Completed Specimen: Urine Updated: 10/28/20 2303     Urine Culture, Routine No growth    Narrative:      Specimen Source->Urine

## 2020-11-01 NOTE — SUBJECTIVE & OBJECTIVE
Interval History: Adequate UOP. NAEON. Stable potassium level.     Review of patient's allergies indicates:   Allergen Reactions    Meperidine      Other reaction(s): violent behavior    Sulfa (sulfonamide antibiotics)      Other reaction(s): Unknown     Current Facility-Administered Medications   Medication Frequency    acetaminophen oral solution 650 mg Q6H PRN    albuterol-ipratropium 2.5 mg-0.5 mg/3 mL nebulizer solution 3 mL Q4H    albuterol-ipratropium 2.5 mg-0.5 mg/3 mL nebulizer solution 3 mL Q6H PRN    balsam peru-castor oiL Oint BID    calcium gluconate 1g in dextrose 5% 100mL (ready to mix system) Q10 Min PRN    calcium gluconate 1g in dextrose 5% 100mL (ready to mix system) Q10 Min PRN    cisatracurium (NIMBEX) 200 mg in dextrose 5 % 100 mL infusion Continuous    dextrose 50% injection 12.5 g PRN    dextrose 50% injection 25 g PRN    famotidine (PF) injection 20 mg Daily    fentaNYL 2500 mcg in 0.9% sodium chloride 250 mL infusion premix (titrating) Continuous    fluconazole (DIFLUCAN) IVPB 400 mg 200 mL Q24H    glucagon (human recombinant) injection 1 mg PRN    glucose chewable tablet 16 g PRN    glucose chewable tablet 24 g PRN    heparin 25,000 units in dextrose 5% (100 units/ml) IV bolus from bag - ADDITIONAL PRN BOLUS - 30 units/kg PRN    heparin 25,000 units in dextrose 5% (100 units/ml) IV bolus from bag - ADDITIONAL PRN BOLUS - 60 units/kg PRN    heparin 25,000 units in dextrose 5% 250 mL (100 units/mL) infusion LOW INTENSITY nomogram - [facility] Continuous    multivitamin liquid no.118 per dose 10 mL Daily    norepinephrine 32 mg in dextrose 5 % 250 mL infusion Continuous    piperacillin-tazobactam 4.5 g in sodium chloride 0.9% 100 mL IVPB (ready to mix system) Q8H    polyethylene glycol packet 17 g Daily    propofol (DIPRIVAN) 10 mg/mL infusion Continuous    senna-docusate 8.6-50 mg per tablet 1 tablet Daily    sodium chloride 0.9% flush 10 mL PRN    vancomycin - pharmacy  to dose pharmacy to manage frequency    vitamin D 1000 units tablet 1,000 Units Daily    white petrolatum-mineral oil (LUBIFRESH P.M.) ophthalmic ointment Q8H       Objective:     Vital Signs (Most Recent):  Temp: 98.6 °F (37 °C) (11/01/20 1600)  Pulse: 101 (11/01/20 1603)  Resp: (!) 32 (11/01/20 1603)  BP: 133/62 (11/01/20 1600)  SpO2: (!) 88 % (11/01/20 1603)  O2 Device (Oxygen Therapy): ventilator (11/01/20 1603) Vital Signs (24h Range):  Temp:  [97.4 °F (36.3 °C)-99.1 °F (37.3 °C)] 98.6 °F (37 °C)  Pulse:  [] 101  Resp:  [23-35] 32  SpO2:  [88 %-98 %] 88 %  BP: (103-133)/(54-65) 133/62  Arterial Line BP: ()/(47-65) 146/57     Weight: 84.4 kg (186 lb) (10/25/20 1120)  Body mass index is 26.69 kg/m².  Body surface area is 2.04 meters squared.    I/O last 3 completed shifts:  In: 4560.5 [I.V.:3380.5; NG/GT:730; IV Piggyback:450]  Out: 4680 [Urine:4680]    Physical Exam  Vitals signs and nursing note reviewed.   Constitutional:       Appearance: He is ill-appearing. He is not diaphoretic.      Interventions: He is sedated and intubated.      Comments: COVID-19 Precautions maintained.     HENT:      Head: Normocephalic and atraumatic.   Cardiovascular:      Rate and Rhythm: Regular rhythm.   Pulmonary:      Effort: He is intubated.      Breath sounds: No rhonchi.   Musculoskeletal:      Right lower leg: No edema.      Left lower leg: No edema.   Skin:     General: Skin is warm and dry.         Significant Labs:  CBC:   Recent Labs   Lab 11/01/20  0433   WBC 25.00*   RBC 3.99*   HGB 12.2*   HCT 39.7*      *   MCH 30.6   MCHC 30.7*     CMP:   Recent Labs   Lab 11/01/20  0433 11/01/20  1246   *  175* 147*   CALCIUM 7.4*  7.3* 6.5*   ALBUMIN 1.2*  --    PROT 5.3*  --      146* 146*   K 5.1  5.1 4.1   CO2 31*  31* 27     106 108   *  111* 100*   CREATININE 2.5*  2.4* 2.0*   ALKPHOS 187*  --    ALT 70*  --    AST 79*  --    BILITOT 0.4  --      All labs within  the past 24 hours have been reviewed.

## 2020-11-01 NOTE — PROGRESS NOTES
[facility] - ICU 16 WT  Nephrology  Progress Note    Patient Name: Nubia Riggs  MRN: [id number]  Admission Date: 10/18/2020  Hospital Length of Stay: 14 days  Attending Provider: Raad Martel MD   Primary Care Physician: Primary Doctor No  Principal Problem:Pneumonia due to COVID-19 virus    Subjective:     HPI: Mr. Nubia Riggs is a 56 yo male with GERD and RAD with COVID-19 dx on 10/15 who presented to [facility] on 10/18 with worsening hypoxia and SOB at home.  He was orginally admitted to hospital medicine, but continued to have increase in oxygen requirements and severe hypoxia off of BiPAP and he was transferred to COVID-ICU on 10/24 and later intubated on the 26th.  Kidney function had remained stable during the admission, noting an YOVANI on 10/27 with a rise in Scr to 1.6.  On 10/26, he was noted to have episodes of hypotension and hypoxia during intubation and later having an increase in sCr with hyperkalemia that has been medically treated with lasix and insulin/dextrose without resolution.  Kidney function has continued to decline since intubation and Nephrology was consulted for evaluation.    Interval History: Adequate UOP. NAEON. Stable potassium level.     Review of patient's allergies indicates:   Allergen Reactions    Meperidine      Other reaction(s): violent behavior    Sulfa (sulfonamide antibiotics)      Other reaction(s): Unknown     Current Facility-Administered Medications   Medication Frequency    acetaminophen oral solution 650 mg Q6H PRN    albuterol-ipratropium 2.5 mg-0.5 mg/3 mL nebulizer solution 3 mL Q4H    albuterol-ipratropium 2.5 mg-0.5 mg/3 mL nebulizer solution 3 mL Q6H PRN    balsam peru-castor oiL Oint BID    calcium gluconate 1g in dextrose 5% 100mL (ready to mix system) Q10 Min PRN    calcium gluconate 1g in dextrose 5% 100mL (ready to mix system) Q10 Min PRN    cisatracurium (NIMBEX) 200 mg in dextrose 5 % 100 mL infusion Continuous    dextrose 50%  injection 12.5 g PRN    dextrose 50% injection 25 g PRN    famotidine (PF) injection 20 mg Daily    fentaNYL 2500 mcg in 0.9% sodium chloride 250 mL infusion premix (titrating) Continuous    fluconazole (DIFLUCAN) IVPB 400 mg 200 mL Q24H    glucagon (human recombinant) injection 1 mg PRN    glucose chewable tablet 16 g PRN    glucose chewable tablet 24 g PRN    heparin 25,000 units in dextrose 5% (100 units/ml) IV bolus from bag - ADDITIONAL PRN BOLUS - 30 units/kg PRN    heparin 25,000 units in dextrose 5% (100 units/ml) IV bolus from bag - ADDITIONAL PRN BOLUS - 60 units/kg PRN    heparin 25,000 units in dextrose 5% 250 mL (100 units/mL) infusion LOW INTENSITY nomogram - [facility] Continuous    multivitamin liquid no.118 per dose 10 mL Daily    norepinephrine 32 mg in dextrose 5 % 250 mL infusion Continuous    piperacillin-tazobactam 4.5 g in sodium chloride 0.9% 100 mL IVPB (ready to mix system) Q8H    polyethylene glycol packet 17 g Daily    propofol (DIPRIVAN) 10 mg/mL infusion Continuous    senna-docusate 8.6-50 mg per tablet 1 tablet Daily    sodium chloride 0.9% flush 10 mL PRN    vancomycin - pharmacy to dose pharmacy to manage frequency    vitamin D 1000 units tablet 1,000 Units Daily    white petrolatum-mineral oil (LUBIFRESH P.M.) ophthalmic ointment Q8H       Objective:     Vital Signs (Most Recent):  Temp: 98.6 °F (37 °C) (11/01/20 1600)  Pulse: 101 (11/01/20 1603)  Resp: (!) 32 (11/01/20 1603)  BP: 133/62 (11/01/20 1600)  SpO2: (!) 88 % (11/01/20 1603)  O2 Device (Oxygen Therapy): ventilator (11/01/20 1603) Vital Signs (24h Range):  Temp:  [97.4 °F (36.3 °C)-99.1 °F (37.3 °C)] 98.6 °F (37 °C)  Pulse:  [] 101  Resp:  [23-35] 32  SpO2:  [88 %-98 %] 88 %  BP: (103-133)/(54-65) 133/62  Arterial Line BP: ()/(47-65) 146/57     Weight: 84.4 kg (186 lb) (10/25/20 1120)  Body mass index is 26.69 kg/m².  Body surface area is 2.04 meters squared.    I/O last 3 completed shifts:  In:  4560.5 [I.V.:3380.5; NG/GT:730; IV Piggyback:450]  Out: 4680 [Urine:4680]    Physical Exam  Vitals signs and nursing note reviewed.   Constitutional:       Appearance: He is ill-appearing. He is not diaphoretic.      Interventions: He is sedated and intubated.      Comments: COVID-19 Precautions maintained.     HENT:      Head: Normocephalic and atraumatic.   Cardiovascular:      Rate and Rhythm: Regular rhythm.   Pulmonary:      Effort: He is intubated.      Breath sounds: No rhonchi.   Musculoskeletal:      Right lower leg: No edema.      Left lower leg: No edema.   Skin:     General: Skin is warm and dry.         Significant Labs:  CBC:   Recent Labs   Lab 11/01/20 0433   WBC 25.00*   RBC 3.99*   HGB 12.2*   HCT 39.7*      *   MCH 30.6   MCHC 30.7*     CMP:   Recent Labs   Lab 11/01/20 0433 11/01/20  1246   *  175* 147*   CALCIUM 7.4*  7.3* 6.5*   ALBUMIN 1.2*  --    PROT 5.3*  --      146* 146*   K 5.1  5.1 4.1   CO2 31*  31* 27     106 108   *  111* 100*   CREATININE 2.5*  2.4* 2.0*   ALKPHOS 187*  --    ALT 70*  --    AST 79*  --    BILITOT 0.4  --      All labs within the past 24 hours have been reviewed.     Assessment/Plan:     YOVANI (acute kidney injury)  Non-Oliguric YOVANI on Normal Renal Fx  YOVANI 2/2 ATN from septic shock from COVID-019 infection.  disproportionate rise in BUN to Cr with possible component of volume depletion.    Plan/Recommendations:  - Bolus of IV Lasix + IVF being effective for volume management and K excretion. Recommend continuing the same as necessary. No acute need for RRT.    -Continue strict I/O's  - Will continue following closely.        Thank you for your consult. I will follow-up with patient. Please contact us if you have any additional questions.    Virgil Henderson MD  Nephrology  [facility] - ICU 16 WT

## 2020-11-01 NOTE — NURSING
0954: Vent settings changed to fio2 of 70% and PEEP of 10.     1115: Patient supinated with 6 RN at bedside, 2 RRT at head of bed, and fellow MD at foot of bed. Patient tolerated the supination well as vitals remained stable throughout the turn.     1130: Liza, significant other, is at bedside.     1150: Dr. Martel changed ventilator rate to 32 from 35.    1355: Liza (significant other) leaving for the day. States she will be back tomorrow.     1415: Relayed critical calcium level to DILSHAD Gibbons. See communication note.     1555: RRT, Minerva, reported critical ABG to RN and PA. See chart for results.    1603: Called GWEN Mercer, regarding patient vitals. Patient has becoming tachycardic, hypertensive, and his o2 sats are slowly dropping (101 HR, 155/89 BP, 88% O2). Asked what had changed on the vent. Stated the PEEP was dropped to 10.0 this AM a few moments before 1000 and Dr. Martel dropped the rate to 32 a few minutes before 1100. The PA instructed to increase PEEP back to 12.0. Minerva, RRT, at bedside and increased PEEP to 12 at 1605.    1625: EKG obtained d/t quick onset tachycardia. Printed and showed to Sosa Truong MD.     1640: Patient proned with 4 RN at bedside, 2 PCT, 2 RRT at head of bed, and fellow MD at foot of bed. Patient tolerated the pronation well as vitals remained stable throughout the turn.     Care Plan    POC reviewed with Nubia Riggs's significant other, Liza. Questions and concerns addressed. Pt progressing toward goals. Will continue to monitor. See below and flowsheets for full assessment and VS info.       Neuro:  Vale Coma Scale  Best Eye Response: 1-->(E1) none  Best Motor Response: 1-->(M1) none  Best Verbal Response: 1-->(V1) none  Vale Coma Scale Score: 3  Assessment Qualifiers: patient intubated, patient chemically sedated or paralyzed  Pupil PERRLA: yes  24 hr Temp:  [97.4 °F (36.3 °C)-99.1 °F (37.3 °C)]      CV:  Rhythm: normal sinus rhythm  DVT  prophylaxis: VTE Required Core Measure: (SCDs) Sequential compression device initiated/maintained, Pharmacological prophylaxis initiated/maintained    Resp:  O2 Device (Oxygen Therapy): ventilator  Vent Mode: A/C  Set Rate: 32 BPM  Oxygen Concentration (%): 100  Vt Set: 450 mL  PEEP/CPAP: 10 cmH20    GI/:  GI prophylaxis: yes  Diet/Nutrition Received: tube feeding  Last Bowel Movement: 10/24/20  Voiding Characteristics: urethral catheter (bladder)  [REMOVED]      Urethral Catheter 10/26/20 0900 Non-latex 16 Fr.-Reason for Continuing Urinary Catheterization: Critically ill in ICU requiring intensive monitoring       Urethral Catheter 10/31/20 1300 Non-latex;Straight-tip 16 Fr.-Reason for Continuing Urinary Catheterization: Critically ill in ICU requiring intensive monitoring     Intake/Output Summary (Last 24 hours) at 11/1/2020 1841  Last data filed at 11/1/2020 1800  Gross per 24 hour   Intake 3774.32 ml   Output 3630 ml   Net 144.32 ml       Labs/Accuchecks:  Recent Labs   Lab 11/01/20  0433   WBC 25.00*   RBC 3.99*   HGB 12.2*   HCT 39.7*         Recent Labs   Lab 11/01/20  0433 11/01/20  1246     146* 146*   K 5.1  5.1 4.1   CO2 31*  31* 27     106 108   *  111* 100*   CREATININE 2.5*  2.4* 2.0*   ALKPHOS 187*  --    ALT 70*  --    AST 79*  --    BILITOT 0.4  --       Recent Labs   Lab 10/26/20  2235  11/01/20  0433   INR 1.3*  --   --    APTT 42.7*   < > 41.7*    < > = values in this interval not displayed.      Recent Labs   Lab 10/31/20  0428   *       Electrolytes: N/A - electrolytes WDL  Accuchecks: Q4H    Gtts/LDAs:   cisatracurium (NIMBEX) infusion 2.014 mcg/kg/min (11/01/20 1800)    fentanyl 25 mL/hr at 11/01/20 1800    heparin (porcine) in D5W 15.979 Units/kg/hr (11/01/20 1800)    norepinephrine bitartrate-D5W Stopped (11/01/20 1759)    propofoL 49.961 mcg/kg/min (11/01/20 1800)       Lines/Drains/Airways     Central Venous Catheter Line             Percutaneous Central Line Insertion/Assessment - Triple Lumen  10/26/20 1940 right internal jugular 6 days          Drain                 NG/OG Tube 10/26/20 2000 orogastric 16 Fr. Left mouth 5 days         Urethral Catheter 10/31/20 1300 Non-latex;Straight-tip 16 Fr. 1 day          Airway                 Airway - Non-Surgical 10/26/20 1820 Endotracheal Tube 6 days       Airway Anesthesia 10/26/20 6 days          Arterial Line            Arterial Line 10/30/20 1556 Right Radial 2 days          Peripheral Intravenous Line                 Peripheral IV - Single Lumen 10/31/20 1630 20 G Anterior;Right Forearm 1 day                Skin/Wounds:  Bathing/Skin Care: bath, partial Date: 11.01.20    Wounds: Yes  Wound care consulted: Yes, previously

## 2020-11-01 NOTE — ASSESSMENT & PLAN NOTE
Non-Oliguric YOVANI on Normal Renal Fx  YOVANI 2/2 ATN from septic shock from COVID-019 infection.  disproportionate rise in BUN to Cr with possible component of volume depletion.    Plan/Recommendations:  - Bolus of IV Lasix + IVF being effective for volume management and K excretion. Recommend continuing the same as necessary. No acute need for RRT.    -Continue strict I/O's  - Will continue following closely.

## 2020-11-01 NOTE — PROGRESS NOTES
[facility] - ICU 16   Critical Care Medicine  Progress Note    Patient Name: Nubia Riggs  MRN: [id number]  Admission Date: 10/18/2020  Hospital Length of Stay: 14 days  Code Status: Full Code  Attending Provider: Raad Martel MD  Primary Care Provider: Primary Doctor No   Principal Problem: Pneumonia due to COVID-19 virus    Subjective:     HPI:  55-year-old male with PMhx of GERD, allergic rinhitis, chronic cough (recently saw pulm and was diagnosed with reactive airway disease, on albuterol PRN) presented the emergency department on 10/18 with chief complaint of shortness of breath. He reports subjective fever, headache, chills, myalgias, poor appetite, and sweats about 6 days ago. He was diagnosed with COVID-19 on 10/15.  Reports presenting to urgent care 10/16 due to 89% pulse ox on room air. Started on levofloxacin and discharged home.  Reports using albuterol treatments at home without much relief.He noticed to have cough productive of blood tinged sputum and shortness of breath so he presented to the ED.     He received ceftria/azithro (x1 each in the ED). He started Remdesivir and completed 5 doses on 10/22, continues on dexamethasone. Patient is on his 7th day of hospital admission and has been requiring higher doses of O2 to keep sats >90%. Today he went up to 60L on 100% FiO2 and thus Critical Care was consulted.       Hospital/ICU Course:  Patient admitted to RICU due to worsening respiratory status with increasing oxygen requirements. Completion of remdesivir on 10/22. On BiPAP with O2 saturation in high 70s-low 80s at rest and acute desaturation to 40s when biPAP removed to provide PO medications. He required intubation on 10/26. Central line and arterial line placed. Proning initiated on 10/27. YOVANI developed with inital response to lasix however, not clearing as hyperkalemia persists. UO decreasing on 10/30 despite multiple lasix doses, and concern for hematuria, CBC sent.  Nephrology consult placed. Blood and sputum cultures on 10/30 NGTD, lactate normal, worsening WBC count. Heparin held overnight for hematuria and blood in sputum, restarted 10/31 for cessation of both. Vasopressor requirements improving, off vinay and vaso as of 11/01, WBC continues to increase, however not febrile and cultures are negative    Interval History/Significant Events: weaned off vaso and vinay, WBC continues to rise (25) without further fevers or growth from cultures, Lasix given with normal saline overnight remains proned. Bowel regimen initiated, advanced TF to 20 cc/hr     Review of Systems   Unable to perform ROS: Intubated     Objective:     Vital Signs (Most Recent):  Temp: 97.9 °F (36.6 °C) (11/01/20 0400)  Pulse: 95 (11/01/20 0700)  Resp: (!) 35 (11/01/20 0700)  BP: (!) 113/56 (11/01/20 0700)  SpO2: 96 % (11/01/20 0700) Vital Signs (24h Range):  Temp:  [97.9 °F (36.6 °C)-100 °F (37.8 °C)] 97.9 °F (36.6 °C)  Pulse:  [] 95  Resp:  [9-35] 35  SpO2:  [92 %-98 %] 96 %  BP: ()/(49-67) 113/56  Arterial Line BP: ()/(46-67) 119/56   Weight: 84.4 kg (186 lb)  Body mass index is 26.69 kg/m².      Intake/Output Summary (Last 24 hours) at 11/1/2020 0843  Last data filed at 11/1/2020 0700  Gross per 24 hour   Intake 3146.84 ml   Output 2510 ml   Net 636.84 ml       Physical Exam  Vitals signs and nursing note reviewed.   Constitutional:       General: He is not in acute distress.     Appearance: Normal appearance. He is ill-appearing.      Interventions: He is sedated, chemically paralyzed and intubated.   Cardiovascular:      Rate and Rhythm: Normal rate and regular rhythm.      Heart sounds: Normal heart sounds.   Pulmonary:      Effort: He is intubated.      Breath sounds: No wheezing, rhonchi or rales.      Comments: Coarse breath sounds  Abdominal:      Comments: Patient proned for exam   Musculoskeletal:      Right lower leg: No edema.      Left lower leg: No edema.   Skin:     Coloration:  Skin is mottled (right foot 4th digit, left foot, back of great toe).   Neurological:      Mental Status: He is unresponsive.      GCS: GCS eye subscore is 1. GCS verbal subscore is 1. GCS motor subscore is 1.      Comments: Patient on NMB         Vents:  Vent Mode: A/C (11/01/20 0443)  Set Rate: 35 BPM (11/01/20 0443)  Vt Set: 450 mL (11/01/20 0443)  PEEP/CPAP: 12 cmH20 (11/01/20 0443)  Oxygen Concentration (%): 80 (11/01/20 0714)  Peak Airway Pressure: 33 cmH2O (11/01/20 0443)  Plateau Pressure: 29 cmH20 (11/01/20 0443)  Total Ve: 16.8 mL (11/01/20 0443)  F/VT Ratio<105 (RSBI): (!) 72.92 (11/01/20 0443)  Lines/Drains/Airways     Central Venous Catheter Line            Percutaneous Central Line Insertion/Assessment - Triple Lumen  10/26/20 1940 right internal jugular 5 days          Drain                 NG/OG Tube 10/26/20 2000 orogastric 16 Fr. Left mouth 5 days         Urethral Catheter 10/31/20 1300 Non-latex;Straight-tip 16 Fr. less than 1 day          Airway                 Airway - Non-Surgical 10/26/20 1820 Endotracheal Tube 5 days       Airway Anesthesia 10/26/20 5 days          Arterial Line            Arterial Line 10/30/20 1556 Right Radial 1 day          Peripheral Intravenous Line                 Peripheral IV - Single Lumen 10/31/20 1630 20 G Anterior;Right Forearm less than 1 day              Significant Labs:    CBC/Anemia Profile:  Recent Labs   Lab 10/31/20  0428 10/31/20  1112 11/01/20  0433   WBC 18.04* 18.53* 25.00*   HGB 13.8* 12.2* 12.2*   HCT 45.1 38.9* 39.7*    224 257   * 101* 100*   RDW 13.1 12.9 12.8        Chemistries:  Recent Labs   Lab 10/31/20  0428 10/31/20  0952  10/31/20  1738 10/31/20  2327 11/01/20  0433    141  141   < > 141 142  142 145  146*   K 5.2*  5.2* 4.9  4.9   < > 4.8 5.6*  5.6* 5.1  5.1    101  101   < > 101 102  102 105  106   CO2 31* 32*  32*   < > 31* 31*  31* 31*  31*   * 112*  112*   < > 115* 114*  114* 110*  111*    CREATININE 3.0* 2.9*  2.9*   < > 2.5* 2.5*  2.5* 2.5*  2.4*   CALCIUM 7.7* 7.8*  7.8*   < > 7.8* 7.5*  7.5* 7.4*  7.3*   ALBUMIN  --  1.2*  --  1.2*  --  1.2*   PROT  --  5.4*  --  5.4*  --  5.3*   BILITOT  --  0.4  --  0.5  --  0.4   ALKPHOS  --  222*  --  214*  --  187*   ALT  --  77*  --  80*  --  70*   AST  --  100*  --  97*  --  79*   MG 2.7*  --   --   --   --  2.4   PHOS 5.3*  --   --   --   --  5.7*    < > = values in this interval not displayed.       All pertinent labs within the past 24 hours have been reviewed.    Significant Imaging:  I have reviewed all pertinent imaging results/findings within the past 24 hours.      ABG  Recent Labs   Lab 11/01/20  0445   PH 7.311*   PO2 85   PCO2 62.7*   HCO3 31.6*   BE 5     Assessment/Plan:     Psychiatric  Anxiety  Holding home vilazodone due heavy sedation requirements      Pulmonary  Reactive airway disease  Follows with Pulm as outpatient. Last seen 8/27/20, told he had RAD  He was also seen by a [facility] pulmonologist who did spirometry that reportedly showed borderline or mild asthma, and has been controlled with albuterol.   Former smoker, started at age 10-12 and quit 10 years ago  -- Continue duo nebs q4    Renal/  YOVANI (acute kidney injury)  No history of prior kidney disease; sCr on admission 0.8 -->2.6 as of 10/30, with UO less responsive to multiple doses of 40 mg lasix which he was initially responsive to; however, BUN rising and he is not clearing in his urine. Retroperitoneal u/s with bilateral medical renal diseased and no hydronephrosis. Fe Urea 26.4% (<35%) indicative of prerenal disease    --nephrology following, do not recommend acute dialysis at this time  --BMP q6  --continue Lasix and LR as needed for potassium removal.    Hypernatremia  Monitor, if fluids are given, prefer LR over NS    Hyperkalemia  Noted 10/29. No EKG changes. Shifted with D50/insulin and furosemide which temporarily improved potassium    --see YOVANI      Other  *  Pneumonia due to COVID-19 virus  COVID positive on 10/15. Patient admitted to hospital medicine and continued to deteriorate needing higher oxygen requirements and thus Critical Care Medicine was consulted. Intubated 10/26 and eventually placed on NMB and proned. Peak and plateau pressures remain at goal    Day 7 of intubation and Day 7 of NMB Last CXR 10/30    - s/p remdesivir (10/18-10/22)  - s/p Dexamethasone (10/27)  - s/p vanc x7 days (end date 11/01)  - Albuterol treatment PRN   - continue Heparin gtt D-dimer >33  - continue LPV and pronation for PF <150 with FiO2 50% and PEEP 10  - Sputum culture sent 10/28 NGTD, reordered 10/30 for leukocytosis and febrile  - continue zosyn, add fluconazole    COVID-19  See resp failure    Palliative care encounter  Palliative consulted for potential for prolonged hospitalization and following along.     Acute hypoxemic respiratory failure due to COVID-19  Secondary to COVID (see above)      Critical Care Time: 45 minutes  Critical secondary to Patient has a condition that poses threat to life and bodily function: Acute resp failure 2/2 COVID      Critical care was time spent personally by me on the following activities: development of treatment plan with patient or surrogate and bedside caregivers, discussions with consultants, evaluation of patient's response to treatment, examination of patient, ordering and performing treatments and interventions, ordering and review of laboratory studies, ordering and review of radiographic studies, pulse oximetry, re-evaluation of patient's condition. This critical care time did not overlap with that of any other provider or involve time for any procedures.     Chris Gibbons NP  Critical Care Medicine  [facility] - ICU 16 [facility]

## 2020-11-01 NOTE — ASSESSMENT & PLAN NOTE
No history of prior kidney disease; sCr on admission 0.8 -->2.6 as of 10/30, with UO less responsive to multiple doses of 40 mg lasix which he was initially responsive to; however, BUN rising and he is not clearing in his urine. Retroperitoneal u/s with bilateral medical renal diseased and no hydronephrosis. Fe Urea 26.4% (<35%) indicative of prerenal disease    --nephrology following, do not recommend acute dialysis at this time  --BMP q6  --continue Lasix and LR as needed for potassium removal.

## 2020-11-01 NOTE — SIGNIFICANT EVENT
Procedure Note  Supine Positioning  Critical Care Medicine       Chief Complaint: Pneumonia due to COVID-19 virus  MRN: [id number]  LOS: 14  Sex: male  Age: 55 y.o.      Last ABG:   Recent Labs   Lab 11/01/20  0445   PH 7.311*   PO2 85   PCO2 62.7*   HCO3 31.6*   BE 5       Vital Signs (Most Recent):   Temp: 97.8 °F (36.6 °C) (11/01/20 0800)  Pulse: 98 (11/01/20 1000)  Resp: (!) 35 (11/01/20 1000)  BP: 113/60 (11/01/20 1000)  SpO2: 95 % (11/01/20 1000)    Ventilator Settings:  Vent Mode: A/C  Oxygen Concentration (%):  [] 70  Resp Rate Total:  [35 br/min] 35 br/min  Vt Set:  [450 mL] 450 mL  PEEP/CPAP:  [12 cmH20] 12 cmH20  Mean Airway Pressure:  [20 qhG36-59 cmH20] 23 cmH20    Indication: Severe, refractory ARDS. High risk for deterioration during proning procedure in need of direct monitoring by Critical Care personnel.     Prior to beginning the procedure, all appropriate equipment and personnel were gathered in the room. Two individuals were placed on each side of the patient and one person stood at the head of the bed and was dedicated to the management of the head of the patient, the endotracheal tube, and the ventilator lines. The person at the head of the bed  coordinated the steps of the proning procedure with team team at the direction of Critical Care team members at the bedside. The patient was first log-rolled 90 degrees onto their side towards the direction of their central venous catheter. Cardiac electrodes were then moved from the patient's anterior to the posterior. The patient?s knees, forehead, chest, and iliac crests were protected using adhesive pads and/or foam pads to reduce the risk of skin breakdown. Once properly positioned in the bed, another 90 degree log roll was performed placing the patient into the supine position. The patient's arms were placed alongside the body. . The patient tolerated the procedure well.     Total Critical Care time (uninterrupted not including procedures):  15

## 2020-11-01 NOTE — PROGRESS NOTES
"Pharmacokinetic Assessment Follow Up: IV Vancomycin    Vancomycin serum concentration assessment(s):    · The random level of 14.9mcg/mL was an approximately 20 hour post dose level.  · The measurement is below the desired definitive target range of 15 to 20 mcg/mL.  · Patient's serum creatine remains the same around 2.4/2.5 mg/dL. Patient maintaining good urine out put 2.8L yesterday and 1.8L today thus far.    Vancomycin Regimen Plan:    · Will redose vancomycin 1250mg 24 hours from the last dose at 08:30h today. Obtain  A vancomycin level at 07:30h tomorrow 11/02/20 to serve as the "pre third dose level" if patient SCr continues to improve and urine output maintained consider scheduling vancomycin regimen per level.     Drug levels (last 3 results):  Recent Labs   Lab Result Units 10/30/20  1020 10/31/20  0428 11/01/20  0433   Vancomycin, Random ug/mL 12.3 13.3 14.9       Pharmacy will continue to follow and monitor vancomycin.    Please contact pharmacy at extension [phone] for questions regarding this assessment.    Thank you for the consult,   Abi Geiger, PharmD, BCPS, Cardiology Clinical Pharmacy Specialist  EXT [phone]       Patient brief summary:  Nubia Riggs is a 55 y.o. male initiated on antimicrobial therapy with IV Vancomycin for treatment of lower respiratory infection    The patient's current regimen is vancomycin 1250mg x 1 dose yesterday 10/31.     Drug Allergies:   Review of patient's allergies indicates:   Allergen Reactions    Meperidine      Other reaction(s): violent behavior    Sulfa (sulfonamide antibiotics)      Other reaction(s): Unknown       Actual Body Weight:   84.4kg    Renal Function:   Estimated Creatinine Clearance: 35.3 mL/min (A) (based on SCr of 2.5 mg/dL (H)).,     Dialysis Method (if applicable):  N/A    CBC (last 72 hours):  Recent Labs   Lab Result Units 10/30/20  0427 10/30/20  0902 10/31/20  0428 10/31/20  1112 11/01/20  0433   WBC K/uL 14.78* 15.93* " 18.04* 18.53* 25.00*   Hemoglobin g/dL 15.0 14.4 13.8* 12.2* 12.2*   Hematocrit % 48.9 46.6 45.1 38.9* 39.7*   Platelets K/uL 197 201 253 224 257   Gran % % 85.8* 80.9* 84.0* 90.0* 90.0*   Lymph % % 3.3* 3.9* 3.0* 2.0* 3.0*   Mono % % 7.1 10.0 8.0 7.0 6.0   Eosinophil % % 0.1 0.2 0.0 0.0 0.0   Basophil % % 0.7 0.6 1.0 0.0 0.0   Differential Method  Automated Automated Manual Manual Manual       Metabolic Panel (last 72 hours):  Recent Labs   Lab Result Units 10/29/20  0836 10/29/20  1233 10/29/20  1439 10/30/20  0010 10/30/20  0130 10/30/20  0427 10/30/20  0849 10/30/20  0957 10/30/20  1557 10/31/20  0056 10/31/20  0428 10/31/20  0952 10/31/20  1112 10/31/20  1428 10/31/20  1738 10/31/20  2327 11/01/20  0433   Sodium mmol/L  --  141 142 142 142 142 143  --  141 141 142 141  141 143  --  141 142  142 145  146*   Sodium, Urine mmol/L  --   --   --   --   --   --   --  62  --   --   --   --   --   --   --   --   --    Potassium mmol/L  --  5.3* 5.5* 7.0* 6.9* 6.4* 5.9*  --  5.4* 5.7* 5.2*  5.2* 4.9  4.9 4.8  --  4.8 5.6*  5.6* 5.1  5.1   Potassium, Urine mmol/L  --   --   --   --   --   --   --  37  --   --   --   --   --   --   --   --   --    Chloride mmol/L  --  107 106 106 105 104 105  --  103 102 102 101  101 103  --  101 102  102 105  106   Chloride, Urine mmol/L  --   --   --   --   --   --   --  91  --   --   --   --   --   --   --   --   --    CO2 mmol/L  --  29 29 26 29 30* 29  --  31* 31* 31* 32*  32* 28  --  31* 31*  31* 31*  31*   Glucose mg/dL  --  165* 129* 166* 174* 166* 109  --  173* 164* 159* 178*  178* 157*  --  158* 182*  182* 179*  175*   Glucose, UA   --   --   --   --   --   --   --   --   --   --   --   --   --  Negative  --   --   --    BUN mg/dL  --  80* 82* 96* 98* 101* 104*  --  107* 111* 114* 112*  112* 113*  --  115* 114*  114* 110*  111*   Creatinine mg/dL  --  1.9* 2.0* 2.4* 2.5* 2.6* 2.7*  --  2.8* 2.9* 3.0* 2.9*  2.9* 2.8*  --  2.5* 2.5*  2.5* 2.5*  2.4*    Creatinine, Urine mg/dL 28.0  --   --   --   --   --   --  40.0  --   --   --   --   --   --   --   --   --    Albumin g/dL  --   --  1.3* 1.4* 1.4* 1.2* 1.3*  --  1.3* 1.3*  --  1.2*  --   --  1.2*  --  1.2*   Total Bilirubin mg/dL  --   --  0.3 0.3 0.3 0.3 0.3  --  0.3 0.3  --  0.4  --   --  0.5  --  0.4   Alkaline Phosphatase U/L  --   --  107 111 109 111 147*  --  219* 219*  --  222*  --   --  214*  --  187*   AST U/L  --   --  74* 81* 80* 88* 103*  --  121* 109*  --  100*  --   --  97*  --  79*   ALT U/L  --   --  36 43 46* 53* 61*  --  81* 85*  --  77*  --   --  80*  --  70*   Magnesium mg/dL  --   --   --   --   --  3.2*  --   --   --   --  2.7*  --   --   --   --   --  2.4   Phosphorus mg/dL  --   --   --   --   --  4.6*  --   --   --   --  5.3*  --   --   --   --   --  5.7*       Vancomycin Administrations:  vancomycin given in the last 96 hours                   vancomycin 1.25 g in dextrose 5% 250 mL IVPB (ready to mix) (mg) 1,250 mg New Bag 10/31/20 0834    vancomycin 750 mg in dextrose 5 % 250 mL IVPB (ready to mix system) (mg) 750 mg New Bag 10/30/20 1339    vancomycin 1.25 g in dextrose 5% 250 mL IVPB (ready to mix) (mg) 1,250 mg New Bag 10/29/20 0900    vancomycin in dextrose 5 % 1 gram/250 mL IVPB 1,000 mg (mg) 1,000 mg New Bag 10/28/20 0820                Microbiologic Results:  Microbiology Results (last 7 days)     Procedure Component Value Units Date/Time    Blood culture [[id number]] Collected: 10/27/20 2011    Order Status: Completed Specimen: Blood from Peripheral, Forearm, Right Updated: 10/31/20 2212     Blood Culture, Routine No Growth to date      No Growth to date      No Growth to date      No Growth to date      No Growth to date    Narrative:      Blood cultures from 2 different sites. 4 bottles total.  Please draw before starting antibiotics.    Blood culture [[id number]] Collected: 10/27/20 2011    Order Status: Completed Specimen: Blood from Peripheral, Forearm, Left Updated:  10/31/20 2212     Blood Culture, Routine No Growth to date      No Growth to date      No Growth to date      No Growth to date      No Growth to date    Narrative:      Blood cultures x 2 different sites. 4 bottles total. Please  draw cultures before administering antibiotics.    Urine culture [[id number]] Collected: 10/31/20 1428    Order Status: No result Specimen: Urine Updated: 10/31/20 1512    Blood culture [[id number]] Collected: 10/30/20 1015    Order Status: Completed Specimen: Blood from Peripheral, Upper Arm, Right Updated: 10/31/20 1212     Blood Culture, Routine No Growth to date      No Growth to date    Narrative:      Blood cultures from 2 different sites. 4 bottles total.  Please draw before starting antibiotics.    Blood culture [[id number]] Collected: 10/30/20 1011    Order Status: Completed Specimen: Blood from Peripheral, Forearm, Left Updated: 10/31/20 1212     Blood Culture, Routine No Growth to date      No Growth to date    Narrative:      Blood cultures x 2 different sites. 4 bottles total. Please  draw cultures before administering antibiotics.    Culture, Respiratory with Gram Stain [[id number]] Collected: 10/30/20 1040    Order Status: Completed Specimen: Respiratory from Endotracheal Aspirate Updated: 10/31/20 0744     Respiratory Culture Normal respiratory alejandra     Gram Stain (Respiratory) Rare WBC's     Gram Stain (Respiratory) No organisms seen    Narrative:      Mini-BAL. Before antibiotics.    Culture, Respiratory with Gram Stain [[id number]] Collected: 10/28/20 1100    Order Status: Completed Specimen: Respiratory from Endotracheal Aspirate Updated: 10/30/20 0818     Respiratory Culture Normal respiratory alejandra     Gram Stain (Respiratory) <10 epithelial cells per low power field.     Gram Stain (Respiratory) Few WBC's     Gram Stain (Respiratory) No organisms seen    Urine culture [[id number]] Collected: 10/27/20 2020    Order Status: Completed Specimen: Urine Updated: 10/28/20 2303      Urine Culture, Routine No growth    Narrative:      Specimen Source->Urine

## 2020-11-01 NOTE — ASSESSMENT & PLAN NOTE
COVID positive on 10/15. Patient admitted to hospital medicine and continued to deteriorate needing higher oxygen requirements and thus Critical Care Medicine was consulted. Intubated 10/26 and eventually placed on NMB and proned. Peak and plateau pressures remain at goal    Day 7 of intubation and Day 7 of NMB Last CXR 10/30    - s/p remdesivir (10/18-10/22)  - s/p Dexamethasone (10/27)  - s/p vanc x7 days (end date 11/01)  - Albuterol treatment PRN   - continue Heparin gtt D-dimer >33  - continue LPV and pronation for PF <150 with FiO2 50% and PEEP 10  - Sputum culture sent 10/28 NGTD, reordered 10/30 for leukocytosis and febrile  - continue zosyn, add fluconazole

## 2020-11-01 NOTE — SIGNIFICANT EVENT
Procedure Note  Prone Positioning  Critical Care Medicine       Chief Complaint: Pneumonia due to COVID-19 virus  MRN: [id number]  LOS: 14  Sex: male  Age: 55 y.o.      Last ABG:   Recent Labs   Lab 11/01/20  1542   PH 7.284*   PO2 66*   PCO2 71.6*   HCO3 33.9*   BE 7       Vital Signs (Most Recent):   Temp: 98.6 °F (37 °C) (11/01/20 1600)  Pulse: 101 (11/01/20 1603)  Resp: (!) 32 (11/01/20 1603)  BP: 133/62 (11/01/20 1600)  SpO2: (!) 88 % (11/01/20 1603)    Ventilator Settings:  Vent Mode: A/C  Oxygen Concentration (%):  [] 100  Resp Rate Total:  [32 br/min-35 br/min] 32 br/min  Vt Set:  [450 mL] 450 mL  PEEP/CPAP:  [10 kyV49-21 cmH20] 10 cmH20  Mean Airway Pressure:  [18 ekS75-35 cmH20] 18 cmH20    Indication: Severe, refractory ARDS. High risk for deterioration during proning procedure in need of direct monitoring by Critical Care personnel.     Prior to beginning the procedure, all appropriate equipment and personnel were gathered in the room. Two individuals were placed on each side of the patient and one person stood at the head of the bed and was dedicated to the management of the head of the patient, the endotracheal tube, and the ventilator lines. The person at the head of the bed  coordinated the steps of the proning procedure with team team at the direction of Critical Care team members at the bedside. The patient was first log-rolled 90 degrees onto their side towards the direction of their central venous catheter. Cardiac electrodes were then moved from the patient's anterior to the posterior. The patient?s knees, forehead, chest, and iliac crests were protected using adhesive pads and/or foam pads to reduce the risk of skin breakdown. Once properly positioned in the bed, another 90 degree log roll was performed placing the patient into the prone position. The patient's arms were placed alongside the body. The patient's head should be turned alternately to the right or left every 2 hours. The patient  tolerated the procedure well.     Total Critical Care time (uninterrupted not including procedures): 15

## 2020-11-01 NOTE — SUBJECTIVE & OBJECTIVE
Interval History/Significant Events: weaned off vaso and vinay, WBC continues to rise (25) without further fevers or growth from cultures, Lasix given with normal saline overnight remains proned.     Review of Systems   Unable to perform ROS: Intubated     Objective:     Vital Signs (Most Recent):  Temp: 97.9 °F (36.6 °C) (11/01/20 0400)  Pulse: 95 (11/01/20 0700)  Resp: (!) 35 (11/01/20 0700)  BP: (!) 113/56 (11/01/20 0700)  SpO2: 96 % (11/01/20 0700) Vital Signs (24h Range):  Temp:  [97.9 °F (36.6 °C)-100 °F (37.8 °C)] 97.9 °F (36.6 °C)  Pulse:  [] 95  Resp:  [9-35] 35  SpO2:  [92 %-98 %] 96 %  BP: ()/(49-67) 113/56  Arterial Line BP: ()/(46-67) 119/56   Weight: 84.4 kg (186 lb)  Body mass index is 26.69 kg/m².      Intake/Output Summary (Last 24 hours) at 11/1/2020 0843  Last data filed at 11/1/2020 0700  Gross per 24 hour   Intake 3146.84 ml   Output 2510 ml   Net 636.84 ml       Physical Exam  Vitals signs and nursing note reviewed.   Constitutional:       General: He is not in acute distress.     Appearance: Normal appearance. He is ill-appearing.      Interventions: He is sedated, chemically paralyzed and intubated.   Cardiovascular:      Rate and Rhythm: Normal rate and regular rhythm.      Heart sounds: Normal heart sounds.   Pulmonary:      Effort: He is intubated.      Breath sounds: No wheezing, rhonchi or rales.      Comments: Coarse breath sounds  Abdominal:      Comments: Patient proned for exam   Musculoskeletal:      Right lower leg: No edema.      Left lower leg: No edema.   Skin:     Coloration: Skin is mottled (right foot 4th digit, left foot, back of great toe).   Neurological:      Mental Status: He is unresponsive.      GCS: GCS eye subscore is 1. GCS verbal subscore is 1. GCS motor subscore is 1.      Comments: Patient on NMB         Vents:  Vent Mode: A/C (11/01/20 0443)  Set Rate: 35 BPM (11/01/20 0443)  Vt Set: 450 mL (11/01/20 0443)  PEEP/CPAP: 12 cmH20 (11/01/20  0443)  Oxygen Concentration (%): 80 (11/01/20 0714)  Peak Airway Pressure: 33 cmH2O (11/01/20 0443)  Plateau Pressure: 29 cmH20 (11/01/20 0443)  Total Ve: 16.8 mL (11/01/20 0443)  F/VT Ratio<105 (RSBI): (!) 72.92 (11/01/20 0443)  Lines/Drains/Airways     Central Venous Catheter Line            Percutaneous Central Line Insertion/Assessment - Triple Lumen  10/26/20 1940 right internal jugular 5 days          Drain                 NG/OG Tube 10/26/20 2000 orogastric 16 Fr. Left mouth 5 days         Urethral Catheter 10/31/20 1300 Non-latex;Straight-tip 16 Fr. less than 1 day          Airway                 Airway - Non-Surgical 10/26/20 1820 Endotracheal Tube 5 days       Airway Anesthesia 10/26/20 5 days          Arterial Line            Arterial Line 10/30/20 1556 Right Radial 1 day          Peripheral Intravenous Line                 Peripheral IV - Single Lumen 10/31/20 1630 20 G Anterior;Right Forearm less than 1 day              Significant Labs:    CBC/Anemia Profile:  Recent Labs   Lab 10/31/20  0428 10/31/20  1112 11/01/20  0433   WBC 18.04* 18.53* 25.00*   HGB 13.8* 12.2* 12.2*   HCT 45.1 38.9* 39.7*    224 257   * 101* 100*   RDW 13.1 12.9 12.8        Chemistries:  Recent Labs   Lab 10/31/20  0428 10/31/20  0952  10/31/20  1738 10/31/20  2327 11/01/20  0433    141  141   < > 141 142  142 145  146*   K 5.2*  5.2* 4.9  4.9   < > 4.8 5.6*  5.6* 5.1  5.1    101  101   < > 101 102  102 105  106   CO2 31* 32*  32*   < > 31* 31*  31* 31*  31*   * 112*  112*   < > 115* 114*  114* 110*  111*   CREATININE 3.0* 2.9*  2.9*   < > 2.5* 2.5*  2.5* 2.5*  2.4*   CALCIUM 7.7* 7.8*  7.8*   < > 7.8* 7.5*  7.5* 7.4*  7.3*   ALBUMIN  --  1.2*  --  1.2*  --  1.2*   PROT  --  5.4*  --  5.4*  --  5.3*   BILITOT  --  0.4  --  0.5  --  0.4   ALKPHOS  --  222*  --  214*  --  187*   ALT  --  77*  --  80*  --  70*   AST  --  100*  --  97*  --  79*   MG 2.7*  --   --   --   --  2.4    PHOS 5.3*  --   --   --   --  5.7*    < > = values in this interval not displayed.       All pertinent labs within the past 24 hours have been reviewed.    Significant Imaging:  I have reviewed all pertinent imaging results/findings within the past 24 hours.

## 2020-11-02 PROBLEM — E87.0 HYPERNATREMIA: Status: ACTIVE | Noted: 2020-01-01

## 2020-11-02 NOTE — SIGNIFICANT EVENT
Procedure Note  Prone Positioning  Critical Care Medicine       Chief Complaint: Pneumonia due to COVID-19 virus  MRN: [id number]  LOS: 15  Sex: male  Age: 55 y.o.      Last ABG:   Recent Labs   Lab 11/02/20  1517   PH 7.220*   PO2 76*   PCO2 85.3*   HCO3 34.9*   BE 7       Vital Signs (Most Recent):   Temp: 98.8 °F (37.1 °C) (11/02/20 1200)  Pulse: 101 (11/02/20 1600)  Resp: (!) 32 (11/02/20 1600)  BP: (!) 103/53 (11/02/20 1600)  SpO2: (!) 87 % (11/02/20 1600)    Ventilator Settings:  Vent Mode: A/C  Oxygen Concentration (%):  [] 100  Resp Rate Total:  [32 br/min] 32 br/min  Vt Set:  [430 mL-450 mL] 430 mL  PEEP/CPAP:  [12 cmH20] 12 cmH20  Mean Airway Pressure:  [18 wdS36-92 cmH20] 21 cmH20    Indication: Severe, refractory ARDS. High risk for deterioration during proning procedure in need of direct monitoring by Critical Care personnel.     Prior to beginning the procedure, all appropriate equipment and personnel were gathered in the room. Two individuals were placed on each side of the patient and one person stood at the head of the bed and was dedicated to the management of the head of the patient, the endotracheal tube, and the ventilator lines. The person at the head of the bed  coordinated the steps of the proning procedure with team team at the direction of Critical Care team members at the bedside. The patient was first log-rolled 90 degrees onto their side towards the direction of their central venous catheter. Cardiac electrodes were then moved from the patient's anterior to the posterior. The patient?s knees, forehead, chest, and iliac crests were protected using adhesive pads and/or foam pads to reduce the risk of skin breakdown. Once properly positioned in the bed, another 90 degree log roll was performed placing the patient into the prone position. The patient's arms were placed alongside the body. The patient's head should be turned alternately to the right or left every 2 hours. The patient  tolerated the procedure well.     ETT 26 cm at the lip pre and post procedure    Total Critical Care time (uninterrupted not including procedures): 15 minutes

## 2020-11-02 NOTE — PROGRESS NOTES
[facility] - ICU 16   Critical Care Medicine  Progress Note    Patient Name: Nubia Riggs  MRN: [id number]  Admission Date: 10/18/2020  Hospital Length of Stay: 15 days  Code Status: Full Code  Attending Provider: Parminder Marin*  Primary Care Provider: Primary Doctor No   Principal Problem: Pneumonia due to COVID-19 virus    Subjective:     HPI:  55-year-old male with PMhx of GERD, allergic rinhitis, chronic cough (recently saw pulm and was diagnosed with reactive airway disease, on albuterol PRN) presented the emergency department on 10/18 with chief complaint of shortness of breath. He reports subjective fever, headache, chills, myalgias, poor appetite, and sweats about 6 days ago. He was diagnosed with COVID-19 on 10/15.  Reports presenting to urgent care 10/16 due to 89% pulse ox on room air. Started on levofloxacin and discharged home.  Reports using albuterol treatments at home without much relief.He noticed to have cough productive of blood tinged sputum and shortness of breath so he presented to the ED.     He received ceftria/azithro (x1 each in the ED). He started Remdesivir and completed 5 doses on 10/22, continues on dexamethasone. Patient is on his 7th day of hospital admission and has been requiring higher doses of O2 to keep sats >90%. Today he went up to 60L on 100% FiO2 and thus Critical Care was consulted.       Hospital/ICU Course:  Patient admitted to RICU due to worsening respiratory status with increasing oxygen requirements. Completion of remdesivir on 10/22. On BiPAP with O2 saturation in high 70s-low 80s at rest and acute desaturation to 40s when biPAP removed to provide PO medications. He required intubation on 10/26. Central line and arterial line placed. Proning initiated on 10/27. YOVANI developed with inital response to lasix however, not clearing as hyperkalemia persists. UO decreasing on 10/30 despite multiple lasix doses, and concern for hematuria, CBC sent.  Nephrology consult placed. Blood and sputum cultures on 10/30 NGTD, lactate normal, worsening WBC count. Heparin held overnight for hematuria and blood in sputum, restarted 10/31 for cessation of both. Vasopressor requirements improving, off vinay and vaso as of 11/01, WBC continues to increase, however not febrile and cultures are negative. Added fluconazole 11/1.     Interval History/Significant Events: added fluconazole 11/1, WBC leveled off this am 23 from 25. Wheezing on exam this morning, prn duo neb requested in addition to scheduled doses     Review of Systems   Unable to perform ROS: Intubated     Objective:     Vital Signs (Most Recent):  Temp: 97.4 °F (36.3 °C) (11/02/20 0800)  Pulse: 84 (11/02/20 0900)  Resp: (!) 32 (11/02/20 0900)  BP: (!) 95/52 (11/02/20 0900)  SpO2: 97 % (11/02/20 0900) Vital Signs (24h Range):  Temp:  [97.4 °F (36.3 °C)-99.7 °F (37.6 °C)] 97.4 °F (36.3 °C)  Pulse:  [] 84  Resp:  [32-35] 32  SpO2:  [88 %-99 %] 97 %  BP: ()/(52-67) 95/52  Arterial Line BP: (105-162)/(46-64) 132/48   Weight: 84.4 kg (186 lb)  Body mass index is 26.69 kg/m².      Intake/Output Summary (Last 24 hours) at 11/2/2020 0929  Last data filed at 11/2/2020 0900  Gross per 24 hour   Intake 3362.55 ml   Output 2665 ml   Net 697.55 ml       Physical Exam  Vitals signs and nursing note reviewed.   Constitutional:       General: He is not in acute distress.     Appearance: Normal appearance. He is ill-appearing.      Interventions: He is sedated, chemically paralyzed and intubated.   Cardiovascular:      Rate and Rhythm: Normal rate and regular rhythm.      Heart sounds: Normal heart sounds.   Pulmonary:      Effort: He is intubated.      Breath sounds: No wheezing, rhonchi or rales.      Comments: Coarse breath sounds  Abdominal:      Comments: Patient proned for exam   Musculoskeletal:      Right lower leg: No edema.      Left lower leg: No edema.   Skin:     Coloration: Skin is mottled (right foot 4th  digit, left foot, back of great toe).   Neurological:      Mental Status: He is unresponsive.      GCS: GCS eye subscore is 1. GCS verbal subscore is 1. GCS motor subscore is 1.      Comments: Patient on NMB         Vents:  Vent Mode: A/C (11/02/20 0817)  Set Rate: 32 BPM (11/02/20 0817)  Vt Set: 450 mL (11/02/20 0817)  PEEP/CPAP: 12 cmH20 (11/02/20 0817)  Oxygen Concentration (%): 90 (11/02/20 0900)  Peak Airway Pressure: 35 cmH2O (11/02/20 0817)  Plateau Pressure: 29 cmH20 (11/02/20 0817)  Total Ve: 15.3 mL (11/02/20 0817)  F/VT Ratio<105 (RSBI): (!) 66.95 (11/02/20 0817)  Lines/Drains/Airways     Central Venous Catheter Line            Percutaneous Central Line Insertion/Assessment - Triple Lumen  10/26/20 1940 right internal jugular 6 days          Drain                 NG/OG Tube 10/26/20 2000 orogastric 16 Fr. Left mouth 6 days         Urethral Catheter 10/31/20 1300 Non-latex;Straight-tip 16 Fr. 1 day          Airway                 Airway - Non-Surgical 10/26/20 1820 Endotracheal Tube 6 days       Airway Anesthesia 10/26/20 6 days          Arterial Line            Arterial Line 10/30/20 1556 Right Radial 2 days          Peripheral Intravenous Line                 Peripheral IV - Single Lumen 10/31/20 1630 20 G Anterior;Right Forearm 1 day              Significant Labs:    CBC/Anemia Profile:  Recent Labs   Lab 10/31/20  1112 11/01/20  0433 11/02/20  0447   WBC 18.53* 25.00* 23.57*   HGB 12.2* 12.2* 11.2*   HCT 38.9* 39.7* 37.5*    257 241   * 100* 103*   RDW 12.9 12.8 13.2        Chemistries:  Recent Labs   Lab 10/31/20  1738  11/01/20  0433  11/01/20  1850 11/02/20  0038 11/02/20  0447      < > 145  146*   < > 145 147* 148*  148*   K 4.8   < > 5.1  5.1   < > 4.6 5.1 4.7  4.7      < > 105  106   < > 105 107 107  108   CO2 31*   < > 31*  31*   < > 30* 32* 31*  31*   *   < > 110*  111*   < > 104* 104* 108*  106*   CREATININE 2.5*   < > 2.5*  2.4*   < > 2.3* 2.4* 2.4*   2.4*   CALCIUM 7.8*   < > 7.4*  7.3*   < > 7.5* 7.7* 7.4*  7.5*   ALBUMIN 1.2*  --  1.2*  --   --   --  1.2*   PROT 5.4*  --  5.3*  --   --   --  5.1*   BILITOT 0.5  --  0.4  --   --   --  0.3   ALKPHOS 214*  --  187*  --   --   --  179*   ALT 80*  --  70*  --   --   --  59*   AST 97*  --  79*  --   --   --  76*   MG  --   --  2.4  --   --   --  2.7*   PHOS  --   --  5.7*  --   --   --  5.2*    < > = values in this interval not displayed.       All pertinent labs within the past 24 hours have been reviewed.    Significant Imaging:  I have reviewed all pertinent imaging results/findings within the past 24 hours.      ABG  Recent Labs   Lab 11/02/20  0449   PH 7.306*   PO2 105*   PCO2 71.7*   HCO3 35.8*   BE 9     Assessment/Plan:     Psychiatric  Anxiety  Holding home vilazodone due heavy sedation administration      Pulmonary  Reactive airway disease  Follows with Pulm as outpatient. Last seen 8/27/20, told he had RAD  He was also seen by a [facility] pulmonologist who did spirometry that reportedly showed borderline or mild asthma, and has been controlled with albuterol.   Former smoker, started at age 10-12 and quit 10 years ago    -- Continue duo nebs q4    Renal/  Hypernatremia  Likely secondary to TFs, begin enteral water boluses 200 cc QID    YOVANI (acute kidney injury)  No history of prior kidney disease; sCr on admission 0.8 -->2.6 as of 10/30, with UO less responsive to multiple doses of 40 mg lasix which he was initially responsive to; however, BUN rising and he is not clearing in his urine. Retroperitoneal u/s with bilateral medical renal diseased and no hydronephrosis. Fe Urea 26.4% (<35%) indicative of prerenal disease    --nephrology following, do not recommend acute dialysis at this time  --BMP q12  --continue Lasix and LR as needed for potassium removal.    Hyperkalemia  Noted 10/29. No EKG changes. Shifted with D50/insulin and furosemide which temporarily improved potassium    --see YOVANI        Other  *  Pneumonia due to COVID-19 virus  COVID positive on 10/15. Patient admitted to hospital medicine and continued to deteriorate needing higher oxygen requirements and thus Critical Care Medicine was consulted. Intubated 10/26 and eventually placed on NMB and proned. Peak and plateau pressures remain at goal    Day 8 of intubation and Day 8 of NMB Last CXR 11/2    - s/p remdesivir (10/18-10/22)  - s/p Dexamethasone (10/27)  - s/p vanc x7 days (end date 11/01)  - duo nebs q4 scheduled and q6 PRN  - continue Heparin gtt D-dimer >33  - continue LPV and pronation for PF <150 with FiO2 50% and PEEP 10  - Sputum culture sent 10/28 NGTD, reordered 10/30 for leukocytosis and febrile all negative  - continue zosyn, add fluconazole    COVID-19  See resp failure    Palliative care encounter  Palliative consulted for potential for prolonged hospitalization and following along.     Acute hypoxemic respiratory failure due to COVID-19  Secondary to COVID (see above)        Critical Care Time: 45 minutes  Critical secondary to Patient has a condition that poses threat to life and bodily function: Acute resp failure 2/2 COVID      Critical care was time spent personally by me on the following activities: development of treatment plan with patient or surrogate and bedside caregivers, discussions with consultants, evaluation of patient's response to treatment, examination of patient, ordering and performing treatments and interventions, ordering and review of laboratory studies, ordering and review of radiographic studies, pulse oximetry, re-evaluation of patient's condition. This critical care time did not overlap with that of any other provider or involve time for any procedures.     Chris Gibbons NP  Critical Care Medicine  [facility] - ICU 16 [facility]

## 2020-11-02 NOTE — ASSESSMENT & PLAN NOTE
Non-Oliguric YOVANI on Normal Renal Fx  YOVANI 2/2 ATN from septic shock from COVID-019 infection.  disproportionate rise in BUN to Cr with possible component of volume depletion.    Plan/Recommendations:  -No acute need for RRT  -kidney function stable, good UOP  -sodium trending up to 148, started on FWF via OG tube.    -would recommend holding further diuresis.  -FiO2 100%.  He appears to be volume depleted from disproportionate rise in BUN from SCR, but due to high oxygen requirements, would hold off on fluid boluses.  - Will continue following closely.

## 2020-11-02 NOTE — CARE UPDATE
Updated significant other at bedside this morning regarding patient's clinical status. All questions answered and concerns addressed.        Chris Gibbons NP  Pulmonary Critical Care Medicine  [facility]

## 2020-11-02 NOTE — ASSESSMENT & PLAN NOTE
No history of prior kidney disease; sCr on admission 0.8 -->2.6 as of 10/30, with UO less responsive to multiple doses of 40 mg lasix which he was initially responsive to; however, BUN rising and he is not clearing in his urine. Retroperitoneal u/s with bilateral medical renal diseased and no hydronephrosis. Fe Urea 26.4% (<35%) indicative of prerenal disease    --nephrology following, do not recommend acute dialysis at this time  --BMP q12  --continue Lasix and LR as needed for potassium removal.

## 2020-11-02 NOTE — ASSESSMENT & PLAN NOTE
COVID positive on 10/15. Patient admitted to hospital medicine and continued to deteriorate needing higher oxygen requirements and thus Critical Care Medicine was consulted. Intubated 10/26 and eventually placed on NMB and proned. Peak and plateau pressures remain at goal    Day 8 of intubation and Day 8 of NMB Last CXR 11/2    - s/p remdesivir (10/18-10/22)  - s/p Dexamethasone (10/27)  - s/p vanc x7 days (end date 11/01)  - duo nebs q4 scheduled and q6 PRN  - continue Heparin gtt D-dimer >33  - continue LPV and pronation for PF <150 with FiO2 50% and PEEP 10  - Sputum culture sent 10/28 NGTD, reordered 10/30 for leukocytosis and febrile all negative  - continue zosyn, add fluconazole

## 2020-11-02 NOTE — SUBJECTIVE & OBJECTIVE
Interval History:  Remains intubated/sedated.  UOP of 2.7L/24h, net even/24h.  SCr remains stable @ 2.4, BUN trending up to 108.    Hyperkalemia has improved, now with hypernatremia with sodium of 148.    Review of patient's allergies indicates:   Allergen Reactions    Meperidine      Other reaction(s): violent behavior    Sulfa (sulfonamide antibiotics)      Other reaction(s): Unknown     Current Facility-Administered Medications   Medication Frequency    acetaminophen oral solution 650 mg Q6H PRN    albuterol-ipratropium 2.5 mg-0.5 mg/3 mL nebulizer solution 3 mL Q4H    albuterol-ipratropium 2.5 mg-0.5 mg/3 mL nebulizer solution 3 mL Q6H PRN    balsam peru-castor oiL Oint BID    calcium gluconate 1g in dextrose 5% 100mL (ready to mix system) Q10 Min PRN    calcium gluconate 1g in dextrose 5% 100mL (ready to mix system) Q10 Min PRN    cisatracurium (NIMBEX) 200 mg in dextrose 5 % 100 mL infusion Continuous    dextrose 50% injection 12.5 g PRN    dextrose 50% injection 25 g PRN    famotidine (PF) injection 20 mg Daily    fentaNYL 2500 mcg in 0.9% sodium chloride 250 mL infusion premix (titrating) Continuous    fluconazole (DIFLUCAN) IVPB 400 mg 200 mL Q24H    glucagon (human recombinant) injection 1 mg PRN    glucose chewable tablet 16 g PRN    glucose chewable tablet 24 g PRN    heparin 25,000 units in dextrose 5% (100 units/ml) IV bolus from bag - ADDITIONAL PRN BOLUS - 30 units/kg PRN    heparin 25,000 units in dextrose 5% (100 units/ml) IV bolus from bag - ADDITIONAL PRN BOLUS - 60 units/kg PRN    heparin 25,000 units in dextrose 5% 250 mL (100 units/mL) infusion LOW INTENSITY nomogram - [facility] Continuous    multivitamin liquid no.118 per dose 10 mL Daily    norepinephrine 32 mg in dextrose 5 % 250 mL infusion Continuous    piperacillin-tazobactam 4.5 g in sodium chloride 0.9% 100 mL IVPB (ready to mix system) Q8H    polyethylene glycol packet 17 g Daily    propofol (DIPRIVAN) 10 mg/mL  infusion Continuous    senna-docusate 8.6-50 mg per tablet 1 tablet Daily    sodium chloride 0.9% flush 10 mL PRN    vitamin D 1000 units tablet 1,000 Units Daily    white petrolatum-mineral oil (LUBIFRESH P.M.) ophthalmic ointment Q8H       Objective:     Vital Signs (Most Recent):  Temp: 98.8 °F (37.1 °C) (11/02/20 1200)  Pulse: 105 (11/02/20 1510)  Resp: (!) 32 (11/02/20 1510)  BP: 117/62 (11/02/20 1400)  SpO2: (!) 86 % (11/02/20 1510)  O2 Device (Oxygen Therapy): ventilator (11/02/20 1510) Vital Signs (24h Range):  Temp:  [97.4 °F (36.3 °C)-99.7 °F (37.6 °C)] 98.8 °F (37.1 °C)  Pulse:  [] 105  Resp:  [32] 32  SpO2:  [86 %-100 %] 86 %  BP: ()/(52-67) 117/62  Arterial Line BP: (105-183)/(46-64) 132/55     Weight: 84.4 kg (186 lb) (10/25/20 1120)  Body mass index is 26.69 kg/m².  Body surface area is 2.04 meters squared.    I/O last 3 completed shifts:  In: 4739.3 [I.V.:2649.3; NG/GT:1040; IV Piggyback:1050]  Out: 4630 [Urine:4625; Drains:5]    Physical Exam  Vitals signs and nursing note reviewed.   Constitutional:       Appearance: He is ill-appearing. He is not diaphoretic.      Interventions: He is sedated and intubated.      Comments: COVID-19 Precautions maintained.     HENT:      Head: Normocephalic and atraumatic.   Cardiovascular:      Rate and Rhythm: Regular rhythm. Tachycardia present.   Pulmonary:      Effort: He is intubated.      Breath sounds: No rhonchi.   Musculoskeletal:      Right lower leg: No edema.      Left lower leg: No edema.   Skin:     General: Skin is warm and dry.         Significant Labs:  CBC:   Recent Labs   Lab 11/02/20  0447   WBC 23.57*   RBC 3.65*   HGB 11.2*   HCT 37.5*      *   MCH 30.7   MCHC 29.9*     CMP:   Recent Labs   Lab 11/02/20 0447 11/02/20  1216   *  141* 132*   CALCIUM 7.4*  7.5* 7.8*   ALBUMIN 1.2*  --    PROT 5.1*  --    *  148* 148*   K 4.7  4.7 4.2   CO2 31*  31* 32*     108 106   *  106* 110*    CREATININE 2.4*  2.4* 2.4*   ALKPHOS 179*  --    ALT 59*  --    AST 76*  --    BILITOT 0.3  --

## 2020-11-02 NOTE — PROGRESS NOTES
"[facility] - ICU 16 WT  Adult Nutrition  Progress Note    SUMMARY       Recommendations    Recommendations:   1. Continue Novasource Renal @ 20 mL/hr as medically appropriate.     --When renal labs WNL or CRRT initiated, recommend modifying TF to Peptamen Intense VHP and advancing as tolerated to goal rate of 55 mL/hr to provide 1988 kcal (with current propofol), 121 g protein, and 1109 mL fluid.   --Hold for residuals >500 mL. Additional free water per MD.     2. When propofol d/c'ed recommend TF of Impact Peptide 1.5 to goal rate of 55 mL/hr to provide 1980 kcal, 124 gm protein, and 1016 mL free fluid.     3. RD following.     Goals: Pt to meet >75% EEN and EPN by RD follow up.  Nutrition Goal Status: progressing towards goal  Communication of RD Recs: other (comment)(POC)    Reason for Assessment    Reason For Assessment: RD follow-up  Diagnosis: (Pneumonia due to COVID-19 virus)  Relevant Medical History: GERD, allergic rinhitis, chronic cough (recently saw pulm and was diagnosed with reactive airway disease)  Interdisciplinary Rounds: did not attend  General Information Comments: Pt remains intubated and sedated. Pt with recent YOVANI, nephrology following. No s/s of discomfort of TF per flowsheets. Weight relatively stable per chart. Due to recent hospital wide restrictions to limit the transfer of (COVID-19), we are not performing any physical exams at this time on patients with +COVID-19. All S/S will be observational; NFPE to be performed at a future date.  Nutrition Discharge Planning: Unclear at this time    Nutrition Risk Screen    Nutrition Risk Screen: tube feeding or parenteral nutrition    Nutrition/Diet History    Spiritual, Cultural Beliefs, Sabianism Practices, Values that Affect Care: no  Factors Affecting Nutritional Intake: NPO, on mechanical ventilation    Anthropometrics    Temp: 98.8 °F (37.1 °C)  Height: 5' 10"  Height (inches): 70 in  Weight Method: Standard Scale  Weight: 84.4 kg " (186 lb)  Weight (lb): 186 lb  Ideal Body Weight (IBW), Male: 166 lb  % Ideal Body Weight, Male (lb): 112.05 %  BMI (Calculated): 26.7  BMI Grade: 25 - 29.9 - overweight       Lab/Procedures/Meds    Pertinent Labs Reviewed: reviewed  Pertinent Labs Comments: Na 148, , GFR 29.7, Ca 7.4, Glu 141, iCal 0.75, Phos 5.2, Mg 2.7  Pertinent Medications Reviewed: reviewed  Pertinent Medications Comments: famotidine, furosemide, MVI, insulin, vit D, fentanyl, propofol    Estimated/Assessed Needs    Weight Used For Calorie Calculations: 84.4 kg (186 lb 1.1 oz)  Energy Calorie Requirements (kcal): 2134 kcal/day  Energy Need Method: [facility]  Protein Requirements: 101-127 gm/day(1.2-1.5 gm/kg)  Weight Used For Protein Calculations: 84.4 kg (186 lb 1.1 oz)  Fluid Requirements (mL): 1 mL/kcal or per MD  Estimated Fluid Requirement Method: RDA Method  RDA Method (mL): 2134         Nutrition Prescription Ordered    Current Diet Order: NPO  Nutrition Order Comments: Continue trickle feeds at 20 mL/hr. Do not advance  Current Nutrition Support Formula Ordered: Novasource Renal  Current Nutrition Support Rate Ordered: 20 (ml)(20 mL/hr)  Current Nutrition Support Frequency Ordered: mL/hr    Evaluation of Received Nutrient/Fluid Intake    Enteral Calories (kcal): 960  Enteral Protein (gm): 44  Enteral (Free Water) Fluid (mL): 344  Other Calories (kcal): 668(propofol)  % Kcal Needs: 76  % Protein Needs: 44  I/O: +4.3L since admit  Energy Calories Required: meeting needs  Protein Required: not meeting needs  Fluid Required: other (see comments)(per MD)  Comments: LBM 10/24  Tolerance: tolerating  % Intake of Estimated Energy Needs: 75 - 100 %  % Meal Intake: NPO    Nutrition Risk    Level of Risk/Frequency of Follow-up: high     Assessment and Plan    Nutrition Problem  Inadequate Energy Intake      Related to (etiology):   Inability to consume sufficient energy      Signs and Symptoms (as evidenced by):   Pt intubated,  NPO      Interventions (treatment strategy):  Collaboration of nutrition care with other providers     Nutrition Diagnosis Status:   Continues         Monitor and Evaluation    Food and Nutrient Intake: energy intake, enteral nutrition intake  Food and Nutrient Adminstration: enteral and parenteral nutrition administration  Anthropometric Measurements: weight, weight change, body mass index  Biochemical Data, Medical Tests and Procedures: electrolyte and renal panel, gastrointestinal profile, glucose/endocrine profile, inflammatory profile, lipid profile  Nutrition-Focused Physical Findings: overall appearance     Nutrition Follow-Up    RD Follow-up?: Yes

## 2020-11-02 NOTE — NURSING
0900: Edwige LAZARO NP rounding on patient. No concerns this AM per Chris.    1005: Patient supinated with RRT x 2 at [facility], NP at [facility], 6 - RNs at bedside. Patient tolerated the supination well with vitals remaining stable.     1020: Decreased Fi02 to 90% on ventilator.     1125: Significant other, Liza at bedside.     1225: Xray obtained at bedside. Water bolus given via OG tube. o2 decreasing to 88-90%.     1239: RRT notified of o2 sats remaining around 88% so Fio2 increased to 100% on ventilator.     1301: TF stopped. OG tube beginning to clog. Unable to pull back or flush fluids.     1325: Liza, leaving bedside.     1409: Changed dressing to bridge of nose, per order.     1450: Dr. Tania MD, and Chris Gibbons NP, at bedside rounding. Called Chris approx 2-3 minutes before she walked in due to patient 02 sat not maintaining. 02 sat currently 86% on fio2 100% on vent. Removed OG and placed new, air heard in abdomen, STAT chest xray order placed to check placement.     1635: Patient proned with 2 RRT at head of bed, 4 RN at bedside, NP and MD at foot of bed. Patient tolerated pronation well. NSR obtained after flipping and 02 is now 91% on 90% and 12.0 PEEP. OG tube okay to use per Chris Gibbons NP. Order placed in a nursing communication. Changed the the arterial line per sterile technique.    Care Plan    POC reviewed with Nubia Riggs's significant other. Questions and concerns addressed. Pt progressing toward goals. Will continue to monitor. See below and flowsheets for full assessment and VS info.       Neuro:  Thorp Coma Scale  Best Eye Response: 1-->(E1) none  Best Motor Response: 1-->(M1) none  Best Verbal Response: 1-->(V1) none  Thorp Coma Scale Score: 3  Assessment Qualifiers: patient intubated, patient chemically sedated or paralyzed  Pupil PERRLA: yes  24 hr Temp:  [97.4 °F (36.3 °C)-99.7 °F (37.6 °C)]      CV:  Rhythm: normal sinus rhythm  DVT prophylaxis: VTE Required Core Measure:  (SCDs) Sequential compression device initiated/maintained, Pharmacological prophylaxis initiated/maintained    Resp:  O2 Device (Oxygen Therapy): ventilator  Vent Mode: A/C  Set Rate: 32 BPM  Oxygen Concentration (%): 90  Vt Set: 430 mL  PEEP/CPAP: 15 cmH20    GI/:  GI prophylaxis: yes  Diet/Nutrition Received: tube feeding  Last Bowel Movement: 10/24/20  Voiding Characteristics: urethral catheter (bladder)  [REMOVED]      Urethral Catheter 10/26/20 0900 Non-latex 16 Fr.-Reason for Continuing Urinary Catheterization: Critically ill in ICU requiring intensive monitoring       Urethral Catheter 10/31/20 1300 Non-latex;Straight-tip 16 Fr.-Reason for Continuing Urinary Catheterization: Critically ill in ICU requiring intensive monitoring     Intake/Output Summary (Last 24 hours) at 11/2/2020 1822  Last data filed at 11/2/2020 1800  Gross per 24 hour   Intake 2643.93 ml   Output 2080 ml   Net 563.93 ml       Labs/Accuchecks:  Recent Labs   Lab 11/02/20  0447   WBC 23.57*   RBC 3.65*   HGB 11.2*   HCT 37.5*         Recent Labs   Lab 11/02/20  0447 11/02/20  1216   *  148* 148*   K 4.7  4.7 4.2   CO2 31*  31* 32*     108 106   *  106* 110*   CREATININE 2.4*  2.4* 2.4*   ALKPHOS 179*  --    ALT 59*  --    AST 76*  --    BILITOT 0.3  --       Recent Labs   Lab 10/26/20  2235  11/02/20  1750   INR 1.3*  --   --    APTT 42.7*   < > 38.2*    < > = values in this interval not displayed.      Recent Labs   Lab 10/31/20  0428   *       Electrolytes: N/A - electrolytes WDL  Accuchecks: Q4H    Gtts/LDAs:   cisatracurium (NIMBEX) infusion 2.014 mcg/kg/min (11/02/20 1800)    fentanyl 25 mL/hr at 11/02/20 1800    heparin (porcine) in D5W 18.041 Units/kg/hr (11/02/20 1800)    norepinephrine bitartrate-D5W Stopped (11/02/20 1238)    propofoL 50 mcg/kg/min (11/02/20 1820)       Lines/Drains/Airways     Central Venous Catheter Line            Percutaneous Central Line Insertion/Assessment -  Triple Lumen  10/26/20 1940 right internal jugular 6 days          Drain                 Urethral Catheter 10/31/20 1300 Non-latex;Straight-tip 16 Fr. 2 days         NG/OG Tube 11/02/20 1456 orogastric 14 Fr. Left mouth less than 1 day          Airway                 Airway - Non-Surgical 10/26/20 1820 Endotracheal Tube 7 days       Airway Anesthesia 10/26/20 7 days          Arterial Line            Arterial Line 10/30/20 1556 Right Radial 3 days          Peripheral Intravenous Line                 Peripheral IV - Single Lumen 10/31/20 1630 20 G Anterior;Right Forearm 2 days                Skin/Wounds:  Bathing/Skin Care: bath, partial Date: 11.02.20  Wounds: Yes  Wound care consulted: Yes, previously

## 2020-11-02 NOTE — ASSESSMENT & PLAN NOTE
Follows with Pulm as outpatient. Last seen 8/27/20, told he had RAD  He was also seen by a [facility] pulmonologist who did spirometry that reportedly showed borderline or mild asthma, and has been controlled with albuterol.   Former smoker, started at age 10-12 and quit 10 years ago    -- Continue duo nebs q4

## 2020-11-02 NOTE — SUBJECTIVE & OBJECTIVE
Interval History/Significant Events: added fluconazole 11/1, WBC leveled off this am 23 from 25. Wheezing on exam this morning, prn duo neb requested in addition to scheduled doses     Review of Systems   Unable to perform ROS: Intubated     Objective:     Vital Signs (Most Recent):  Temp: 97.4 °F (36.3 °C) (11/02/20 0800)  Pulse: 84 (11/02/20 0900)  Resp: (!) 32 (11/02/20 0900)  BP: (!) 95/52 (11/02/20 0900)  SpO2: 97 % (11/02/20 0900) Vital Signs (24h Range):  Temp:  [97.4 °F (36.3 °C)-99.7 °F (37.6 °C)] 97.4 °F (36.3 °C)  Pulse:  [] 84  Resp:  [32-35] 32  SpO2:  [88 %-99 %] 97 %  BP: ()/(52-67) 95/52  Arterial Line BP: (105-162)/(46-64) 132/48   Weight: 84.4 kg (186 lb)  Body mass index is 26.69 kg/m².      Intake/Output Summary (Last 24 hours) at 11/2/2020 0929  Last data filed at 11/2/2020 0900  Gross per 24 hour   Intake 3362.55 ml   Output 2665 ml   Net 697.55 ml       Physical Exam  Vitals signs and nursing note reviewed.   Constitutional:       General: He is not in acute distress.     Appearance: Normal appearance. He is ill-appearing.      Interventions: He is sedated, chemically paralyzed and intubated.   Cardiovascular:      Rate and Rhythm: Normal rate and regular rhythm.      Heart sounds: Normal heart sounds.   Pulmonary:      Effort: He is intubated.      Breath sounds: No wheezing, rhonchi or rales.      Comments: Coarse breath sounds  Abdominal:      Comments: Patient proned for exam   Musculoskeletal:      Right lower leg: No edema.      Left lower leg: No edema.   Skin:     Coloration: Skin is mottled (right foot 4th digit, left foot, back of great toe).   Neurological:      Mental Status: He is unresponsive.      GCS: GCS eye subscore is 1. GCS verbal subscore is 1. GCS motor subscore is 1.      Comments: Patient on NMB         Vents:  Vent Mode: A/C (11/02/20 0817)  Set Rate: 32 BPM (11/02/20 0817)  Vt Set: 450 mL (11/02/20 0817)  PEEP/CPAP: 12 cmH20 (11/02/20 0817)  Oxygen  Concentration (%): 90 (11/02/20 0900)  Peak Airway Pressure: 35 cmH2O (11/02/20 0817)  Plateau Pressure: 29 cmH20 (11/02/20 0817)  Total Ve: 15.3 mL (11/02/20 0817)  F/VT Ratio<105 (RSBI): (!) 66.95 (11/02/20 0817)  Lines/Drains/Airways     Central Venous Catheter Line            Percutaneous Central Line Insertion/Assessment - Triple Lumen  10/26/20 1940 right internal jugular 6 days          Drain                 NG/OG Tube 10/26/20 2000 orogastric 16 Fr. Left mouth 6 days         Urethral Catheter 10/31/20 1300 Non-latex;Straight-tip 16 Fr. 1 day          Airway                 Airway - Non-Surgical 10/26/20 1820 Endotracheal Tube 6 days       Airway Anesthesia 10/26/20 6 days          Arterial Line            Arterial Line 10/30/20 1556 Right Radial 2 days          Peripheral Intravenous Line                 Peripheral IV - Single Lumen 10/31/20 1630 20 G Anterior;Right Forearm 1 day              Significant Labs:    CBC/Anemia Profile:  Recent Labs   Lab 10/31/20  1112 11/01/20  0433 11/02/20  0447   WBC 18.53* 25.00* 23.57*   HGB 12.2* 12.2* 11.2*   HCT 38.9* 39.7* 37.5*    257 241   * 100* 103*   RDW 12.9 12.8 13.2        Chemistries:  Recent Labs   Lab 10/31/20  1738  11/01/20  0433  11/01/20  1850 11/02/20  0038 11/02/20  0447      < > 145  146*   < > 145 147* 148*  148*   K 4.8   < > 5.1  5.1   < > 4.6 5.1 4.7  4.7      < > 105  106   < > 105 107 107  108   CO2 31*   < > 31*  31*   < > 30* 32* 31*  31*   *   < > 110*  111*   < > 104* 104* 108*  106*   CREATININE 2.5*   < > 2.5*  2.4*   < > 2.3* 2.4* 2.4*  2.4*   CALCIUM 7.8*   < > 7.4*  7.3*   < > 7.5* 7.7* 7.4*  7.5*   ALBUMIN 1.2*  --  1.2*  --   --   --  1.2*   PROT 5.4*  --  5.3*  --   --   --  5.1*   BILITOT 0.5  --  0.4  --   --   --  0.3   ALKPHOS 214*  --  187*  --   --   --  179*   ALT 80*  --  70*  --   --   --  59*   AST 97*  --  79*  --   --   --  76*   MG  --   --  2.4  --   --   --  2.7*   PHOS   --   --  5.7*  --   --   --  5.2*    < > = values in this interval not displayed.       All pertinent labs within the past 24 hours have been reviewed.    Significant Imaging:  I have reviewed all pertinent imaging results/findings within the past 24 hours.

## 2020-11-02 NOTE — SIGNIFICANT EVENT
Procedure Note  Supine Positioning  Critical Care Medicine       Chief Complaint: Pneumonia due to COVID-19 virus  MRN: [id number]  LOS: 15  Sex: male  Age: 55 y.o.      Last ABG:   Recent Labs   Lab 11/02/20  0449   PH 7.306*   PO2 105*   PCO2 71.7*   HCO3 35.8*   BE 9       Vital Signs (Most Recent):   Temp: 97.4 °F (36.3 °C) (11/02/20 0800)  Pulse: 84 (11/02/20 0900)  Resp: (!) 32 (11/02/20 0900)  BP: (!) 95/52 (11/02/20 0900)  SpO2: 97 % (11/02/20 0900)    Ventilator Settings:  Vent Mode: A/C  Oxygen Concentration (%):  [] 90  Resp Rate Total:  [32 br/min-35 br/min] 32 br/min  Vt Set:  [450 mL] 450 mL  PEEP/CPAP:  [10 [id number] cmH20] 12 cmH20  Mean Airway Pressure:  [18 wpM97-01 cmH20] 21 cmH20        Indication: Severe, refractory ARDS. High risk for deterioration during supination procedure in need of direct monitoring by Critical Care personnel.     Prior to beginning the procedure, all appropriate equipment and personnel were gathered in the room. Two individuals were placed on each side of the patient and two respiratory therapists stood at the head of the bed and was dedicated to the management of the head of the patient, the endotracheal tube, and the ventilator lines. The person at the head of the bed coordinated the steps of the supination procedure at the direction of Critical Care team members at the bedside. The patient was first log-rolled 90 degrees onto their side away from the direction of their central venous catheter. Cardiac electrodes were then moved from the patient's posterior back to the anterior. Once properly positioned in the bed, another 90 degree log roll was performed placing the patient into the supine position. The patient's arms were placed alongside the body. Continuous pulse oximetry and blood pressure monitoring was performed without any desaturation or hemodynamic instability. The patient tolerated the procedure well.     ETT about 26 cm at the lip pre-procedure and  confirmed to be 26 cm at the lip post-procedure    Total Critical Care time (uninterrupted not including procedures): 15 minutes

## 2020-11-02 NOTE — PROGRESS NOTES
[facility] - ICU 16 WT  Nephrology  Progress Note    Patient Name: Nubia Riggs  MRN: [id number]  Admission Date: 10/18/2020  Hospital Length of Stay: 15 days  Attending Provider: Parminder Marin*   Primary Care Physician: Primary Doctor No  Principal Problem:Pneumonia due to COVID-19 virus    Subjective:     HPI: Mr. Nubia Riggs is a 54 yo male with GERD and RAD with COVID-19 dx on 10/15 who [place] [facility] [place] with worsening hypoxia and SOB at home.  He was orginally admitted to hospital medicine, but continued to have increase in oxygen requirements and severe hypoxia off of BiPAP and he was transferred to COVID-ICU on 10/24 and later intubated on the 26th.  Kidney function had remained stable during the admission, noting an YOVANI on 10/27 with a rise in Scr to 1.6.  On 10/26, he was noted to have episodes of hypotension and hypoxia during intubation and later having an increase in sCr with hyperkalemia that has been medically treated with lasix and insulin/dextrose without resolution.  Kidney function has continued to decline since intubation and Nephrology was consulted for evaluation.    Interval History:  Remains intubated/sedated.  UOP of 2.7L/24h, net even/24h.  SCr remains stable @ 2.4, BUN trending up to 108.    Hyperkalemia has improved, now with hypernatremia with sodium of 148.    Review of patient's allergies indicates:   Allergen Reactions    Meperidine      Other reaction(s): violent behavior    Sulfa (sulfonamide antibiotics)      Other reaction(s): Unknown     Current Facility-Administered Medications   Medication Frequency    acetaminophen oral solution 650 mg Q6H PRN    albuterol-ipratropium 2.5 mg-0.5 mg/3 mL nebulizer solution 3 mL Q4H    albuterol-ipratropium 2.5 mg-0.5 mg/3 mL nebulizer solution 3 mL Q6H PRN    balsam peru-castor oiL Oint BID    calcium gluconate 1g in dextrose 5% 100mL (ready to mix system) Q10 Min PRN    calcium gluconate 1g in  dextrose 5% 100mL (ready to mix system) Q10 Min PRN    cisatracurium (NIMBEX) 200 mg in dextrose 5 % 100 mL infusion Continuous    dextrose 50% injection 12.5 g PRN    dextrose 50% injection 25 g PRN    famotidine (PF) injection 20 mg Daily    fentaNYL 2500 mcg in 0.9% sodium chloride 250 mL infusion premix (titrating) Continuous    fluconazole (DIFLUCAN) IVPB 400 mg 200 mL Q24H    glucagon (human recombinant) injection 1 mg PRN    glucose chewable tablet 16 g PRN    glucose chewable tablet 24 g PRN    heparin 25,000 units in dextrose 5% (100 units/ml) IV bolus from bag - ADDITIONAL PRN BOLUS - 30 units/kg PRN    heparin 25,000 units in dextrose 5% (100 units/ml) IV bolus from bag - ADDITIONAL PRN BOLUS - 60 units/kg PRN    heparin 25,000 units in dextrose 5% 250 mL (100 units/mL) infusion LOW INTENSITY nomogram - [facility] Continuous    multivitamin liquid no.118 per dose 10 mL Daily    norepinephrine 32 mg in dextrose 5 % 250 mL infusion Continuous    piperacillin-tazobactam 4.5 g in sodium chloride 0.9% 100 mL IVPB (ready to mix system) Q8H    polyethylene glycol packet 17 g Daily    propofol (DIPRIVAN) 10 mg/mL infusion Continuous    senna-docusate 8.6-50 mg per tablet 1 tablet Daily    sodium chloride 0.9% flush 10 mL PRN    vitamin D 1000 units tablet 1,000 Units Daily    white petrolatum-mineral oil (LUBIFRESH P.M.) ophthalmic ointment Q8H       Objective:     Vital Signs (Most Recent):  Temp: 98.8 °F (37.1 °C) (11/02/20 1200)  Pulse: 105 (11/02/20 1510)  Resp: (!) 32 (11/02/20 1510)  BP: 117/62 (11/02/20 1400)  SpO2: (!) 86 % (11/02/20 1510)  O2 Device (Oxygen Therapy): ventilator (11/02/20 1510) Vital Signs (24h Range):  Temp:  [97.4 °F (36.3 °C)-99.7 °F (37.6 °C)] 98.8 °F (37.1 °C)  Pulse:  [] 105  Resp:  [32] 32  SpO2:  [86 %-100 %] 86 %  BP: ()/(52-67) 117/62  Arterial Line BP: (105-183)/(46-64) 132/55     Weight: 84.4 kg (186 lb) (10/25/20 1120)  Body mass index is 26.69  kg/m².  Body surface area is 2.04 meters squared.    I/O last 3 completed shifts:  In: 4739.3 [I.V.:2649.3; NG/GT:1040; IV Piggyback:1050]  Out: 4630 [Urine:4625; Drains:5]    Physical Exam  Vitals signs and nursing note reviewed.   Constitutional:       Appearance: He is ill-appearing. He is not diaphoretic.      Interventions: He is sedated and intubated.      Comments: COVID-19 Precautions maintained.     HENT:      Head: Normocephalic and atraumatic.   Cardiovascular:      Rate and Rhythm: Regular rhythm. Tachycardia present.   Pulmonary:      Effort: He is intubated.      Breath sounds: No rhonchi.   Musculoskeletal:      Right lower leg: No edema.      Left lower leg: No edema.   Skin:     General: Skin is warm and dry.         Significant Labs:  CBC:   Recent Labs   Lab 11/02/20 0447   WBC 23.57*   RBC 3.65*   HGB 11.2*   HCT 37.5*      *   MCH 30.7   MCHC 29.9*     CMP:   Recent Labs   Lab 11/02/20 0447 11/02/20  1216   *  141* 132*   CALCIUM 7.4*  7.5* 7.8*   ALBUMIN 1.2*  --    PROT 5.1*  --    *  148* 148*   K 4.7  4.7 4.2   CO2 31*  31* 32*     108 106   *  106* 110*   CREATININE 2.4*  2.4* 2.4*   ALKPHOS 179*  --    ALT 59*  --    AST 76*  --    BILITOT 0.3  --             Assessment/Plan:     YOVANI (acute kidney injury)  Non-Oliguric YOVANI on Normal Renal Fx  YOVANI 2/2 ATN from septic shock from COVID-019 infection.  disproportionate rise in BUN to Cr with possible component of volume depletion.    Plan/Recommendations:  -No acute need for RRT  -kidney function stable, good UOP  -sodium trending up to 148, started on FWF via OG tube.    -would recommend holding further diuresis.  -FiO2 100%.  He appears to be volume depleted from disproportionate rise in BUN from SCR, but due to high oxygen requirements, would hold off on fluid boluses.  - Will continue following closely.      Kamaljit R Jemma, NP  Nephrology  [facility] - ICU 16 WT

## 2020-11-02 NOTE — PLAN OF CARE
Problem: Feeding Intolerance (Enteral Nutrition)  Goal: Feeding Tolerance  Outcome: Ongoing, Progressing     Recommendations:   1. Continue Novasource Renal @ 20 mL/hr as medically appropriate.     --When renal labs WNL or CRRT initiated, recommend modifying TF to Peptamen Intense VHP and advancing as tolerated to goal rate of 55 mL/hr to provide 1988 kcal (with current propofol), 121 g protein, and 1109 mL fluid.   --Hold for residuals >500 mL. Additional free water per MD.     2. When propofol d/c'ed recommend TF of Impact Peptide 1.5 to goal rate of 55 mL/hr to provide 1980 kcal, 124 gm protein, and 1016 mL free fluid.     3. RD following.     Goals: Pt to meet >75% EEN and EPN by RD follow up.  Nutrition Goal Status: progressing towards goal  Communication of RD Recs: other (comment)(POC)

## 2020-11-03 NOTE — PLAN OF CARE
Pt medically not stable for discharge. Remains intubated and sedated.  Palliative care following.    CM to follow for discharge planning needs.  LUPILLO Melgar RN  Case Management  [id number]      11/03/20 8145   Discharge Reassessment   Assessment Type Discharge Planning Reassessment   Provided patient/caregiver education on the expected discharge date and the discharge plan Yes   Do you have any problems affording any of your prescribed medications? TBD   Discharge Plan A Long-term acute care facility (LTAC)   Discharge Plan B Long-term acute care facility (LTAC)   DME Needed Upon Discharge  other (see comments)  (TBD)   Anticipated Discharge Disposition LTAC   Can the patient/caregiver answer the patient profile reliably? No, cognitively impaired  (I&S)   Describe the patient's ability to walk at the present time. Major restrictions/daily assistance from another person   Number of comorbid conditions (as recorded on the chart) Four   Post-Acute Status   Post-Acute Authorization Placement   Post-Acute Placement Status Awaiting Internal Medical Clearance   Discharge Delays None known at this time

## 2020-11-03 NOTE — SUBJECTIVE & OBJECTIVE
Interval History:   UOP of 2.5L with lasix 80 mg x 1 yesterday.  Net even volume status.  SCr increased to 2.7 from 2.4, BUN of 115 on AM labs  Worsening hypernatremia of 152.  On FWF.    Review of patient's allergies indicates:   Allergen Reactions    Meperidine      Other reaction(s): violent behavior    Sulfa (sulfonamide antibiotics)      Other reaction(s): Unknown     Current Facility-Administered Medications   Medication Frequency    acetaminophen oral solution 650 mg Q6H PRN    albuterol-ipratropium 2.5 mg-0.5 mg/3 mL nebulizer solution 3 mL Q4H    albuterol-ipratropium 2.5 mg-0.5 mg/3 mL nebulizer solution 3 mL Q6H PRN    balsam peru-castor oiL Oint BID    cisatracurium (NIMBEX) 200 mg in dextrose 5 % 100 mL infusion Continuous    dextrose 50% injection 12.5 g PRN    dextrose 50% injection 25 g PRN    famotidine (PF) injection 20 mg Daily    fentaNYL 2500 mcg in 0.9% sodium chloride 250 mL infusion premix (titrating) Continuous    fluconazole (DIFLUCAN) IVPB 400 mg 200 mL Q24H    glucagon (human recombinant) injection 1 mg PRN    glucose chewable tablet 16 g PRN    glucose chewable tablet 24 g PRN    heparin 25,000 units in dextrose 5% (100 units/ml) IV bolus from bag - ADDITIONAL PRN BOLUS - 30 units/kg PRN    heparin 25,000 units in dextrose 5% (100 units/ml) IV bolus from bag - ADDITIONAL PRN BOLUS - 60 units/kg PRN    heparin 25,000 units in dextrose 5% 250 mL (100 units/mL) infusion LOW INTENSITY nomogram - [facility] Continuous    midazolam (VERSED) 1 mg/mL injection 2 mg Once    multivitamin liquid no.118 per dose 10 mL Daily    piperacillin-tazobactam 4.5 g in sodium chloride 0.9% 100 mL IVPB (ready to mix system) Q8H    polyethylene glycol packet 17 g Daily    propofol (DIPRIVAN) 10 mg/mL infusion Continuous    senna-docusate 8.6-50 mg per tablet 1 tablet Daily    sodium chloride 0.9% flush 10 mL PRN    vitamin D 1000 units tablet 1,000 Units Daily    white petrolatum-mineral oil  (MANDY P.M.) ophthalmic ointment BID       Objective:     Vital Signs (Most Recent):  Temp: 97.5 °F (36.4 °C) (11/03/20 1200)  Pulse: (!) 116 (11/03/20 1530)  Resp: (!) 32 (11/03/20 1530)  BP: (!) 130/59 (11/03/20 1500)  SpO2: (!) 88 % (11/03/20 1530)  O2 Device (Oxygen Therapy): ventilator (11/03/20 1500) Vital Signs (24h Range):  Temp:  [97.1 °F (36.2 °C)-98.4 °F (36.9 °C)] 97.5 °F (36.4 °C)  Pulse:  [] 116  Resp:  [8-32] 32  SpO2:  [86 %-97 %] 88 %  BP: ()/(49-65) 130/59  Arterial Line BP: (0-162)/(0-60) 123/49     Weight: 84.4 kg (186 lb) (10/25/20 1120)  Body mass index is 26.69 kg/m².  Body surface area is 2.04 meters squared.    I/O last 3 completed shifts:  In: 3932.5 [I.V.:2539.2; NG/GT:770; IV Piggyback:623.3]  Out: 3580 [Urine:3560; Drains:20]    Physical Exam  Vitals signs and nursing note reviewed.   Constitutional:       Appearance: He is ill-appearing. He is not diaphoretic.      Interventions: He is sedated and intubated.      Comments: COVID-19 Precautions maintained.     HENT:      Head: Normocephalic and atraumatic.   Cardiovascular:      Rate and Rhythm: Regular rhythm. Tachycardia present.   Pulmonary:      Effort: He is intubated.      Breath sounds: No rhonchi.   Musculoskeletal:      Right lower leg: No edema.      Left lower leg: No edema.   Skin:     General: Skin is warm and dry.         Significant Labs:  ABGs:   Recent Labs   Lab 11/03/20  0417   PH 7.248*   PCO2 84.5*   HCO3 36.9*   POCSATURATED 96   BE 10     CBC:   Recent Labs   Lab 11/03/20  0219   WBC 26.19*   RBC 3.49*   HGB 11.0*   HCT 36.6*      *   MCH 31.5*   MCHC 30.1*     CMP:   Recent Labs   Lab 11/03/20  0208  11/03/20  1216      < > 118*   CALCIUM 7.6*   < > 7.8*   ALBUMIN 1.2*  --   --    PROT 5.3*  --   --    *   < > 151*   K 4.1   < > 4.0   CO2 34*   < > 34*      < > 106   *   < > 115*   CREATININE 2.7*   < > 2.7*   ALKPHOS 155*  --   --    ALT 52*  --   --    AST  72*  --   --    BILITOT 0.4  --   --     < > = values in this interval not displayed.

## 2020-11-03 NOTE — SIGNIFICANT EVENT
Procedure Note  Supine Positioning  Critical Care Medicine       Chief Complaint: Pneumonia due to COVID-19 virus  MRN: [id number]  LOS: 16  Sex: male  Age: 55 y.o.      Last ABG:   Recent Labs   Lab 11/03/20  0417   PH 7.248*   PO2 101*   PCO2 84.5*   HCO3 36.9*   BE 10       Vital Signs (Most Recent):   Temp: 97.1 °F (36.2 °C) (11/03/20 0800)  Pulse: 92 (11/03/20 1100)  Resp: (!) 32 (11/03/20 1100)  BP: 119/60 (11/03/20 1100)  SpO2: 96 % (11/03/20 1100)    Ventilator Settings:  Vent Mode: A/C  Oxygen Concentration (%):  [] 100  Resp Rate Total:  [32 br/min] 32 br/min  Vt Set:  [430 mL] 430 mL  PEEP/CPAP:  [12 cmH20-15 cmH20] 12 cmH20  Mean Airway Pressure:  [20 urL86-23 cmH20] 23 cmH20        Indication: Severe, refractory ARDS. High risk for deterioration during supination procedure in need of direct monitoring by Critical Care personnel.     Prior to beginning the procedure, all appropriate equipment and personnel were gathered in the room. Two individuals were placed on each side of the patient and two respiratory therapists stood at the head of the bed and was dedicated to the management of the head of the patient, the endotracheal tube, and the ventilator lines. The person at the head of the bed coordinated the steps of the supination procedure at the direction of Critical Care team members at the bedside. The patient was first log-rolled 90 degrees onto their side away from the direction of their central venous catheter. Cardiac electrodes were then moved from the patient's posterior back to the anterior. Once properly positioned in the bed, another 90 degree log roll was performed placing the patient into the supine position. The patient's arms were placed alongside the body. Continuous pulse oximetry and blood pressure monitoring was performed without any desaturation or hemodynamic instability. The patient tolerated the procedure well.     Total Critical Care time (uninterrupted not including  procedures): 30mins  Fiona Winterbottom APRN, CNS  Critical Care Medicine  [phone]

## 2020-11-03 NOTE — SIGNIFICANT EVENT
Procedure Note  Supine Positioning  Critical Care Medicine       Chief Complaint: Pneumonia due to COVID-19 virus  MRN: [id number]  LOS: 15  Sex: male  Age: 55 y.o.      Last ABG:   Recent Labs   Lab 11/02/20  1517   PH 7.220*   PO2 76*   PCO2 85.3*   HCO3 34.9*   BE 7       Vital Signs (Most Recent):   Temp: 97.4 °F (36.3 °C) (11/02/20 1600)  Pulse: 102 (11/02/20 1800)  Resp: (!) 32 (11/02/20 1800)  BP: (!) 126/58 (11/02/20 1800)  SpO2: (!) 90 % (11/02/20 1800)    Ventilator Settings:  Vent Mode: A/C  Oxygen Concentration (%):  [] 90  Resp Rate Total:  [32 br/min] 32 br/min  Vt Set:  [430 mL-450 mL] 430 mL  PEEP/CPAP:  [12 cmH20-15 cmH20] 15 cmH20  Mean Airway Pressure:  [18 cdR60-02 cmH20] 23 cmH20        Indication: Severe, refractory ARDS. High risk for deterioration during supination procedure in need of direct monitoring by Critical Care personnel.     Prior to beginning the procedure, all appropriate equipment and personnel were gathered in the room. Two individuals were placed on each side of the patient and two respiratory therapists stood at the head of the bed and was dedicated to the management of the head of the patient, the endotracheal tube, and the ventilator lines. The person at the head of the bed coordinated the steps of the supination procedure at the direction of Critical Care team members at the bedside. The patient was first log-rolled 90 degrees onto their side away from the direction of their central venous catheter. Cardiac electrodes were then moved from the patient's posterior back to the anterior. Once properly positioned in the bed, another 90 degree log roll was performed placing the patient into the supine position. The patient's arms were placed alongside the body. Continuous pulse oximetry and blood pressure monitoring was performed without any desaturation or hemodynamic instability. The patient tolerated the procedure well.     Total Critical Care time (uninterrupted not  including procedures): 15 minutes

## 2020-11-03 NOTE — NURSING
0830: Medications given via IV and OG tube. Ericka decreased FIo2 to 90% on the ventilator and instructed to decrease to 80% around 0900 if his sats maintain above 90% on 90 fio2. Covered with second blanket due to lower axillary temperature.     0900: Spoke with Liza, significant other, and provided updates. Answered all questions and concerns. States she will come later in the shift to visit. Initiated bedside madelin-trac monitoring.      0905: Ventilator fio2 decreased to 80%. Blood culture x 2 obtained and sent to lab via tube system.     0950: Ms. De Jesus called for an update on how he is doing on the 80%. Answered questions and concerns she had.    1101: Patient supinated with NP at foot of bed, 2 RRT at head of bed, and 5 RN at bedside. Patient tolerated supination well with all vitals remaining stable. Purple to reddened area noted to tip of penis. NP and charge nurse aware.    1155: Xray obtained at bedside.     1354: Fio2 increased to 90% on ventilator around 1345. o2 sats were 86-87%. Patient 02 sat now 89-90%.     1453: Nephrology at bedside. Recommending increase of free water boluses to 300 from 200. Awaiting order. Recommended fentanyl be mixed in d5. Will call pharmacy to see if this is possible then will message nephro with an answer.      1530: Liza, significant other, at bedside.     1540: Dr. Tania MD, and Fiona Winterbottom, NP, at bedside. ABG obtained. RRT at bedside advancing tube to 30 at lip. PEEP increased to 14.0 by Ericka. Rate increased to 35.     1545: PEEP decreased back to 12.0 by Ericka.     1605: Patient proned with NP at foot of bed, 2 RRT at head of bed, and 5 RN at bedside. Patient tolerated pronation well with all vitals remaining stable.     1715: Liza, SO, left bedside from visiting.     1830: Called Np regarding art line bleeding. Fiona Winterbottom assessed & redressed the arterial line. STAT labs obtained per order. Swapped the aPTT to anti Xa monitoring. Will  continue to monitor.    Care Plan    POC reviewed with Liza Soliz. Questions and concerns addressed. Pt progressing toward goals slowly. BCX x 2, resp cx, hooked to flotrac, d/c'd levo, albumin x 1, prone @ 1600, versed 2mg x 2 doses, inc free water flush to 300 q6h. Will continue to monitor. See below and flowsheets for full assessment and VS info.       Neuro:  Victoria Coma Scale  Best Eye Response: 1-->(E1) none  Best Motor Response: 1-->(M1) none  Best Verbal Response: 1-->(V1) none  Victoria Coma Scale Score: 3  Assessment Qualifiers: patient chemically sedated or paralyzed, patient intubated  Pupil PERRLA: yes  24 hr Temp:  [97.1 °F (36.2 °C)-98.4 °F (36.9 °C)]      CV:  Rhythm: normal sinus rhythm  DVT prophylaxis: VTE Required Core Measure: Pharmacological prophylaxis initiated/maintained    Resp:  O2 Device (Oxygen Therapy): ventilator  Vent Mode: A/C  Set Rate: 32 BPM  Oxygen Concentration (%): 90  Vt Set: 430 mL  PEEP/CPAP: 12 cmH20    GI/:  GI prophylaxis: yes  Diet/Nutrition Received: tube feeding  Last Bowel Movement: 10/24/20  Voiding Characteristics: urethral catheter (bladder)  [REMOVED]      Urethral Catheter 10/26/20 0900 Non-latex 16 Fr.-Reason for Continuing Urinary Catheterization: Critically ill in ICU requiring intensive monitoring       Urethral Catheter 10/31/20 1300 Non-latex;Straight-tip 16 Fr.-Reason for Continuing Urinary Catheterization: Critically ill in ICU and requiring hourly monitoring of intake/output     Intake/Output Summary (Last 24 hours) at 11/3/2020 1748  Last data filed at 11/3/2020 1700  Gross per 24 hour   Intake 3047.8 ml   Output 2270 ml   Net 777.8 ml       Labs/Accuchecks:  Recent Labs   Lab 11/03/20  0219   WBC 26.19*   RBC 3.49*   HGB 11.0*   HCT 36.6*         Recent Labs   Lab 11/03/20  0208  11/03/20  1216   *   < > 151*   K 4.1   < > 4.0   CO2 34*   < > 34*      < > 106   *   < > 115*   CREATININE 2.7*   < > 2.7*    ALKPHOS 155*  --   --    ALT 52*  --   --    AST 72*  --   --    BILITOT 0.4  --   --     < > = values in this interval not displayed.      Recent Labs   Lab 11/03/20  1541   APTT 36.7*      Recent Labs   Lab 10/31/20  0428   *       Electrolytes: N/A - electrolytes WDL  Accuchecks: Q4H    Gtts/LDAs:   cisatracurium (NIMBEX) infusion 2.014 mcg/kg/min (11/03/20 1700)    fentanyl 25 mL/hr at 11/03/20 1700    heparin (porcine) in D5W 24 Units/kg/hr (11/03/20 1722)    propofoL 49.961 mcg/kg/min (11/03/20 1700)       Lines/Drains/Airways     Central Venous Catheter Line            Percutaneous Central Line Insertion/Assessment - Triple Lumen  10/26/20 1940 right internal jugular 7 days          Drain                 Urethral Catheter 10/31/20 1300 Non-latex;Straight-tip 16 Fr. 3 days         NG/OG Tube 11/02/20 1456 orogastric 14 Fr. Left mouth 1 day          Airway                 Airway - Non-Surgical 10/26/20 1820 Endotracheal Tube 8 days       Airway Anesthesia 10/26/20 8 days          Arterial Line            Arterial Line 10/30/20 1556 Right Radial 4 days          Peripheral Intravenous Line                 Peripheral IV - Single Lumen 10/31/20 1630 20 G Anterior;Right Forearm 3 days                Skin/Wounds:  Bathing/Skin Care: linen changed, incontinence care, complete bath Date: 11.03.20  Wounds: Yes  Wound care consulted: Yes, previously

## 2020-11-03 NOTE — PROCEDURES
"Nubia Riggs is a 55 y.o. male patient.    Temp: 98.2 °F (36.8 °C) (11/03/20 0000)  Pulse: 86 (11/03/20 0417)  Resp: (!) 9 (11/03/20 0417)  BP: (!) 96/49 (11/03/20 0417)  SpO2: 96 % (11/03/20 0417)  Weight: 84.4 kg (186 lb) (10/25/20 1120)  Height: 5' 10" (177.8 cm) (11/02/20 1510)     Procedure Note  Prone Positioning  Critical Care Medicine         Chief Complaint: Pneumonia due to COVID-19 virus  MRN: [id number]  LOS: 15  Sex: male  Age: 55 y.o.        Last ABG:   Recent Labs     11/03/20 0417   PH 7.248*   PCO2 84.5*   PO2 101*   HCO3 36.9*   POCSATURATED 96   BE 10            Indication: Severe, refractory ARDS. High risk for deterioration during pronation procedure in need of direct monitoring by Critical Care personnel. Procedure performed at 2030pm 11/2/20.     Prior to beginning the procedure, all appropriate equipment and personnel were gathered in the room. Two individuals were placed on each side of the patient and two respiratory therapists stood at the head of the bed and was dedicated to the management of the head of the patient, the endotracheal tube, and the ventilator lines. The person at the head of the bed coordinated the steps of the supination procedure at the direction of Critical Care team members at the bedside. The patient was first log-rolled 90 degrees onto their side away from the direction of their central venous catheter. Cardiac electrodes were then moved from the patient's posterior back to the anterior. Once properly positioned in the bed, another 90 degree log roll was performed placing the patient into the supine position. The patient's arms were placed alongside the body. Continuous pulse oximetry and blood pressure monitoring was performed without any desaturation or hemodynamic instability. The patient tolerated the procedure well.       Ernesto Shabazz  11/3/2020  "

## 2020-11-03 NOTE — ASSESSMENT & PLAN NOTE
Non-Oliguric YOVANI on Normal Renal Fx  YOVANI 2/2 ATN from septic shock from COVID-019 infection.  disproportionate rise in BUN to Cr with possible component of volume depletion.    Plan/Recommendations:  -No acute need for RRT  -Good UOP, no major electrolyte abnormalities  -sodium trending up to 152.  FWD of 4.3L  -recommend increasing FWF to 300 ml q 4 hours.  -would recommend holding further diuresis.  - Will continue following closely.

## 2020-11-03 NOTE — ASSESSMENT & PLAN NOTE
-FWD of 4.3L  -recommend increasing FWF to 300 ml q 4 hours  -likely having an osmotic diuresis from azotemia and lasix use  -recommend holding diuretics

## 2020-11-03 NOTE — PROGRESS NOTES
[facility] - ICU 16 WT  Nephrology  Progress Note    Patient Name: Nubia Riggs  MRN: [id number]  Admission Date: 10/18/2020  Hospital Length of Stay: 16 days  Attending Provider: Parminder Marin*   Primary Care Physician: Primary Doctor No  Principal Problem:Pneumonia due to COVID-19 virus    Subjective:     HPI: Mr. Nubia Riggs is a 56 yo male with GERD and RAD with COVID-19 dx on 10/15 who presented to [facility] on 10/18 with worsening hypoxia and SOB at home.  He was orginally admitted to hospital medicine, but continued to have increase in oxygen requirements and severe hypoxia off of BiPAP and he was transferred to COVID-ICU on 10/24 and later intubated on the 26th.  Kidney function had remained stable during the admission, noting an YOVANI on 10/27 with a rise in Scr to 1.6.  On 10/26, he was noted to have episodes of hypotension and hypoxia during intubation and later having an increase in sCr with hyperkalemia that has been medically treated with lasix and insulin/dextrose without resolution.  Kidney function has continued to decline since intubation and Nephrology was consulted for evaluation.    Interval History:   UOP of 2.5L with lasix 80 mg x 1 yesterday.  Net even volume status.  SCr increased to 2.7 from 2.4, BUN of 115 on AM labs  Worsening hypernatremia of 152.  On FWF.    Review of patient's allergies indicates:   Allergen Reactions    Meperidine      Other reaction(s): violent behavior    Sulfa (sulfonamide antibiotics)      Other reaction(s): Unknown     Current Facility-Administered Medications   Medication Frequency    acetaminophen oral solution 650 mg Q6H PRN    albuterol-ipratropium 2.5 mg-0.5 mg/3 mL nebulizer solution 3 mL Q4H    albuterol-ipratropium 2.5 mg-0.5 mg/3 mL nebulizer solution 3 mL Q6H PRN    balsam peru-castor oiL Oint BID    cisatracurium (NIMBEX) 200 mg in dextrose 5 % 100 mL infusion Continuous    dextrose 50% injection 12.5 g PRN    dextrose  50% injection 25 g PRN    famotidine (PF) injection 20 mg Daily    fentaNYL 2500 mcg in 0.9% sodium chloride 250 mL infusion premix (titrating) Continuous    fluconazole (DIFLUCAN) IVPB 400 mg 200 mL Q24H    glucagon (human recombinant) injection 1 mg PRN    glucose chewable tablet 16 g PRN    glucose chewable tablet 24 g PRN    heparin 25,000 units in dextrose 5% (100 units/ml) IV bolus from bag - ADDITIONAL PRN BOLUS - 30 units/kg PRN    heparin 25,000 units in dextrose 5% (100 units/ml) IV bolus from bag - ADDITIONAL PRN BOLUS - 60 units/kg PRN    heparin 25,000 units in dextrose 5% 250 mL (100 units/mL) infusion LOW INTENSITY nomogram - [facility] Continuous    midazolam (VERSED) 1 mg/mL injection 2 mg Once    multivitamin liquid no.118 per dose 10 mL Daily    piperacillin-tazobactam 4.5 g in sodium chloride 0.9% 100 mL IVPB (ready to mix system) Q8H    polyethylene glycol packet 17 g Daily    propofol (DIPRIVAN) 10 mg/mL infusion Continuous    senna-docusate 8.6-50 mg per tablet 1 tablet Daily    sodium chloride 0.9% flush 10 mL PRN    vitamin D 1000 units tablet 1,000 Units Daily    white petrolatum-mineral oil (LUBIFRESH P.M.) ophthalmic ointment BID       Objective:     Vital Signs (Most Recent):  Temp: 97.5 °F (36.4 °C) (11/03/20 1200)  Pulse: (!) 116 (11/03/20 1530)  Resp: (!) 32 (11/03/20 1530)  BP: (!) 130/59 (11/03/20 1500)  SpO2: (!) 88 % (11/03/20 1530)  O2 Device (Oxygen Therapy): ventilator (11/03/20 1500) Vital Signs (24h Range):  Temp:  [97.1 °F (36.2 °C)-98.4 °F (36.9 °C)] 97.5 °F (36.4 °C)  Pulse:  [] 116  Resp:  [8-32] 32  SpO2:  [86 %-97 %] 88 %  BP: ()/(49-65) 130/59  Arterial Line BP: (0-162)/(0-60) 123/49     Weight: 84.4 kg (186 lb) (10/25/20 1120)  Body mass index is 26.69 kg/m².  Body surface area is 2.04 meters squared.    I/O last 3 completed shifts:  In: 3932.5 [I.V.:2539.2; NG/GT:770; IV Piggyback:623.3]  Out: 3580 [Urine:3560; Drains:20]    Physical  Exam  Vitals signs and nursing note reviewed.   Constitutional:       Appearance: He is ill-appearing. He is not diaphoretic.      Interventions: He is sedated and intubated.      Comments: COVID-19 Precautions maintained.     HENT:      Head: Normocephalic and atraumatic.   Cardiovascular:      Rate and Rhythm: Regular rhythm. Tachycardia present.   Pulmonary:      Effort: He is intubated.      Breath sounds: No rhonchi.   Musculoskeletal:      Right lower leg: No edema.      Left lower leg: No edema.   Skin:     General: Skin is warm and dry.         Significant Labs:  ABGs:   Recent Labs   Lab 11/03/20  0417   PH 7.248*   PCO2 84.5*   HCO3 36.9*   POCSATURATED 96   BE 10     CBC:   Recent Labs   Lab 11/03/20  0219   WBC 26.19*   RBC 3.49*   HGB 11.0*   HCT 36.6*      *   MCH 31.5*   MCHC 30.1*     CMP:   Recent Labs   Lab 11/03/20  0208  11/03/20  1216      < > 118*   CALCIUM 7.6*   < > 7.8*   ALBUMIN 1.2*  --   --    PROT 5.3*  --   --    *   < > 151*   K 4.1   < > 4.0   CO2 34*   < > 34*      < > 106   *   < > 115*   CREATININE 2.7*   < > 2.7*   ALKPHOS 155*  --   --    ALT 52*  --   --    AST 72*  --   --    BILITOT 0.4  --   --     < > = values in this interval not displayed.            Assessment/Plan:     YOVANI (acute kidney injury)  Non-Oliguric YOVANI on Normal Renal Fx  YOVANI 2/2 ATN from septic shock from COVID-019 infection.  disproportionate rise in BUN to Cr with possible component of volume depletion.    Plan/Recommendations:  -No acute need for RRT  -Good UOP, no major electrolyte abnormalities  -sodium trending up to 152.  FWD of 4.3L  -recommend increasing FWF to 300 ml q 4 hours.  -would recommend holding further diuresis.  - Will continue following closely.    Hypernatremia  -FWD of 4.3L  -recommend increasing FWF to 300 ml q 4 hours  -likely having an osmotic diuresis from azotemia and lasix use  -recommend holding diuretics      Kamaljit Easton,  NP  Nephrology  [facility] - ICU 16 WT

## 2020-11-04 NOTE — PROGRESS NOTES
Wound care follow-up  The bridge of the nose remains with comfeel covering the wound, no drainage observed, light brown covering over wound bed. The patient is no longer on BiPAP, he is proning daily.   The LUQ discolored area is pink/intact/blanches.   Recommendations:  Nose- continue comfeel as ordered  LUQ- resolved, discontinue venelex, cover with foam to protect area  Nursing to continue pressure prevention measures and care  Wound care will continue to follow-up  CHIDI Harrell RN, CWCN  [id number]  nose    Q

## 2020-11-04 NOTE — ASSESSMENT & PLAN NOTE
Non-Oliguric YOVANI on Normal Renal Fx  YOVANI 2/2 ATN from septic shock from COVID-019 infection.  disproportionate rise in BUN to Cr with possible component of volume depletion.    Plan/Recommendations:  -No acute need for RRT  -Sodium remains elevated, some improvement in SCr/BUN with IV FW  -sodium trending up to 151.  FWD of 4.3L, unable to tolerate FWF via OG  -recommend D5W @ 150 cc/hr.  -would recommend holding further diuresis.  - Will continue following closely.

## 2020-11-04 NOTE — ASSESSMENT & PLAN NOTE
Follows with Pulm as outpatient. Last seen 8/27/20, told he had RAD  He was also seen by a [facility] pulmonologist who did spirometry that reportedly showed borderline or mild asthma, and has been controlled with albuterol.   Former smoker, started at age 10-12 and quit 10 years ago    -- Continue duo nebs q4

## 2020-11-04 NOTE — ASSESSMENT & PLAN NOTE
Likely secondary to TFs +/- dehydration from diuresis     -- emesis noted with free water flushes  -- D5W @ 100/hr

## 2020-11-04 NOTE — ASSESSMENT & PLAN NOTE
Noted 10/29. No EKG changes. Shifted with D50/insulin and furosemide which temporarily improved potassium    --see YOVANI  --resolved

## 2020-11-04 NOTE — ASSESSMENT & PLAN NOTE
-FWD of 4.3L  -recommend increasing D5W gtt to 150 cc/hr  -likely having an osmotic diuresis from azotemia and lasix use  -recommend holding diuretics

## 2020-11-04 NOTE — PROGRESS NOTES
Pharmacokinetic Initial Assessment: IV Vancomycin    Vancomycin Regimen Assessment/Plan:     - Pharmacy re-consulted for Vancomycin dosing.    - Patient with worsening YOVANI, SCr 3.9 mg/dL from baseline ~ 0.8-0.9 mg/dL.   - Administer Vancomycin 1250 mg IV x 1 dose today.   - Goal Trough 15 to 20 mcg/mL  - A random level is ordered with AM labs tomorrow.  Pharmacy to pulse dose based on level and renal function/plan.  Pharmacy will continue to follow and monitor vancomycin.      Please contact pharmacy at extension [phone] with any questions regarding this assessment.     Thank you for the consult,   Lizbeth Mcdaniel       Patient brief summary:  Nubia Riggs is a 55 y.o. male initiated on antimicrobial therapy with IV Vancomycin for treatment of suspected Pneumonia    Drug Allergies:   Review of patient's allergies indicates:   Allergen Reactions    Meperidine      Other reaction(s): violent behavior    Sulfa (sulfonamide antibiotics)      Other reaction(s): Unknown       Actual Body Weight:   84.4 kg    Renal Function:   Estimated Creatinine Clearance: 22.6 mL/min (A) (based on SCr of 3.9 mg/dL (H)).,     Dialysis Method (if applicable):  N/A - followed by Nephro    CBC (last 72 hours):  Recent Labs   Lab Result Units 11/02/20  0447 11/03/20  0219 11/03/20  1830 11/04/20  0417   WBC K/uL 23.57* 26.19* 30.29* 24.42*  24.42*   Hemoglobin g/dL 11.2* 11.0* 10.3* 9.3*  9.3*   Hematocrit % 37.5* 36.6* 35.7* 31.6*  31.6*   Platelets K/uL 241 256 281 258  258   Gran % % 90.0* 86.0* 88.0* 83.0*  83.0*   Lymph % % 4.0* 4.0* 5.0* 3.0*  3.0*   Mono % % 3.0* 5.0 4.0 8.0  8.0   Eosinophil % % 0.0 0.0 0.0 0.0  0.0   Basophil % % 0.0 1.0 1.0 0.0  0.0   Differential Method  Manual Manual Manual Manual  Manual       Metabolic Panel (last 72 hours):  Recent Labs   Lab Result Units 11/01/20  1246 11/01/20  1850 11/02/20  0038 11/02/20  0447 11/02/20  1216 11/02/20  1750 11/03/20  0208 11/03/20  0605 11/03/20  1216  11/03/20  1801 11/04/20  0006 11/04/20  0417   Sodium mmol/L 146* 145 147* 148*  148* 148* 149* 148* 152* 151* 151* 150* 150*   Potassium mmol/L 4.1 4.6 5.1 4.7  4.7 4.2 4.4 4.1 4.2 4.0 4.4 4.4 4.2   Chloride mmol/L 108 105 107 107  108 106 108 104 107 106 106 106 107   CO2 mmol/L 27 30* 32* 31*  31* 32* 30* 34* 30* 34* 34* 32* 28   Glucose mg/dL 147* 155* 152* 141*  141* 132* 128* 107 100 118* 139* 132* 119*   BUN mg/dL 100* 104* 104* 108*  106* 110* 111* 113* 115* 115* 120* 117* 122*   Creatinine mg/dL 2.0* 2.3* 2.4* 2.4*  2.4* 2.4* 2.5* 2.7* 2.7* 2.7* 3.1* 3.5* 3.9*   Albumin g/dL  --   --   --  1.2*  --   --  1.2*  --   --   --   --  1.5*   Total Bilirubin mg/dL  --   --   --  0.3  --   --  0.4  --   --   --   --  0.4   Alkaline Phosphatase U/L  --   --   --  179*  --   --  155*  --   --   --   --  128   AST U/L  --   --   --  76*  --   --  72*  --   --   --   --  78*   ALT U/L  --   --   --  59*  --   --  52*  --   --   --   --  43   Magnesium mg/dL  --   --   --  2.7*  --   --  2.7*  --   --   --   --  2.9*   Phosphorus mg/dL  --   --   --  5.2*  --   --  6.9*  --   --   --   --  9.3*       Drug levels (last 3 results):  Recent Labs   Lab Result Units 11/02/20  0447   Vancomycin, Random ug/mL 16.5       Microbiologic Results:  Microbiology Results (last 7 days)     Procedure Component Value Units Date/Time    Blood culture [[id number]] Collected: 11/03/20 0904    Order Status: Completed Specimen: Blood from Peripheral, Antecubital, Left Updated: 11/04/20 1022     Blood Culture, Routine No Growth to date      No Growth to date    Narrative:      Blood cultures x 2 different sites. 4 bottles total. Please  draw cultures before administering antibiotics.    Blood culture [[id number]] Collected: 11/03/20 0904    Order Status: Completed Specimen: Blood from Peripheral, Upper Arm, Right Updated: 11/04/20 1022     Blood Culture, Routine No Growth to date      No Growth to date    Narrative:      Blood cultures  from 2 different sites. 4 bottles total.  Please draw before starting antibiotics.    Culture, Respiratory with Gram Stain [[id number]] Collected: 11/03/20 0933    Order Status: Completed Specimen: Respiratory from Endotracheal Aspirate Updated: 11/04/20 0945     Respiratory Culture Normal respiratory alejandra     Gram Stain (Respiratory) <10 epithelial cells per low power field.     Gram Stain (Respiratory) No WBC's     Gram Stain (Respiratory) No organisms seen    Blood culture [[id number]] Collected: 10/30/20 1011    Order Status: Completed Specimen: Blood from Peripheral, Forearm, Left Updated: 11/03/20 1212     Blood Culture, Routine No Growth to date      No Growth to date      No Growth to date      No Growth to date      No Growth to date    Narrative:      Blood cultures x 2 different sites. 4 bottles total. Please  draw cultures before administering antibiotics.    Blood culture [[id number]] Collected: 10/30/20 1015    Order Status: Completed Specimen: Blood from Peripheral, Upper Arm, Right Updated: 11/03/20 1212     Blood Culture, Routine No Growth to date      No Growth to date      No Growth to date      No Growth to date      No Growth to date    Narrative:      Blood cultures from 2 different sites. 4 bottles total.  Please draw before starting antibiotics.    Culture, Respiratory with Gram Stain [[id number]] Collected: 10/30/20 1040    Order Status: Completed Specimen: Respiratory from Endotracheal Aspirate Updated: 11/02/20 1106     Respiratory Culture Normal respiratory alejandra     Gram Stain (Respiratory) Rare WBC's     Gram Stain (Respiratory) No organisms seen    Narrative:      Mini-BAL. Before antibiotics.    Blood culture [[id number]] Collected: 10/27/20 2011    Order Status: Completed Specimen: Blood from Peripheral, Forearm, Right Updated: 11/01/20 2212     Blood Culture, Routine No growth after 5 days.    Narrative:      Blood cultures from 2 different sites. 4 bottles total.  Please draw before  starting antibiotics.    Blood culture [[id number]] Collected: 10/27/20 2011    Order Status: Completed Specimen: Blood from Peripheral, Forearm, Left Updated: 11/01/20 2212     Blood Culture, Routine No growth after 5 days.    Narrative:      Blood cultures x 2 different sites. 4 bottles total. Please  draw cultures before administering antibiotics.    Urine culture [[id number]] Collected: 10/31/20 1428    Order Status: Completed Specimen: Urine Updated: 11/01/20 1814     Urine Culture, Routine No growth    Narrative:      Specimen Source->Urine    Culture, Respiratory with Gram Stain [[id number]] Collected: 10/28/20 1100    Order Status: Completed Specimen: Respiratory from Endotracheal Aspirate Updated: 10/30/20 0818     Respiratory Culture Normal respiratory alejandra     Gram Stain (Respiratory) <10 epithelial cells per low power field.     Gram Stain (Respiratory) Few WBC's     Gram Stain (Respiratory) No organisms seen    Urine culture [[id number]] Collected: 10/27/20 2020    Order Status: Completed Specimen: Urine Updated: 10/28/20 2303     Urine Culture, Routine No growth    Narrative:      Specimen Source->Urine

## 2020-11-04 NOTE — ASSESSMENT & PLAN NOTE
COVID positive on 10/15. Patient admitted to hospital medicine and continued to deteriorate needing higher oxygen requirements and thus Critical Care Medicine was consulted. Intubated 10/26 and eventually placed on NMB and proned. Peak and plateau pressures remain at goal    - s/p remdesivir (10/18-10/22)  - s/p Dexamethasone (10/27)  - s/p vanc x7 days (end date 11/01)  - duo nebs q4 scheduled and q6 PRN  - continue Heparin gtt D-dimer >33  - continue LPV and pronation for PF <150 with FiO2 50% and PEEP 10  - Sputum culture sent 10/28 NGTD, reordered 10/30 for leukocytosis and febrile all negative  - continue zosyn and fluconazole.   - Suspect aspiration event 11/4 with worsening oxygenation. Vancomycin restarted.   - CTS consult for ECMO evaluation

## 2020-11-04 NOTE — SUBJECTIVE & OBJECTIVE
Interval History/Significant Events: Patient continues to require high amounts of oxygen and ventilator support    Review of Systems  Objective:     Vital Signs (Most Recent):  Temp: 97.8 °F (36.6 °C) (11/03/20 1600)  Pulse: (!) 118 (11/03/20 1800)  Resp: (!) 35 (11/03/20 1800)  BP: 134/61 (11/03/20 1800)  SpO2: (!) 90 % (11/03/20 1800) Vital Signs (24h Range):  Temp:  [97.1 °F (36.2 °C)-98.2 °F (36.8 °C)] 97.8 °F (36.6 °C)  Pulse:  [] 118  Resp:  [8-35] 35  SpO2:  [86 %-97 %] 90 %  BP: ()/(49-65) 134/61  Arterial Line BP: (0-162)/(0-60) 138/54   Weight: 84.4 kg (186 lb)  Body mass index is 26.69 kg/m².      Intake/Output Summary (Last 24 hours) at 11/3/2020 1909  Last data filed at 11/3/2020 1800  Gross per 24 hour   Intake 3080.95 ml   Output 2520 ml   Net 560.95 ml       Physical Exam  Vitals signs and nursing note reviewed.   Constitutional:       Appearance: Normal appearance.      Interventions: He is sedated, intubated and restrained.   HENT:      Head: Normocephalic.   Cardiovascular:      Rate and Rhythm: Regular rhythm. Tachycardia present.      Pulses: Normal pulses.   Pulmonary:      Effort: He is intubated.      Breath sounds: Normal air entry.   Abdominal:      General: Bowel sounds are normal.      Palpations: Abdomen is soft.   Skin:     General: Skin is warm and dry.      Comments: Acne on back   Neurological:      Mental Status: He is easily aroused.      GCS: GCS eye subscore is 1. GCS verbal subscore is 1. GCS motor subscore is 1.      Comments: Paralyzed and sedated   Psychiatric:         Attention and Perception: He is inattentive.         Vents:  Vent Mode: A/C (11/03/20 1530)  Set Rate: 32 BPM (11/03/20 1530)  Vt Set: 430 mL (11/03/20 1530)  PEEP/CPAP: 12 cmH20 (11/03/20 1530)  Oxygen Concentration (%): 90 (11/03/20 1800)  Peak Airway Pressure: 36 cmH2O (11/03/20 1530)  Plateau Pressure: 29 cmH20 (11/03/20 1530)  Total Ve: 14.1 mL (11/03/20 1530)  F/VT Ratio<105 (RSBI): (!) 72.56  (11/03/20 1530)  Lines/Drains/Airways     Central Venous Catheter Line            Percutaneous Central Line Insertion/Assessment - Triple Lumen  10/26/20 1940 right internal jugular 8 days          Drain                 Urethral Catheter 10/31/20 1300 Non-latex;Straight-tip 16 Fr. 3 days         NG/OG Tube 11/02/20 1456 orogastric 14 Fr. Left mouth 1 day          Airway                 Airway - Non-Surgical 10/26/20 1820 Endotracheal Tube 8 days       Airway Anesthesia 10/26/20 8 days          Arterial Line            Arterial Line 10/30/20 1556 Right Radial 4 days          Peripheral Intravenous Line                 Peripheral IV - Single Lumen 10/31/20 1630 20 G Anterior;Right Forearm 3 days              Significant Labs:    CBC/Anemia Profile:  Recent Labs   Lab 11/02/20  0447 11/03/20  0219   WBC 23.57* 26.19*   HGB 11.2* 11.0*   HCT 37.5* 36.6*    256   * 105*   RDW 13.2 13.7        Chemistries:  Recent Labs   Lab 11/02/20  0447  11/03/20  0208 11/03/20  0605 11/03/20  1216 11/03/20  1801   *  148*   < > 148* 152* 151* 151*   K 4.7  4.7   < > 4.1 4.2 4.0 4.4     108   < > 104 107 106 106   CO2 31*  31*   < > 34* 30* 34* 34*   *  106*   < > 113* 115* 115* 120*   CREATININE 2.4*  2.4*   < > 2.7* 2.7* 2.7* 3.1*   CALCIUM 7.4*  7.5*   < > 7.6* 7.6* 7.8* 7.9*   ALBUMIN 1.2*  --  1.2*  --   --   --    PROT 5.1*  --  5.3*  --   --   --    BILITOT 0.3  --  0.4  --   --   --    ALKPHOS 179*  --  155*  --   --   --    ALT 59*  --  52*  --   --   --    AST 76*  --  72*  --   --   --    MG 2.7*  --  2.7*  --   --   --    PHOS 5.2*  --  6.9*  --   --   --     < > = values in this interval not displayed.       All pertinent labs within the past 24 hours have been reviewed.    Significant Imaging:  I have reviewed and interpreted all pertinent imaging results/findings within the past 24 hours.

## 2020-11-04 NOTE — ASSESSMENT & PLAN NOTE
No history of prior kidney disease; sCr on admission 0.8 -->2.6 as of 10/30, with UO less responsive to multiple doses of 40 mg lasix which he was initially responsive to; however, BUN rising and he is not clearing in his urine. Retroperitoneal u/s with bilateral medical renal diseased and no hydronephrosis. Fe Urea 26.4% (<35%) indicative of prerenal disease    -- nephrology following, do not recommend acute dialysis at this time  -- BMP q12  -- Avoid ACEI/ARB/NSAIDS/Nephrotoxins.   -- Renally dose all meds

## 2020-11-04 NOTE — CONSULTS
[facility] - ICU 16 WT  Cardiothoracic Surgery  Consult Note    Patient Name: Nubia Riggs  MRN: [id number]  Admission Date: 10/18/2020  Attending Physician: Parminder Marin*  Referring Provider: Self, Aaareferral    Patient information was obtained from past medical records and ER records.     Inpatient consult to Cardiothoracic Surgery  Consult performed by: Tabitah Parmar PA-C  Consult ordered by: Yesica Fiore NP        Subjective:     Principal Problem: Acute respiratory distress syndrome (ARDS) due to 2019 novel coronavirus    History of Present Illness: Mr. Nubia Riggs is a 54 yo male with GERD and RAD with COVID-19 dx on 10/15 who presented to [facility] on 10/18 with worsening hypoxia and SOB at home.  He was orginally admitted to hospital medicine, but continued to have increase in oxygen requirements and severe hypoxia off of BiPAP and he was transferred to COVID-ICU on 10/24 and later intubated on the 26th.  Kidney function had remained stable during the admission, noting an YOVANI on 10/27 with a rise in Scr to 1.6.  On 10/26, he was noted to have episodes of hypotension and hypoxia during intubation and later having an increase in sCr with hyperkalemia that has been medically treated with lasix and insulin/dextrose without resolution.  Kidney function has continued to decline since intubation. Nephrology consulted.     P:F 81 this morning on 90% 12 PEEP while proned. Plateau pressure at goal. Continued hematuria noted.   Update: After supination, worsening oxygenation with saturations maintaining in the low 80's despite 100% 14 PEEP    No current facility-administered medications on file prior to encounter.      Current Outpatient Medications on File Prior to Encounter   Medication Sig    albuterol (ACCUNEB) 1.25 mg/3 mL Nebu Take 3 mLs (1.25 mg total) by nebulization every 6 (six) hours as needed. Rescue    albuterol (VENTOLIN HFA) 90 mcg/actuation inhaler Inhale 1-2 puffs  into the lungs every 4 (four) hours as needed for Wheezing. Rescue    anastrozole (ARIMIDEX) 1 mg Tab     azelastine (ASTELIN) 137 mcg (0.1 %) nasal spray 2 sprays (274 mcg total) by Nasal route 2 (two) times daily.    azelastine (OPTIVAR) 0.05 % ophthalmic solution Place 1 drop into both eyes 2 (two) times daily as needed.    cholestyramine (QUESTRAN) 4 gram packet dissolve ONE PACKET in liquid AND TAKE BY MOUTH ONCE TO TWICE daily    coQ10, ubiquinol, 100 mg Cap Take 1 capsule by mouth once daily.      fluticasone furoate-vilanteroL (BREO ELLIPTA) 100-25 mcg/dose diskus inhaler Inhale 1 puff into the lungs once daily. Controller    metoclopramide HCl (REGLAN) 10 MG tablet Take 1 tablet (10 mg total) by mouth every 6 (six) hours as needed (headache).    minoxidil (LONITEN) 2.5 MG tablet     montelukast (SINGULAIR) 10 mg tablet Take 10 mg by mouth daily as needed.    papaverine 30 mg/mL injection Add: Phentolamine 2 mg  Add: PGE1 40 mcg    Sig:  Inject 25 units (0.25 mls) as directed    papaverine 30 mg/mL injection Papaverine 60mg/ml  Add: Phentolamine 20 mg  Add: PGE1 200 mcg  (60-2-20) Double Strength   Add Atropine 0.1mg/ml  Sig:  Inject 20 units (0.20 mls) as directed    pulse oximeter (PULSE OXIMETER) device by Apply Externally route 2 (two) times a day. Use twice daily at 8 AM and 3 PM and record the value in MyChart as directed.    tadalafil (CIALIS) 20 MG Tab Take 1 tablet (20 mg total) by mouth Every 3 (three) days.    tadalafil (CIALIS) 5 MG tablet Take 1 tablet (5 mg total) by mouth daily as needed for Erectile Dysfunction.    testosterone cypionate (DEPOTESTOTERONE CYPIONATE) 200 mg/mL injection     VIIBRYD 40 mg Tab tablet     zolpidem (AMBIEN) 5 MG Tab Take 5 mg by mouth nightly.       Review of patient's allergies indicates:   Allergen Reactions    Meperidine      Other reaction(s): violent behavior    Sulfa (sulfonamide antibiotics)      Other reaction(s): Unknown       Past  Medical History:   Diagnosis Date    ADD (attention deficit disorder)     Anemia     Bradycardia 3/8/2018    3/8/2018: ECG: SB 46 bpm.    GERD (gastroesophageal reflux disease)      Past Surgical History:   Procedure Laterality Date    BACK SURGERY      CERVICAL SPINE SURGERY      facit repair    CHOLECYSTECTOMY      implant      chin    LIPOSUCTION      waiste    RHINOPLASTY TIP      SPINE SURGERY      l5 s1 micro discectomy    SPINE SURGERY      l5 s1 rodes placed    TONSILLECTOMY       Family History     Problem Relation (Age of Onset)    Cancer Father, Paternal Grandmother    Heart disease Paternal Grandfather        Tobacco Use    Smoking status: Former Smoker     Packs/day: 1.00     Years: 15.00     Pack years: 15.00     Quit date: 2012     Years since quittin.6    Smokeless tobacco: Never Used   Substance and Sexual Activity    Alcohol use: No    Drug use: No    Sexual activity: Not on file     Review of Systems   Unable to perform ROS: Intubated     Objective:     Vital Signs (Most Recent):  Temp: 98.3 °F (36.8 °C) (20 1200)  Pulse: 105 (20 1300)  Resp: (!) 35 (20 1300)  BP: (!) 113/58 (20 1300)  SpO2: (!) 82 % (20 1345) Vital Signs (24h Range):  Temp:  [97.8 °F (36.6 °C)-100.8 °F (38.2 °C)] 98.3 °F (36.8 °C)  Pulse:  [101-119] 105  Resp:  [32-35] 35  SpO2:  [82 %-97 %] 82 %  BP: (107-145)/(55-68) 113/58  Arterial Line BP: (101-143)/(43-54) 115/43     Weight: 84.4 kg (186 lb)  Body mass index is 26.69 kg/m².    SpO2: (!) 82 %  O2 Device (Oxygen Therapy): ventilator     Intake/Output - Last 3 Shifts       700 -  -  - 659    I.V. (mL/kg) 1715.9 (20.3) 1949.6 (23.1) 585.7 (6.9)    NG/ 1700 415    IV Piggyback 523.3 200 550    Total Intake(mL/kg) 2929.2 (34.7) 3849.6 (45.6) 1550.7 (18.4)    Urine (mL/kg/hr) 2560 (1.3) 1570 (0.8) 160 (0.2)    Drains 20      Total Output 2580 1570 160    Net +349.2  +2279.6 +1390.7                  Lines/Drains/Airways     Central Venous Catheter Line            Percutaneous Central Line Insertion/Assessment - Triple Lumen  10/26/20 1940 right internal jugular 8 days          Drain                 Urethral Catheter 10/31/20 1300 Non-latex;Straight-tip 16 Fr. 4 days         NG/OG Tube 11/02/20 1456 orogastric 14 Fr. Left mouth 1 day          Airway                 Airway - Non-Surgical 10/26/20 1820 Endotracheal Tube 8 days       Airway Anesthesia 10/26/20 8 days          Arterial Line            Arterial Line 10/30/20 1556 Right Radial 4 days          Peripheral Intravenous Line                 Peripheral IV - Single Lumen 10/31/20 1630 20 G Anterior;Right Forearm 3 days                 STS Risk Score: n/a    Significant Labs:  ABGs:   Recent Labs   Lab 11/04/20  0502   PH 7.211*   PCO2 83.3*   PO2 73*   HCO3 33.4*   POCSATURATED 90*   BE 6     Amylase: No results for input(s): AMYLASE in the last 48 hours.  BMP:   Recent Labs   Lab 11/04/20 0417 11/04/20  1220   * 94   * 151*   K 4.2 3.3*    116*   CO2 28 24   * 108*   CREATININE 3.9* 3.3*   CALCIUM 7.7* 6.2*   MG 2.9*  --      Cardiac markers: No results for input(s): CKMB, CPKMB, TROPONINT, TROPONINI, MYOGLOBIN in the last 48 hours.  CBC:   Recent Labs   Lab 11/04/20 0417   WBC 24.42*  24.42*   RBC 2.94*  2.94*   HGB 9.3*  9.3*   HCT 31.6*  31.6*     258   *  108*   MCH 31.6*  31.6*   MCHC 29.4*  29.4*     CMP:   Recent Labs   Lab 11/04/20 0417 11/04/20  1220   * 94   CALCIUM 7.7* 6.2*   ALBUMIN 1.5*  --    PROT 5.3*  --    * 151*   K 4.2 3.3*   CO2 28 24    116*   * 108*   CREATININE 3.9* 3.3*   ALKPHOS 128  --    ALT 43  --    AST 78*  --    BILITOT 0.4  --      Coagulation:   Recent Labs   Lab 11/03/20  1541 11/03/20  1830   INR  --  1.1   APTT 36.7*  --      Lactic Acid: No results for input(s): LACTATE in the last 48 hours.  LFTs:   Recent Labs    Lab 11/04/20  0417   ALT 43   AST 78*   ALKPHOS 128   BILITOT 0.4   PROT 5.3*   ALBUMIN 1.5*     Lipase: No results for input(s): LIPASE in the last 48 hours.    Significant Diagnostics: reviewed   TTE 10/25/2020  · The left ventricle is normal in size with normal systolic function. The estimated ejection fraction is 55%.  · Normal left ventricular diastolic function.  · Normal right ventricular systolic function.  · Intermediate central venous pressure (8 mmHg).  · Trivial circumferential pericardial effusion.    CXR reviewed     Assessment/Plan:       Acute hypoxemic respiratory failure due to COVID-19  CTS consulted for possible ecmo in COVID + patient with worsening kidney and respiratory status. Dr. Nazario to review and staff.         Thank you for your consult. I will follow-up with patient. Please contact us if you have any additional questions.    Tabitha Parmar PA-C  Cardiothoracic Surgery  [facility] - ICU 16 WT    I have seen the patient and reviewed the nurse practitioner's note above. I have personally interviewed and examined the patient at bedside and agree with the findings.     Mr. Riggs is a 55 year old male with COVID-19 respiratory failure and kidney failure who's respiratory status has recently worsened.  Unfortunately, with multi organ failure (respiratory and renal failure) he is not a candidate for ECMO.  Please contact us with any questions.      Benjie Nazario MD  Cardiothoracic Surgery  [facility]

## 2020-11-04 NOTE — SUBJECTIVE & OBJECTIVE
Interval History/Significant Events: Febrile overnight to 100.8; suspect aspiration event. CXR with worsening left sided opacity. P:F 81 this morning on 90% 12 PEEP while proned. Plateau pressure at goal. Continued hematuria noted.     Update: After supination, worsening oxygenation with saturations maintaining in the low 80's despite 100% 14 PEEP.     Review of Systems   Unable to perform ROS: Intubated     Objective:     Vital Signs (Most Recent):  Temp: (!) 100.8 °F (38.2 °C) (11/04/20 0000)  Pulse: 102 (11/04/20 0856)  Resp: (!) 35 (11/04/20 0856)  BP: 135/68 (11/04/20 0600)  SpO2: (!) 93 % (11/04/20 0856) Vital Signs (24h Range):  Temp:  [97.5 °F (36.4 °C)-100.8 °F (38.2 °C)] 100.8 °F (38.2 °C)  Pulse:  [] 102  Resp:  [32-35] 35  SpO2:  [86 %-97 %] 93 %  BP: (102-142)/(55-68) 135/68  Arterial Line BP: (101-162)/(44-60) 139/50   Weight: 84.4 kg (186 lb)  Body mass index is 26.69 kg/m².      Intake/Output Summary (Last 24 hours) at 11/4/2020 0907  Last data filed at 11/4/2020 0710  Gross per 24 hour   Intake 3668.92 ml   Output 1570 ml   Net 2098.92 ml       Physical Exam  Vitals signs and nursing note reviewed.   Constitutional:       General: He is not in acute distress.     Appearance: Normal appearance. He is ill-appearing.      Interventions: He is sedated, chemically paralyzed and intubated.   HENT:      Mouth/Throat:      Lips: Lesions present.      Comments: Upper and lower lips swollen with breakdown noted.   Cardiovascular:      Rate and Rhythm: Regular rhythm. Tachycardia present.      Pulses:           Radial pulses are 2+ on the right side and 2+ on the left side.        Dorsalis pedis pulses are 2+ on the right side and 2+ on the left side.      Heart sounds: Normal heart sounds.   Pulmonary:      Effort: He is intubated.      Breath sounds: Normal breath sounds. No wheezing, rhonchi or rales.   Abdominal:      General: Bowel sounds are normal.      Comments: Patient proned for exam    Genitourinary:     Comments: Kelley in place with hematuria.   Musculoskeletal:      Right lower leg: No edema.      Left lower leg: No edema.   Skin:     General: Skin is warm and dry.      Capillary Refill: Capillary refill takes 2 to 3 seconds.      Coloration: Skin is not mottled (right foot 4th digit, left foot, back of great toe).   Neurological:      Mental Status: He is unresponsive.      GCS: GCS eye subscore is 1. GCS verbal subscore is 1. GCS motor subscore is 1.      Comments: Sedated and paralyzed.        Vents:  Vent Mode: A/C (11/04/20 0500)  Set Rate: 35 BPM (11/04/20 0500)  Vt Set: 430 mL (11/04/20 0500)  PEEP/CPAP: 12 cmH20 (11/04/20 0500)  Oxygen Concentration (%): 90 (11/04/20 0710)  Peak Airway Pressure: 34 cmH2O (11/04/20 0500)  Plateau Pressure: 34 cmH20 (11/04/20 0500)  Total Ve: 15.7 mL (11/04/20 0500)  F/VT Ratio<105 (RSBI): (!) 78.12 (11/04/20 0500)  Lines/Drains/Airways     Central Venous Catheter Line            Percutaneous Central Line Insertion/Assessment - Triple Lumen  10/26/20 1940 right internal jugular 8 days          Drain                 Urethral Catheter 10/31/20 1300 Non-latex;Straight-tip 16 Fr. 3 days         NG/OG Tube 11/02/20 1456 orogastric 14 Fr. Left mouth 1 day          Airway                 Airway - Non-Surgical 10/26/20 1820 Endotracheal Tube 8 days       Airway Anesthesia 10/26/20 8 days          Arterial Line            Arterial Line 10/30/20 1556 Right Radial 4 days          Peripheral Intravenous Line                 Peripheral IV - Single Lumen 10/31/20 1630 20 G Anterior;Right Forearm 3 days              Significant Labs:    CBC/Anemia Profile:  Recent Labs   Lab 11/03/20  0219 11/03/20  1830 11/04/20  0417   WBC 26.19* 30.29* 24.42*  24.42*   HGB 11.0* 10.3* 9.3*  9.3*   HCT 36.6* 35.7* 31.6*  31.6*    281 258  258   * 108* 108*  108*   RDW 13.7 13.9 14.0  14.0        Chemistries:  Recent Labs   Lab 11/03/20  0208  11/03/20  1803  11/04/20  0006 11/04/20  0417   *   < > 151* 150* 150*   K 4.1   < > 4.4 4.4 4.2      < > 106 106 107   CO2 34*   < > 34* 32* 28   *   < > 120* 117* 122*   CREATININE 2.7*   < > 3.1* 3.5* 3.9*   CALCIUM 7.6*   < > 7.9* 7.9* 7.7*   ALBUMIN 1.2*  --   --   --  1.5*   PROT 5.3*  --   --   --  5.3*   BILITOT 0.4  --   --   --  0.4   ALKPHOS 155*  --   --   --  128   ALT 52*  --   --   --  43   AST 72*  --   --   --  78*   MG 2.7*  --   --   --  2.9*   PHOS 6.9*  --   --   --  9.3*    < > = values in this interval not displayed.       All pertinent labs within the past 24 hours have been reviewed.    Significant Imaging:  I have reviewed all pertinent imaging results/findings within the past 24 hours.

## 2020-11-04 NOTE — ASSESSMENT & PLAN NOTE
Follows with Pulm as outpatient. Last seen 8/27/20, told he had RAD  He was also seen by a [facility] pulmonologist who did spirometry that reportedly showed borderline or mild asthma, and has been controlled with albuterol.   Former smoker, started at age 10-12 and quit 10 years ago    -- Continue duo nebs q4

## 2020-11-04 NOTE — PROGRESS NOTES
[facility] - ICU 16   Critical Care Medicine  Progress Note    Patient Name: Nubia Riggs  MRN: [id number]  Admission Date: 10/18/2020  Hospital Length of Stay: 17 days  Code Status: Full Code  Attending Provider: Parminder Marin*  Primary Care Provider: Primary Doctor No   Principal Problem: Acute respiratory distress syndrome (ARDS) due to 2019 novel coronavirus    Subjective:     HPI:  55-year-old male with PMhx of GERD, allergic rinhitis, chronic cough (recently saw pulm and was diagnosed with reactive airway disease, on albuterol PRN) presented the emergency department on 10/18 with chief complaint of shortness of breath. He reports subjective fever, headache, chills, myalgias, poor appetite, and sweats about 6 days ago. He was diagnosed with COVID-19 on 10/15.  Reports presenting to urgent care 10/16 due to 89% pulse ox on room air. Started on levofloxacin and discharged home.  Reports using albuterol treatments at home without much relief.He noticed to have cough productive of blood tinged sputum and shortness of breath so he presented to the ED.     He received ceftria/azithro (x1 each in the ED). He started Remdesivir and completed 5 doses on 10/22, continues on dexamethasone. Patient is on his 7th day of hospital admission and has been requiring higher doses of O2 to keep sats >90%. Today he went up to 60L on 100% FiO2 and thus Critical Care was consulted.       Hospital/ICU Course:  Patient admitted to RICU due to worsening respiratory status with increasing oxygen requirements. Completion of remdesivir on 10/22. On BiPAP with O2 saturation in high 70s-low 80s at rest and acute desaturation to 40s when biPAP removed to provide PO medications. He required intubation on 10/26. Central line and arterial line placed. Proning initiated on 10/27. YVOANI developed with inital response to lasix however, not clearing as hyperkalemia persists. UO decreasing on 10/30 despite multiple lasix  doses, and concern for hematuria, CBC sent. Nephrology consult placed. Blood and sputum cultures on 10/30 NGTD, lactate normal, worsening WBC count. Heparin held overnight for hematuria and blood in sputum, restarted 10/31 for cessation of both. Vasopressor requirements improving, off vinay and vaso as of 11/01, WBC continues to increase, however not febrile and cultures are negative. Added fluconazole 11/1.     Interval History/Significant Events: Febrile overnight to 100.8; suspect aspiration event. CXR with worsening left sided opacity. P:F 81 this morning on 90% 12 PEEP while proned. Plateau pressure at goal. Continued hematuria noted.     Update: After supination, worsening oxygenation with saturations maintaining in the low 80's despite 100% 14 PEEP.     Review of Systems   Unable to perform ROS: Intubated     Objective:     Vital Signs (Most Recent):  Temp: (!) 100.8 °F (38.2 °C) (11/04/20 0000)  Pulse: 102 (11/04/20 0856)  Resp: (!) 35 (11/04/20 0856)  BP: 135/68 (11/04/20 0600)  SpO2: (!) 93 % (11/04/20 0856) Vital Signs (24h Range):  Temp:  [97.5 °F (36.4 °C)-100.8 °F (38.2 °C)] 100.8 °F (38.2 °C)  Pulse:  [] 102  Resp:  [32-35] 35  SpO2:  [86 %-97 %] 93 %  BP: (102-142)/(55-68) 135/68  Arterial Line BP: (101-162)/(44-60) 139/50   Weight: 84.4 kg (186 lb)  Body mass index is 26.69 kg/m².      Intake/Output Summary (Last 24 hours) at 11/4/2020 0907  Last data filed at 11/4/2020 0710  Gross per 24 hour   Intake 3668.92 ml   Output 1570 ml   Net 2098.92 ml       Physical Exam  Vitals signs and nursing note reviewed.   Constitutional:       General: He is not in acute distress.     Appearance: Normal appearance. He is ill-appearing.      Interventions: He is sedated, chemically paralyzed and intubated.   HENT:      Mouth/Throat:      Lips: Lesions present.      Comments: Upper and lower lips swollen with breakdown noted.   Cardiovascular:      Rate and Rhythm: Regular rhythm. Tachycardia present.       Pulses:           Radial pulses are 2+ on the right side and 2+ on the left side.        Dorsalis pedis pulses are 2+ on the right side and 2+ on the left side.      Heart sounds: Normal heart sounds.   Pulmonary:      Effort: He is intubated.      Breath sounds: Normal breath sounds. No wheezing, rhonchi or rales.   Abdominal:      General: Bowel sounds are normal.      Comments: Patient proned for exam   Genitourinary:     Comments: Kelley in place with hematuria.   Musculoskeletal:      Right lower leg: No edema.      Left lower leg: No edema.   Skin:     General: Skin is warm and dry.      Capillary Refill: Capillary refill takes 2 to 3 seconds.      Coloration: Skin is not mottled (right foot 4th digit, left foot, back of great toe).   Neurological:      Mental Status: He is unresponsive.      GCS: GCS eye subscore is 1. GCS verbal subscore is 1. GCS motor subscore is 1.      Comments: Sedated and paralyzed.        Vents:  Vent Mode: A/C (11/04/20 0500)  Set Rate: 35 BPM (11/04/20 0500)  Vt Set: 430 mL (11/04/20 0500)  PEEP/CPAP: 12 cmH20 (11/04/20 0500)  Oxygen Concentration (%): 90 (11/04/20 0710)  Peak Airway Pressure: 34 cmH2O (11/04/20 0500)  Plateau Pressure: 34 cmH20 (11/04/20 0500)  Total Ve: 15.7 mL (11/04/20 0500)  F/VT Ratio<105 (RSBI): (!) 78.12 (11/04/20 0500)  Lines/Drains/Airways     Central Venous Catheter Line            Percutaneous Central Line Insertion/Assessment - Triple Lumen  10/26/20 1940 right internal jugular 8 days          Drain                 Urethral Catheter 10/31/20 1300 Non-latex;Straight-tip 16 Fr. 3 days         NG/OG Tube 11/02/20 1456 orogastric 14 Fr. Left mouth 1 day          Airway                 Airway - Non-Surgical 10/26/20 1820 Endotracheal Tube 8 days       Airway Anesthesia 10/26/20 8 days          Arterial Line            Arterial Line 10/30/20 1556 Right Radial 4 days          Peripheral Intravenous Line                 Peripheral IV - Single Lumen 10/31/20 1630  20 G Anterior;Right Forearm 3 days              Significant Labs:    CBC/Anemia Profile:  Recent Labs   Lab 11/03/20  0219 11/03/20  1830 11/04/20  0417   WBC 26.19* 30.29* 24.42*  24.42*   HGB 11.0* 10.3* 9.3*  9.3*   HCT 36.6* 35.7* 31.6*  31.6*    281 258  258   * 108* 108*  108*   RDW 13.7 13.9 14.0  14.0        Chemistries:  Recent Labs   Lab 11/03/20  0208  11/03/20  1801 11/04/20  0006 11/04/20 0417   *   < > 151* 150* 150*   K 4.1   < > 4.4 4.4 4.2      < > 106 106 107   CO2 34*   < > 34* 32* 28   *   < > 120* 117* 122*   CREATININE 2.7*   < > 3.1* 3.5* 3.9*   CALCIUM 7.6*   < > 7.9* 7.9* 7.7*   ALBUMIN 1.2*  --   --   --  1.5*   PROT 5.3*  --   --   --  5.3*   BILITOT 0.4  --   --   --  0.4   ALKPHOS 155*  --   --   --  128   ALT 52*  --   --   --  43   AST 72*  --   --   --  78*   MG 2.7*  --   --   --  2.9*   PHOS 6.9*  --   --   --  9.3*    < > = values in this interval not displayed.       All pertinent labs within the past 24 hours have been reviewed.    Significant Imaging:  I have reviewed all pertinent imaging results/findings within the past 24 hours.      AB  Recent Labs   Lab 11/04/20  0502   PH 7.211*   PO2 73*   PCO2 83.3*   HCO3 33.4*   BE 6     Assessment/Plan:     Psychiatric  Anxiety  Holding home vilazodone due heavy sedation administration      Pulmonary  Reactive airway disease  Follows with Pulm as outpatient. Last seen 8/27/20, told he had RAD  He was also seen by a [facility] pulmonologist who did spirometry that reportedly showed borderline or mild asthma, and has been controlled with albuterol.   Former smoker, started at age 10-12 and quit 10 years ago    -- Continue duo nebs q4    Renal/  Hypernatremia  Likely secondary to TFs +/- dehydration from diuresis     -- emesis noted with free water flushes  -- D5W @ 100/hr     YOVANI (acute kidney injury)  No history of prior kidney disease; sCr on admission 0.8 -->2.6 as of 10/30, with UO less responsive  to multiple doses of 40 mg lasix which he was initially responsive to; however, BUN rising and he is not clearing in his urine. Retroperitoneal u/s with bilateral medical renal diseased and no hydronephrosis. Fe Urea 26.4% (<35%) indicative of prerenal disease    -- nephrology following, do not recommend acute dialysis at this time  -- BMP q12  -- Avoid ACEI/ARB/NSAIDS/Nephrotoxins.   -- Renally dose all meds    Hyperkalemia  Noted 10/29. No EKG changes. Shifted with D50/insulin and furosemide which temporarily improved potassium    --see YOVANI  --resolved         Other  * Acute respiratory distress syndrome (ARDS) due to 2019 novel coronavirus  COVID positive on 10/15. Patient admitted to hospital medicine and continued to deteriorate needing higher oxygen requirements and thus Critical Care Medicine was consulted. Intubated 10/26 and eventually placed on NMB and proned. Peak and plateau pressures remain at goal    - s/p remdesivir (10/18-10/22)  - s/p Dexamethasone (10/27)  - s/p vanc x7 days (end date 11/01)  - duo nebs q4 scheduled and q6 PRN  - continue Heparin gtt D-dimer >33  - continue LPV and pronation for PF <150 with FiO2 50% and PEEP 10  - Sputum culture sent 10/28 NGTD, reordered 10/30 for leukocytosis and febrile all negative  - continue zosyn and fluconazole.   - Suspect aspiration event 11/4 with worsening oxygenation. Vancomycin restarted.   - CTS consult for ECMO evaluation        COVID-19  See resp failure    Palliative care encounter  Palliative consulted for potential for prolonged hospitalization and following along.     Acute hypoxemic respiratory failure due to COVID-19  Secondary to COVID (see above)    Pneumonia due to COVID-19 virus  See ARDS      Plan discussed with Dr. Marin.     Critical Care Time: 70 minutes  Critical secondary to Patient has a condition that poses threat to life and bodily function: ARDS 2/2 COVID      Critical care was time spent personally by me on the following  activities: development of treatment plan with patient or surrogate and bedside caregivers, discussions with consultants, evaluation of patient's response to treatment, examination of patient, ordering and performing treatments and interventions, ordering and review of laboratory studies, ordering and review of radiographic studies, pulse oximetry, re-evaluation of patient's condition. This critical care time did not overlap with that of any other provider or involve time for any procedures.     Yesica Fiore NP  Critical Care Medicine  [facility] - ICU 16 WT

## 2020-11-04 NOTE — PROGRESS NOTES
[facility] - ICU 16 WT  Nephrology  Progress Note    Patient Name: Nubia Riggs  MRN: [id number]  Admission Date: 10/18/2020  Hospital Length of Stay: 17 days  Attending Provider: Parminder Marin*   Primary Care Physician: Primary Doctor No  Principal Problem:Acute respiratory distress syndrome (ARDS) due to 2019 novel coronavirus    Subjective:     HPI: Mr. Nubia Riggs is a 56 yo male with GERD and RAD with COVID-19 dx on 10/15 who presented to [facility] on 10/18 with worsening hypoxia and SOB at home.  He was orginally admitted to hospital medicine, but continued to have increase in oxygen requirements and severe hypoxia off of BiPAP and he was transferred to COVID-ICU on 10/24 and later intubated on the 26th.  Kidney function had remained stable during the admission, noting an YOVANI on 10/27 with a rise in Scr to 1.6.  On 10/26, he was noted to have episodes of hypotension and hypoxia during intubation and later having an increase in sCr with hyperkalemia that has been medically treated with lasix and insulin/dextrose without resolution.  Kidney function has continued to decline since intubation and Nephrology was consulted for evaluation.    Interval History:   Continues to require high oxygen requirements.  Unable to tolerate tube feeds, so discontinued this AM along with FWF.  Sodium worsening to 151.  Kidney function continues to worsen with a rise in sCr to 3.9 and BUN of 122.  Started on D5W @ 100 cc/hr.  Repeat labs with improvement in SCr/BUN.    Review of patient's allergies indicates:   Allergen Reactions    Meperidine      Other reaction(s): violent behavior    Sulfa (sulfonamide antibiotics)      Other reaction(s): Unknown     Current Facility-Administered Medications   Medication Frequency    acetaminophen oral solution 650 mg Q6H PRN    albuterol-ipratropium 2.5 mg-0.5 mg/3 mL nebulizer solution 3 mL Q4H    albuterol-ipratropium 2.5 mg-0.5 mg/3 mL nebulizer solution 3 mL  Q6H PRN    cisatracurium (NIMBEX) 200 mg in dextrose 5 % 100 mL infusion Continuous    dextrose 5 % infusion Continuous    dextrose 50% injection 12.5 g PRN    dextrose 50% injection 25 g PRN    famotidine (PF) injection 20 mg Daily    fentaNYL 2500 mcg in 0.9% sodium chloride 250 mL infusion premix (titrating) Continuous    fluconazole (DIFLUCAN) IVPB 400 mg 200 mL Q24H    glucagon (human recombinant) injection 1 mg PRN    glucose chewable tablet 16 g PRN    glucose chewable tablet 24 g PRN    heparin 25,000 units in dextrose 5% 250 ml (100 units/mL) infusion MINIMAL INTENSITY nomogram - Anti- Xa Continuous    lactulose 20 gram/30 mL solution Soln 30 g TID    multivitamin liquid no.118 per dose 10 mL Daily    norepinephrine 4 mg in dextrose 5% 250 mL infusion (premix) (titrating) Continuous    piperacillin-tazobactam 4.5 g in sodium chloride 0.9% 100 mL IVPB (ready to mix system) Q8H    polyethylene glycol packet 17 g BID    propofol (DIPRIVAN) 10 mg/mL infusion Continuous    senna-docusate 8.6-50 mg per tablet 2 tablet BID    sevelamer carbonate pwpk 1.6 g TID    sodium chloride 0.9% flush 10 mL PRN    vancomycin - pharmacy to dose pharmacy to manage frequency    vitamin D 1000 units tablet 1,000 Units Daily    white petrolatum-mineral oil (LUBIFRESH P.M.) ophthalmic ointment BID       Objective:     Vital Signs (Most Recent):  Temp: 98.3 °F (36.8 °C) (11/04/20 1200)  Pulse: 105 (11/04/20 1300)  Resp: (!) 35 (11/04/20 1300)  BP: (!) 113/58 (11/04/20 1300)  SpO2: (!) 82 % (11/04/20 1345)  O2 Device (Oxygen Therapy): ventilator (11/04/20 1300) Vital Signs (24h Range):  Temp:  [97.8 °F (36.6 °C)-100.8 °F (38.2 °C)] 98.3 °F (36.8 °C)  Pulse:  [101-119] 105  Resp:  [32-35] 35  SpO2:  [82 %-97 %] 82 %  BP: (107-145)/(55-68) 113/58  Arterial Line BP: (101-143)/(43-54) 115/43     Weight: 84.4 kg (186 lb) (10/25/20 1120)  Body mass index is 26.69 kg/m².  Body surface area is 2.04 meters  squared.    I/O last 3 completed shifts:  In: 4857.6 [I.V.:2734.3; NG/GT:1700; IV Piggyback:423.3]  Out: 3070 [Urine:3070]    Physical Exam  Vitals signs and nursing note reviewed.   Constitutional:       Appearance: He is ill-appearing. He is not diaphoretic.      Interventions: He is sedated and intubated.      Comments: COVID-19 Precautions maintained.     HENT:      Head: Normocephalic and atraumatic.   Cardiovascular:      Rate and Rhythm: Regular rhythm. Tachycardia present.   Pulmonary:      Effort: He is intubated.      Breath sounds: No rhonchi.   Musculoskeletal:      Right lower leg: No edema.      Left lower leg: No edema.   Skin:     General: Skin is warm and dry.         Significant Labs:  CBC:   Recent Labs   Lab 11/04/20 0417   WBC 24.42*  24.42*   RBC 2.94*  2.94*   HGB 9.3*  9.3*   HCT 31.6*  31.6*     258   *  108*   MCH 31.6*  31.6*   MCHC 29.4*  29.4*     CMP:   Recent Labs   Lab 11/04/20 0417 11/04/20  1220   * 94   CALCIUM 7.7* 6.2*   ALBUMIN 1.5*  --    PROT 5.3*  --    * 151*   K 4.2 3.3*   CO2 28 24    116*   * 108*   CREATININE 3.9* 3.3*   ALKPHOS 128  --    ALT 43  --    AST 78*  --    BILITOT 0.4  --             Assessment/Plan:     YOVANI (acute kidney injury)  Non-Oliguric YOVANI on Normal Renal Fx  YOVANI 2/2 ATN from septic shock from COVID-019 infection.  disproportionate rise in BUN to Cr with possible component of volume depletion.    Plan/Recommendations:  -No acute need for RRT  -Sodium remains elevated, some improvement in SCr/BUN with IV FW  -sodium trending up to 151.  FWD of 4.3L, unable to tolerate FWF via OG  -recommend D5W @ 150 cc/hr.  -would recommend holding further diuresis.  - Will continue following closely.    Hypernatremia  -FWD of 4.3L  -recommend increasing D5W gtt to 150 cc/hr  -likely having an osmotic diuresis from azotemia and lasix use  -recommend holding diuretics        Kamaljit Easton, DILSHAD  Nephrology  [facility]  [facility]

## 2020-11-04 NOTE — PROGRESS NOTES
[facility] - ICU 16   Critical Care Medicine  Progress Note    Patient Name: Nubia Riggs  MRN: [id number]  Admission Date: 10/18/2020  Hospital Length of Stay: 16 days  Code Status: Full Code  Attending Provider: Parminder Marin*  Primary Care Provider: Primary Doctor No   Principal Problem: Pneumonia due to COVID-19 virus    Subjective:     HPI:  55-year-old male with PMhx of GERD, allergic rinhitis, chronic cough (recently saw pulm and was diagnosed with reactive airway disease, on albuterol PRN) presented the emergency department on 10/18 with chief complaint of shortness of breath. He reports subjective fever, headache, chills, myalgias, poor appetite, and sweats about 6 days ago. He was diagnosed with COVID-19 on 10/15.  Reports presenting to urgent care 10/16 due to 89% pulse ox on room air. Started on levofloxacin and discharged home.  Reports using albuterol treatments at home without much relief.He noticed to have cough productive of blood tinged sputum and shortness of breath so he presented to the ED.     He received ceftria/azithro (x1 each in the ED). He started Remdesivir and completed 5 doses on 10/22, continues on dexamethasone. Patient is on his 7th day of hospital admission and has been requiring higher doses of O2 to keep sats >90%. Today he went up to 60L on 100% FiO2 and thus Critical Care was consulted.       Hospital/ICU Course:  Patient admitted to RICU due to worsening respiratory status with increasing oxygen requirements. Completion of remdesivir on 10/22. On BiPAP with O2 saturation in high 70s-low 80s at rest and acute desaturation to 40s when biPAP removed to provide PO medications. He required intubation on 10/26. Central line and arterial line placed. Proning initiated on 10/27. YOVANI developed with inital response to lasix however, not clearing as hyperkalemia persists. UO decreasing on 10/30 despite multiple lasix doses, and concern for hematuria, CBC sent.  Nephrology consult placed. Blood and sputum cultures on 10/30 NGTD, lactate normal, worsening WBC count. Heparin held overnight for hematuria and blood in sputum, restarted 10/31 for cessation of both. Vasopressor requirements improving, off vinay and vaso as of 11/01, WBC continues to increase, however not febrile and cultures are negative. Added fluconazole 11/1.     Interval History/Significant Events: Patient continues to require high amounts of oxygen and ventilator support    Review of Systems  Objective:     Vital Signs (Most Recent):  Temp: 97.8 °F (36.6 °C) (11/03/20 1600)  Pulse: (!) 118 (11/03/20 1800)  Resp: (!) 35 (11/03/20 1800)  BP: 134/61 (11/03/20 1800)  SpO2: (!) 90 % (11/03/20 1800) Vital Signs (24h Range):  Temp:  [97.1 °F (36.2 °C)-98.2 °F (36.8 °C)] 97.8 °F (36.6 °C)  Pulse:  [] 118  Resp:  [8-35] 35  SpO2:  [86 %-97 %] 90 %  BP: ()/(49-65) 134/61  Arterial Line BP: (0-162)/(0-60) 138/54   Weight: 84.4 kg (186 lb)  Body mass index is 26.69 kg/m².      Intake/Output Summary (Last 24 hours) at 11/3/2020 1909  Last data filed at 11/3/2020 1800  Gross per 24 hour   Intake 3080.95 ml   Output 2520 ml   Net 560.95 ml       Physical Exam  Vitals signs and nursing note reviewed.   Constitutional:       Appearance: Normal appearance.      Interventions: He is sedated, intubated and restrained.   HENT:      Head: Normocephalic.   Cardiovascular:      Rate and Rhythm: Regular rhythm. Tachycardia present.      Pulses: Normal pulses.   Pulmonary:      Effort: He is intubated.      Breath sounds: Normal air entry.   Abdominal:      General: Bowel sounds are normal.      Palpations: Abdomen is soft.   Skin:     General: Skin is warm and dry.      Comments: Acne on back   Neurological:      Mental Status: He is easily aroused.      GCS: GCS eye subscore is 1. GCS verbal subscore is 1. GCS motor subscore is 1.      Comments: Paralyzed and sedated   Psychiatric:         Attention and Perception: He is  inattentive.         Vents:  Vent Mode: A/C (11/03/20 1530)  Set Rate: 32 BPM (11/03/20 1530)  Vt Set: 430 mL (11/03/20 1530)  PEEP/CPAP: 12 cmH20 (11/03/20 1530)  Oxygen Concentration (%): 90 (11/03/20 1800)  Peak Airway Pressure: 36 cmH2O (11/03/20 1530)  Plateau Pressure: 29 cmH20 (11/03/20 1530)  Total Ve: 14.1 mL (11/03/20 1530)  F/VT Ratio<105 (RSBI): (!) 72.56 (11/03/20 1530)  Lines/Drains/Airways     Central Venous Catheter Line            Percutaneous Central Line Insertion/Assessment - Triple Lumen  10/26/20 1940 right internal jugular 8 days          Drain                 Urethral Catheter 10/31/20 1300 Non-latex;Straight-tip 16 Fr. 3 days         NG/OG Tube 11/02/20 1456 orogastric 14 Fr. Left mouth 1 day          Airway                 Airway - Non-Surgical 10/26/20 1820 Endotracheal Tube 8 days       Airway Anesthesia 10/26/20 8 days          Arterial Line            Arterial Line 10/30/20 1556 Right Radial 4 days          Peripheral Intravenous Line                 Peripheral IV - Single Lumen 10/31/20 1630 20 G Anterior;Right Forearm 3 days              Significant Labs:    CBC/Anemia Profile:  Recent Labs   Lab 11/02/20  0447 11/03/20  0219   WBC 23.57* 26.19*   HGB 11.2* 11.0*   HCT 37.5* 36.6*    256   * 105*   RDW 13.2 13.7        Chemistries:  Recent Labs   Lab 11/02/20  0447  11/03/20  0208 11/03/20  0605 11/03/20  1216 11/03/20  1801   *  148*   < > 148* 152* 151* 151*   K 4.7  4.7   < > 4.1 4.2 4.0 4.4     108   < > 104 107 106 106   CO2 31*  31*   < > 34* 30* 34* 34*   *  106*   < > 113* 115* 115* 120*   CREATININE 2.4*  2.4*   < > 2.7* 2.7* 2.7* 3.1*   CALCIUM 7.4*  7.5*   < > 7.6* 7.6* 7.8* 7.9*   ALBUMIN 1.2*  --  1.2*  --   --   --    PROT 5.1*  --  5.3*  --   --   --    BILITOT 0.3  --  0.4  --   --   --    ALKPHOS 179*  --  155*  --   --   --    ALT 59*  --  52*  --   --   --    AST 76*  --  72*  --   --   --    MG 2.7*  --  2.7*  --   --   --     PHOS 5.2*  --  6.9*  --   --   --     < > = values in this interval not displayed.       All pertinent labs within the past 24 hours have been reviewed.    Significant Imaging:  I have reviewed and interpreted all pertinent imaging results/findings within the past 24 hours.      ABG  Recent Labs   Lab 11/03/20  1536   PH 7.220*   PO2 69*   PCO2 94.4*   HCO3 38.6*   BE 11     Assessment/Plan:       * Pneumonia due to COVID-19 virus/ Acute hypoxemic respiratory failure due to COVID-19  COVID positive on 10/15. Patient admitted to hospital medicine and continued to deteriorate needing higher oxygen requirements and thus Critical Care Medicine was consulted. Intubated 10/26 and eventually placed on NMB and proned. Peak and plateau pressures remain at goal  - s/p remdesivir (10/18-10/22)  - s/p Dexamethasone (10/27)  - s/p vanc x7 days (end date 11/01)  - duo nebs q4 scheduled and q6 PRN  - continue Heparin gtt per Xa protocol.  D-dimer >33  - continue LPV and pronation for PF <150   - Sputum culture sent 10/28 NGTD, reordered 10/30 for leukocytosis and febrile all negative  - recultured 11/3  - continue zosyn, add fluconazole    Anxiety  Holding home vilazodone due heavy sedation administration  - paralyzed and sedated    Reactive airway disease  Follows with Pulm as outpatient. Last seen 8/27/20, told he had RAD  He was also seen by a [facility] pulmonologist who did spirometry that reportedly showed borderline or mild asthma, and has been controlled with albuterol.   Former smoker, started at age 10-12 and quit 10 years ago  -- Continue duo nebs q4    Hypernatremia  Likely secondary to TFs  - enteral water boluses 300 cc QID    YOVANI (acute kidney injury)  No history of prior kidney disease; sCr on admission 0.8 -->2.6 as of 10/30, with UO less responsive to multiple doses of 40 mg lasix which he was initially responsive to; however, BUN rising and he is not clearing in his urine. Retroperitoneal u/s with bilateral medical  renal diseased and no hydronephrosis. Fe Urea 26.4% (<35%) indicative of prerenal disease  --nephrology following, do not recommend acute dialysis at this time  --BMP q12    Hyperkalemia  Noted 10/29. No EKG changes. Shifted with D50/insulin and furosemide which temporarily improved potassium  --see YOVANI    Palliative care encounter  Palliative consulted for potential for prolonged hospitalization and following along.       Critical Care Time:  60 minutes    Critical care was time spent personally by me on the following activities: development of treatment plan with patient or surrogate and bedside caregivers, discussions with consultants, evaluation of patient's response to treatment, examination of patient, ordering and performing treatments and interventions, ordering and review of laboratory studies, ordering and review of radiographic studies, pulse oximetry, re-evaluation of patient's condition. This critical care time did not overlap with that of any other provider or involve time for any procedures.     Fiona Winterbottom, APRN, CNS  Critical Care Medicine  [facility] - ICU 16 WT

## 2020-11-04 NOTE — NURSING
0800: Assessment complete. See flow sheet for further information. Arterial line is still bleeding. PM shift undressed the arterial line, placed some quick clot, and redressed with tegaderm. Bleeding has appeared to slow with the quick clot. Patient upper lip appears to be swollen. Unsure as to why or how it is swollen. Will inform treatment team when they arrive. Kelley is still noted to have some pink tinged urine.     0900: Meds given. See MAR. Xa obtained and sent to lab via tube system. Yesica Fiore NP at bedside. Addressed the arterial line bleeding/dressing and swollen lip with her. Okay to keep the arterial line as long as it is functioning properly. Discussed lack of bowel movement. Awaiting order for bowel movement. Attempted to go down to 80% on ventilator. Patient 02 level decreased so increased back to 90%.     1035: Patient supinated with 2 RRT at HoB, NP at FoB, 4 RN and 1 PCT at bedside. Patient tolerated supination well. Connected back to monitors and all alarms are on and safety measures in place.  Per Yesica, d/c flotrac.    1050: Patient desatting to 79%. Increased to 100% on ventilator. RRT and RN at bedside. RRT increased PEEP to 14.0; DILSHAD Robert, notified and arrived at bedside. Attempt made to suction and placed patient HOB up higher. Elevated head on pillow and o2 sat increased only to 80-81%. Chest x ray ordered.     1140: Chest xray obtained. Yesica called to inform her the xray was complete. Patient noted to be more dusky in the face.     1330: Wound care at bedside assessing patient. Plan to d/c the venelex ointment for the patient's side of abdomen.     1500: Benjie Llamas consulted for ecmo. At bedside, stated patient was not a candidate for it at this time due to kidney injury.     1631: Dr. Marin rounding with JERRI Fiore NP. Adebayo stated to move the 1800 BMP to 2100 as there was one ordered before 1800 as a STAT. Duskiness to the face is increasing.    1700: Family meeting with  Liza, SO, and Tanner, brother, along with RN, Dr. Marin and DILSHAD iFore. Informed Dr. Marin and NP about the ABG results obtained.     1720: Family at bedside visiting with patient.      1800: Discussed dialysis with patient's family. They want to proceed. Called Yesica and she is going to have the consent signed along with a blood transfusion consent for any future needed transfusions.     1831: Family leaving bedside to meet with Yesica to sign the consents.    Care Plan    POC reviewed with Nubia Riggs's family. Questions and concerns addressed. Pt progressing toward goals. Will continue to monitor. See below and flowsheets for full assessment and VS info.       Neuro:  Vale Coma Scale  Best Eye Response: 1-->(E1) none  Best Motor Response: 1-->(M1) none  Best Verbal Response: 1-->(V1) none  Vale Coma Scale Score: 3  Assessment Qualifiers: patient chemically sedated or paralyzed, patient intubated  Pupil PERRLA: yes  24 hr Temp:  [98.1 °F (36.7 °C)-100.8 °F (38.2 °C)]      CV:  Rhythm: sinus tachycardia  DVT prophylaxis: VTE Required Core Measure: Pharmacological prophylaxis initiated/maintained    Resp:  O2 Device (Oxygen Therapy): ventilator  Vent Mode: A/C  Set Rate: 35 BPM  Oxygen Concentration (%): 100  Vt Set: 430 mL  PEEP/CPAP: 14 cmH20    GI/:  GI prophylaxis: yes  Diet/Nutrition Received: tube feeding  Last Bowel Movement: 10/24/20  Voiding Characteristics: urethral catheter (bladder)  [REMOVED]      Urethral Catheter 10/26/20 0900 Non-latex 16 Fr.-Reason for Continuing Urinary Catheterization: Critically ill in ICU requiring intensive monitoring       Urethral Catheter 10/31/20 1300 Non-latex;Straight-tip 16 Fr.-Reason for Continuing Urinary Catheterization: Critically ill in ICU and requiring hourly monitoring of intake/output     Intake/Output Summary (Last 24 hours) at 11/4/2020 1832  Last data filed at 11/4/2020 1828  Gross per 24 hour   Intake 4672.45 ml   Output 800 ml   Net  3872.45 ml       Labs/Accuchecks:  Recent Labs   Lab 11/04/20  0417   WBC 24.42*  24.42*   RBC 2.94*  2.94*   HGB 9.3*  9.3*   HCT 31.6*  31.6*     258      Recent Labs   Lab 11/04/20  0417  11/04/20  1537   *   < > 145   K 4.2   < > 4.4   CO2 28   < > 30*      < > 101   *   < > 135*   CREATININE 3.9*   < > 4.9*   ALKPHOS 128  --   --    ALT 43  --   --    AST 78*  --   --    BILITOT 0.4  --   --     < > = values in this interval not displayed.      Recent Labs   Lab 11/03/20  1541 11/03/20  1830   INR  --  1.1   APTT 36.7*  --       Recent Labs   Lab 10/31/20  0428   *       Electrolytes: No replacement orders  Accuchecks: Q4H    Gtts/LDAs:   calcium gluconate IVPB      cisatracurium (NIMBEX) infusion 2.014 mcg/kg/min (11/04/20 1800)    dextrose 5 % 100 mL/hr at 11/04/20 1800    dextrose-sod citrate-citric ac      fentanyl 25 mL/hr at 11/04/20 1800    heparin (porcine) in D5W 19 Units/kg/hr (11/04/20 1814)    nitric oxide gas      norepinephrine bitartrate-D5W 0.16 mcg/kg/min (11/04/20 1827)    propofoL 49.961 mcg/kg/min (11/04/20 1800)       Lines/Drains/Airways     Central Venous Catheter Line            Percutaneous Central Line Insertion/Assessment - Triple Lumen  10/26/20 1940 right internal jugular 8 days          Drain                 Urethral Catheter 10/31/20 1300 Non-latex;Straight-tip 16 Fr. 4 days         NG/OG Tube 11/02/20 1456 orogastric 14 Fr. Left mouth 2 days          Airway                 Airway - Non-Surgical 10/26/20 1820 Endotracheal Tube 9 days       Airway Anesthesia 10/26/20 9 days          Arterial Line            Arterial Line 10/30/20 1556 Right Radial 5 days          Peripheral Intravenous Line                 Peripheral IV - Single Lumen 10/31/20 1630 20 G Anterior;Right Forearm 4 days                Skin/Wounds:  Bathing/Skin Care: linen changed, incontinence care, bath, partial  Wounds: Yes  Wound care consulted: Yes, previously

## 2020-11-04 NOTE — SUBJECTIVE & OBJECTIVE
Interval History:   Continues to require high oxygen requirements.  Unable to tolerate tube feeds, so discontinued this AM along with FWF.  Sodium worsening to 151.  Kidney function continues to worsen with a rise in sCr to 3.9 and BUN of 122.  Started on D5W @ 100 cc/hr.  Repeat labs with improvement in SCr/BUN.    Review of patient's allergies indicates:   Allergen Reactions    Meperidine      Other reaction(s): violent behavior    Sulfa (sulfonamide antibiotics)      Other reaction(s): Unknown     Current Facility-Administered Medications   Medication Frequency    acetaminophen oral solution 650 mg Q6H PRN    albuterol-ipratropium 2.5 mg-0.5 mg/3 mL nebulizer solution 3 mL Q4H    albuterol-ipratropium 2.5 mg-0.5 mg/3 mL nebulizer solution 3 mL Q6H PRN    cisatracurium (NIMBEX) 200 mg in dextrose 5 % 100 mL infusion Continuous    dextrose 5 % infusion Continuous    dextrose 50% injection 12.5 g PRN    dextrose 50% injection 25 g PRN    famotidine (PF) injection 20 mg Daily    fentaNYL 2500 mcg in 0.9% sodium chloride 250 mL infusion premix (titrating) Continuous    fluconazole (DIFLUCAN) IVPB 400 mg 200 mL Q24H    glucagon (human recombinant) injection 1 mg PRN    glucose chewable tablet 16 g PRN    glucose chewable tablet 24 g PRN    heparin 25,000 units in dextrose 5% 250 ml (100 units/mL) infusion MINIMAL INTENSITY nomogram - Anti- Xa Continuous    lactulose 20 gram/30 mL solution Soln 30 g TID    multivitamin liquid no.118 per dose 10 mL Daily    norepinephrine 4 mg in dextrose 5% 250 mL infusion (premix) (titrating) Continuous    piperacillin-tazobactam 4.5 g in sodium chloride 0.9% 100 mL IVPB (ready to mix system) Q8H    polyethylene glycol packet 17 g BID    propofol (DIPRIVAN) 10 mg/mL infusion Continuous    senna-docusate 8.6-50 mg per tablet 2 tablet BID    sevelamer carbonate pwpk 1.6 g TID    sodium chloride 0.9% flush 10 mL PRN    vancomycin - pharmacy to dose pharmacy to  manage frequency    vitamin D 1000 units tablet 1,000 Units Daily    white petrolatum-mineral oil (LUBIFRESH P.M.) ophthalmic ointment BID       Objective:     Vital Signs (Most Recent):  Temp: 98.3 °F (36.8 °C) (11/04/20 1200)  Pulse: 105 (11/04/20 1300)  Resp: (!) 35 (11/04/20 1300)  BP: (!) 113/58 (11/04/20 1300)  SpO2: (!) 82 % (11/04/20 1345)  O2 Device (Oxygen Therapy): ventilator (11/04/20 1300) Vital Signs (24h Range):  Temp:  [97.8 °F (36.6 °C)-100.8 °F (38.2 °C)] 98.3 °F (36.8 °C)  Pulse:  [101-119] 105  Resp:  [32-35] 35  SpO2:  [82 %-97 %] 82 %  BP: (107-145)/(55-68) 113/58  Arterial Line BP: (101-143)/(43-54) 115/43     Weight: 84.4 kg (186 lb) (10/25/20 1120)  Body mass index is 26.69 kg/m².  Body surface area is 2.04 meters squared.    I/O last 3 completed shifts:  In: 4857.6 [I.V.:2734.3; NG/GT:1700; IV Piggyback:423.3]  Out: 3070 [Urine:3070]    Physical Exam  Vitals signs and nursing note reviewed.   Constitutional:       Appearance: He is ill-appearing. He is not diaphoretic.      Interventions: He is sedated and intubated.      Comments: COVID-19 Precautions maintained.     HENT:      Head: Normocephalic and atraumatic.   Cardiovascular:      Rate and Rhythm: Regular rhythm. Tachycardia present.   Pulmonary:      Effort: He is intubated.      Breath sounds: No rhonchi.   Musculoskeletal:      Right lower leg: No edema.      Left lower leg: No edema.   Skin:     General: Skin is warm and dry.         Significant Labs:  CBC:   Recent Labs   Lab 11/04/20  0417   WBC 24.42*  24.42*   RBC 2.94*  2.94*   HGB 9.3*  9.3*   HCT 31.6*  31.6*     258   *  108*   MCH 31.6*  31.6*   MCHC 29.4*  29.4*     CMP:   Recent Labs   Lab 11/04/20  0417 11/04/20  1220   * 94   CALCIUM 7.7* 6.2*   ALBUMIN 1.5*  --    PROT 5.3*  --    * 151*   K 4.2 3.3*   CO2 28 24    116*   * 108*   CREATININE 3.9* 3.3*   ALKPHOS 128  --    ALT 43  --    AST 78*  --    BILITOT 0.4  --

## 2020-11-04 NOTE — SUBJECTIVE & OBJECTIVE
No current facility-administered medications on file prior to encounter.      Current Outpatient Medications on File Prior to Encounter   Medication Sig    albuterol (ACCUNEB) 1.25 mg/3 mL Nebu Take 3 mLs (1.25 mg total) by nebulization every 6 (six) hours as needed. Rescue    albuterol (VENTOLIN HFA) 90 mcg/actuation inhaler Inhale 1-2 puffs into the lungs every 4 (four) hours as needed for Wheezing. Rescue    anastrozole (ARIMIDEX) 1 mg Tab     azelastine (ASTELIN) 137 mcg (0.1 %) nasal spray 2 sprays (274 mcg total) by Nasal route 2 (two) times daily.    azelastine (OPTIVAR) 0.05 % ophthalmic solution Place 1 drop into both eyes 2 (two) times daily as needed.    cholestyramine (QUESTRAN) 4 gram packet dissolve ONE PACKET in liquid AND TAKE BY MOUTH ONCE TO TWICE daily    coQ10, ubiquinol, 100 mg Cap Take 1 capsule by mouth once daily.      fluticasone furoate-vilanteroL (BREO ELLIPTA) 100-25 mcg/dose diskus inhaler Inhale 1 puff into the lungs once daily. Controller    metoclopramide HCl (REGLAN) 10 MG tablet Take 1 tablet (10 mg total) by mouth every 6 (six) hours as needed (headache).    minoxidil (LONITEN) 2.5 MG tablet     montelukast (SINGULAIR) 10 mg tablet Take 10 mg by mouth daily as needed.    papaverine 30 mg/mL injection Add: Phentolamine 2 mg  Add: PGE1 40 mcg    Sig:  Inject 25 units (0.25 mls) as directed    papaverine 30 mg/mL injection Papaverine 60mg/ml  Add: Phentolamine 20 mg  Add: PGE1 200 mcg  (60-2-20) Double Strength   Add Atropine 0.1mg/ml  Sig:  Inject 20 units (0.20 mls) as directed    pulse oximeter (PULSE OXIMETER) device by Apply Externally route 2 (two) times a day. Use twice daily at 8 AM and 3 PM and record the value in Mementohart as directed.    tadalafil (CIALIS) 20 MG Tab Take 1 tablet (20 mg total) by mouth Every 3 (three) days.    tadalafil (CIALIS) 5 MG tablet Take 1 tablet (5 mg total) by mouth daily as needed for Erectile Dysfunction.    testosterone cypionate  (DEPOTESTOTERONE CYPIONATE) 200 mg/mL injection     VIIBRYD 40 mg Tab tablet     zolpidem (AMBIEN) 5 MG Tab Take 5 mg by mouth nightly.       Review of patient's allergies indicates:   Allergen Reactions    Meperidine      Other reaction(s): violent behavior    Sulfa (sulfonamide antibiotics)      Other reaction(s): Unknown       Past Medical History:   Diagnosis Date    ADD (attention deficit disorder)     Anemia     Bradycardia 3/8/2018    3/8/2018: ECG: SB 46 bpm.    GERD (gastroesophageal reflux disease)      Past Surgical History:   Procedure Laterality Date    BACK SURGERY      CERVICAL SPINE SURGERY      facit repair    CHOLECYSTECTOMY      implant      chin    LIPOSUCTION      waiste    RHINOPLASTY TIP      SPINE SURGERY      l5 s1 micro discectomy    SPINE SURGERY      l5 s1 rodes placed    TONSILLECTOMY       Family History     Problem Relation (Age of Onset)    Cancer Father, Paternal Grandmother    Heart disease Paternal Grandfather        Tobacco Use    Smoking status: Former Smoker     Packs/day: 1.00     Years: 15.00     Pack years: 15.00     Quit date: 2012     Years since quittin.6    Smokeless tobacco: Never Used   Substance and Sexual Activity    Alcohol use: No    Drug use: No    Sexual activity: Not on file     Review of Systems   Unable to perform ROS: Intubated     Objective:     Vital Signs (Most Recent):  Temp: 98.3 °F (36.8 °C) (20 1200)  Pulse: 105 (20 1300)  Resp: (!) 35 (20 1300)  BP: (!) 113/58 (20 1300)  SpO2: (!) 82 % (20 1345) Vital Signs (24h Range):  Temp:  [97.8 °F (36.6 °C)-100.8 °F (38.2 °C)] 98.3 °F (36.8 °C)  Pulse:  [101-119] 105  Resp:  [32-35] 35  SpO2:  [82 %-97 %] 82 %  BP: (107-145)/(55-68) 113/58  Arterial Line BP: (101-143)/(43-54) 115/43     Weight: 84.4 kg (186 lb)  Body mass index is 26.69 kg/m².    SpO2: (!) 82 %  O2 Device (Oxygen Therapy): ventilator     Intake/Output - Last 3 Shifts        0700 -  11/03 0659 11/03 0700 - 11/04 0659 11/04 0700 - 11/05 0659    I.V. (mL/kg) 1715.9 (20.3) 1949.6 (23.1) 585.7 (6.9)    NG/ 1700 415    IV Piggyback 523.3 200 550    Total Intake(mL/kg) 2929.2 (34.7) 3849.6 (45.6) 1550.7 (18.4)    Urine (mL/kg/hr) 2560 (1.3) 1570 (0.8) 160 (0.2)    Drains 20      Total Output 2580 1570 160    Net +349.2 +2279.6 +1390.7                  Lines/Drains/Airways     Central Venous Catheter Line            Percutaneous Central Line Insertion/Assessment - Triple Lumen  10/26/20 1940 right internal jugular 8 days          Drain                 Urethral Catheter 10/31/20 1300 Non-latex;Straight-tip 16 Fr. 4 days         NG/OG Tube 11/02/20 1456 orogastric 14 Fr. Left mouth 1 day          Airway                 Airway - Non-Surgical 10/26/20 1820 Endotracheal Tube 8 days       Airway Anesthesia 10/26/20 8 days          Arterial Line            Arterial Line 10/30/20 1556 Right Radial 4 days          Peripheral Intravenous Line                 Peripheral IV - Single Lumen 10/31/20 1630 20 G Anterior;Right Forearm 3 days                 STS Risk Score: n/a    Significant Labs:  ABGs:   Recent Labs   Lab 11/04/20  0502   PH 7.211*   PCO2 83.3*   PO2 73*   HCO3 33.4*   POCSATURATED 90*   BE 6     Amylase: No results for input(s): AMYLASE in the last 48 hours.  BMP:   Recent Labs   Lab 11/04/20  0417 11/04/20  1220   * 94   * 151*   K 4.2 3.3*    116*   CO2 28 24   * 108*   CREATININE 3.9* 3.3*   CALCIUM 7.7* 6.2*   MG 2.9*  --      Cardiac markers: No results for input(s): CKMB, CPKMB, TROPONINT, TROPONINI, MYOGLOBIN in the last 48 hours.  CBC:   Recent Labs   Lab 11/04/20  0417   WBC 24.42*  24.42*   RBC 2.94*  2.94*   HGB 9.3*  9.3*   HCT 31.6*  31.6*     258   *  108*   MCH 31.6*  31.6*   MCHC 29.4*  29.4*     CMP:   Recent Labs   Lab 11/04/20  0417 11/04/20  1220   * 94   CALCIUM 7.7* 6.2*   ALBUMIN 1.5*  --    PROT 5.3*  --    *  151*   K 4.2 3.3*   CO2 28 24    116*   * 108*   CREATININE 3.9* 3.3*   ALKPHOS 128  --    ALT 43  --    AST 78*  --    BILITOT 0.4  --      Coagulation:   Recent Labs   Lab 11/03/20  1541 11/03/20  1830   INR  --  1.1   APTT 36.7*  --      Lactic Acid: No results for input(s): LACTATE in the last 48 hours.  LFTs:   Recent Labs   Lab 11/04/20  0417   ALT 43   AST 78*   ALKPHOS 128   BILITOT 0.4   PROT 5.3*   ALBUMIN 1.5*     Lipase: No results for input(s): LIPASE in the last 48 hours.    Significant Diagnostics: reviewed   TTE 10/25/2020  · The left ventricle is normal in size with normal systolic function. The estimated ejection fraction is 55%.  · Normal left ventricular diastolic function.  · Normal right ventricular systolic function.  · Intermediate central venous pressure (8 mmHg).  · Trivial circumferential pericardial effusion.    CXR reviewed

## 2020-11-04 NOTE — SIGNIFICANT EVENT
Call to bedside for a bleeding arterial line. Also found to have  Blood tinged urine. Heparin changed to Xa protocol from PTT.   Coags and H+H sent. Arterial line redressed. Worsening renal function noted with rising creatinine. No urgent need for dialysis. Will continue to monitor closely

## 2020-11-04 NOTE — SIGNIFICANT EVENT
Procedure Note  Prone Positioning  Critical Care Medicine       Chief Complaint: Pneumonia due to COVID-19 virus  MRN: [id number]  LOS: 16  Sex: male  Age: 55 y.o.      Last ABG:   Recent Labs   Lab 11/03/20  1536   PH 7.220*   PO2 69*   PCO2 94.4*   HCO3 38.6*   BE 11       Vital Signs (Most Recent):   Temp: 97.8 °F (36.6 °C) (11/03/20 1600)  Pulse: (!) 118 (11/03/20 1800)  Resp: (!) 35 (11/03/20 1800)  BP: 134/61 (11/03/20 1800)  SpO2: (!) 90 % (11/03/20 1800)    Ventilator Settings:  Vent Mode: A/C  Oxygen Concentration (%):  [] 90  Resp Rate Total:  [32 br/min-35 br/min] 35 br/min  Vt Set:  [430 mL] 430 mL  PEEP/CPAP:  [12 cmH20] 12 cmH20  Mean Airway Pressure:  [21 gpJ29-83 cmH20] 22 cmH20    Indication: Severe, refractory ARDS. High risk for deterioration during proning procedure in need of direct monitoring by Critical Care personnel.     Prior to beginning the procedure, all appropriate equipment and personnel were gathered in the room. Two individuals were placed on each side of the patient and one person stood at the head of the bed and was dedicated to the management of the head of the patient, the endotracheal tube, and the ventilator lines. The person at the head of the bed  coordinated the steps of the proning procedure with team team at the direction of Critical Care team members at the bedside. The patient was first log-rolled 90 degrees onto their side towards the direction of their central venous catheter. Cardiac electrodes were then moved from the patient's anterior to the posterior. The patient?s knees, forehead, chest, and iliac crests were protected using adhesive pads and/or foam pads to reduce the risk of skin breakdown. Once properly positioned in the bed, another 90 degree log roll was performed placing the patient into the prone position. The patient's arms were placed alongside the body. The patient's head should be turned alternately to the right or left every 2 hours. The patient  tolerated the procedure well.     Total Critical Care time (uninterrupted not including procedures): 30mins  Fiona Winterbottom APRN, CNS  Critical Care Medicine  [phone]

## 2020-11-04 NOTE — SIGNIFICANT EVENT
Procedure Note  Supine Positioning  Critical Care Medicine       Chief Complaint: Acute respiratory distress syndrome (ARDS) due to 2019 novel coronavirus  MRN: [id number]  LOS: 17  Sex: male  Age: 55 y.o.    Last ABG:   Recent Labs   Lab 11/04/20  1632   PH 7.149*   PO2 55*   PCO2 89.1*   HCO3 31.0*   BE 2     Vital Signs (Most Recent):   Temp: 98.3 °F (36.8 °C) (11/04/20 1200)  Pulse: 107 (11/04/20 1700)  Resp: (!) 35 (11/04/20 1700)  BP: (!) 111/54 (11/04/20 1700)  SpO2: (!) 78 % (11/04/20 1700)    Ventilator Settings:  Vent Mode: A/C  Oxygen Concentration (%):  [] 100  Resp Rate Total:  [35 br/min] 35 br/min  Vt Set:  [430 mL] 430 mL  PEEP/CPAP:  [12 juO76-82 cmH20] 14 cmH20  Mean Airway Pressure:  [21 ntV91-25 cmH20] 25 cmH20    Indication: Severe, refractory ARDS. High risk for deterioration during supination procedure in need of direct monitoring by Critical Care personnel.     Prior to beginning the procedure, all appropriate equipment and personnel were gathered in the room. Two individuals were placed on each side of the patient and two respiratory therapists stood at the head of the bed and was dedicated to the management of the head of the patient, the endotracheal tube, and the ventilator lines. The person at the head of the bed coordinated the steps of the supination procedure at the direction of Critical Care team members at the bedside. The patient was first log-rolled 90 degrees onto their side away from the direction of their central venous catheter. Cardiac electrodes were then moved from the patient's posterior back to the anterior. Once properly positioned in the bed, another 90 degree log roll was performed placing the patient into the supine position. The patient's arms were placed alongside the body. Continuous pulse oximetry and blood pressure monitoring was performed.     Emesis noted during the turn. O2 sats in the low 80's while supine.     Total Critical Care time (uninterrupted not  including procedures): 25 mins    Yesica Fiore, NP  Critical Care Medicine

## 2020-11-04 NOTE — HPI
Mr. Nubia Riggs is a 56 yo male with GERD and RAD with COVID-19 dx on 10/15 who presented to [facility] on 10/18 with worsening hypoxia and SOB at home.  He was orginally admitted to hospital medicine, but continued to have increase in oxygen requirements and severe hypoxia off of BiPAP and he was transferred to COVID-ICU on 10/24 and later intubated on the 26th.  Kidney function had remained stable during the admission, noting an YOVANI on 10/27 with a rise in Scr to 1.6.  On 10/26, he was noted to have episodes of hypotension and hypoxia during intubation and later having an increase in sCr with hyperkalemia that has been medically treated with lasix and insulin/dextrose without resolution.  Kidney function has continued to decline since intubation. Nephrology consulted.     P:F 81 this morning on 90% 12 PEEP while proned. Plateau pressure at goal. Continued hematuria noted.   Update: After supination, worsening oxygenation with saturations maintaining in the low 80's despite 100% 14 PEEP

## 2020-11-04 NOTE — ASSESSMENT & PLAN NOTE
CTS consulted for possible ecmo in COVID + patient with worsening kidney and respiratory status. Dr. Nazario to review and staff.

## 2020-11-04 NOTE — ASSESSMENT & PLAN NOTE
COVID positive on 10/15. Patient admitted to hospital medicine and continued to deteriorate needing higher oxygen requirements and thus Critical Care Medicine was consulted. Intubated 10/26 and eventually placed on NMB and proned. Peak and plateau pressures remain at goal    - s/p remdesivir (10/18-10/22)  - s/p Dexamethasone (10/27)  - s/p vanc x7 days (end date 11/01)  - duo nebs q4 scheduled and q6 PRN  - continue Heparin gtt D-dimer >33  - continue LPV and pronation for PF <150 with FiO2 50% and PEEP 10  - Sputum culture sent 10/28 NGTD, reordered 10/30 for leukocytosis and febrile all negative  - continue zosyn, add fluconazole

## 2020-11-05 PROBLEM — E87.5 HYPERKALEMIA: Status: RESOLVED | Noted: 2020-01-01 | Resolved: 2020-01-01

## 2020-11-05 PROBLEM — R65.21 SEPTIC SHOCK: Status: ACTIVE | Noted: 2020-01-01

## 2020-11-05 PROBLEM — J69.0: Status: ACTIVE | Noted: 2020-01-01

## 2020-11-05 PROBLEM — A41.9 SEPTIC SHOCK: Status: ACTIVE | Noted: 2020-01-01

## 2020-11-05 NOTE — SIGNIFICANT EVENT
Procedure Note  Supine Positioning  Critical Care Medicine         Chief Complaint: Acute respiratory distress syndrome (ARDS) due to 2019 novel coronavirus  MRN: [id number]  LOS: 17  Sex: male  Age: 55 y.o.     Recent Labs     11/05/20  0426   PH 7.149*   PCO2 96.1*   PO2 55*   HCO3 33.4*   POCSATURATED 76*   BE 5       Vitals:    11/05/20 0600   BP:    Pulse: 96   Resp: (!) 35   Temp:           Ventilator Settings:  Vent Mode: A/C  Oxygen Concentration (%):  [] 100  Resp Rate Total:  [35 br/min] 35 br/min  Vt Set:  [430 mL] 430 mL  PEEP/CPAP:  [12 otB79-40 cmH20] 14 cmH20  Mean Airway Pressure:  [21 niX15-99 cmH20] 25 cmH20     Indication: Severe, refractory ARDS. High risk for deterioration during pronation procedure in need of direct monitoring by Critical Care personnel.      Prior to beginning the procedure, all appropriate equipment and personnel were gathered in the room. Two individuals were placed on each side of the patient and two respiratory therapists stood at the head of the bed and was dedicated to the management of the head of the patient, the endotracheal tube, and the ventilator lines. The person at the head of the bed coordinated the steps of the supination procedure at the direction of Critical Care team members at the bedside. The patient was first log-rolled 90 degrees onto their side away from the direction of their central venous catheter. Cardiac electrodes were then moved from the patient's posterior back to the anterior. Once properly positioned in the bed, another 90 degree log roll was performed placing the patient into the supine position. The patient's arms were placed alongside the body. Continuous pulse oximetry and blood pressure monitoring was performed.      O2 sats in the low 40's while supine.      Total Critical Care time (uninterrupted not including procedures): 25 mins

## 2020-11-05 NOTE — PROGRESS NOTES
SLED treatment started to new left IJ trialysis. Flows were sluggish, unable to aspirate blood from red line of left IJ trialysis. Lines reversed when connected, treatment initiated. See flowsheet for details.

## 2020-11-05 NOTE — PROGRESS NOTES
"[facility] - ICU 16 WT  Adult Nutrition  Progress Note    SUMMARY       Recommendations    Recommendations:   1. When medically able, recommend modifying TF to Peptamen Intense VHP and advancing as tolerated to goal rate of 50 mL/hr to provide 1868 kcal (with current propofol), 110 g protein, and 1008 mL fluid. (meets 87% of energy needs)  --Hold for residuals >500 mL. Additional free water per MD.    2. When propofol d/c'ed recommend TF of Impact Peptide 1.5 to goal rate of 55 mL/hr to provide 1980 kcal, 124 gm protein, and 1016 mL free fluid.     3. RD following.      Goals: Pt to meet >75% EEN and EPN by RD follow up.  Nutrition Goal Status: goal not met  Communication of RD Recs: other (comment)(POC)    Reason for Assessment    Reason For Assessment: RD follow-up  Diagnosis: (Pneumonia due to COVID-19 virus)  Relevant Medical History: GERD, allergic rinhitis, chronic cough (recently saw pulm and was diagnosed with reactive airway disease)  Interdisciplinary Rounds: did not attend  General Information Comments: Pt intubated, sedated and proned. Spoke with RN over phone. TF clamped yesterday d/t emesis. She reports pt without nutrition <24hrs. CRRT initiated last night. Due to recent hospital wide restrictions to limit the transfer of (COVID-19), we are not performing any physical exams at this time on patients with +COVID-19. All S/S will be observational; NFPE to be performed at a future date.  Nutrition Discharge Planning: Unclear at this time.    Nutrition Risk Screen    Nutrition Risk Screen: tube feeding or parenteral nutrition    Nutrition/Diet History    Spiritual, Cultural Beliefs, Bahai Practices, Values that Affect Care: no  Factors Affecting Nutritional Intake: NPO, on mechanical ventilation    Anthropometrics    Temp: 98.8 °F (37.1 °C)  Height: 5' 10"  Height (inches): 70 in  Weight Method: Standard Scale  Weight: 84.4 kg (186 lb)  Weight (lb): 186 lb  Ideal Body Weight (IBW), Male: " 166 lb  % Ideal Body Weight, Male (lb): 112.05 %  BMI (Calculated): 26.7  BMI Grade: 25 - 29.9 - overweight       Lab/Procedures/Meds    Pertinent Labs Reviewed: reviewed  Pertinent Labs Comments: , Cr 4.3, GFR 14.6, Glu 113, Ca 8.2  Pertinent Medications Reviewed: reviewed  Pertinent Medications Comments: Vitamin D, senna, MVI, miralax, famotidine, ca gluconate, norepinephrine, propofol, vasopressin    Estimated/Assessed Needs    Weight Used For Calorie Calculations: 84.4 kg (186 lb 1.1 oz)  Energy Calorie Requirements (kcal): 2127 kcal/day  Energy Need Method: [facility]  Protein Requirements: 101-127 gm/day(1.2-1.5 gm/kg)  Weight Used For Protein Calculations: 84.4 kg (186 lb 1.1 oz)  Fluid Requirements (mL): 1 mL/kcal or per MD  Estimated Fluid Requirement Method: RDA Method  RDA Method (mL): 2127     Nutrition Prescription Ordered    Current Diet Order: NPO  Nutrition Order Comments: TF clamped currently  Current Nutrition Support Formula Ordered: Novasource Renal  Current Nutrition Support Rate Ordered: 20 (ml)(20 mL/hr)  Current Nutrition Support Frequency Ordered: mL/hr    Evaluation of Received Nutrient/Fluid Intake    Enteral Calories (kcal): 0  Enteral Protein (gm): 0  Enteral (Free Water) Fluid (mL): 0  Other Calories (kcal): 668(propofol)  % Kcal Needs: 31  % Protein Needs: 0  I/O: +1.4L  Energy Calories Required: not meeting needs  Protein Required: not meeting needs  Fluid Required: other (see comments)(per MD)  Comments: LBM 11/5  Tolerance: not tolerating  % Intake of Estimated Energy Needs: 0 - 25 %  % Meal Intake: NPO    Nutrition Risk    Level of Risk/Frequency of Follow-up: high     Assessment and Plan    Nutrition Problem  Inadequate Energy Intake      Related to (etiology):   Inability to consume sufficient energy      Signs and Symptoms (as evidenced by):   Pt intubated, NPO      Interventions (treatment strategy):  Collaboration of nutrition care with other providers, enteral  nutrition      Nutrition Diagnosis Status:   Continues       Monitor and Evaluation    Food and Nutrient Intake: energy intake, enteral nutrition intake  Food and Nutrient Adminstration: enteral and parenteral nutrition administration  Anthropometric Measurements: weight, weight change  Biochemical Data, Medical Tests and Procedures: electrolyte and renal panel, gastrointestinal profile, glucose/endocrine profile, inflammatory profile, lipid profile  Nutrition-Focused Physical Findings: overall appearance     Nutrition Follow-Up    RD Follow-up?: Yes

## 2020-11-05 NOTE — PROCEDURES
[facility] SLED NOTE     Patient currently on SLED for removal of uremic toxins and volume.     Ultrafiltration goal is 500 cc/hr     Labs have been reviewed and the dialysate bath has been adjusted.     Assessment/Plan:  Started on SLED this morning, net even volume status.  UOP ~ 500 ml/24h.  Now on levo/Vaso for BP support.    Will discontinue citrate/calcium  Switch to NS pre-filter  Switch to SCUF for volume removal  -500 cc/hr as tolerated.    RODRIGO Medina, FNP-BC  Nephrology  Pager:  [phone]

## 2020-11-05 NOTE — PROCEDURES
"Nubia Riggs is a 55 y.o. male patient.    Temp: 99.9 °F (37.7 °C) (11/04/20 1800)  Pulse: 108 (11/04/20 1800)  Resp: (!) 35 (11/04/20 1800)  BP: (!) 111/56 (11/04/20 1800)  SpO2: (!) 81 % (11/04/20 1800)  Weight: 84.4 kg (186 lb) (10/25/20 1120)  Height: 5' 10" (177.8 cm) (11/04/20 1244)       Central Line    Date/Time: 11/4/2020 7:33 PM  Performed by: Yesica Fiore NP  Authorized by: Yesica Fiore NP     Location procedure was performed:  [facility] RESPIRATORY ICU  Pre-operative diagnosis:  Ards  Post-operative diagnosis:  Ards  Consent Done ?:  Yes  Time out complete?: Verified correct patient, procedure, equipment, staff, and site/side    Indications:  Hemodialysis  Preparation:  Skin prepped with ChloraPrep  Skin prep agent dried: Skin prep agent completely dried prior to procedure    Sterile barriers: All five maximal sterile barriers used - gloves, gown, cap, mask and large sterile sheet    Hand hygiene: Hand hygiene performed immediately prior to central venous catheter insertion    Location:  Left internal jugular  Catheter type:  Trialysis  Inserted Catheter Length (cm):  20  Ultrasound guidance: Yes    Vessel Caliber:  Large   patent  Comprressibility:  Normal  Needle advanced into vessel with real time ultrasound guidance.    Guidewire confirmed in vessel.    Steril sheath on probe.    Manometry: Yes    Number of attempts:  1  Securement:  Line sutured, chlorhexidine patch, sterile dressing applied and blood return through all ports  Complications: No    Estimated blood loss (mL):  2  XRay:  Placement verified by x-ray, no pneumothorax on x-ray, tip termination and successful placement  Adverse Events:  None        Yesica Fiore  11/4/2020  "

## 2020-11-05 NOTE — PROGRESS NOTES
[facility] - ICU 16   Critical Care Medicine  Progress Note    Patient Name: Nubia Riggs  MRN: [id number]  Admission Date: 10/18/2020  Hospital Length of Stay: 18 days  Code Status: Partial Code  Attending Provider: Parminder Marin*  Primary Care Provider: Primary Doctor No   Principal Problem: Acute respiratory distress syndrome (ARDS) due to 2019 novel coronavirus    Subjective:     HPI:  55-year-old male with PMhx of GERD, allergic rinhitis, chronic cough (recently saw pulm and was diagnosed with reactive airway disease, on albuterol PRN) presented the emergency department on 10/18 with chief complaint of shortness of breath. He reports subjective fever, headache, chills, myalgias, poor appetite, and sweats about 6 days ago. He was diagnosed with COVID-19 on 10/15.  Reports presenting to urgent care 10/16 due to 89% pulse ox on room air. Started on levofloxacin and discharged home.  Reports using albuterol treatments at home without much relief.He noticed to have cough productive of blood tinged sputum and shortness of breath so he presented to the ED.     He received ceftria/azithro (x1 each in the ED). He started Remdesivir and completed 5 doses on 10/22, continues on dexamethasone. Patient is on his 7th day of hospital admission and has been requiring higher doses of O2 to keep sats >90%. Today he went up to 60L on 100% FiO2 and thus Critical Care was consulted.       Hospital/ICU Course:  Patient admitted to RICU due to worsening respiratory status with increasing oxygen requirements. Completion of remdesivir on 10/22. On BiPAP with O2 saturation in high 70s-low 80s at rest and acute desaturation to 40s when biPAP removed to provide PO medications. He required intubation on 10/26. Central line and arterial line placed. Proning initiated on 10/27. YOVANI developed with inital response to lasix however, not clearing as hyperkalemia persists. UO decreasing on 10/30 despite multiple lasix  doses, and concern for hematuria, CBC sent. Nephrology consult placed. Blood and sputum cultures on 10/30 NGTD, lactate normal, worsening WBC count. Heparin held overnight for hematuria and blood in sputum, restarted 10/31 for cessation of both. Vasopressor requirements improving, off vinay and vaso as of 11/01, WBC continues to increase, however not febrile and cultures are negative. Added fluconazole 11/1. Febrile on 11/4 with suspected aspiration event. Increase in FiO2 and pressor requirements. Oxygen saturations remained in the low 80's for most of the day. Family updated and code status changed to no shocks, no compressions. RRT started the evening of 11/4.      Interval History/Significant Events: RRT started yesterday night and patient was proned again. Oxygenation improved this morning with saturations in mid- high 80's. Prognosis remains guarded.     Review of Systems   Unable to perform ROS: Intubated     Objective:     Vital Signs (Most Recent):  Temp: 98.8 °F (37.1 °C) (11/05/20 0701)  Pulse: (!) 54 (11/05/20 0845)  Resp: (!) 35 (11/05/20 0845)  BP: 136/63 (11/04/20 2015)  SpO2: (!) 86 % (11/05/20 0845) Vital Signs (24h Range):  Temp:  [98.3 °F (36.8 °C)-99.9 °F (37.7 °C)] 98.8 °F (37.1 °C)  Pulse:  [] 54  Resp:  [7-35] 35  SpO2:  [46 %-91 %] 86 %  BP: (109-145)/(53-65) 136/63  Arterial Line BP: ()/(38-61) 119/52   Weight: 84.4 kg (186 lb)  Body mass index is 26.69 kg/m².      Intake/Output Summary (Last 24 hours) at 11/5/2020 0858  Last data filed at 11/5/2020 0822  Gross per 24 hour   Intake 4291.69 ml   Output 3502 ml   Net 789.69 ml       Physical Exam  Vitals signs and nursing note reviewed.   Constitutional:       General: He is not in acute distress.     Appearance: Normal appearance. He is ill-appearing.      Interventions: He is sedated, chemically paralyzed and intubated.   HENT:      Mouth/Throat:      Lips: Lesions present.      Comments: Upper and lower lips with breakdown noted.    Cardiovascular:      Rate and Rhythm: Regular rhythm. Tachycardia present.      Pulses:           Radial pulses are 2+ on the right side and 2+ on the left side.        Dorsalis pedis pulses are 2+ on the right side and 2+ on the left side.      Heart sounds: Normal heart sounds.      Comments: Bilateral upper extremity edema 2+  Pulmonary:      Effort: He is intubated.      Breath sounds: Examination of the right-lower field reveals rales. Rales present. No wheezing or rhonchi.   Abdominal:      General: Bowel sounds are normal.      Comments: Patient proned for exam   Genitourinary:     Comments: Kelley in place with hematuria.   Musculoskeletal:      Right lower le+ Edema present.      Left lower le+ Edema present.   Skin:     General: Skin is warm and dry.      Capillary Refill: Capillary refill takes 2 to 3 seconds.      Coloration: Skin is not mottled.   Neurological:      Mental Status: He is unresponsive.      GCS: GCS eye subscore is 1. GCS verbal subscore is 1. GCS motor subscore is 1.      Comments: Sedated and paralyzed.        Vents:  Vent Mode: A/C (20)  Set Rate: 35 BPM (20)  Vt Set: 430 mL (20)  PEEP/CPAP: 14 cmH20 (20)  Oxygen Concentration (%): 100 (20 0800)  Peak Airway Pressure: 39 cmH2O (20)  Plateau Pressure: 41 cmH20 (20)  Total Ve: 15.9 mL (20)  F/VT Ratio<105 (RSBI): (!) 76.59 (20 050)  Lines/Drains/Airways     Central Venous Catheter Line            Percutaneous Central Line Insertion/Assessment - Triple Lumen  10/26/20 1940 right internal jugular 9 days    Trialysis (Dialysis) Catheter 20 1914 left internal jugular less than 1 day          Drain                 Urethral Catheter 10/31/20 1300 Non-latex;Straight-tip 16 Fr. 4 days         NG/OG Tube 20 1456 orogastric 14 Fr. Left mouth 2 days         Rectal Tube 20 0230 rectal tube w/ balloon (indicate number of mLs) less than  1 day          Airway                 Airway - Non-Surgical 10/26/20 1820 Endotracheal Tube 9 days       Airway Anesthesia 10/26/20 9 days          Arterial Line            Arterial Line 10/30/20 1556 Right Radial 5 days              Significant Labs:    CBC/Anemia Profile:  Recent Labs   Lab 11/03/20  1830 11/04/20 0417 11/05/20  0405   WBC 30.29* 24.42*  24.42* 43.27*   HGB 10.3* 9.3*  9.3* 9.4*   HCT 35.7* 31.6*  31.6* 32.1*    258  258 340   * 108*  108* 107*   RDW 13.9 14.0  14.0 14.4        Chemistries:  Recent Labs   Lab 11/04/20 0417 11/04/20  1537 11/04/20 2227 11/05/20  0405   *   < > 145 149*  149* 145  145  145   K 4.2   < > 4.4 4.3  4.3 4.4  4.4  4.4      < > 101 102  102 101  101  101   CO2 28   < > 30* 33*  33* 30*  30*  30*   *   < > 135* 132*  132* 100*  99*  99*   CREATININE 3.9*   < > 4.9* 5.4*  5.4* 4.3*  4.3*  4.3*   CALCIUM 7.7*   < > 7.8* 7.5*  7.5* 8.2*  8.2*  8.2*   ALBUMIN 1.5*  --   --  1.5* 1.7*  1.7*   PROT 5.3*  --   --   --  6.1   BILITOT 0.4  --   --   --  0.6   ALKPHOS 128  --   --   --  147*   ALT 43  --   --   --  49*   AST 78*  --   --   --  90*   MG 2.9*  --   --  3.3* 2.8*  2.7*   PHOS 9.3*  --   --  7.6* 6.8*  6.8*    < > = values in this interval not displayed.       All pertinent labs within the past 24 hours have been reviewed.    Significant Imaging:  I have reviewed all pertinent imaging results/findings within the past 24 hours.      ABG  Recent Labs   Lab 11/05/20  0426   PH 7.149*   PO2 55*   PCO2 96.1*   HCO3 33.4*   BE 5     Assessment/Plan:     Psychiatric  Anxiety  Holding home vilazodone due heavy sedation administration      Pulmonary  Reactive airway disease  Follows with Pulm as outpatient. Last seen 8/27/20, told he had RAD  He was also seen by a [facility] pulmonologist who did spirometry that reportedly showed borderline or mild asthma, and has been controlled with albuterol.   Former smoker, started  at age 10-12 and quit 10 years ago    -- Continue scheduled duo nebs    Renal/  Hypernatremia  Likely secondary to TFs +/- dehydration from diuresis     -- emesis noted with free water flushes  -- improved with D5W      YOVANI (acute kidney injury)  No history of prior kidney disease; sCr on admission 0.8 -->2.6 as of 10/30, with UO less responsive to multiple doses of 40 mg lasix which he was initially responsive to; however, BUN rising and he is not clearing in his urine. Retroperitoneal u/s with bilateral medical renal diseased and no hydronephrosis. Fe Urea 26.4% (<35%) indicative of prerenal disease    -- nephrology following, appreciate assistance   -- RRT started 11/4  -- Avoid ACEI/ARB/NSAIDS/Nephrotoxins.   -- Renally dose all meds    ID  Septic shock  Likely 2/2 aspiration on 11/4. Febrile, WBC increased to 40K. CXR with left sided consolidation. Increased FiO2 and vasopressor requirements.      -- donta, vanc, fluconazole  -- blood and sputum cultures with NGTD  -- titrate norepi for MAP >65  -- vaso and SDS added     Other  * Acute respiratory distress syndrome (ARDS) due to 2019 novel coronavirus  COVID positive on 10/15. Patient admitted to hospital medicine and continued to deteriorate needing higher oxygen requirements and thus Critical Care Medicine was consulted. Intubated 10/26 and eventually placed on NMB and proned. Peak and plateau pressures remain at goal    - s/p remdesivir (10/18-10/22)  - s/p Dexamethasone (10/27)  - s/p vanc x7 days (end date 11/01)  - duo nebs q4 scheduled and q6 PRN  - continue Heparin gtt D-dimer >33  - continue LPV and pronation for PF <150   - Sputum culture sent 10/28 NGTD, reordered 10/30 for leukocytosis and febrile all negative  - Zosyn escalated to donta. Continue fluconazole. Vancomycin restarted.  - Suspect aspiration event 11/4 with worsening oxygenation.    - CTS evaluated; not a candidate for ECMO     - Jessica started 11/4      COVID-19  See resp  failure    Palliative care encounter  Palliative consulted for potential for prolonged hospitalization and following along.     Acute hypoxemic respiratory failure due to COVID-19  Secondary to COVID (see above)    Pneumonia due to COVID-19 virus  See ARDS      Discussed with Dr. Marin.     Critical Care Time: 70 minutes  Critical secondary to Patient has a condition that poses threat to life and bodily function: ARDS 2/2 COVID, shock      Critical care was time spent personally by me on the following activities: development of treatment plan with patient or surrogate and bedside caregivers, discussions with consultants, evaluation of patient's response to treatment, examination of patient, ordering and performing treatments and interventions, ordering and review of laboratory studies, ordering and review of radiographic studies, pulse oximetry, re-evaluation of patient's condition. This critical care time did not overlap with that of any other provider or involve time for any procedures.     Yesica Fiore NP  Critical Care Medicine  [facility] - ICU 16 [facility]

## 2020-11-05 NOTE — ASSESSMENT & PLAN NOTE
Follows with Pulm as outpatient. Last seen 8/27/20, told he had RAD  He was also seen by a [facility] pulmonologist who did spirometry that reportedly showed borderline or mild asthma, and has been controlled with albuterol.   Former smoker, started at age 10-12 and quit 10 years ago    -- Continue scheduled duo nikolay

## 2020-11-05 NOTE — CONSULTS
[facility] - ICU 16 [facility]  Infectious Disease  Consult Note    Patient Name: Nubia Riggs  MRN: [id number]  Admission Date: 10/18/2020  Hospital Length of Stay: 18 days  Attending Physician: Parminder Marin*  Primary Care Provider: Primary Doctor No     Isolation Status: Airborne and Contact and Droplet    Patient information was obtained from past medical records and ER records.      Consults  Assessment/Plan:     No new Assessment & Plan notes have been filed under this hospital service since the last note was generated.  Service: Infectious Diseases      Thank you for your consult. I will follow-up with patient. Please contact us if you have any additional questions.    Trini Webb MD  Infectious Disease  [facility] - ICU 16 [facility]    Subjective:     Principal Problem: Acute respiratory distress syndrome (ARDS) due to 2019 novel coronavirus    HPI: Mr. Riggs is a 54 yo M w/ a PMHx of GERD, allergic rhinitis, chronic cough, and reactive airway disease (prescribed albuterol PRN) who presented to the ED on 10/18 for worsening SOB and cough productive of blood-tinged sputum. He reported onset of fever, HA, chills, myalgias, decreased appetite, and sweats on 10/9 and a diagnosis of COVID on 10/15. On 10/16, he went to an urgent care because of O2 sats persistently ~89% and he was prescribed levofloxacin and sent home. At home, he used his albuterol without much relief.     On 10/18, he presented to [facility] ED. He was given ceftriaxone and azithromycin x1 dose in the ED and then admitted to hospital medicine. He continued dexamethasone started remdesivir on 10/22 and completed 5 doses. By 10/24, his oxygen requirements were increasing steadily. Critical Care was consulted when he went up to 60L on 100% FiO2.     He was admitted to the RICU on 10/24 and intubated on 10/26. Since 10/24, he developed an YOVANI (now requiring CRRT) and he has had WBC counts trending up. Initially, he was afebrile  and his lactate was WNL. Fluconazole initiated on 11/1. CRRT was initiated overnight on 11/4. He also had a suspected aspiration event, his T Max overnight was 100.8F, and CXR showed worsening L-sided opacity. He had an increase in FiO2 and pressor requirements with O2 sats consistently in the low 80s throughout the day. On 11/5, Zosyn was escalated to meropenem, Vanc was added, fluconazole was continued. Blood and Sputum cultures drawn earlier in his admission have continued to be unremarkable. Infectious Disease was consulted    Interval History: Pt underwent CRRT overnight. Pt proned and O2 sats improved to mid-high 80s. No further episodes of emesis or potential aspiration. Pt's condition labile and prognosis guarded.    Review of Systems   Unable to perform ROS: Intubated     Objective:     Vital Signs (Most Recent):  Temp: 98.6 °F (37 °C) (11/05/20 1100)  Pulse: 81 (11/05/20 1300)  Resp: (!) 36 (11/05/20 1300)  BP: 136/63 (11/04/20 2015)  SpO2: (!) 92 % (11/05/20 1300) Vital Signs (24h Range):  Temp:  [98.6 °F (37 °C)-99.9 °F (37.7 °C)] 98.6 °F (37 °C)  Pulse:  [] 81  Resp:  [7-36] 36  SpO2:  [46 %-92 %] 92 %  BP: (109-145)/(53-65) 136/63  Arterial Line BP: ()/(38-61) 148/57     Weight: 84.4 kg (186 lb)  Body mass index is 26.69 kg/m².    Estimated Creatinine Clearance: 32.7 mL/min (A) (based on SCr of 2.7 mg/dL (H)).    Significant Labs:   Blood Culture:   Recent Labs   Lab 10/18/20  0751 10/27/20  2011 10/30/20  1011 10/30/20  1015 11/03/20  0904   LABBLOO No growth after 5 days. No growth after 5 days.  No growth after 5 days. No growth after 5 days. No growth after 5 days. No Growth to date  No Growth to date  No Growth to date  No Growth to date  No Growth to date  No Growth to date     CBC:   Recent Labs   Lab 11/03/20  1830 11/04/20  0417 11/05/20  0405   WBC 30.29* 24.42*  24.42* 43.27*   HGB 10.3* 9.3*  9.3* 9.4*   HCT 35.7* 31.6*  31.6* 32.1*    258  258 340     CMP:    Recent Labs   Lab 11/04/20  0417  11/04/20  2227 11/05/20  0405 11/05/20  1023   *   < > 149*  149* 145  145  145 140   K 4.2   < > 4.3  4.3 4.4  4.4  4.4 4.3      < > 102  102 101  101  101 99   CO2 28   < > 33*  33* 30*  30*  30* 26   *   < > 136*  136* 113*  112*  112* 143*   *   < > 132*  132* 100*  99*  99* 52*   CREATININE 3.9*   < > 5.4*  5.4* 4.3*  4.3*  4.3* 2.7*   CALCIUM 7.7*   < > 7.5*  7.5* 8.2*  8.2*  8.2* 9.2   PROT 5.3*  --   --  6.1  --    ALBUMIN 1.5*  --  1.5* 1.7*  1.7*  --    BILITOT 0.4  --   --  0.6  --    ALKPHOS 128  --   --  147*  --    AST 78*  --   --  90*  --    ALT 43  --   --  49*  --    ANIONGAP 15   < > 14  14 14  14  14 15   EGFRNONAA 16.5*   < > 11.1*  11.1* 14.7*  14.7*  14.7* 25.7*    < > = values in this interval not displayed.     Fungus Culture (Blood or Bone Marrow): No results for input(s): FUNGUSCULTUR in the last 4320 hours.  Lactic Acid: No results for input(s): LACTATE in the last 48 hours.  Microbiology Results (last 7 days)     Procedure Component Value Units Date/Time    Culture, Respiratory with Gram Stain [[id number]] Collected: 11/03/20 0933    Order Status: Completed Specimen: Respiratory from Endotracheal Aspirate Updated: 11/05/20 1055     Respiratory Culture Normal respiratory alejandra      No S aureus or Pseudomonas isolated.     Gram Stain (Respiratory) <10 epithelial cells per low power field.     Gram Stain (Respiratory) No WBC's     Gram Stain (Respiratory) No organisms seen    Blood culture [[id number]] Collected: 11/03/20 0904    Order Status: Completed Specimen: Blood from Peripheral, Antecubital, Left Updated: 11/05/20 1022     Blood Culture, Routine No Growth to date      No Growth to date      No Growth to date    Narrative:      Blood cultures x 2 different sites. 4 bottles total. Please  draw cultures before administering antibiotics.    Blood culture [[id number]] Collected: 11/03/20 0904     Order Status: Completed Specimen: Blood from Peripheral, Upper Arm, Right Updated: 11/05/20 1022     Blood Culture, Routine No Growth to date      No Growth to date      No Growth to date    Narrative:      Blood cultures from 2 different sites. 4 bottles total.  Please draw before starting antibiotics.    Blood culture [[id number]] Collected: 10/30/20 1011    Order Status: Completed Specimen: Blood from Peripheral, Forearm, Left Updated: 11/04/20 1212     Blood Culture, Routine No growth after 5 days.    Narrative:      Blood cultures x 2 different sites. 4 bottles total. Please  draw cultures before administering antibiotics.    Blood culture [[id number]] Collected: 10/30/20 1015    Order Status: Completed Specimen: Blood from Peripheral, Upper Arm, Right Updated: 11/04/20 1212     Blood Culture, Routine No growth after 5 days.    Narrative:      Blood cultures from 2 different sites. 4 bottles total.  Please draw before starting antibiotics.    Culture, Respiratory with Gram Stain [[id number]] Collected: 10/30/20 1040    Order Status: Completed Specimen: Respiratory from Endotracheal Aspirate Updated: 11/02/20 1106     Respiratory Culture Normal respiratory alejandra     Gram Stain (Respiratory) Rare WBC's     Gram Stain (Respiratory) No organisms seen    Narrative:      Mini-BAL. Before antibiotics.    Blood culture [[id number]] Collected: 10/27/20 2011    Order Status: Completed Specimen: Blood from Peripheral, Forearm, Right Updated: 11/01/20 2212     Blood Culture, Routine No growth after 5 days.    Narrative:      Blood cultures from 2 different sites. 4 bottles total.  Please draw before starting antibiotics.    Blood culture [[id number]] Collected: 10/27/20 2011    Order Status: Completed Specimen: Blood from Peripheral, Forearm, Left Updated: 11/01/20 2212     Blood Culture, Routine No growth after 5 days.    Narrative:      Blood cultures x 2 different sites. 4 bottles total. Please  draw cultures before  administering antibiotics.    Urine culture [[id number]] Collected: 10/31/20 1428    Order Status: Completed Specimen: Urine Updated: 11/01/20 1814     Urine Culture, Routine No growth    Narrative:      Specimen Source->Urine    Culture, Respiratory with Gram Stain [[id number]] Collected: 10/28/20 1100    Order Status: Completed Specimen: Respiratory from Endotracheal Aspirate Updated: 10/30/20 0818     Respiratory Culture Normal respiratory alejandra     Gram Stain (Respiratory) <10 epithelial cells per low power field.     Gram Stain (Respiratory) Few WBC's     Gram Stain (Respiratory) No organisms seen        POCT Glucose:   Recent Labs   Lab 11/05/20  0342 11/05/20  0736 11/05/20  1034   POCTGLUCOSE 106 117* 155*       Significant Imaging: I have reviewed all pertinent imaging results/findings within the past 24 hours.   · CXR (11/4): Mildly progressive parenchymal opacities at the left lung base in this patient with ARDS or severe pulmonary edema.

## 2020-11-05 NOTE — PLAN OF CARE
Problem: Feeding Intolerance (Enteral Nutrition)  Goal: Feeding Tolerance  Outcome: Ongoing, Not Progressing     Recommendations:   1. When medically able, recommend modifying TF to Peptamen Intense VHP and advancing as tolerated to goal rate of 50 mL/hr to provide 1868 kcal (with current propofol), 110 g protein, and 1008 mL fluid. (meets 87% of energy needs)  --Hold for residuals >500 mL. Additional free water per MD.    2. When propofol d/c'ed recommend TF of Impact Peptide 1.5 to goal rate of 55 mL/hr to provide 1980 kcal, 124 gm protein, and 1016 mL free fluid.     3. RD following.      Goals: Pt to meet >75% EEN and EPN by RD follow up.  Nutrition Goal Status: goal not met  Communication of RD Recs: other (comment)(POC)

## 2020-11-05 NOTE — ASSESSMENT & PLAN NOTE
No history of prior kidney disease; sCr on admission 0.8 -->2.6 as of 10/30, with UO less responsive to multiple doses of 40 mg lasix which he was initially responsive to; however, BUN rising and he is not clearing in his urine. Retroperitoneal u/s with bilateral medical renal diseased and no hydronephrosis. Fe Urea 26.4% (<35%) indicative of prerenal disease    -- nephrology following, appreciate assistance   -- RRT started 11/4  -- Avoid ACEI/ARB/NSAIDS/Nephrotoxins.   -- Renally dose all meds

## 2020-11-05 NOTE — ASSESSMENT & PLAN NOTE
COVID positive on 10/15. Patient admitted to hospital medicine and continued to deteriorate needing higher oxygen requirements and thus Critical Care Medicine was consulted. Intubated 10/26 and eventually placed on NMB and proned. Peak and plateau pressures remain at goal    - s/p remdesivir (10/18-10/22)  - s/p Dexamethasone (10/27)  - s/p vanc x7 days (end date 11/01)  - duo nebs q4 scheduled and q6 PRN  - continue Heparin gtt D-dimer >33  - continue LPV and pronation for PF <150   - Sputum culture sent 10/28 NGTD, reordered 10/30 for leukocytosis and febrile all negative  - Zosyn escalated to donta. Continue fluconazole. Vancomycin restarted.  - Suspect aspiration event 11/4 with worsening oxygenation.    - CTS evaluated; not a candidate for ECMO     - Jessica started 11/4

## 2020-11-05 NOTE — ASSESSMENT & PLAN NOTE
Likely 2/2 aspiration on 11/4. Febrile, WBC increased to 40K. CXR with left sided consolidation. Increased FiO2 and vasopressor requirements.      -- donta, vanc, fluconazole  -- blood and sputum cultures with NGTD  -- titrate norepi for MAP >65  -- vaso and SDS added

## 2020-11-05 NOTE — PROGRESS NOTES
Pharmacokinetic Assessment Follow Up: IV Vancomycin    Vancomycin serum concentration assessment(s):    Vancomycin random level resulted at 21.6 mcg/mL approximately 16 hours after the previous dose. Goal level is 15 to 20 mcg/mL.     Nephrology is consulted for YOVANI and plans to switch to SCUF.     Drug levels (last 3 results):  Recent Labs   Lab Result Units 11/05/20  0405   Vancomycin, Random ug/mL 21.6     Vancomycin Regimen Plan:    Administer vancomycin IV 750mg. Redose when the random level is less than 20 mcg/mL. Next level to be drawn with morning labs on 11/6    Pharmacy will continue to follow and monitor vancomycin.    Please contact pharmacy at extension [phone] for questions regarding this assessment.    Thank you for the consult,   Lavern Davis, PharmD, BCCCP             Patient brief summary:  Nubia Riggs is a 55 y.o. male initiated on antimicrobial therapy with IV vancomycin for treatment of sepsis    Drug Allergies:   Review of patient's allergies indicates:   Allergen Reactions    Meperidine      Other reaction(s): violent behavior    Sulfa (sulfonamide antibiotics)      Other reaction(s): Unknown       Actual Body Weight:   84.4 kg     Renal Function:   Estimated Creatinine Clearance: 32.7 mL/min (A) (based on SCr of 2.7 mg/dL (H)).    Dialysis Method (if applicable):  SCUF    CBC (last 72 hours):  Recent Labs   Lab Result Units 11/03/20  0219 11/03/20  1830 11/04/20  0417 11/05/20  0405   WBC K/uL 26.19* 30.29* 24.42*  24.42* 43.27*   Hemoglobin g/dL 11.0* 10.3* 9.3*  9.3* 9.4*   Hematocrit % 36.6* 35.7* 31.6*  31.6* 32.1*   Platelets K/uL 256 281 258  258 340   Gran % % 86.0* 88.0* 83.0*  83.0* 84.0*   Lymph % % 4.0* 5.0* 3.0*  3.0* 7.0*   Mono % % 5.0 4.0 8.0  8.0 3.0*   Eosinophil % % 0.0 0.0 0.0  0.0 1.0   Basophil % % 1.0 1.0 0.0  0.0 0.0   Differential Method  Manual Manual Manual  Manual Manual       Metabolic Panel (last 72 hours):  Recent Labs   Lab Result Units  11/02/20  1750 11/03/20  0208 11/03/20  0605 11/03/20  1216 11/03/20  1801 11/04/20  0006 11/04/20  0417 11/04/20  1220 11/04/20  1537 11/04/20  2227 11/05/20  0405 11/05/20  1023   Sodium mmol/L 149* 148* 152* 151* 151* 150* 150* 151* 145 149*  149* 145  145  145 140   Potassium mmol/L 4.4 4.1 4.2 4.0 4.4 4.4 4.2 3.3* 4.4 4.3  4.3 4.4  4.4  4.4 4.3   Chloride mmol/L 108 104 107 106 106 106 107 116* 101 102  102 101  101  101 99   CO2 mmol/L 30* 34* 30* 34* 34* 32* 28 24 30* 33*  33* 30*  30*  30* 26   Glucose mg/dL 128* 107 100 118* 139* 132* 119* 94 179* 136*  136* 113*  112*  112* 143*   BUN mg/dL 111* 113* 115* 115* 120* 117* 122* 108* 135* 132*  132* 100*  99*  99* 52*   Creatinine mg/dL 2.5* 2.7* 2.7* 2.7* 3.1* 3.5* 3.9* 3.3* 4.9* 5.4*  5.4* 4.3*  4.3*  4.3* 2.7*   Albumin g/dL  --  1.2*  --   --   --   --  1.5*  --   --  1.5* 1.7*  1.7*  --    Total Bilirubin mg/dL  --  0.4  --   --   --   --  0.4  --   --   --  0.6  --    Alkaline Phosphatase U/L  --  155*  --   --   --   --  128  --   --   --  147*  --    AST U/L  --  72*  --   --   --   --  78*  --   --   --  90*  --    ALT U/L  --  52*  --   --   --   --  43  --   --   --  49*  --    Magnesium mg/dL  --  2.7*  --   --   --   --  2.9*  --   --  3.3* 2.8*  2.7*  --    Phosphorus mg/dL  --  6.9*  --   --   --   --  9.3*  --   --  7.6* 6.8*  6.8*  --        Vancomycin Administrations:  vancomycin given in the last 96 hours                   vancomycin 1.25 g in dextrose 5% 250 mL IVPB (ready to mix) (mg) 1,250 mg New Bag 10/27/20 0452    vancomycin 1.5 g in dextrose 5 % 250 mL IVPB (ready to mix) (mg) 1,500 mg New Bag 10/26/20 1611                Microbiologic Results:  Microbiology Results (last 7 days)     Procedure Component Value Units Date/Time    Culture, Respiratory with Gram Stain [[id number]] Collected: 11/03/20 0933    Order Status: Completed Specimen: Respiratory from Endotracheal Aspirate Updated: 11/05/20 1055      Respiratory Culture Normal respiratory alejandra      No S aureus or Pseudomonas isolated.     Gram Stain (Respiratory) <10 epithelial cells per low power field.     Gram Stain (Respiratory) No WBC's     Gram Stain (Respiratory) No organisms seen    Blood culture [[id number]] Collected: 11/03/20 0904    Order Status: Completed Specimen: Blood from Peripheral, Antecubital, Left Updated: 11/05/20 1022     Blood Culture, Routine No Growth to date      No Growth to date      No Growth to date    Narrative:      Blood cultures x 2 different sites. 4 bottles total. Please  draw cultures before administering antibiotics.    Blood culture [[id number]] Collected: 11/03/20 0904    Order Status: Completed Specimen: Blood from Peripheral, Upper Arm, Right Updated: 11/05/20 1022     Blood Culture, Routine No Growth to date      No Growth to date      No Growth to date    Narrative:      Blood cultures from 2 different sites. 4 bottles total.  Please draw before starting antibiotics.    Blood culture [[id number]] Collected: 10/30/20 1011    Order Status: Completed Specimen: Blood from Peripheral, Forearm, Left Updated: 11/04/20 1212     Blood Culture, Routine No growth after 5 days.    Narrative:      Blood cultures x 2 different sites. 4 bottles total. Please  draw cultures before administering antibiotics.    Blood culture [[id number]] Collected: 10/30/20 1015    Order Status: Completed Specimen: Blood from Peripheral, Upper Arm, Right Updated: 11/04/20 1212     Blood Culture, Routine No growth after 5 days.    Narrative:      Blood cultures from 2 different sites. 4 bottles total.  Please draw before starting antibiotics.    Culture, Respiratory with Gram Stain [[id number]] Collected: 10/30/20 1040    Order Status: Completed Specimen: Respiratory from Endotracheal Aspirate Updated: 11/02/20 1106     Respiratory Culture Normal respiratory alejandra     Gram Stain (Respiratory) Rare WBC's     Gram Stain (Respiratory) No organisms seen     Narrative:      Mini-BAL. Before antibiotics.    Blood culture [[id number]] Collected: 10/27/20 2011    Order Status: Completed Specimen: Blood from Peripheral, Forearm, Right Updated: 11/01/20 2212     Blood Culture, Routine No growth after 5 days.    Narrative:      Blood cultures from 2 different sites. 4 bottles total.  Please draw before starting antibiotics.    Blood culture [[id number]] Collected: 10/27/20 2011    Order Status: Completed Specimen: Blood from Peripheral, Forearm, Left Updated: 11/01/20 2212     Blood Culture, Routine No growth after 5 days.    Narrative:      Blood cultures x 2 different sites. 4 bottles total. Please  draw cultures before administering antibiotics.    Urine culture [[id number]] Collected: 10/31/20 1428    Order Status: Completed Specimen: Urine Updated: 11/01/20 1814     Urine Culture, Routine No growth    Narrative:      Specimen Source->Urine    Culture, Respiratory with Gram Stain [[id number]] Collected: 10/28/20 1100    Order Status: Completed Specimen: Respiratory from Endotracheal Aspirate Updated: 10/30/20 0818     Respiratory Culture Normal respiratory alejandra     Gram Stain (Respiratory) <10 epithelial cells per low power field.     Gram Stain (Respiratory) Few WBC's     Gram Stain (Respiratory) No organisms seen

## 2020-11-05 NOTE — ASSESSMENT & PLAN NOTE
Likely secondary to TFs +/- dehydration from diuresis     -- emesis noted with free water flushes  -- improved with D5W

## 2020-11-05 NOTE — SUBJECTIVE & OBJECTIVE
Interval History: Pt underwent CRRT overnight. Pt proned and O2 sats improved to mid-high 80s. No further episodes of emesis or potential aspiration. Pt's condition labile and prognosis guarded.    Review of Systems   Unable to perform ROS: Intubated     Objective:     Vital Signs (Most Recent):  Temp: 98.6 °F (37 °C) (11/05/20 1100)  Pulse: 81 (11/05/20 1300)  Resp: (!) 36 (11/05/20 1300)  BP: 136/63 (11/04/20 2015)  SpO2: (!) 92 % (11/05/20 1300) Vital Signs (24h Range):  Temp:  [98.6 °F (37 °C)-99.9 °F (37.7 °C)] 98.6 °F (37 °C)  Pulse:  [] 81  Resp:  [7-36] 36  SpO2:  [46 %-92 %] 92 %  BP: (109-145)/(53-65) 136/63  Arterial Line BP: ()/(38-61) 148/57     Weight: 84.4 kg (186 lb)  Body mass index is 26.69 kg/m².    Estimated Creatinine Clearance: 32.7 mL/min (A) (based on SCr of 2.7 mg/dL (H)).    Significant Labs:   Blood Culture:   Recent Labs   Lab 10/18/20  0751 10/27/20  2011 10/30/20  1011 10/30/20  1015 11/03/20  0904   LABBLOO No growth after 5 days. No growth after 5 days.  No growth after 5 days. No growth after 5 days. No growth after 5 days. No Growth to date  No Growth to date  No Growth to date  No Growth to date  No Growth to date  No Growth to date     CBC:   Recent Labs   Lab 11/03/20  1830 11/04/20 0417 11/05/20  0405   WBC 30.29* 24.42*  24.42* 43.27*   HGB 10.3* 9.3*  9.3* 9.4*   HCT 35.7* 31.6*  31.6* 32.1*    258  258 340     CMP:   Recent Labs   Lab 11/04/20 0417 11/04/20  2227 11/05/20  0405 11/05/20  1023   *   < > 149*  149* 145  145  145 140   K 4.2   < > 4.3  4.3 4.4  4.4  4.4 4.3      < > 102  102 101  101  101 99   CO2 28   < > 33*  33* 30*  30*  30* 26   *   < > 136*  136* 113*  112*  112* 143*   *   < > 132*  132* 100*  99*  99* 52*   CREATININE 3.9*   < > 5.4*  5.4* 4.3*  4.3*  4.3* 2.7*   CALCIUM 7.7*   < > 7.5*  7.5* 8.2*  8.2*  8.2* 9.2   PROT 5.3*  --   --  6.1  --    ALBUMIN 1.5*  --  1.5* 1.7*   1.7*  --    BILITOT 0.4  --   --  0.6  --    ALKPHOS 128  --   --  147*  --    AST 78*  --   --  90*  --    ALT 43  --   --  49*  --    ANIONGAP 15   < > 14  14 14  14  14 15   EGFRNONAA 16.5*   < > 11.1*  11.1* 14.7*  14.7*  14.7* 25.7*    < > = values in this interval not displayed.     Fungus Culture (Blood or Bone Marrow): No results for input(s): FUNGUSCULTUR in the last 4320 hours.  Lactic Acid: No results for input(s): LACTATE in the last 48 hours.  Microbiology Results (last 7 days)     Procedure Component Value Units Date/Time    Culture, Respiratory with Gram Stain [[id number]] Collected: 11/03/20 0933    Order Status: Completed Specimen: Respiratory from Endotracheal Aspirate Updated: 11/05/20 1055     Respiratory Culture Normal respiratory alejandra      No S aureus or Pseudomonas isolated.     Gram Stain (Respiratory) <10 epithelial cells per low power field.     Gram Stain (Respiratory) No WBC's     Gram Stain (Respiratory) No organisms seen    Blood culture [[id number]] Collected: 11/03/20 0904    Order Status: Completed Specimen: Blood from Peripheral, Antecubital, Left Updated: 11/05/20 1022     Blood Culture, Routine No Growth to date      No Growth to date      No Growth to date    Narrative:      Blood cultures x 2 different sites. 4 bottles total. Please  draw cultures before administering antibiotics.    Blood culture [[id number]] Collected: 11/03/20 0904    Order Status: Completed Specimen: Blood from Peripheral, Upper Arm, Right Updated: 11/05/20 1022     Blood Culture, Routine No Growth to date      No Growth to date      No Growth to date    Narrative:      Blood cultures from 2 different sites. 4 bottles total.  Please draw before starting antibiotics.    Blood culture [[id number]] Collected: 10/30/20 1011    Order Status: Completed Specimen: Blood from Peripheral, Forearm, Left Updated: 11/04/20 1212     Blood Culture, Routine No growth after 5 days.    Narrative:      Blood cultures x 2  different sites. 4 bottles total. Please  draw cultures before administering antibiotics.    Blood culture [[id number]] Collected: 10/30/20 1015    Order Status: Completed Specimen: Blood from Peripheral, Upper Arm, Right Updated: 11/04/20 1212     Blood Culture, Routine No growth after 5 days.    Narrative:      Blood cultures from 2 different sites. 4 bottles total.  Please draw before starting antibiotics.    Culture, Respiratory with Gram Stain [[id number]] Collected: 10/30/20 1040    Order Status: Completed Specimen: Respiratory from Endotracheal Aspirate Updated: 11/02/20 1106     Respiratory Culture Normal respiratory alejandra     Gram Stain (Respiratory) Rare WBC's     Gram Stain (Respiratory) No organisms seen    Narrative:      Mini-BAL. Before antibiotics.    Blood culture [[id number]] Collected: 10/27/20 2011    Order Status: Completed Specimen: Blood from Peripheral, Forearm, Right Updated: 11/01/20 2212     Blood Culture, Routine No growth after 5 days.    Narrative:      Blood cultures from 2 different sites. 4 bottles total.  Please draw before starting antibiotics.    Blood culture [[id number]] Collected: 10/27/20 2011    Order Status: Completed Specimen: Blood from Peripheral, Forearm, Left Updated: 11/01/20 2212     Blood Culture, Routine No growth after 5 days.    Narrative:      Blood cultures x 2 different sites. 4 bottles total. Please  draw cultures before administering antibiotics.    Urine culture [[id number]] Collected: 10/31/20 1428    Order Status: Completed Specimen: Urine Updated: 11/01/20 1814     Urine Culture, Routine No growth    Narrative:      Specimen Source->Urine    Culture, Respiratory with Gram Stain [[id number]] Collected: 10/28/20 1100    Order Status: Completed Specimen: Respiratory from Endotracheal Aspirate Updated: 10/30/20 0818     Respiratory Culture Normal respiratory alejandra     Gram Stain (Respiratory) <10 epithelial cells per low power field.     Gram Stain (Respiratory)  Few WBC's     Gram Stain (Respiratory) No organisms seen        POCT Glucose:   Recent Labs   Lab 11/05/20  0342 11/05/20  0736 11/05/20  1034   POCTGLUCOSE 106 117* 155*       Significant Imaging: I have reviewed all pertinent imaging results/findings within the past 24 hours.   · CXR (11/4): Mildly progressive parenchymal opacities at the left lung base in this patient with ARDS or severe pulmonary edema.

## 2020-11-05 NOTE — SUBJECTIVE & OBJECTIVE
Interval History/Significant Events: RRT started yesterday night and patient was proned again. Oxygenation improved this morning with saturations in mid- high 80's. Prognosis remains guarded.     Review of Systems   Unable to perform ROS: Intubated     Objective:     Vital Signs (Most Recent):  Temp: 98.8 °F (37.1 °C) (11/05/20 0701)  Pulse: (!) 54 (11/05/20 0845)  Resp: (!) 35 (11/05/20 0845)  BP: 136/63 (11/04/20 2015)  SpO2: (!) 86 % (11/05/20 0845) Vital Signs (24h Range):  Temp:  [98.3 °F (36.8 °C)-99.9 °F (37.7 °C)] 98.8 °F (37.1 °C)  Pulse:  [] 54  Resp:  [7-35] 35  SpO2:  [46 %-91 %] 86 %  BP: (109-145)/(53-65) 136/63  Arterial Line BP: ()/(38-61) 119/52   Weight: 84.4 kg (186 lb)  Body mass index is 26.69 kg/m².      Intake/Output Summary (Last 24 hours) at 11/5/2020 0858  Last data filed at 11/5/2020 0822  Gross per 24 hour   Intake 4291.69 ml   Output 3502 ml   Net 789.69 ml       Physical Exam  Vitals signs and nursing note reviewed.   Constitutional:       General: He is not in acute distress.     Appearance: Normal appearance. He is ill-appearing.      Interventions: He is sedated, chemically paralyzed and intubated.   HENT:      Mouth/Throat:      Lips: Lesions present.      Comments: Upper and lower lips with breakdown noted.   Cardiovascular:      Rate and Rhythm: Regular rhythm. Tachycardia present.      Pulses:           Radial pulses are 2+ on the right side and 2+ on the left side.        Dorsalis pedis pulses are 2+ on the right side and 2+ on the left side.      Heart sounds: Normal heart sounds.      Comments: Bilateral upper extremity edema 2+  Pulmonary:      Effort: He is intubated.      Breath sounds: Examination of the right-lower field reveals rales. Rales present. No wheezing or rhonchi.   Abdominal:      General: Bowel sounds are normal.      Comments: Patient proned for exam   Genitourinary:     Comments: Kelley in place with hematuria.   Musculoskeletal:      Right lower  le+ Edema present.      Left lower le+ Edema present.   Skin:     General: Skin is warm and dry.      Capillary Refill: Capillary refill takes 2 to 3 seconds.      Coloration: Skin is not mottled.   Neurological:      Mental Status: He is unresponsive.      GCS: GCS eye subscore is 1. GCS verbal subscore is 1. GCS motor subscore is 1.      Comments: Sedated and paralyzed.        Vents:  Vent Mode: A/C (20)  Set Rate: 35 BPM (20)  Vt Set: 430 mL (20)  PEEP/CPAP: 14 cmH20 (20)  Oxygen Concentration (%): 100 (20 0800)  Peak Airway Pressure: 39 cmH2O (20)  Plateau Pressure: 41 cmH20 (20)  Total Ve: 15.9 mL (20)  F/VT Ratio<105 (RSBI): (!) 76.59 (20)  Lines/Drains/Airways     Central Venous Catheter Line            Percutaneous Central Line Insertion/Assessment - Triple Lumen  10/26/20 1940 right internal jugular 9 days    Trialysis (Dialysis) Catheter 20 1914 left internal jugular less than 1 day          Drain                 Urethral Catheter 10/31/20 1300 Non-latex;Straight-tip 16 Fr. 4 days         NG/OG Tube 20 1456 orogastric 14 Fr. Left mouth 2 days         Rectal Tube 20 0230 rectal tube w/ balloon (indicate number of mLs) less than 1 day          Airway                 Airway - Non-Surgical 10/26/20 1820 Endotracheal Tube 9 days       Airway Anesthesia 10/26/20 9 days          Arterial Line            Arterial Line 10/30/20 1556 Right Radial 5 days              Significant Labs:    CBC/Anemia Profile:  Recent Labs   Lab 20  1830 20  0417 20  0405   WBC 30.29* 24.42*  24.42* 43.27*   HGB 10.3* 9.3*  9.3* 9.4*   HCT 35.7* 31.6*  31.6* 32.1*    258  258 340   * 108*  108* 107*   RDW 13.9 14.0  14.0 14.4        Chemistries:  Recent Labs   Lab 20  0417  20  1537 20  2227 20  0405   *   < > 145 149*  149* 145  145  145   K 4.2    < > 4.4 4.3  4.3 4.4  4.4  4.4      < > 101 102  102 101  101  101   CO2 28   < > 30* 33*  33* 30*  30*  30*   *   < > 135* 132*  132* 100*  99*  99*   CREATININE 3.9*   < > 4.9* 5.4*  5.4* 4.3*  4.3*  4.3*   CALCIUM 7.7*   < > 7.8* 7.5*  7.5* 8.2*  8.2*  8.2*   ALBUMIN 1.5*  --   --  1.5* 1.7*  1.7*   PROT 5.3*  --   --   --  6.1   BILITOT 0.4  --   --   --  0.6   ALKPHOS 128  --   --   --  147*   ALT 43  --   --   --  49*   AST 78*  --   --   --  90*   MG 2.9*  --   --  3.3* 2.8*  2.7*   PHOS 9.3*  --   --  7.6* 6.8*  6.8*    < > = values in this interval not displayed.       All pertinent labs within the past 24 hours have been reviewed.    Significant Imaging:  I have reviewed all pertinent imaging results/findings within the past 24 hours.

## 2020-11-05 NOTE — HPI
Mr. Riggs is a 54 yo M w/ a PMHx of GERD, allergic rhinitis, chronic cough, and reactive airway disease (prescribed albuterol PRN) who presented to the ED on 10/18 for worsening SOB and cough productive of blood-tinged sputum. He reported onset of fever, HA, chills, myalgias, decreased appetite, and sweats on 10/9 and a diagnosis of COVID on 10/15. On 10/16, he went to an urgent care because of O2 sats persistently ~89% and he was prescribed levofloxacin and sent home. At home, he used his albuterol without much relief.     On 10/18, he presented to [facility] [place] [place]. He was given ceftriaxone and azithromycin x1 dose in the ED and then admitted to hospital medicine. He continued dexamethasone started remdesivir on 10/22 and completed 5 doses. By 10/24, his oxygen requirements were increasing steadily. Critical Care was consulted when he went up to 60L on 100% FiO2.     He was admitted to the RICU on 10/24 and intubated on 10/26. Since 10/24, he developed an YOVANI (now requiring CRRT) and he has had WBC counts trending up. Initially, he was afebrile and his lactate was WNL. Fluconazole initiated on 11/1. CRRT was initiated overnight on 11/4. He also had a suspected aspiration event, his T Max overnight was 100.8F, and CXR showed worsening L-sided opacity. He had an increase in FiO2 and pressor requirements with O2 sats consistently in the low 80s throughout the day. On 11/5, Zosyn was escalated to meropenem, Vanc was added, fluconazole was continued. Blood and Sputum cultures drawn earlier in his admission have continued to be unremarkable. Infectious Disease was consulted

## 2020-11-06 NOTE — RESPIRATORY THERAPY
Patient supinated, ETT secured at the 26 cm mar at the lip. Nurses, NP and 2 RT at bedside. Patient tolerated procedure well. I will continue to monitor patient closely.

## 2020-11-06 NOTE — PROGRESS NOTES
[facility] SLED NOTE     Patient currently on SLED for removal of uremic toxins and volume.     Ultrafiltration goal is 500 cc/hr     Labs have been reviewed and the dialysate bath has been adjusted.     Assessment/Plan:    Continue SLED for volume management and metabolic clearance  Goal to be net negative daily    Pt remains on levo gtt   Pt remains anuric     RODRIGO Lam, AGNP-C  Nephrology  Pager: [phone]

## 2020-11-06 NOTE — PROGRESS NOTES
Certified Child Life Specialist called patient's wife, Liza, at her requested time to provide support and talk about resources for patient's children. Liza stated that now was not a good time after all and asked CCLS to call back in the morning after 9am.     CCLS will continue to follow.    CORI Burt  Certified Child Life Specialist  Pediatric Hem/Onc Clinic  Ex. [phone]

## 2020-11-06 NOTE — ASSESSMENT & PLAN NOTE
Likely 2/2 aspiration on 11/4. Febrile, WBC increased to 40K but down to 36 on 11/6. CXR with left sided consolidation. Increased FiO2 and vasopressor requirements.      -- donta, vanc, fluconazole  -- blood and sputum cultures 11/3 with NGTD 11/6  -- titrate norepi for MAP >65  -- vaso and SDS added

## 2020-11-06 NOTE — PROCEDURES
Procedure Note  Prone Positioning  Critical Care Medicine       Chief Complaint: Acute respiratory distress syndrome (ARDS) due to 2019 novel coronavirus  MRN: [id number]  LOS: 19  Sex: male  Age: 55 y.o.      Last ABG:   Recent Labs   Lab 11/06/20  1515   PH 7.203*   PO2 75*   PCO2 62.2*   HCO3 24.5   BE -3       Vital Signs (Most Recent):   Temp: 99 °F (37.2 °C) (11/06/20 1600)  Pulse: 104 (11/06/20 1600)  Resp: (!) 36 (11/06/20 1600)  BP: 136/63 (11/04/20 2015)  SpO2: (!) 89 % (11/06/20 1600)    Ventilator Settings:  Vent Mode: A/C  Oxygen Concentration (%):  [] 100  Resp Rate Total:  [35 br/min-42 br/min] 36 br/min  Vt Set:  [450 mL-490 mL] 450 mL  PEEP/CPAP:  [8 cmH20-10 cmH20] 10 cmH20  Mean Airway Pressure:  [19 arT99-91 cmH20] 22 cmH20    Indication: Severe, refractory ARDS. High risk for deterioration during proning procedure in need of direct monitoring by Critical Care personnel.     Prior to beginning the procedure, all appropriate equipment and personnel were gathered in the room. Two individuals were placed on each side of the patient and one person stood at the head of the bed and was dedicated to the management of the head of the patient, the endotracheal tube, and the ventilator lines. The person at the head of the bed  coordinated the steps of the proning procedure with team team at the direction of Critical Care team members at the bedside. The patient was first log-rolled 90 degrees onto their side towards the direction of their central venous catheter. Cardiac electrodes were then moved from the patient's anterior to the posterior. The patient?s knees, forehead, chest, and iliac crests were protected using adhesive pads and/or foam pads to reduce the risk of skin breakdown. Once properly positioned in the bed, another 90 degree log roll was performed placing the patient into the prone position. The patient's arms were placed alongside the body. The patient's head should be turned alternately  to the right or left every 2 hours. The patient tolerated the procedure well.     ET tube at 26 cm at the lip pre- and post- procedure.     Total Critical Care time (uninterrupted not including procedures): 15 minutes    Nydia Collado PA-C  Critical Care Medicine  11/6/2020   5:26 PM

## 2020-11-06 NOTE — PLAN OF CARE
Pt medically not stable for discharge, remains intubated and sedated.  Palliative care following.  Discussion with wife regarding convalescent plasma but she wants to think about it.    CM to follow for discharge planning needs.  LUPILLO Melgar RN  Case Management  [id number]      11/06/20 1346   Discharge Reassessment   Assessment Type Discharge Planning Reassessment   Provided patient/caregiver education on the expected discharge date and the discharge plan Yes   Do you have any problems affording any of your prescribed medications? TBD   Discharge Plan A Other   Anticipated Discharge Disposition LTAC   Can the patient/caregiver answer the patient profile reliably? No, cognitively impaired   Describe the patient's ability to walk at the present time. Does not walk or unable to take any steps at all   Number of comorbid conditions (as recorded on the chart) Four   Post-Acute Status   Post-Acute Authorization Placement   Post-Acute Placement Status Awaiting Internal Medical Clearance   Discharge Delays None known at this time

## 2020-11-06 NOTE — SUBJECTIVE & OBJECTIVE
Interval History/Significant Events: Has been prone for desaturations. High vent requirements continue; challenging to maximally oxygenate with PEEP as plateau pressures increase. WBC elevated, may be due to possible aspiration in previous days but WBC improving. Consulting for convalescent plasma.     Review of Systems   Unable to perform ROS: Intubated     Objective:     Vital Signs (Most Recent):  Temp: 98.8 °F (37.1 °C) (11/06/20 1400)  Pulse: 95 (11/06/20 1400)  Resp: (!) 36 (11/06/20 1400)  BP: 136/63 (11/04/20 2015)  SpO2: (!) 90 % (11/06/20 1400) Vital Signs (24h Range):  Temp:  [90.9 °F (32.7 °C)-100.6 °F (38.1 °C)] 98.8 °F (37.1 °C)  Pulse:  [] 95  Resp:  [31-36] 36  SpO2:  [86 %-93 %] 90 %  Arterial Line BP: ()/(46-62) 117/54   Weight: 84.4 kg (186 lb)  Body mass index is 26.69 kg/m².      Intake/Output Summary (Last 24 hours) at 11/6/2020 1514  Last data filed at 11/6/2020 1400  Gross per 24 hour   Intake 5657.87 ml   Output 82109 ml   Net -5848.13 ml       Physical Exam  Vitals signs and nursing note reviewed.   Constitutional:       General: He is not in acute distress.     Appearance: Normal appearance. He is ill-appearing.      Interventions: He is sedated, chemically paralyzed and intubated.   HENT:      Mouth/Throat:      Lips: Lesions present.      Comments: Upper and lower lips with breakdown noted.   Cardiovascular:      Rate and Rhythm: Regular rhythm. Tachycardia present.      Pulses:           Radial pulses are 2+ on the right side and 2+ on the left side.        Dorsalis pedis pulses are 2+ on the right side and 2+ on the left side.      Heart sounds: Normal heart sounds.      Comments: Bilateral upper extremity edema 2+  Pulmonary:      Effort: He is intubated.      Breath sounds: Examination of the right-lower field reveals rales. Rales present. No wheezing or rhonchi.   Abdominal:      General: Bowel sounds are normal.      Comments: Patient proned for exam   Genitourinary:      Comments: Kelley in place with hematuria.   Musculoskeletal:      Right lower le+ Edema present.      Left lower le+ Edema present.   Skin:     General: Skin is warm and dry.      Capillary Refill: Capillary refill takes 2 to 3 seconds.      Coloration: Skin is not mottled.   Neurological:      Mental Status: He is unresponsive.      GCS: GCS eye subscore is 1. GCS verbal subscore is 1. GCS motor subscore is 1.      Comments: Sedated and paralyzed.          Vents:  Vent Mode: A/C (20)  Set Rate: 36 BPM (20 114)  Vt Set: 490 mL (20 114)  PEEP/CPAP: 10 cmH20 (20)  Oxygen Concentration (%): 100 (20 1400)  Peak Airway Pressure: 37 cmH2O (20)  Plateau Pressure: 32 cmH20 (20 114)  Total Ve: 20.6 mL (20)  F/VT Ratio<105 (RSBI): (!) 62.94 (20 114)  Lines/Drains/Airways     Central Venous Catheter Line            Percutaneous Central Line Insertion/Assessment - Triple Lumen  10/26/20 1940 right internal jugular 10 days    Trialysis (Dialysis) Catheter 20 1914 left internal jugular 1 day          Drain                 Urethral Catheter 10/31/20 1300 Non-latex;Straight-tip 16 Fr. 6 days         NG/OG Tube 20 1456 orogastric 14 Fr. Left mouth 4 days         Rectal Tube 20 0230 rectal tube w/ balloon (indicate number of mLs) 1 day          Airway                 Airway - Non-Surgical 10/26/20 1820 Endotracheal Tube 10 days       Airway Anesthesia 10/26/20 10 days          Arterial Line            Arterial Line 10/30/20 1556 Right Radial 7 days              Significant Labs:    CBC/Anemia Profile:  Recent Labs   Lab 20  0405 20  0404   WBC 43.27* 36.52*   HGB 9.4* 9.3*   HCT 32.1* 30.2*    326   * 101*   RDW 14.4 13.9        Chemistries:  Recent Labs   Lab 20  0405  209 20  0404 20  0920     145  145   < > 138  138 139 140   K 4.4  4.4  4.4   < > 4.0  4.0 4.0  4.6     101  101   < > 99  99 100 103   CO2 30*  30*  30*   < > 26  26 25 22*   *  99*  99*   < > 37*  37* 43* 52*   CREATININE 4.3*  4.3*  4.3*   < > 2.5*  2.5* 2.9* 3.4*   CALCIUM 8.2*  8.2*  8.2*   < > 8.9  8.9 8.3* 8.1*   ALBUMIN 1.7*  1.7*  --  1.6* 1.7*  --    PROT 6.1  --   --  6.4  --    BILITOT 0.6  --   --  0.6  --    ALKPHOS 147*  --   --  127  --    ALT 49*  --   --  47*  --    AST 90*  --   --  71*  --    MG 2.8*  2.7*  --  2.4 2.4  --    PHOS 6.8*  6.8*  --  5.7* 6.2*  --     < > = values in this interval not displayed.       Lactic Acid: No results for input(s): LACTATE in the last 48 hours.    Significant Imaging:  CXR: I have reviewed all pertinent results/findings within the past 24 hours and my personal findings are:  last CXR 11/4, no changes

## 2020-11-06 NOTE — CONSULTS
Certified Child Life Specialist reached out to patient's wife, Liza, to help provide support and resources for children. Liza asked that CCLS call her back at a later time when she felt like she had more time to talk.      Thank you for this consult. CCLS will continue to follow.      CORI Burt  Certified Child Life Specialist  Pediatric Hem/Onc Clinic  Ex. [phone]

## 2020-11-06 NOTE — PROGRESS NOTES
[facility] - ICU 16   Critical Care Medicine  Progress Note    Patient Name: Nubia Riggs  MRN: [id number]  Admission Date: 10/18/2020  Hospital Length of Stay: 19 days  Code Status: Partial Code  Attending Provider: Parminder Marin*  Primary Care Provider: Primary Doctor No   Principal Problem: Acute respiratory distress syndrome (ARDS) due to 2019 novel coronavirus    Subjective:     HPI:  55-year-old male with PMhx of GERD, allergic rinhitis, chronic cough (recently saw pulm and was diagnosed with reactive airway disease, on albuterol PRN) presented the emergency department on 10/18 with chief complaint of shortness of breath. He reports subjective fever, headache, chills, myalgias, poor appetite, and sweats about 6 days ago. He was diagnosed with COVID-19 on 10/15.  Reports presenting to urgent care 10/16 due to 89% pulse ox on room air. Started on levofloxacin and discharged home.  Reports using albuterol treatments at home without much relief.He noticed to have cough productive of blood tinged sputum and shortness of breath so he presented to the ED.     He received ceftria/azithro (x1 each in the ED). He started Remdesivir and completed 5 doses on 10/22, continues on dexamethasone. Patient is on his 7th day of hospital admission and has been requiring higher doses of O2 to keep sats >90%. Today he went up to 60L on 100% FiO2 and thus Critical Care was consulted.       Hospital/ICU Course:  Patient admitted to RICU due to worsening respiratory status with increasing oxygen requirements. Completion of remdesivir on 10/22. On BiPAP with O2 saturation in high 70s-low 80s at rest and acute desaturation to 40s when biPAP removed to provide PO medications. He required intubation on 10/26. Central line and arterial line placed. Proning initiated on 10/27. YOVANI developed with inital response to lasix however, not clearing as hyperkalemia persists. UO decreasing on 10/30 despite multiple lasix  doses, and concern for hematuria, CBC sent. Nephrology consult placed. Blood and sputum cultures on 10/30 NGTD, lactate normal, worsening WBC count. Heparin held overnight for hematuria and blood in sputum, restarted 10/31 for cessation of both. Vasopressor requirements improving, off vinay and vaso as of 11/01, WBC continues to increase, however not febrile and cultures are negative. Added fluconazole 11/1. Febrile on 11/4 with suspected aspiration event. Increase in FiO2 and pressor requirements. Oxygen saturations remained in the low 80's for most of the day. Family updated and code status changed to no shocks, no compressions. RRT started the evening of 11/4. Proned 36 hours for desaturations; difficult to increase PEEP as plateau pressures increase. Family considering convalescent plasma.     Interval History/Significant Events: Has been prone for desaturations. High vent requirements continue; challenging to maximally oxygenate with PEEP as plateau pressures increase. WBC elevated, may be due to possible aspiration in previous days but WBC improving. Consulting for convalescent plasma.     Review of Systems   Unable to perform ROS: Intubated     Objective:     Vital Signs (Most Recent):  Temp: 98.8 °F (37.1 °C) (11/06/20 1400)  Pulse: 95 (11/06/20 1400)  Resp: (!) 36 (11/06/20 1400)  BP: 136/63 (11/04/20 2015)  SpO2: (!) 90 % (11/06/20 1400) Vital Signs (24h Range):  Temp:  [90.9 °F (32.7 °C)-100.6 °F (38.1 °C)] 98.8 °F (37.1 °C)  Pulse:  [] 95  Resp:  [31-36] 36  SpO2:  [86 %-93 %] 90 %  Arterial Line BP: ()/(46-62) 117/54   Weight: 84.4 kg (186 lb)  Body mass index is 26.69 kg/m².      Intake/Output Summary (Last 24 hours) at 11/6/2020 1514  Last data filed at 11/6/2020 1400  Gross per 24 hour   Intake 5657.87 ml   Output 25213 ml   Net -5848.13 ml       Physical Exam  Vitals signs and nursing note reviewed.   Constitutional:       General: He is not in acute distress.     Appearance: Normal  appearance. He is ill-appearing.      Interventions: He is sedated, chemically paralyzed and intubated.   HENT:      Mouth/Throat:      Lips: Lesions present.      Comments: Upper and lower lips with breakdown noted.   Cardiovascular:      Rate and Rhythm: Regular rhythm. Tachycardia present.      Pulses:           Radial pulses are 2+ on the right side and 2+ on the left side.        Dorsalis pedis pulses are 2+ on the right side and 2+ on the left side.      Heart sounds: Normal heart sounds.      Comments: Bilateral upper extremity edema 2+  Pulmonary:      Effort: He is intubated.      Breath sounds: Examination of the right-lower field reveals rales. Rales present. No wheezing or rhonchi.   Abdominal:      General: Bowel sounds are normal.      Comments: Patient proned for exam   Genitourinary:     Comments: Kelley in place with hematuria.   Musculoskeletal:      Right lower le+ Edema present.      Left lower le+ Edema present.   Skin:     General: Skin is warm and dry.      Capillary Refill: Capillary refill takes 2 to 3 seconds.      Coloration: Skin is not mottled.   Neurological:      Mental Status: He is unresponsive.      GCS: GCS eye subscore is 1. GCS verbal subscore is 1. GCS motor subscore is 1.      Comments: Sedated and paralyzed.          Vents:  Vent Mode: A/C (20)  Set Rate: 36 BPM (20)  Vt Set: 490 mL (20)  PEEP/CPAP: 10 cmH20 (20 114)  Oxygen Concentration (%): 100 (20 1400)  Peak Airway Pressure: 37 cmH2O (20 114)  Plateau Pressure: 32 cmH20 (20 1149)  Total Ve: 20.6 mL (20 114)  F/VT Ratio<105 (RSBI): (!) 62.94 (20 114)  Lines/Drains/Airways     Central Venous Catheter Line            Percutaneous Central Line Insertion/Assessment - Triple Lumen  10/26/20 1940 right internal jugular 10 days    Trialysis (Dialysis) Catheter 20 left internal jugular 1 day          Drain                 Urethral Catheter  10/31/20 1300 Non-latex;Straight-tip 16 Fr. 6 days         NG/OG Tube 11/02/20 1456 orogastric 14 Fr. Left mouth 4 days         Rectal Tube 11/05/20 0230 rectal tube w/ balloon (indicate number of mLs) 1 day          Airway                 Airway - Non-Surgical 10/26/20 1820 Endotracheal Tube 10 days       Airway Anesthesia 10/26/20 10 days          Arterial Line            Arterial Line 10/30/20 1556 Right Radial 7 days              Significant Labs:    CBC/Anemia Profile:  Recent Labs   Lab 11/05/20 0405 11/06/20  0404   WBC 43.27* 36.52*   HGB 9.4* 9.3*   HCT 32.1* 30.2*    326   * 101*   RDW 14.4 13.9        Chemistries:  Recent Labs   Lab 11/05/20 0405 11/05/20 2239 11/06/20  0404 11/06/20  0920     145  145   < > 138  138 139 140   K 4.4  4.4  4.4   < > 4.0  4.0 4.0 4.6     101  101   < > 99  99 100 103   CO2 30*  30*  30*   < > 26  26 25 22*   *  99*  99*   < > 37*  37* 43* 52*   CREATININE 4.3*  4.3*  4.3*   < > 2.5*  2.5* 2.9* 3.4*   CALCIUM 8.2*  8.2*  8.2*   < > 8.9  8.9 8.3* 8.1*   ALBUMIN 1.7*  1.7*  --  1.6* 1.7*  --    PROT 6.1  --   --  6.4  --    BILITOT 0.6  --   --  0.6  --    ALKPHOS 147*  --   --  127  --    ALT 49*  --   --  47*  --    AST 90*  --   --  71*  --    MG 2.8*  2.7*  --  2.4 2.4  --    PHOS 6.8*  6.8*  --  5.7* 6.2*  --     < > = values in this interval not displayed.       Lactic Acid: No results for input(s): LACTATE in the last 48 hours.    Significant Imaging:  CXR: I have reviewed all pertinent results/findings within the past 24 hours and my personal findings are:  last CXR 11/4, no changes      ABG  Recent Labs   Lab 11/06/20  1515   PH 7.203*   PO2 75*   PCO2 62.2*   HCO3 24.5   BE -3     Assessment/Plan:     Psychiatric  Anxiety  Holding home vilazodone due heavy sedation administration      Pulmonary  Aspiration pneumonia of left lung due to vomit  Suspected aspiration event on 11/4. Pt subsequently febrile to  100.8F with WBC that continues to trend up. CXR with worsening L-sided opacity. Cultures drawn on 11/3 with NGTD. Zosyn escalated to meropenem.     - Continue meropenem, vanc, and fluconazole.   - ID suggested CT Chest and bronchoscopy for sputum culture if stable enough (would order aspergillus antigen)    Reactive airway disease  Follows with Pulm as outpatient. Last seen 8/27/20, told he had RAD  He was also seen by a OSH pulmonologist who did spirometry that reportedly showed borderline or mild asthma, and has been controlled with albuterol.   Former smoker, started at age 10-12 and quit 10 years ago    -- Continue scheduled duo nebs    Renal/  Hypernatremia  Likely secondary to TFs +/- dehydration from diuresis     -- emesis noted with free water flushes  -- improved with D5W      YOVANI (acute kidney injury)  No history of prior kidney disease; sCr on admission 0.8 -->2.6 as of 10/30, with UO less responsive to multiple doses of 40 mg lasix which he was initially responsive to; however, BUN rising and he is not clearing in his urine. Retroperitoneal u/s with bilateral medical renal diseased and no hydronephrosis. Fe Urea 26.4% (<35%) indicative of prerenal disease    -- nephrology following, appreciate assistance   -- RRT started 11/4  -- Avoid ACEI/ARB/NSAIDS/Nephrotoxins.   -- Renally dose all meds    ID  Septic shock  Likely 2/2 aspiration on 11/4. Febrile, WBC increased to 40K but down to 36 on 11/6. CXR with left sided consolidation. Increased FiO2 and vasopressor requirements.      -- donta, vanc, fluconazole  -- blood and sputum cultures 11/3 with NGTD 11/6  -- titrate norepi for MAP >65  -- vaso and SDS added     Other  * Acute respiratory distress syndrome (ARDS) due to 2019 novel coronavirus  COVID positive on 10/15. Patient admitted to hospital medicine and continued to deteriorate needing higher oxygen requirements and thus Critical Care Medicine was consulted. Intubated 10/26 and eventually placed  on NMB and proned. Peak and plateau pressures remain at goal    - s/p remdesivir (10/18-10/22)  - s/p Dexamethasone (10/27)  - s/p vanc x7 days (end date 11/01)  - duo nebs q4 scheduled and q6 PRN  - continue Heparin gtt D-dimer >33  - continue LPV and pronation for PF <150   - considering convalescent plasma  - Sputum culture sent 10/28 NGTD, reordered 10/30 for leukocytosis and febrile all negative;   - Zosyn escalated to donta. Continue fluconazole. Vancomycin restarted.  - Suspect aspiration event 11/4 with worsening oxygenation.    - CTS evaluated; not a candidate for ECMO     - Jessica started 11/4      COVID-19  See resp failure    Palliative care encounter  Palliative consulted for potential for prolonged hospitalization and following along.     Acute hypoxemic respiratory failure due to COVID-19  Secondary to COVID (see above)    Pneumonia due to COVID-19 virus  See ARDS         Critical Care Daily Checklist:    A: Awake: RASS Goal/Actual Goal: RASS Goal: -5-->unarousable  Actual: Chang Agitation Sedation Scale (RASS): Unarousable   B: Spontaneous Breathing Trial Performed?     C: SAT & SBT Coordinated?  n/a                      D: Delirium: CAM-ICU Overall CAM-ICU: Positive   E: Early Mobility Performed? No   F: Feeding Goal: Goals: Pt to meet >75% EEN and EPN by RD follow up.  Status: Nutrition Goal Status: goal not met   Current Diet Order   Procedures    Diet NPO      AS: Analgesia/Sedation Fentanyl and propofol   T: Thromboembolic Prophylaxis Heparin infusion   H: HOB > 300 No; proned   U: Stress Ulcer Prophylaxis (if needed) Yes; pepcid   G: Glucose Control yes   B: Bowel Function Stool Occurrence: 1   I: Indwelling Catheter (Lines & Kelley) Necessity Yes, L trialysis, R IJ   D: De-escalation of Antimicrobials/Pharmacotherapies When appropriate    Plan for the day/ETD Supine then prone    Code Status:  Family/Goals of Care: Partial Code  Discussed with attending       Critical secondary to Patient has a  condition that poses threat to life and bodily function: Severe Respiratory Distress      Critical care was time spent personally by me on the following activities: development of treatment plan with patient or surrogate and bedside caregivers, discussions with consultants, evaluation of patient's response to treatment, examination of patient, ordering and performing treatments and interventions, ordering and review of laboratory studies, ordering and review of radiographic studies, pulse oximetry, re-evaluation of patient's condition. This critical care time did not overlap with that of any other provider or involve time for any procedures.     Abi Hartman MD  Critical Care Medicine  [facility] - ICU 16 [facility]

## 2020-11-06 NOTE — CARE UPDATE
Called S/O Liza as she had discussed convalescent plasma with Dr Marin yesterday but wanted to think about it. Per RN, she was calling to discuss; returned call with no answer, left brief voicemail.

## 2020-11-06 NOTE — ASSESSMENT & PLAN NOTE
Follows with Pulm as outpatient. Last seen 8/27/20, told he had RAD  He was also seen by a [facility] pulmonologist who did spirometry that reportedly showed borderline or mild asthma, and has been controlled with albuterol.   Former smoker, started at age 10-12 and quit 10 years ago    -- Continue scheduled duo nikolay

## 2020-11-06 NOTE — NURSING
Pt supinated at 11:00, PA, charge and RT x2 at the bedside. ETT secured at 26 cm at the lips. Pt tolerated supination well. Pt placed on 100% FiO2 and 10 of PEEP. Plan to prone pt at 1600. WCTM

## 2020-11-06 NOTE — ASSESSMENT & PLAN NOTE
COVID positive on 10/15. Patient admitted to hospital medicine and continued to deteriorate needing higher oxygen requirements and thus Critical Care Medicine was consulted. Intubated 10/26 and eventually placed on NMB and proned. Peak and plateau pressures remain at goal    - s/p remdesivir (10/18-10/22)  - s/p Dexamethasone (10/27)  - s/p vanc x7 days (end date 11/01)  - duo nebs q4 scheduled and q6 PRN  - continue Heparin gtt D-dimer >33  - continue LPV and pronation for PF <150   - considering convalescent plasma  - Sputum culture sent 10/28 NGTD, reordered 10/30 for leukocytosis and febrile all negative;   - Zosyn escalated to donta. Continue fluconazole. Vancomycin restarted.  - Suspect aspiration event 11/4 with worsening oxygenation.    - CTS evaluated; not a candidate for ECMO     - Jessica started 11/4

## 2020-11-06 NOTE — CONSULTS
Certified Child Life Specialist reached out to patient's wife, Liza, to help provide support and resources for children. Liza asked that CCLS call her back at a later time when she felt like she had more time to talk.     Thank you for this consult. CCLS will continue to follow.     CORI Burt  Certified Child Life Specialist  Pediatric Hem/Onc Clinic  Ex. [phone]

## 2020-11-06 NOTE — PROGRESS NOTES
Certified Child Life Specialist called patient's wife, Liza, at the time she requested to discuss resources and support for children. CCLS left a voicemail for Liza to return call at her earliest convienence.     CCLS will continue to follow.     CORI Burt  Certified Child Life Specialist  Pediatric Hem/Onc Clinic  Ex. [phone]

## 2020-11-06 NOTE — PLAN OF CARE
FELICITY Skin Integrity Evaluation      Subjective    FELICITY skin integrity champion evaluation of patient as part of the comprehensive skin care team .       Nubia Riggs is being evaluated as per the Epic  pressure injury skin report.  He has been admitted to CMICU for 13 days but has been hospitalized for total of 19 days. Skin injury was noted on 10/29/2020.    Patient is a 55 year old male who presented on 10/18/2020 for respiratory failure 2/2 COVID-19. Patient required prolonged course of NIPPV prior to requiring intubation on 10/26. Patient now with frequent pronation/supination for ARDS.       Limited Physical exam     Lesion 1: Nose - dark discoloration with no erythema or skin break down.           Assessment :       Lesion 1:  Deep Tissue Pressure Injury     POA : no    Consults: Wound Care and Support surfaces as per WOCN recommendations                    Follow up:    Patient will be re evaluated weekly.       Nydia Collado PA-C  Critical Care Medicine  11/6/2020   2:47 PM

## 2020-11-06 NOTE — PROCEDURES
Procedure Note  Supine Positioning  Critical Care Medicine       Chief Complaint: Acute respiratory distress syndrome (ARDS) due to 2019 novel coronavirus  MRN: [id number]  LOS: 19  Sex: male  Age: 55 y.o.      Last ABG:   Recent Labs   Lab 11/06/20  0753   PH 7.276*   PO2 62*   PCO2 57.8*   HCO3 26.9   BE 0       Vital Signs (Most Recent):   Temp: 99.7 °F (37.6 °C) (11/06/20 1000)  Pulse: 96 (11/06/20 1000)  Resp: (!) 36 (11/06/20 1000)  BP: 136/63 (11/04/20 2015)  SpO2: (!) 90 % (11/06/20 1000)    Ventilator Settings:  Vent Mode: A/C  Oxygen Concentration (%):  [] 90  Resp Rate Total:  [36 br/min-42 br/min] 36 br/min  Vt Set:  [490 mL-520 mL] 490 mL  PEEP/CPAP:  [8 cmH20] 8 cmH20  Mean Airway Pressure:  [20 dkM56-79 cmH20] 22 cmH20        Indication: Severe, refractory ARDS. High risk for deterioration during supination procedure in need of direct monitoring by Critical Care personnel.     Prior to beginning the procedure, all appropriate equipment and personnel were gathered in the room. Two individuals were placed on each side of the patient and two respiratory therapists stood at the head of the bed and was dedicated to the management of the head of the patient, the endotracheal tube, and the ventilator lines. The person at the head of the bed coordinated the steps of the supination procedure at the direction of Critical Care team members at the bedside. The patient was first log-rolled 90 degrees onto their side away from the direction of their central venous catheter. Cardiac electrodes were then moved from the patient's posterior back to the anterior. Once properly positioned in the bed, another 90 degree log roll was performed placing the patient into the supine position. The patient's arms were placed alongside the body. Continuous pulse oximetry and blood pressure monitoring was performed without any desaturation or hemodynamic instability. The patient tolerated the procedure well.     Of note, ET tube  at 26 cm at the lip pre- and post-procedure.     Total Critical Care time (uninterrupted not including procedures): 15 minutes    Nydia Collado PA-C  Critical Care Medicine  11/6/2020   10:47 AM

## 2020-11-06 NOTE — PROGRESS NOTES
Pharmacokinetic Assessment Follow Up: IV Vancomycin    Vancomycin serum concentration assessment(s):    Vancomycin random level resulted at 24.4 mcg/mL approximately 19 hours after the previous dose. Goal level is 15 to 20 mcg/mL.     Nephrology is consulted for YOVANI and plans to switch to SLED.     Drug levels (last 3 results):  Recent Labs   Lab Result Units 11/05/20  0405 11/06/20  0404   Vancomycin, Random ug/mL 21.6 24.4     Vancomycin Regimen Plan:    Hold vancomycin IV and redose when the random level is less than 20 mcg/mL or as needed for dialysis. Next level to be drawn with morning labs on 11/7    Pharmacy will continue to follow and monitor vancomycin.    Please contact pharmacy at extension [phone] for questions regarding this assessment.    Thank you for the consult,   Lavern Davis, PharmD, BCCCP             Patient brief summary:  Nubia Riggs is a 55 y.o. male initiated on antimicrobial therapy with IV vancomycin for treatment of sepsis    Drug Allergies:   Review of patient's allergies indicates:   Allergen Reactions    Meperidine      Other reaction(s): violent behavior    Sulfa (sulfonamide antibiotics)      Other reaction(s): Unknown       Actual Body Weight:   84.4 kg     Renal Function:   Estimated Creatinine Clearance: 25.9 mL/min (A) (based on SCr of 3.4 mg/dL (H)).    Dialysis Method (if applicable):  SLED    CBC (last 72 hours):  Recent Labs   Lab Result Units 11/03/20  1830 11/04/20  0417 11/05/20  0405 11/06/20  0404   WBC K/uL 30.29* 24.42*  24.42* 43.27* 36.52*   Hemoglobin g/dL 10.3* 9.3*  9.3* 9.4* 9.3*   Hematocrit % 35.7* 31.6*  31.6* 32.1* 30.2*   Platelets K/uL 281 258  258 340 326   Gran % % 88.0* 83.0*  83.0* 84.0* 91.0*   Lymph % % 5.0* 3.0*  3.0* 7.0* 1.0*   Mono % % 4.0 8.0  8.0 3.0* 3.5*   Eosinophil % % 0.0 0.0  0.0 1.0 0.0   Basophil % % 1.0 0.0  0.0 0.0 0.0   Differential Method  Manual Manual  Manual Manual Manual       Metabolic Panel (last 72  hours):  Recent Labs   Lab Result Units 11/03/20  1801 11/04/20  0006 11/04/20  0417 11/04/20  1220 11/04/20  1537 11/04/20  2227 11/05/20  0405 11/05/20  1023 11/05/20  1734 11/05/20  2239 11/06/20  0404 11/06/20  0920   Sodium mmol/L 151* 150* 150* 151* 145 149*  149* 145  145  145 140 139 138  138 139 140   Potassium mmol/L 4.4 4.4 4.2 3.3* 4.4 4.3  4.3 4.4  4.4  4.4 4.3 4.0 4.0  4.0 4.0 4.6   Chloride mmol/L 106 106 107 116* 101 102  102 101  101  101 99 100 99  99 100 103   CO2 mmol/L 34* 32* 28 24 30* 33*  33* 30*  30*  30* 26 27 26  26 25 22*   Glucose mg/dL 139* 132* 119* 94 179* 136*  136* 113*  112*  112* 143* 153* 182*  182* 127* 145*   BUN mg/dL 120* 117* 122* 108* 135* 132*  132* 100*  99*  99* 52* 28* 37*  37* 43* 52*   Creatinine mg/dL 3.1* 3.5* 3.9* 3.3* 4.9* 5.4*  5.4* 4.3*  4.3*  4.3* 2.7* 1.9* 2.5*  2.5* 2.9* 3.4*   Albumin g/dL  --   --  1.5*  --   --  1.5* 1.7*  1.7*  --   --  1.6* 1.7*  --    Total Bilirubin mg/dL  --   --  0.4  --   --   --  0.6  --   --   --  0.6  --    Alkaline Phosphatase U/L  --   --  128  --   --   --  147*  --   --   --  127  --    AST U/L  --   --  78*  --   --   --  90*  --   --   --  71*  --    ALT U/L  --   --  43  --   --   --  49*  --   --   --  47*  --    Magnesium mg/dL  --   --  2.9*  --   --  3.3* 2.8*  2.7*  --   --  2.4 2.4  --    Phosphorus mg/dL  --   --  9.3*  --   --  7.6* 6.8*  6.8*  --   --  5.7* 6.2*  --        Vancomycin Administrations:  vancomycin given in the last 96 hours                   vancomycin 1.25 g in dextrose 5% 250 mL IVPB (ready to mix) (mg) 1,250 mg New Bag 10/27/20 0452    vancomycin 1.5 g in dextrose 5 % 250 mL IVPB (ready to mix) (mg) 1,500 mg New Bag 10/26/20 1611                Microbiologic Results:  Microbiology Results (last 7 days)     Procedure Component Value Units Date/Time    Blood culture [[id number]] Collected: 11/03/20 0904    Order Status: Completed Specimen: Blood from Peripheral,  Antecubital, Left Updated: 11/06/20 1022     Blood Culture, Routine No Growth to date      No Growth to date      No Growth to date      No Growth to date    Narrative:      Blood cultures x 2 different sites. 4 bottles total. Please  draw cultures before administering antibiotics.    Blood culture [[id number]] Collected: 11/03/20 0904    Order Status: Completed Specimen: Blood from Peripheral, Upper Arm, Right Updated: 11/06/20 1022     Blood Culture, Routine No Growth to date      No Growth to date      No Growth to date      No Growth to date    Narrative:      Blood cultures from 2 different sites. 4 bottles total.  Please draw before starting antibiotics.    Culture, Respiratory with Gram Stain [[id number]] Collected: 11/03/20 0933    Order Status: Completed Specimen: Respiratory from Endotracheal Aspirate Updated: 11/05/20 1055     Respiratory Culture Normal respiratory alejandra      No S aureus or Pseudomonas isolated.     Gram Stain (Respiratory) <10 epithelial cells per low power field.     Gram Stain (Respiratory) No WBC's     Gram Stain (Respiratory) No organisms seen    Blood culture [[id number]] Collected: 10/30/20 1011    Order Status: Completed Specimen: Blood from Peripheral, Forearm, Left Updated: 11/04/20 1212     Blood Culture, Routine No growth after 5 days.    Narrative:      Blood cultures x 2 different sites. 4 bottles total. Please  draw cultures before administering antibiotics.    Blood culture [[id number]] Collected: 10/30/20 1015    Order Status: Completed Specimen: Blood from Peripheral, Upper Arm, Right Updated: 11/04/20 1212     Blood Culture, Routine No growth after 5 days.    Narrative:      Blood cultures from 2 different sites. 4 bottles total.  Please draw before starting antibiotics.    Culture, Respiratory with Gram Stain [[id number]] Collected: 10/30/20 1040    Order Status: Completed Specimen: Respiratory from Endotracheal Aspirate Updated: 11/02/20 1106     Respiratory Culture Normal  respiratory alejandra     Gram Stain (Respiratory) Rare WBC's     Gram Stain (Respiratory) No organisms seen    Narrative:      Mini-BAL. Before antibiotics.    Blood culture [[id number]] Collected: 10/27/20 2011    Order Status: Completed Specimen: Blood from Peripheral, Forearm, Right Updated: 11/01/20 2212     Blood Culture, Routine No growth after 5 days.    Narrative:      Blood cultures from 2 different sites. 4 bottles total.  Please draw before starting antibiotics.    Blood culture [[id number]] Collected: 10/27/20 2011    Order Status: Completed Specimen: Blood from Peripheral, Forearm, Left Updated: 11/01/20 2212     Blood Culture, Routine No growth after 5 days.    Narrative:      Blood cultures x 2 different sites. 4 bottles total. Please  draw cultures before administering antibiotics.    Urine culture [[id number]] Collected: 10/31/20 1428    Order Status: Completed Specimen: Urine Updated: 11/01/20 1814     Urine Culture, Routine No growth    Narrative:      Specimen Source->Urine

## 2020-11-06 NOTE — ASSESSMENT & PLAN NOTE
Suspected aspiration event on 11/4. Pt subsequently febrile to 100.8F with WBC that continues to trend up. CXR with worsening L-sided opacity. Cultures drawn on 11/3 with NGTD. Zosyn escalated to meropenem.     - Continue meropenem, vanc, and fluconazole.   - ID suggested CT Chest and bronchoscopy for sputum culture if stable enough (would order aspergillus antigen)

## 2020-11-06 NOTE — NURSING
No acute events throughout the shift. PERRLA 3mm. Pt remains on the ventilator with FiO2 of 90% and a PEEP of 8. NSR. MAP goal greater than 65 with levophed and vasopressin. Pt remains on continuous CRRT with UF of 500.  No coverage needed via sliding scale. Pt was given partial bath. Plan of care is to continue to wean oxygen and pressors as tolerated. See flowsheet for vitals. Will continue to monitor.

## 2020-11-07 NOTE — ASSESSMENT & PLAN NOTE
Likely secondary to TFs +/- dehydration from diuresis    -- emesis noted with free water flushes  -- improved with D5W    -- TF held since 11/5; consider resuming to intermittent suction in next day or two

## 2020-11-07 NOTE — PROGRESS NOTES
[facility] - ICU 16   Critical Care Medicine  Progress Note    Patient Name: Nubia Riggs  MRN: [id number]  Admission Date: 10/18/2020  Hospital Length of Stay: 20 days  Code Status: Partial Code  Attending Provider: Parminder Marin*  Primary Care Provider: Primary Doctor No   Principal Problem: Acute respiratory distress syndrome (ARDS) due to 2019 novel coronavirus    Subjective:     HPI:  55-year-old male with PMhx of GERD, allergic rinhitis, chronic cough (recently saw pulm and was diagnosed with reactive airway disease, on albuterol PRN) presented the emergency department on 10/18 with chief complaint of shortness of breath. He reports subjective fever, headache, chills, myalgias, poor appetite, and sweats about 6 days ago. He was diagnosed with COVID-19 on 10/15.  Reports presenting to urgent care 10/16 due to 89% pulse ox on room air. Started on levofloxacin and discharged home.  Reports using albuterol treatments at home without much relief.He noticed to have cough productive of blood tinged sputum and shortness of breath so he presented to the ED.     He received ceftria/azithro (x1 each in the ED). He started Remdesivir and completed 5 doses on 10/22, continues on dexamethasone. Patient is on his 7th day of hospital admission and has been requiring higher doses of O2 to keep sats >90%. Today he went up to 60L on 100% FiO2 and thus Critical Care was consulted.       Hospital/ICU Course:  Patient admitted to RICU due to worsening respiratory status with increasing oxygen requirements. Completion of remdesivir on 10/22. On BiPAP with O2 saturation in high 70s-low 80s at rest and acute desaturation to 40s when biPAP removed to provide PO medications. He required intubation on 10/26. Central line and arterial line placed. Proning initiated on 10/27. YOVANI developed with inital response to lasix however, not clearing as hyperkalemia persists. UO decreasing on 10/30 despite multiple lasix  doses, and concern for hematuria, CBC sent. Nephrology consult placed. Blood and sputum cultures on 10/30 NGTD, lactate normal, worsening WBC count. Heparin held overnight for hematuria and blood in sputum, restarted 10/31 for cessation of both. Vasopressor requirements improving, off vinay and vaso as of 11/01, WBC continues to increase, however not febrile and cultures are negative. Added fluconazole 11/1. Febrile on 11/4 with suspected aspiration event. Increase in FiO2 and pressor requirements. Oxygen saturations remained in the low 80's for most of the day. Family updated and code status changed to no shocks, no compressions. RRT started the evening of 11/4. Proned 36 hours for desaturations; difficult to increase PEEP as plateau pressures increase. Patient's significant other agreed to convalescent plasma; 2 units ordered 11/7/20.  CRRT clotted off 11/7 also, so citrate added per nephrology. ID recommended adjusting antimicrobial therapy to micafungin (from fluconazole), in addition to meropenam and renally dosed vancomycin in context of increased leukocytosis and increasing vasopressor support. ID also recommended CDiff panel and obtaining respiratory cultures. Feet cool to touch and weak pulses so dopplers ordered.      Interval History/Significant Events: Febrile overnight. Patient remains intubated (PEEP10/100%), sedation, paralyzed with increasing pressor requirements. Right foot cool to touch, weak pulses noted on right lower extremity. Fluconazole replaced by micafungin. CDiff panel prdered. Respiratory cultures ordered again. Convalescent plasma given.     Review of Systems   Unable to perform ROS: Intubated     Objective:     Vital Signs (Most Recent):  Temp: (!) 100.9 °F (38.3 °C) (11/07/20 1230)  Pulse: 107 (11/07/20 1230)  Resp: (!) 36 (11/07/20 1230)  BP: 118/66 (11/07/20 1200)  SpO2: (!) 94 % (11/07/20 1230) Vital Signs (24h Range):  Temp:  [97.2 °F (36.2 °C)-100.9 °F (38.3 °C)] 100.9 °F (38.3  °C)  Pulse:  [] 107  Resp:  [35-36] 36  SpO2:  [87 %-100 %] 94 %  BP: (118)/(66) 118/66  Arterial Line BP: ()/(41-64) 117/54   Weight: 84.4 kg (186 lb)  Body mass index is 26.69 kg/m².      Intake/Output Summary (Last 24 hours) at 2020 1248  Last data filed at 2020 1200  Gross per 24 hour   Intake 3496.91 ml   Output 65391 ml   Net -7325.09 ml       Physical Exam  Vitals signs and nursing note reviewed.   Constitutional:       General: He is not in acute distress.     Appearance: Normal appearance. He is ill-appearing.      Interventions: He is sedated, chemically paralyzed and intubated.   HENT:      Mouth/Throat:      Lips: Lesions present.      Mouth: Mucous membranes are moist.      Comments: Upper and lower lips with breakdown noted.   Eyes:      Comments: Taped for protection during paralysis   Cardiovascular:      Rate and Rhythm: Regular rhythm. Tachycardia present.      Pulses:           Radial pulses are 2+ on the right side and 2+ on the left side.        Dorsalis pedis pulses are 2+ on the right side and 2+ on the left side.      Heart sounds: Normal heart sounds.      Comments: Bilateral upper extremity edema 2+  Poor pulses in right PT, DP  Cool to touch right foot with darkening noted in toes on right and left.   Pulmonary:      Effort: He is intubated.      Breath sounds: Examination of the right-lower field reveals rales. Rales present. No wheezing or rhonchi.   Abdominal:      General: Bowel sounds are normal.      Comments: Patient proned for exam   Genitourinary:     Comments: Kelley in place with hematuria.   Musculoskeletal:      Right lower le+ Edema present.      Left lower le+ Edema present.   Skin:     General: Skin is warm and dry.      Capillary Refill: Capillary refill takes 2 to 3 seconds.      Coloration: Skin is not mottled.   Neurological:      Mental Status: He is unresponsive.      GCS: GCS eye subscore is 1. GCS verbal subscore is 1. GCS motor subscore  is 1.      Comments: Sedated and paralyzed.          Vents:  Vent Mode: A/C (11/07/20 1128)  Set Rate: 36 BPM (11/07/20 1128)  Vt Set: 470 mL (11/07/20 1128)  PEEP/CPAP: 10 cmH20 (11/07/20 1128)  Oxygen Concentration (%): 100 (11/07/20 1230)  Peak Airway Pressure: 35 cmH2O (11/07/20 1128)  Plateau Pressure: 33 cmH20 (11/07/20 1128)  Total Ve: 19.9 mL (11/07/20 1128)  F/VT Ratio<105 (RSBI): (!) 64.98 (11/07/20 1128)  Lines/Drains/Airways     Central Venous Catheter Line            Percutaneous Central Line Insertion/Assessment - Triple Lumen  10/26/20 1940 right internal jugular 11 days    Trialysis (Dialysis) Catheter 11/04/20 1914 left internal jugular 2 days          Drain                 Urethral Catheter 10/31/20 1300 Non-latex;Straight-tip 16 Fr. 7 days         NG/OG Tube 11/02/20 1456 orogastric 14 Fr. Left mouth 4 days         Rectal Tube 11/05/20 0230 rectal tube w/ balloon (indicate number of mLs) 2 days          Airway                 Airway - Non-Surgical 10/26/20 1820 Endotracheal Tube 11 days       Airway Anesthesia 10/26/20 11 days          Arterial Line            Arterial Line 10/30/20 1556 Right Radial 7 days              Significant Labs:    CBC/Anemia Profile:  Recent Labs   Lab 11/06/20  0404 11/07/20  0412   WBC 36.52* 51.72*   HGB 9.3* 9.3*   HCT 30.2* 29.5*    366*   * 102*   RDW 13.9 14.2        Chemistries:  Recent Labs   Lab 11/06/20  0404  11/06/20  1542 11/06/20  2213 11/07/20  0412 11/07/20  0949      < > 136  135* 134*  134* 135*  134*  134* 136   K 4.0   < > 4.9  4.9 4.9  4.9 5.1  5.1  5.1 5.1      < > 101  101 102  102 102  102  102 102   CO2 25   < > 22*  22* 20*  20* 19*  19*  19* 20*   BUN 43*   < > 28*  28* 17  17 20  21*  21* 16   CREATININE 2.9*   < > 2.1*  2.0* 1.5*  1.5* 1.7*  1.7*  1.7* 1.4   CALCIUM 8.3*   < > 8.1*  8.1* 8.3*  8.3* 7.8*  7.9*  7.9* 7.9*   ALBUMIN 1.7*  --  2.0* 2.1* 2.0*  2.0*  --    PROT 6.4  --   --    --  7.1  --    BILITOT 0.6  --   --   --  0.8  --    ALKPHOS 127  --   --   --  140*  --    ALT 47*  --   --   --  51*  --    AST 71*  --   --   --  73*  --    MG 2.4  --  2.1 2.0 1.9  1.9  --    PHOS 6.2*  --  4.4 3.6 3.9  3.8  --     < > = values in this interval not displayed.       Blood Culture 11/3: NGTD  Respiratory Cultures 11/3: NGTD, no pseudomonas, no MRSA    Significant Imaging:  I have reviewed all pertinent imaging results/findings within the past 24 hours.   CXR 11/4 with unchanged bilateral patchy airspace opacities      ABG  Recent Labs   Lab 11/07/20  1128   PH 7.231*   PO2 78*   PCO2 57.3*   HCO3 24.0   BE -3     Assessment/Plan:     Psychiatric  Anxiety  Holding home vilazodone due heavy sedation administration      Pulmonary  Aspiration pneumonia of left lung due to vomit  Suspected aspiration event on 11/4. Pt subsequently febrile to 100.8F with WBC that continues to trend up. CXR with worsening L-sided opacity. Cultures drawn on 11/3 with NGTD. Zosyn escalated to meropenem.     - Continue meropenem, vanc, and micafungin   - ID suggested CT Chest and bronchoscopy for sputum culture if stable enough (would order aspergillus antigen)    Reactive airway disease  Follows with Pulm as outpatient. Last seen 8/27/20, told he had RAD  He was also seen by a [facility] pulmonologist who did spirometry that reportedly showed borderline or mild asthma, and has been controlled with albuterol.   Former smoker, started at age 10-12 and quit 10 years ago    -- Continue scheduled duo nebs    Renal/  Hypernatremia  Likely secondary to TFs +/- dehydration from diuresis    -- emesis noted with free water flushes  -- improved with D5W    -- TF held since 11/5; consider resuming to intermittent suction in next day or two    YOVANI (acute kidney injury)  No history of prior kidney disease; sCr on admission 0.8 -->2.6 as of 10/30, with UO less responsive to multiple doses of 40 mg lasix which he was initially responsive to;  however, BUN rising and he is not clearing in his urine. Retroperitoneal u/s with bilateral medical renal diseased and no hydronephrosis. Fe Urea 26.4% (<35%) indicative of prerenal disease. Clotted off CRRT 11/7, nephrology contacted and will adjust citrate    -- nephrology following, appreciate assistance   -- RRT started 11/4  -- Avoid ACEI/ARB/NSAIDS/Nephrotoxins.   -- Renally dose all meds  -- Continue to assess CRRT for clotting    ID  Septic shock  Likely 2/2 aspiration on 11/4. Febrile, WBC increased to 40K but down to 36 on 11/6. CXR with left sided consolidation. Increased FiO2 and vasopressor requirements.      -- donta, vanc, micafungin (ID following)  -- blood and sputum cultures 11/3 with NGTD 11/6  -- titrate norepi for MAP >65  -- vaso and SDS added   -- CDiff checked 11/7    Other  * Acute respiratory distress syndrome (ARDS) due to 2019 novel coronavirus  COVID positive on 10/15. Patient admitted to hospital medicine and continued to deteriorate needing higher oxygen requirements and thus Critical Care Medicine was consulted. Intubated 10/26 and eventually placed on NMB and proned. Peak and plateau pressures remain at goal    - s/p remdesivir (10/18-10/22)  - s/p Dexamethasone (10/27)  - s/p vanc x7 days (end date 11/01)  - duo nebs q4 scheduled and q6 PRN  - continue Heparin gtt D-dimer >33  - continue LPV and pronation for PF <150   - ordered convalescent plasma 2 units 11/7  - Sputum culture sent 10/28 NGTD, reordered 10/30 for leukocytosis and febrile all negative; reordered 11/7 per ID (increasing WBC)  - Zosyn escalated to donta. Fluconazole escalated to micafungun. Vancomycin restarted and renally dosed.  - Suspect aspiration event 11/4 with worsening oxygenation.    - CTS evaluated; not a candidate for ECMO     - Jessica started 11/4      COVID-19  See resp failure    Palliative care encounter  Palliative consulted for potential for prolonged hospitalization and following along.     Acute  hypoxemic respiratory failure due to COVID-19  Secondary to COVID (see above)    Pneumonia due to COVID-19 virus  See ARDS                Critical Care Daily Checklist:     A: Awake: RASS Goal/Actual Goal: RASS Goal: -5-->unarousable  Actual: Chang Agitation Sedation Scale (RASS): Unarousable   B: Spontaneous Breathing Trial Performed?    C: SAT & SBT Coordinated?  n/a                      D: Delirium: CAM-ICU Overall CAM-ICU: Positive   E: Early Mobility Performed? No   F: Feeding Goal: Goals: Pt to meet >75% EEN and EPN by RD follow up.  Status: Nutrition Goal Status: goal not met       Current Diet Order   Procedures    Diet NPO       AS: Analgesia/Sedation Fentanyl and propofol   T: Thromboembolic Prophylaxis Heparin infusion   H: HOB > 300 No; proned   U: Stress Ulcer Prophylaxis (if needed) Yes; pepcid   G: Glucose Control yes   B: Bowel Function Stool Occurrence: 1   I: Indwelling Catheter (Lines & Kelley) Necessity Yes, L trialysis, R IJ   D: De-escalation of Antimicrobials/Pharmacotherapies When appropriate     Plan for the day/ETD Supine then prone     Code Status:  Family/Goals of Care: Partial Code  Discussed with attending          Critical secondary to Patient has a condition that poses threat to life and bodily function: Severe Respiratory Distress and Acute Renal Failure      Critical care was time spent personally by me on the following activities: development of treatment plan with patient or surrogate and bedside caregivers, discussions with consultants, evaluation of patient's response to treatment, examination of patient, ordering and performing treatments and interventions, ordering and review of laboratory studies, ordering and review of radiographic studies, pulse oximetry, re-evaluation of patient's condition. This critical care time did not overlap with that of any other provider or involve time for any procedures.     Abi Hartman MD  Critical Care Medicine  [facility]  - ICU 16 WT

## 2020-11-07 NOTE — NURSING
POC reviewed, pt remains Intubated, sedated and paralyzed. CRRT in progress, tolerating well. Pt in prone position. Toes to right foot dark purple in color. MD's aware, nitropaste applied. Pt on multiple drips, Nitric oxide in progress. Safety prec maintained. Vitals stable, will cont to monitor

## 2020-11-07 NOTE — SIGNIFICANT EVENT
Procedure Note  Supine Positioning  Critical Care Medicine       Chief Complaint: Acute respiratory distress syndrome (ARDS) due to 2019 novel coronavirus  MRN: [id number]  LOS: 20  Sex: male  Age: 55 y.o.    Last ABG:   Recent Labs   Lab 11/07/20  1128   PH 7.231*   PO2 78*   PCO2 57.3*   HCO3 24.0   BE -3       Vital Signs (Most Recent):   Temp: (!) 100.6 °F (38.1 °C) (11/07/20 1145)  Pulse: 107 (11/07/20 1145)  Resp: (!) 36 (11/07/20 1145)  BP: 136/63 (11/04/20 2015)  SpO2: (!) 93 % (11/07/20 1145)    Ventilator Settings:  Vent Mode: A/C  Oxygen Concentration (%):  [100] 100  Resp Rate Total:  [36 br/min] 36 br/min  Vt Set:  [400 mL-490 mL] 470 mL  PEEP/CPAP:  [10 cmH20] 10 cmH20  Mean Airway Pressure:  [20 vwO51-87 cmH20] 20 cmH20    Indication: Severe, refractory ARDS. High risk for deterioration during supination procedure in need of direct monitoring by Critical Care personnel.     Prior to beginning the procedure, all appropriate equipment and personnel were gathered in the room. Two individuals were placed on each side of the patient and two respiratory therapists stood at the head of the bed and was dedicated to the management of the head of the patient, the endotracheal tube, and the ventilator lines. The person at the head of the bed coordinated the steps of the supination procedure at the direction of Critical Care team members at the bedside. The patient was first log-rolled 90 degrees onto their side away from the direction of their central venous catheter. Cardiac electrodes were then moved from the patient's posterior back to the anterior. Once properly positioned in the bed, another 90 degree log roll was performed placing the patient into the supine position. The patient's arms were placed alongside the body. Continuous pulse oximetry and blood pressure monitoring was performed without any desaturation or hemodynamic instability. The patient tolerated the procedure well.     ** CRRT clotted off just  prior to supination. Attempted to return blood, but was not able to. Will notify nephrology.    Total Critical Care time (uninterrupted not including procedures): 20 mins    Yesica Fiore NP  Critical Care Medicine

## 2020-11-07 NOTE — ASSESSMENT & PLAN NOTE
54 y/o male with no significant past medical history who was admitted to the hospital with respiratory failure due to COVID-19.  Per chart, he is s/p remdesivir and dexamethasone.  He is receiving plasma infusion today.  Continue supportive care.  Prognosis appears poor at this time.

## 2020-11-07 NOTE — ASSESSMENT & PLAN NOTE
Likely 2/2 aspiration on 11/4. Febrile, WBC increased to 40K but down to 36 on 11/6. CXR with left sided consolidation. Increased FiO2 and vasopressor requirements.      -- donta, vanc, micafungin (ID following)  -- blood and sputum cultures 11/3 with NGTD 11/6  -- titrate norepi for MAP >65  -- vaso and SDS added   -- CDiff checked 11/7

## 2020-11-07 NOTE — ASSESSMENT & PLAN NOTE
Follows with Pulm as outpatient. Last seen 8/27/20, told he had RAD  He was also seen by a [facility] pulmonologist who did spirometry that reportedly showed borderline or mild asthma, and has been controlled with albuterol.   Former smoker, started at age 10-12 and quit 10 years ago    -- Continue scheduled duo nikolay

## 2020-11-07 NOTE — ASSESSMENT & PLAN NOTE
Suspected aspiration event on 11/4. Pt subsequently febrile to 100.8F with WBC that continues to trend up. CXR with worsening L-sided opacity. Cultures drawn on 11/3 with NGTD. Zosyn escalated to meropenem.     - Continue meropenem, vanc, and micafungin   - ID suggested CT Chest and bronchoscopy for sputum culture if stable enough (would order aspergillus antigen)

## 2020-11-07 NOTE — PROGRESS NOTES
[facility] - ICU 16 WT  Infectious Disease  Progress Note    Patient Name: Nubia Riggs  MRN: [id number]  Admission Date: 10/18/2020  Length of Stay: 20 days  Attending Physician: Parminder Marin*  Primary Care Provider: Primary Doctor No    Isolation Status: Airborne and Contact and Droplet  Assessment/Plan:      * Acute respiratory distress syndrome (ARDS) due to 2019 novel coronavirus  54 y/o male with no significant past medical history who was admitted to the hospital with respiratory failure due to COVID-19.  Per chart, he is s/p remdesivir and dexamethasone.  He is receiving plasma infusion today.  Continue supportive care.  Prognosis appears poor at this time.    Septic shock  54 y/o admitted with complications of COVID-19.  Septic shock likely related to COVID-19 infection.  He is requiring pressors, having high fevers, and his WBC count is increasing.  On exam today, he is prone and there is greenish emesis coming from his mouth.  He is at risk of aspiration and given his prolonged hospital course there is a risk of drug resistant organisms.   There is also small amount of liquid stool in the rectal tube and there is some risk for C diff as well.  Recommendations are as follows;    1.  Continue meropenem.    2.  Continue renal dosed vancomycin.    3.  Discontinue fluconazole and start micafungin 100 mg IV q 24 hours.    4.  Send stool for C diff if possible.    5.  Obtain respiratory sample for culture.        Anticipated Disposition: TBD    Thank you for your consult. I will follow-up with patient. Please contact us if you have any additional questions.    Fermin Hunt MD  Infectious Disease  [facility] - ICU 16 WT    Subjective:     Principal Problem:Acute respiratory distress syndrome (ARDS) due to 2019 novel coronavirus    HPI: Mr. Riggs is a 54 yo M w/ a PMHx of GERD, allergic rhinitis, chronic cough, and reactive airway disease (prescribed albuterol PRN) who  presented to the ED on 10/18 for worsening SOB and cough productive of blood-tinged sputum. He reported onset of fever, HA, chills, myalgias, decreased appetite, and sweats on 10/9 and a diagnosis of COVID on 10/15. On 10/16, he went to an urgent care because of O2 sats persistently ~89% and he was prescribed levofloxacin and sent home. At home, he used his albuterol without much relief.     On 10/18, he presented to [facility] ED. He was given ceftriaxone and azithromycin x1 dose in the ED and then admitted to hospital medicine. He continued dexamethasone started remdesivir on 10/22 and completed 5 doses. By 10/24, his oxygen requirements were increasing steadily. Critical Care was consulted when he went up to 60L on 100% FiO2.     He was admitted to the RICU on 10/24 and intubated on 10/26. Since 10/24, he developed an YOVANI (now requiring CRRT) and he has had WBC counts trending up. Initially, he was afebrile and his lactate was WNL. Fluconazole initiated on 11/1. CRRT was initiated overnight on 11/4. He also had a suspected aspiration event, his T Max overnight was 100.8F, and CXR showed worsening L-sided opacity. He had an increase in FiO2 and pressor requirements with O2 sats consistently in the low 80s throughout the day. On 11/5, Zosyn was escalated to meropenem, Vanc was added, fluconazole was continued. Blood and Sputum cultures drawn earlier in his admission have continued to be unremarkable. Infectious Disease was consulted  Interval History:     WBC count continues to increase.  Patient remains febrile.    Review of Systems   Unable to perform ROS: Acuity of condition     Objective:     Vital Signs (Most Recent):  Temp: (!) 100.4 °F (38 °C) (11/07/20 0946)  Pulse: 110 (11/07/20 0917)  Resp: (!) 36 (11/07/20 0917)  BP: 136/63 (11/04/20 2015)  SpO2: 98 % (11/07/20 0917) Vital Signs (24h Range):  Temp:  [97.2 °F (36.2 °C)-100.9 °F (38.3 °C)] 100.4 °F (38 °C)  Pulse:  [] 110  Resp:  [33-36] 36  SpO2:  [87 %-99  %] 98 %  Arterial Line BP: ()/(41-64) 105/51     Weight: 84.4 kg (186 lb)  Body mass index is 26.69 kg/m².    Estimated Creatinine Clearance: 51.9 mL/min (A) (based on SCr of 1.7 mg/dL (H)).    Physical Exam  Vitals signs and nursing note reviewed.   Constitutional:       Comments: Sedated. Not responding   HENT:      Head: Normocephalic and atraumatic.      Mouth/Throat:      Comments: initubated  Eyes:      General: No scleral icterus.        Right eye: No discharge.         Left eye: No discharge.   Cardiovascular:      Rate and Rhythm: Normal rate and regular rhythm.      Heart sounds: No murmur.   Pulmonary:      Comments: On ventilator machine.  PEEP=10, UhV4=908  Abdominal:      General: There is no distension.      Palpations: Abdomen is soft.   Genitourinary:     Comments: Kelley catheter in place.  Rectal tube in place.  Musculoskeletal:         General: No swelling, deformity or signs of injury.   Skin:     General: Skin is warm and dry.   Neurological:      Comments: Sedated, paralyzed.         Significant Labs:   Microbiology Results (last 7 days)     Procedure Component Value Units Date/Time    Blood culture [[id number]] Collected: 11/03/20 0904    Order Status: Completed Specimen: Blood from Peripheral, Antecubital, Left Updated: 11/06/20 1022     Blood Culture, Routine No Growth to date      No Growth to date      No Growth to date      No Growth to date    Narrative:      Blood cultures x 2 different sites. 4 bottles total. Please  draw cultures before administering antibiotics.    Blood culture [[id number]] Collected: 11/03/20 0904    Order Status: Completed Specimen: Blood from Peripheral, Upper Arm, Right Updated: 11/06/20 1022     Blood Culture, Routine No Growth to date      No Growth to date      No Growth to date      No Growth to date    Narrative:      Blood cultures from 2 different sites. 4 bottles total.  Please draw before starting antibiotics.    Culture, Respiratory with Gram Stain  [[id number]] Collected: 11/03/20 0933    Order Status: Completed Specimen: Respiratory from Endotracheal Aspirate Updated: 11/05/20 1055     Respiratory Culture Normal respiratory alejandra      No S aureus or Pseudomonas isolated.     Gram Stain (Respiratory) <10 epithelial cells per low power field.     Gram Stain (Respiratory) No WBC's     Gram Stain (Respiratory) No organisms seen    Blood culture [[id number]] Collected: 10/30/20 1011    Order Status: Completed Specimen: Blood from Peripheral, Forearm, Left Updated: 11/04/20 1212     Blood Culture, Routine No growth after 5 days.    Narrative:      Blood cultures x 2 different sites. 4 bottles total. Please  draw cultures before administering antibiotics.    Blood culture [[id number]] Collected: 10/30/20 1015    Order Status: Completed Specimen: Blood from Peripheral, Upper Arm, Right Updated: 11/04/20 1212     Blood Culture, Routine No growth after 5 days.    Narrative:      Blood cultures from 2 different sites. 4 bottles total.  Please draw before starting antibiotics.    Culture, Respiratory with Gram Stain [[id number]] Collected: 10/30/20 1040    Order Status: Completed Specimen: Respiratory from Endotracheal Aspirate Updated: 11/02/20 1106     Respiratory Culture Normal respiratory alejandra     Gram Stain (Respiratory) Rare WBC's     Gram Stain (Respiratory) No organisms seen    Narrative:      Mini-BAL. Before antibiotics.    Blood culture [[id number]] Collected: 10/27/20 2011    Order Status: Completed Specimen: Blood from Peripheral, Forearm, Right Updated: 11/01/20 2212     Blood Culture, Routine No growth after 5 days.    Narrative:      Blood cultures from 2 different sites. 4 bottles total.  Please draw before starting antibiotics.    Blood culture [[id number]] Collected: 10/27/20 2011    Order Status: Completed Specimen: Blood from Peripheral, Forearm, Left Updated: 11/01/20 2212     Blood Culture, Routine No growth after 5 days.    Narrative:      Blood  cultures x 2 different sites. 4 bottles total. Please  draw cultures before administering antibiotics.    Urine culture [[id number]] Collected: 10/31/20 1428    Order Status: Completed Specimen: Urine Updated: 11/01/20 1814     Urine Culture, Routine No growth    Narrative:      Specimen Source->Urine          Significant Imaging: I have reviewed all pertinent imaging results/findings within the past 24 hours.

## 2020-11-07 NOTE — ASSESSMENT & PLAN NOTE
No history of prior kidney disease; sCr on admission 0.8 -->2.6 as of 10/30, with UO less responsive to multiple doses of 40 mg lasix which he was initially responsive to; however, BUN rising and he is not clearing in his urine. Retroperitoneal u/s with bilateral medical renal diseased and no hydronephrosis. Fe Urea 26.4% (<35%) indicative of prerenal disease. Clotted off CRRT 11/7, nephrology contacted and will adjust citrate    -- nephrology following, appreciate assistance   -- RRT started 11/4  -- Avoid ACEI/ARB/NSAIDS/Nephrotoxins.   -- Renally dose all meds  -- Continue to assess CRRT for clotting

## 2020-11-07 NOTE — ASSESSMENT & PLAN NOTE
COVID positive on 10/15. Patient admitted to hospital medicine and continued to deteriorate needing higher oxygen requirements and thus Critical Care Medicine was consulted. Intubated 10/26 and eventually placed on NMB and proned. Peak and plateau pressures remain at goal    - s/p remdesivir (10/18-10/22)  - s/p Dexamethasone (10/27)  - s/p vanc x7 days (end date 11/01)  - duo nebs q4 scheduled and q6 PRN  - continue Heparin gtt D-dimer >33  - continue LPV and pronation for PF <150   - ordered convalescent plasma 2 units 11/7  - Sputum culture sent 10/28 NGTD, reordered 10/30 for leukocytosis and febrile all negative; reordered 11/7 per ID (increasing WBC)  - Zosyn escalated to donta. Fluconazole escalated to micafungun. Vancomycin restarted and renally dosed.  - Suspect aspiration event 11/4 with worsening oxygenation.    - CTS evaluated; not a candidate for ECMO     - Jessica started 11/4

## 2020-11-07 NOTE — NURSING
Pt proned at 1745. PA, charge nurse, RT x2 at the bedside. Pt tolerated well. ETT secured at 26 cm at the lips. FiO2 is 100% and PEEP of 10. WCTM

## 2020-11-07 NOTE — SUBJECTIVE & OBJECTIVE
Interval History:     WBC count continues to increase.  Patient remains febrile.    Review of Systems   Unable to perform ROS: Acuity of condition     Objective:     Vital Signs (Most Recent):  Temp: (!) 100.4 °F (38 °C) (11/07/20 0946)  Pulse: 110 (11/07/20 0917)  Resp: (!) 36 (11/07/20 0917)  BP: 136/63 (11/04/20 2015)  SpO2: 98 % (11/07/20 0917) Vital Signs (24h Range):  Temp:  [97.2 °F (36.2 °C)-100.9 °F (38.3 °C)] 100.4 °F (38 °C)  Pulse:  [] 110  Resp:  [33-36] 36  SpO2:  [87 %-99 %] 98 %  Arterial Line BP: ()/(41-64) 105/51     Weight: 84.4 kg (186 lb)  Body mass index is 26.69 kg/m².    Estimated Creatinine Clearance: 51.9 mL/min (A) (based on SCr of 1.7 mg/dL (H)).    Physical Exam  Vitals signs and nursing note reviewed.   Constitutional:       Comments: Sedated. Not responding   HENT:      Head: Normocephalic and atraumatic.      Mouth/Throat:      Comments: initubated  Eyes:      General: No scleral icterus.        Right eye: No discharge.         Left eye: No discharge.   Cardiovascular:      Rate and Rhythm: Normal rate and regular rhythm.      Heart sounds: No murmur.   Pulmonary:      Comments: On ventilator machine.  PEEP=10, JlC7=240  Abdominal:      General: There is no distension.      Palpations: Abdomen is soft.   Genitourinary:     Comments: Kelley catheter in place.  Rectal tube in place.  Musculoskeletal:         General: No swelling, deformity or signs of injury.   Skin:     General: Skin is warm and dry.   Neurological:      Comments: Sedated, paralyzed.         Significant Labs:   Microbiology Results (last 7 days)     Procedure Component Value Units Date/Time    Blood culture [[id number]] Collected: 11/03/20 0904    Order Status: Completed Specimen: Blood from Peripheral, Antecubital, Left Updated: 11/06/20 1022     Blood Culture, Routine No Growth to date      No Growth to date      No Growth to date      No Growth to date    Narrative:      Blood cultures x 2 different sites.  4 bottles total. Please  draw cultures before administering antibiotics.    Blood culture [[id number]] Collected: 11/03/20 0904    Order Status: Completed Specimen: Blood from Peripheral, Upper Arm, Right Updated: 11/06/20 1022     Blood Culture, Routine No Growth to date      No Growth to date      No Growth to date      No Growth to date    Narrative:      Blood cultures from 2 different sites. 4 bottles total.  Please draw before starting antibiotics.    Culture, Respiratory with Gram Stain [[id number]] Collected: 11/03/20 0933    Order Status: Completed Specimen: Respiratory from Endotracheal Aspirate Updated: 11/05/20 1055     Respiratory Culture Normal respiratory alejandra      No S aureus or Pseudomonas isolated.     Gram Stain (Respiratory) <10 epithelial cells per low power field.     Gram Stain (Respiratory) No WBC's     Gram Stain (Respiratory) No organisms seen    Blood culture [[id number]] Collected: 10/30/20 1011    Order Status: Completed Specimen: Blood from Peripheral, Forearm, Left Updated: 11/04/20 1212     Blood Culture, Routine No growth after 5 days.    Narrative:      Blood cultures x 2 different sites. 4 bottles total. Please  draw cultures before administering antibiotics.    Blood culture [[id number]] Collected: 10/30/20 1015    Order Status: Completed Specimen: Blood from Peripheral, Upper Arm, Right Updated: 11/04/20 1212     Blood Culture, Routine No growth after 5 days.    Narrative:      Blood cultures from 2 different sites. 4 bottles total.  Please draw before starting antibiotics.    Culture, Respiratory with Gram Stain [[id number]] Collected: 10/30/20 1040    Order Status: Completed Specimen: Respiratory from Endotracheal Aspirate Updated: 11/02/20 1106     Respiratory Culture Normal respiratory alejandra     Gram Stain (Respiratory) Rare WBC's     Gram Stain (Respiratory) No organisms seen    Narrative:      Mini-BAL. Before antibiotics.    Blood culture [[id number]] Collected: 10/27/20 2011     Order Status: Completed Specimen: Blood from Peripheral, Forearm, Right Updated: 11/01/20 2212     Blood Culture, Routine No growth after 5 days.    Narrative:      Blood cultures from 2 different sites. 4 bottles total.  Please draw before starting antibiotics.    Blood culture [[id number]] Collected: 10/27/20 2011    Order Status: Completed Specimen: Blood from Peripheral, Forearm, Left Updated: 11/01/20 2212     Blood Culture, Routine No growth after 5 days.    Narrative:      Blood cultures x 2 different sites. 4 bottles total. Please  draw cultures before administering antibiotics.    Urine culture [[id number]] Collected: 10/31/20 1428    Order Status: Completed Specimen: Urine Updated: 11/01/20 1814     Urine Culture, Routine No growth    Narrative:      Specimen Source->Urine          Significant Imaging: I have reviewed all pertinent imaging results/findings within the past 24 hours.

## 2020-11-07 NOTE — PROGRESS NOTES
CRRT:    Treatment ran for 45 Hours until system clotted off.  Rinsed back. Machine changed.    SLED restarted; both ports with good flow.  UF rate set to 500ml/hr  Daily checks done. Orders verified.

## 2020-11-07 NOTE — PROGRESS NOTES
[facility] SLED NOTE    Nbuia Riggs is a 55 y.o. male currently on SLED for removal of uremic toxins and volume.     Patient seen and evaluated on SLED, tolerating treatment, see CRRT flowsheet for vitals and assessments.    Labs have been reviewed and the dialysate bath has been adjusted.    Labs:      Recent Labs   Lab 11/06/20  1542 11/06/20  2213 11/07/20  0412     135* 134*  134* 135*  134*  134*   K 4.9  4.9 4.9  4.9 5.1  5.1  5.1     101 102  102 102  102  102   CO2 22*  22* 20*  20* 19*  19*  19*   BUN 28*  28* 17  17 20  21*  21*   CREATININE 2.1*  2.0* 1.5*  1.5* 1.7*  1.7*  1.7*   CALCIUM 8.1*  8.1* 8.3*  8.3* 7.8*  7.9*  7.9*   PHOS 4.4 3.6 3.9  3.8       Recent Labs   Lab 11/05/20  0405 11/06/20  0404 11/07/20  0412   WBC 43.27* 36.52* 51.72*   HGB 9.4* 9.3* 9.3*   HCT 32.1* 30.2* 29.5*    326 366*        Assessment/Plan    -Patient currently hemodynamically not stable for intermittent hemodialysis.   -Will plan on SLED today for 12 hours with UF of 350 cc/hr for metabolic clearance and volume removal  -SLED prescription and dialysate baths were reviewed and changes made according to most recent labs.    -Maintain MAP>65  -Monitor electrolytes with serial labs while on SLED.  -Follow replacement protocol.  -Avoid morphine in ESRD patients.  -Renal function panel daily.   -Avoid nephrotoxins.  -Renal dose meds to GFR   -Strict I+O daily.  -Daily weights   -Plan discussed with HD RN.  -Please see attending attestation to follow      Antony Leslie MD  Nephrology  [facility]    ATTENDING PHYSICIAN ATTESTATION -  NEPHROLOGY  Note from the fellow/resident was reviewed. A full-contact physical exam was not possible due to the clinical condition of the patient due to suspected COVID19 diagnosis and the necessity to minimize exposure. For details, please see fellow/resident note. Physical exam of the primary team reviewed. I  thoroughly reviewed the demographic, clinical, laboratorial and imaging information available in medical records. I agree with the assessment, plan and recommendations as stated by the subspecialty resident.     DIALYSIS (CRRT) NOTE  Patient evaluated while undergoing Slow Low Efficiency Dialysis (SLED) indicated for YOVANI and hemodynamic instability. No complications, tolerating session with current UFR. Will continue current support.

## 2020-11-07 NOTE — NURSING
Pt's toes appeared slightly purple and were cool/cold to touch, more so the right foot. Dopplered pulses bilaterally. Was unable to doppler the right Posterior Tibial pulse, MD Abi Hartman notified. Nitric paste ordered. Warm compresses applied.

## 2020-11-07 NOTE — PLAN OF CARE
Pt remains sedated, intubated, and paralyzed with nimbex, propofol, and fentanyl. Febrile- Tmax 102.0. Tylenol admin'd and cooling blanket applied. MD aware. No new orders.     Remains in ST, rate in 100s. Maintained MAP >65 with vaso and levo. AntiXa therapeutic, remains on heparin gtt. RLE cool and mottled, unable to locate pulse with doppler. US completed, awaiting results. Nitropaste and warm compress applied. 2u of convalesced plasma admin'd.     Supinated at 1030, proned at 1730, tolerated well. Remains on ventilator with settings of AC/VC, PEEP 10, 100% FiO2, rate 36.     Bowel sounds hypoactive, Flexi remains in place with minimal output. Sample sent for possible cdiff. Kelley remains in place, oliguric with OP <10cc today. CRRT clotted around 1030, dialysis RN at bs to restart at this time. Replaced Mg.     Spouse present for short visit today, updated on plan of care. Questions answered and verbalized understanding.

## 2020-11-07 NOTE — PROGRESS NOTES
Pharmacokinetic Assessment Follow Up: IV Vancomycin    Vancomycin serum concentration assessment(s):    The random level was drawn correctly and can be used to guide therapy at this time. The measurement is below the desired definitive target range of 15 to 20 mcg/mL.    Vancomycin Regimen Plan:    Change regimen to Vancomycin 1250 mg IV once with next serum trough concentration measured with AM lab tomorrow.     Drug levels (last 3 results):  Recent Labs   Lab Result Units 11/05/20  0405 11/06/20  0404 11/07/20  0412   Vancomycin, Random ug/mL 21.6 24.4 11.2       Pharmacy will continue to follow and monitor vancomycin.    Please contact pharmacy at extension [phone] for questions regarding this assessment.    Thank you for the consult,   Franck Bishop       Patient brief summary:  Nubia Riggs is a 55 y.o. male initiated on antimicrobial therapy with IV Vancomycin for treatment of sepsis      Drug Allergies:   Review of patient's allergies indicates:   Allergen Reactions    Meperidine      Other reaction(s): violent behavior    Sulfa (sulfonamide antibiotics)      Other reaction(s): Unknown       Actual Body Weight:   84.4 kg    Renal Function:   Estimated Creatinine Clearance: 63 mL/min (based on SCr of 1.4 mg/dL).,     Dialysis Method (if applicable):  SLED    CBC (last 72 hours):  Recent Labs   Lab Result Units 11/05/20  0405 11/06/20  0404 11/07/20  0412   WBC K/uL 43.27* 36.52* 51.72*   Hemoglobin g/dL 9.4* 9.3* 9.3*   Hematocrit % 32.1* 30.2* 29.5*   Platelets K/uL 340 326 366*   Gran % % 84.0* 91.0* 74.0*   Lymph % % 7.0* 1.0* 3.0*   Mono % % 3.0* 3.5* 13.0   Eosinophil % % 1.0 0.0 0.0   Basophil % % 0.0 0.0 0.0   Differential Method  Manual Manual Manual       Metabolic Panel (last 72 hours):  Recent Labs   Lab Result Units 11/04/20  1537 11/04/20  2227 11/05/20  0405 11/05/20  1023 11/05/20  1734 11/05/20  2239 11/06/20  0404 11/06/20  0920 11/06/20  1542 11/06/20  2213 11/07/20  0412 11/07/20  0949    Sodium mmol/L 145 149*  149* 145  145  145 140 139 138  138 139 140 136  135* 134*  134* 135*  134*  134* 136   Potassium mmol/L 4.4 4.3  4.3 4.4  4.4  4.4 4.3 4.0 4.0  4.0 4.0 4.6 4.9  4.9 4.9  4.9 5.1  5.1  5.1 5.1   Chloride mmol/L 101 102  102 101  101  101 99 100 99  99 100 103 101  101 102  102 102  102  102 102   CO2 mmol/L 30* 33*  33* 30*  30*  30* 26 27 26  26 25 22* 22*  22* 20*  20* 19*  19*  19* 20*   Glucose mg/dL 179* 136*  136* 113*  112*  112* 143* 153* 182*  182* 127* 145* 138*  136* 128*  128* 132*  127*  127* 136*   BUN mg/dL 135* 132*  132* 100*  99*  99* 52* 28* 37*  37* 43* 52* 28*  28* 17  17 20  21*  21* 16   Creatinine mg/dL 4.9* 5.4*  5.4* 4.3*  4.3*  4.3* 2.7* 1.9* 2.5*  2.5* 2.9* 3.4* 2.1*  2.0* 1.5*  1.5* 1.7*  1.7*  1.7* 1.4   Albumin g/dL  --  1.5* 1.7*  1.7*  --   --  1.6* 1.7*  --  2.0* 2.1* 2.0*  2.0*  --    Total Bilirubin mg/dL  --   --  0.6  --   --   --  0.6  --   --   --  0.8  --    Alkaline Phosphatase U/L  --   --  147*  --   --   --  127  --   --   --  140*  --    AST U/L  --   --  90*  --   --   --  71*  --   --   --  73*  --    ALT U/L  --   --  49*  --   --   --  47*  --   --   --  51*  --    Magnesium mg/dL  --  3.3* 2.8*  2.7*  --   --  2.4 2.4  --  2.1 2.0 1.9  1.9  --    Phosphorus mg/dL  --  7.6* 6.8*  6.8*  --   --  5.7* 6.2*  --  4.4 3.6 3.9  3.8  --        Vancomycin Administrations:  vancomycin given in the last 96 hours                   vancomycin 750 mg in dextrose 5 % 250 mL IVPB (ready to mix system) (mg) 750 mg New Bag 11/05/20 0935    vancomycin 1.25 g in dextrose 5% 250 mL IVPB (ready to mix) (mg) 1,250 mg New Bag 11/04/20 1203                Microbiologic Results:  Microbiology Results (last 7 days)     Procedure Component Value Units Date/Time    Clostridium difficile EIA [[id number]] Collected: 11/07/20 1102    Order Status: Sent Specimen: Stool Updated: 11/07/20 1151    Blood culture  [[id number]] Collected: 11/03/20 0904    Order Status: Completed Specimen: Blood from Peripheral, Upper Arm, Right Updated: 11/07/20 1022     Blood Culture, Routine No Growth to date      No Growth to date      No Growth to date      No Growth to date      No Growth to date    Narrative:      Blood cultures from 2 different sites. 4 bottles total.  Please draw before starting antibiotics.    Blood culture [[id number]] Collected: 11/03/20 0904    Order Status: Completed Specimen: Blood from Peripheral, Antecubital, Left Updated: 11/07/20 1022     Blood Culture, Routine No Growth to date      No Growth to date      No Growth to date      No Growth to date      No Growth to date    Narrative:      Blood cultures x 2 different sites. 4 bottles total. Please  draw cultures before administering antibiotics.    Culture, Respiratory with Gram Stain [[id number]]     Order Status: No result Specimen: Respiratory     Culture, Respiratory with Gram Stain [[id number]] Collected: 11/03/20 0933    Order Status: Completed Specimen: Respiratory from Endotracheal Aspirate Updated: 11/05/20 1055     Respiratory Culture Normal respiratory alejandra      No S aureus or Pseudomonas isolated.     Gram Stain (Respiratory) <10 epithelial cells per low power field.     Gram Stain (Respiratory) No WBC's     Gram Stain (Respiratory) No organisms seen    Blood culture [[id number]] Collected: 10/30/20 1011    Order Status: Completed Specimen: Blood from Peripheral, Forearm, Left Updated: 11/04/20 1212     Blood Culture, Routine No growth after 5 days.    Narrative:      Blood cultures x 2 different sites. 4 bottles total. Please  draw cultures before administering antibiotics.    Blood culture [[id number]] Collected: 10/30/20 1015    Order Status: Completed Specimen: Blood from Peripheral, Upper Arm, Right Updated: 11/04/20 1212     Blood Culture, Routine No growth after 5 days.    Narrative:      Blood cultures from 2 different sites. 4 bottles  total.  Please draw before starting antibiotics.    Culture, Respiratory with Gram Stain [[id number]] Collected: 10/30/20 1040    Order Status: Completed Specimen: Respiratory from Endotracheal Aspirate Updated: 11/02/20 1106     Respiratory Culture Normal respiratory alejandra     Gram Stain (Respiratory) Rare WBC's     Gram Stain (Respiratory) No organisms seen    Narrative:      Mini-BAL. Before antibiotics.    Blood culture [[id number]] Collected: 10/27/20 2011    Order Status: Completed Specimen: Blood from Peripheral, Forearm, Right Updated: 11/01/20 2212     Blood Culture, Routine No growth after 5 days.    Narrative:      Blood cultures from 2 different sites. 4 bottles total.  Please draw before starting antibiotics.    Blood culture [[id number]] Collected: 10/27/20 2011    Order Status: Completed Specimen: Blood from Peripheral, Forearm, Left Updated: 11/01/20 2212     Blood Culture, Routine No growth after 5 days.    Narrative:      Blood cultures x 2 different sites. 4 bottles total. Please  draw cultures before administering antibiotics.    Urine culture [[id number]] Collected: 10/31/20 1428    Order Status: Completed Specimen: Urine Updated: 11/01/20 1814     Urine Culture, Routine No growth    Narrative:      Specimen Source->Urine

## 2020-11-07 NOTE — PLAN OF CARE
Procedure Note  Prone Positioning  Critical Care Medicine       Chief Complaint: Acute respiratory distress syndrome (ARDS) due to 2019 novel coronavirus  MRN: [id number]  LOS: 20  Sex: male  Age: 55 y.o.      Last ABG:   Recent Labs   Lab 11/07/20 1619   PH 7.210*   PO2 79*   PCO2 58.3*   HCO3 23.3*   BE -5       Vital Signs (Most Recent):   Temp: 96.8 °F (36 °C) (11/07/20 1705)  Pulse: 108 (11/07/20 1729)  Resp: (!) 36 (11/07/20 1705)  BP: 137/60 (11/07/20 1705)  SpO2: (!) 94 % (11/07/20 1729)    Ventilator Settings:  Vent Mode: A/C  Oxygen Concentration (%):  [100] 100  Resp Rate Total:  [36 br/min] 36 br/min  Vt Set:  [400 mL-490 mL] 470 mL  PEEP/CPAP:  [10 cmH20] 10 cmH20  Mean Airway Pressure:  [20 cqK87-60 cmH20] 26 cmH20    Indication: Severe, refractory ARDS. High risk for deterioration during proning procedure in need of direct monitoring by Critical Care personnel.     Prior to beginning the procedure, all appropriate equipment and personnel were gathered in the room. Two individuals were placed on each side of the patient and one person stood at the head of the bed and was dedicated to the management of the head of the patient, the endotracheal tube, and the ventilator lines. The person at the head of the bed  coordinated the steps of the proning procedure with team team at the direction of Critical Care team members at the bedside. The patient was first log-rolled 90 degrees onto their side towards the direction of their central venous catheter. Cardiac electrodes were then moved from the patient's anterior to the posterior. The patient?s knees, forehead, chest, and iliac crests were protected using adhesive pads and/or foam pads to reduce the risk of skin breakdown. Once properly positioned in the bed, another 90 degree log roll was performed placing the patient into the prone position. The patient's arms were placed alongside the body. The patient's head should be turned alternately to the right or left  every 2 hours. The patient tolerated the procedure well.     Total Critical Care time (uninterrupted not including procedures): 20 minutes    Louisa Mccurdy MD  Pulmonary and Critical Care Fellow  11/07/2020  5:46 PM

## 2020-11-07 NOTE — CARE UPDATE
Convalescent plasma discussed with patient's significant other who agreed for patient to received plasma. Patient fact sheet available in bedside chart if needed.

## 2020-11-07 NOTE — ASSESSMENT & PLAN NOTE
56 y/o admitted with complications of COVID-19.  Septic shock likely related to COVID-19 infection.  He is requiring pressors, having high fevers, and his WBC count is increasing.  On exam today, he is prone and there is greenish emesis coming from his mouth.  He is at risk of aspiration and given his prolonged hospital course there is a risk of drug resistant organisms.   There is also small amount of liquid stool in the rectal tube and there is some risk for C diff as well.  Recommendations are as follows;    1.  Continue meropenem.    2.  Continue renal dosed vancomycin.    3.  Discontinue fluconazole and start micafungin 100 mg IV q 24 hours.    4.  Send stool for C diff if possible.    5.  Obtain respiratory sample for culture.

## 2020-11-07 NOTE — SUBJECTIVE & OBJECTIVE
Interval History/Significant Events: Febrile overnight. Patient remains intubated (PEEP10/100%), sedation, paralyzed with increasing pressor requirements. Right foot cool to touch, weak pulses noted on right lower extremity. Fluconazole replaced by micafungin. CDiff panel prdered. Respiratory cultures ordered again. Convalescent plasma given.     Review of Systems   Unable to perform ROS: Intubated     Objective:     Vital Signs (Most Recent):  Temp: (!) 100.9 °F (38.3 °C) (11/07/20 1230)  Pulse: 107 (11/07/20 1230)  Resp: (!) 36 (11/07/20 1230)  BP: 118/66 (11/07/20 1200)  SpO2: (!) 94 % (11/07/20 1230) Vital Signs (24h Range):  Temp:  [97.2 °F (36.2 °C)-100.9 °F (38.3 °C)] 100.9 °F (38.3 °C)  Pulse:  [] 107  Resp:  [35-36] 36  SpO2:  [87 %-100 %] 94 %  BP: (118)/(66) 118/66  Arterial Line BP: ()/(41-64) 117/54   Weight: 84.4 kg (186 lb)  Body mass index is 26.69 kg/m².      Intake/Output Summary (Last 24 hours) at 11/7/2020 1248  Last data filed at 11/7/2020 1200  Gross per 24 hour   Intake 3496.91 ml   Output 55143 ml   Net -7325.09 ml       Physical Exam  Vitals signs and nursing note reviewed.   Constitutional:       General: He is not in acute distress.     Appearance: Normal appearance. He is ill-appearing.      Interventions: He is sedated, chemically paralyzed and intubated.   HENT:      Mouth/Throat:      Lips: Lesions present.      Mouth: Mucous membranes are moist.      Comments: Upper and lower lips with breakdown noted.   Eyes:      Comments: Taped for protection during paralysis   Cardiovascular:      Rate and Rhythm: Regular rhythm. Tachycardia present.      Pulses:           Radial pulses are 2+ on the right side and 2+ on the left side.        Dorsalis pedis pulses are 2+ on the right side and 2+ on the left side.      Heart sounds: Normal heart sounds.      Comments: Bilateral upper extremity edema 2+  Poor pulses in right PT, DP  Cool to touch right foot with darkening noted in toes  on right and left.   Pulmonary:      Effort: He is intubated.      Breath sounds: Examination of the right-lower field reveals rales. Rales present. No wheezing or rhonchi.   Abdominal:      General: Bowel sounds are normal.      Comments: Patient proned for exam   Genitourinary:     Comments: Kelley in place with hematuria.   Musculoskeletal:      Right lower le+ Edema present.      Left lower le+ Edema present.   Skin:     General: Skin is warm and dry.      Capillary Refill: Capillary refill takes 2 to 3 seconds.      Coloration: Skin is not mottled.   Neurological:      Mental Status: He is unresponsive.      GCS: GCS eye subscore is 1. GCS verbal subscore is 1. GCS motor subscore is 1.      Comments: Sedated and paralyzed.          Vents:  Vent Mode: A/C (20)  Set Rate: 36 BPM (20)  Vt Set: 470 mL (20)  PEEP/CPAP: 10 cmH20 (20)  Oxygen Concentration (%): 100 (20 1230)  Peak Airway Pressure: 35 cmH2O (20)  Plateau Pressure: 33 cmH20 (20)  Total Ve: 19.9 mL (20)  F/VT Ratio<105 (RSBI): (!) 64.98 (20)  Lines/Drains/Airways     Central Venous Catheter Line            Percutaneous Central Line Insertion/Assessment - Triple Lumen  10/26/20 1940 right internal jugular 11 days    Trialysis (Dialysis) Catheter 20 1914 left internal jugular 2 days          Drain                 Urethral Catheter 10/31/20 1300 Non-latex;Straight-tip 16 Fr. 7 days         NG/OG Tube 20 1456 orogastric 14 Fr. Left mouth 4 days         Rectal Tube 20 0230 rectal tube w/ balloon (indicate number of mLs) 2 days          Airway                 Airway - Non-Surgical 10/26/20 1820 Endotracheal Tube 11 days       Airway Anesthesia 10/26/20 11 days          Arterial Line            Arterial Line 10/30/20 1556 Right Radial 7 days              Significant Labs:    CBC/Anemia Profile:  Recent Labs   Lab 20  0404 20  0412    WBC 36.52* 51.72*   HGB 9.3* 9.3*   HCT 30.2* 29.5*    366*   * 102*   RDW 13.9 14.2        Chemistries:  Recent Labs   Lab 11/06/20  0404  11/06/20  1542 11/06/20  2213 11/07/20  0412 11/07/20  0949      < > 136  135* 134*  134* 135*  134*  134* 136   K 4.0   < > 4.9  4.9 4.9  4.9 5.1  5.1  5.1 5.1      < > 101  101 102  102 102  102  102 102   CO2 25   < > 22*  22* 20*  20* 19*  19*  19* 20*   BUN 43*   < > 28*  28* 17  17 20  21*  21* 16   CREATININE 2.9*   < > 2.1*  2.0* 1.5*  1.5* 1.7*  1.7*  1.7* 1.4   CALCIUM 8.3*   < > 8.1*  8.1* 8.3*  8.3* 7.8*  7.9*  7.9* 7.9*   ALBUMIN 1.7*  --  2.0* 2.1* 2.0*  2.0*  --    PROT 6.4  --   --   --  7.1  --    BILITOT 0.6  --   --   --  0.8  --    ALKPHOS 127  --   --   --  140*  --    ALT 47*  --   --   --  51*  --    AST 71*  --   --   --  73*  --    MG 2.4  --  2.1 2.0 1.9  1.9  --    PHOS 6.2*  --  4.4 3.6 3.9  3.8  --     < > = values in this interval not displayed.       Blood Culture 11/3: NGTD  Respiratory Cultures 11/3: NGTD, no pseudomonas, no MRSA    Significant Imaging:  I have reviewed all pertinent imaging results/findings within the past 24 hours.   CXR 11/4 with unchanged bilateral patchy airspace opacities

## 2020-11-08 NOTE — NURSING
CRRT clotted after 6 hours of treatment. Could not rinse back because large clot in line. Dialysis RN called and will come back and restart machine. Upon disconnecting CRRT lines a large clot was removed Notified Kate YUNG

## 2020-11-08 NOTE — PLAN OF CARE
Procedure Note  Supine Positioning  Critical Care Medicine       Chief Complaint: Acute respiratory distress syndrome (ARDS) due to 2019 novel coronavirus  MRN: [id number]  LOS: 21  Sex: male  Age: 55 y.o.      Last ABG:   Recent Labs   Lab 11/08/20  0425   PH 7.175*   PO2 126*   PCO2 62.6*   HCO3 23.1*   BE -5       Vital Signs (Most Recent):   Temp: 97.2 °F (36.2 °C) (11/08/20 0900)  Pulse: 90 (11/08/20 0900)  Resp: (!) 36 (11/08/20 0900)  BP: (!) 105/57 (11/08/20 0800)  SpO2: (!) 94 % (11/08/20 0900)    Ventilator Settings:  Vent Mode: A/C  Oxygen Concentration (%):  [] 80  Resp Rate Total:  [36 br/min-50 br/min] 50 br/min  Vt Set:  [470 mL] 470 mL  PEEP/CPAP:  [10 cmH20] 10 cmH20  Mean Airway Pressure:  [20 laL76-27 cmH20] 21 cmH20        Indication: Severe, refractory ARDS. High risk for deterioration during supination procedure in need of direct monitoring by Critical Care personnel.     Prior to beginning the procedure, all appropriate equipment and personnel were gathered in the room. Two individuals were placed on each side of the patient and two respiratory therapists stood at the head of the bed and was dedicated to the management of the head of the patient, the endotracheal tube, and the ventilator lines. The person at the head of the bed coordinated the steps of the supination procedure at the direction of Critical Care team members at the bedside. The patient was first log-rolled 90 degrees onto their side away from the direction of their central venous catheter. Cardiac electrodes were then moved from the patient's posterior back to the anterior. Once properly positioned in the bed, another 90 degree log roll was performed placing the patient into the supine position. The patient's arms were placed alongside the body. Continuous pulse oximetry and blood pressure monitoring was performed without any desaturation or hemodynamic instability. The patient tolerated the procedure well.     ET tube at 24  cm at the lip pre- and post-procedure    Total Critical Care time (uninterrupted not including procedures): 15 minutes    Nydia Collado PA-C  Critical Care Medicine  11/8/2020   10:19 AM

## 2020-11-08 NOTE — CARE UPDATE
Updated S/O on status of patient. Answered questions related to fever, using Tylenol to treat fever, respiratory status, reduced perfusion to feet, and CRRT troubleshooting. Wife appreciative of call, all questions answered.

## 2020-11-08 NOTE — PROGRESS NOTES
[facility] SLED NOTE    Nubia Riggs is a 55 y.o. male currently on SLED for removal of uremic toxins and volume.     Patient seen and evaluated on SLED, tolerating treatment, see CRRT flowsheet for vitals and assessments.    Labs have been reviewed and the dialysate bath has been adjusted.    Labs:      Recent Labs   Lab 11/07/20  1420 11/08/20  0012 11/08/20  0415   *  135* 134*  134*  134* 135*  135*  135*  134*   K 5.0  5.0 5.0  5.0  5.0 5.1  5.1  5.1  5.1     102 102  102  102 103  103  103  102   CO2 20*  20* 20*  20*  20* 19*  19*  19*  19*   BUN 30*  30* 40*  40*  40* 29*  29*  29*  29*   CREATININE 2.3*  2.3* 2.4*  2.4*  2.4* 1.9*  1.9*  1.9*  1.9*   CALCIUM 7.8*  7.8* 7.6*  7.6*  7.6* 7.5*  7.5*  7.5*  7.5*   PHOS 4.3  4.3 5.2*  5.2* 4.6*  4.6*       Recent Labs   Lab 11/06/20  0404 11/07/20  0412 11/08/20  0415   WBC 36.52* 51.72* 44.03*   HGB 9.3* 9.3* 8.5*   HCT 30.2* 29.5* 26.2*    366* 249        Assessment/Plan    -Patient currently hemodynamically not stable for intermittent hemodialysis.   -Will plan on SLED today for 12 hours with UF of 350 cc/hr for metabolic clearance and volume removal  -SLED prescription and dialysate baths were reviewed and changes made according to most recent labs.    -Maintain MAP>65  -Monitor electrolytes with serial labs while on SLED.  -Follow replacement protocol.  -Avoid morphine in ESRD patients.  -Renal function panel daily.   -Avoid nephrotoxins.  -Renal dose meds to GFR   -Strict I+O daily.  -Daily weights   -Plan discussed with HD RN.  -Please see attending attestation to follow      Antony Leslie MD  Nephrology  [facility]    ATTENDING PHYSICIAN ATTESTATION  I have personally verified the history and examined the patient. I thoroughly reviewed the demographic, clinical, laboratorial and imaging information available in medical records. I agree with the assessment  and recommendations provided by the subspecialty resident who was under my supervision.     DIALYSIS (CRRT) NOTE  Patient evaluated while undergoing Slow Low Efficiency Dialysis (SLED) indicated for YOVANI and hemodynamic instability. No complications, tolerating session with current UFR. Will continue current support.

## 2020-11-08 NOTE — PROGRESS NOTES
SLED treatment restarted to left IJ trialysis. Unable to aspirate, but able to flush blue line of left IJ trialysis. Able to aspirate and flush red line of left IJ trialysis. Lines connected, treatment initiated. See flowsheet for details.

## 2020-11-08 NOTE — PLAN OF CARE
Procedure Note  Prone Positioning  Critical Care Medicine       Chief Complaint: Acute respiratory distress syndrome (ARDS) due to 2019 novel coronavirus  MRN: [id number]  LOS: 21  Sex: male  Age: 55 y.o.      Last ABG:   Recent Labs   Lab 11/08/20  1531   PH 7.291*   PO2 55*   PCO2 64.3*   HCO3 31.0*   BE 4       Vital Signs (Most Recent):   Temp: 99 °F (37.2 °C) (11/08/20 1600)  Pulse: 109 (11/08/20 1600)  Resp: (!) 36 (11/08/20 1600)  BP: (!) 90/51 (11/08/20 1600)  SpO2: (!) 92 % (11/08/20 1600)    Ventilator Settings:  Vent Mode: A/C  Oxygen Concentration (%):  [] 100  Resp Rate Total:  [36 br/min-50 br/min] 36 br/min  Vt Set:  [470 mL] 470 mL  PEEP/CPAP:  [10 cmH20] 10 cmH20  Mean Airway Pressure:  [20 lbG10-63 cmH20] 20 cmH20    Indication: Severe, refractory ARDS. High risk for deterioration during proning procedure in need of direct monitoring by Critical Care personnel.     Prior to beginning the procedure, all appropriate equipment and personnel were gathered in the room. Two individuals were placed on each side of the patient and one person stood at the head of the bed and was dedicated to the management of the head of the patient, the endotracheal tube, and the ventilator lines. The person at the head of the bed  coordinated the steps of the proning procedure with team team at the direction of Critical Care team members at the bedside. The patient was first log-rolled 90 degrees onto their side towards the direction of their central venous catheter. Cardiac electrodes were then moved from the patient's anterior to the posterior. The patient?s knees, forehead, chest, and iliac crests were protected using adhesive pads and/or foam pads to reduce the risk of skin breakdown. Once properly positioned in the bed, another 90 degree log roll was performed placing the patient into the prone position. The patient's arms were placed alongside the body. The patient's head should be turned alternately to the right  or left every 2 hours. The patient tolerated the procedure well.     ET tube at 24 cm at the lip pre- and post-procedure    Total Critical Care time (uninterrupted not including procedures): 15 minutes      Nydia Collado PA-C  Critical Care Medicine  11/8/2020   5:18 PM

## 2020-11-08 NOTE — PROGRESS NOTES
SLED treatment restarted to left IJ trialysis. Flows were sluggish, lines connected, treatment initiated. See flowsheet for details.    Previous SLED treatment clotted after 6 hours. Noticeable clots observed in CRRT lines and in left IJ trialysis. Recommend CathFlo to left IJ trialysis if CRRT clots again, recommend starting citrate if CRRT clots again.

## 2020-11-08 NOTE — PROGRESS NOTES
Pharmacokinetic Assessment Follow Up: IV Vancomycin    Vancomycin serum concentration assessment(s):  - Random concentration this AM at 04:15 was 19.7 mcg/ml which is ~14 hours after dose of 1250mg. The measurement is within the desired definitive target range of 15 to 20 mcg/mL.  - Patient is on continuous SLED at this time.    Vancomycin Regimen Plan:  - Re-dose vancomycin 1250mg x1 dose  - Vancomycin random level tomorrow with AM labs    Drug levels (last 3 results):  Recent Labs   Lab Result Units 11/06/20  0404 11/07/20  0412 11/08/20  0415   Vancomycin, Random ug/mL 24.4 11.2 19.7       Pharmacy will continue to follow and monitor vancomycin.    Please contact pharmacy at extension [phone] for questions regarding this assessment.    Thank you for the consult,   Yesenia Coker       Patient brief summary:  Nubia Riggs is a 55 y.o. male initiated on antimicrobial therapy with IV Vancomycin for treatment of lower respiratory infection    The patient's current regimen is pulse dosing    Drug Allergies:   Review of patient's allergies indicates:   Allergen Reactions    Meperidine      Other reaction(s): violent behavior    Sulfa (sulfonamide antibiotics)      Other reaction(s): Unknown       Actual Body Weight:   84 kg    Renal Function:   Estimated Creatinine Clearance: 88.2 mL/min (based on SCr of 1 mg/dL).,     CBC (last 72 hours):  Recent Labs   Lab Result Units 11/06/20  0404 11/07/20  0412 11/08/20  0415   WBC K/uL 36.52* 51.72* 44.03*   Hemoglobin g/dL 9.3* 9.3* 8.5*   Hematocrit % 30.2* 29.5* 26.2*   Platelets K/uL 326 366* 249   Gran % % 91.0* 74.0* 76.0*   Lymph % % 1.0* 3.0* 6.0*   Mono % % 3.5* 13.0 7.0   Eosinophil % % 0.0 0.0 0.0   Basophil % % 0.0 0.0 0.0   Differential Method  Manual Manual Manual       Metabolic Panel (last 72 hours):  Recent Labs   Lab Result Units 11/05/20  1734 11/05/20  2239 11/06/20  0404 11/06/20  0920 11/06/20  1542 11/06/20  2213 11/07/20  0412 11/07/20  0949  11/07/20  1420 11/08/20  0012 11/08/20  0415 11/08/20  1056 11/08/20  1517   Sodium mmol/L 139 138  138 139 140 136  135* 134*  134* 135*  134*  134* 136 135*  135* 134*  134*  134* 135*  135*  135*  134* 136 138  138  138  138   Potassium mmol/L 4.0 4.0  4.0 4.0 4.6 4.9  4.9 4.9  4.9 5.1  5.1  5.1 5.1 5.0  5.0 5.0  5.0  5.0 5.1  5.1  5.1  5.1 5.0 4.8  4.8  4.8  4.7   Chloride mmol/L 100 99  99 100 103 101  101 102  102 102  102  102 102 102  102 102  102  102 103  103  103  102 105 100  100  100  100   CO2 mmol/L 27 26  26 25 22* 22*  22* 20*  20* 19*  19*  19* 20* 20*  20* 20*  20*  20* 19*  19*  19*  19* 19* 27  27  27  27   Glucose mg/dL 153* 182*  182* 127* 145* 138*  136* 128*  128* 132*  127*  127* 136* 148*  148* 128*  128*  128* 134*  134*  134*  136* 124* 119*  119*  119*  120*   BUN mg/dL 28* 37*  37* 43* 52* 28*  28* 17  17 20  21*  21* 16 30*  30* 40*  40*  40* 29*  29*  29*  29* 17 12  12  12  12   Creatinine mg/dL 1.9* 2.5*  2.5* 2.9* 3.4* 2.1*  2.0* 1.5*  1.5* 1.7*  1.7*  1.7* 1.4 2.3*  2.3* 2.4*  2.4*  2.4* 1.9*  1.9*  1.9*  1.9* 1.2 1.0  1.0  1.0  1.0   Albumin g/dL  --  1.6* 1.7*  --  2.0* 2.1* 2.0*  2.0*  --  2.2*  2.2* 2.0*  2.0* 2.0*  2.0*  2.0*  --  1.9*  1.9*  1.9*   Total Bilirubin mg/dL  --   --  0.6  --   --   --  0.8  --   --   --  0.7  --   --    Alkaline Phosphatase U/L  --   --  127  --   --   --  140*  --   --   --  115  --   --    AST U/L  --   --  71*  --   --   --  73*  --   --   --  86*  --   --    ALT U/L  --   --  47*  --   --   --  51*  --   --   --  44  --   --    Magnesium mg/dL  --  2.4 2.4  --  2.1 2.0 1.9  1.9  --  2.0  2.0 2.6  2.6 2.2  2.2  2.2  --  1.8  1.8  1.8   Phosphorus mg/dL  --  5.7* 6.2*  --  4.4 3.6 3.9  3.8  --  4.3  4.3 5.2*  5.2* 4.6*  4.6*  --  3.1  3.1  3.1       Vancomycin Administrations:  vancomycin given in the last 96 hours                    vancomycin 1.25 g in dextrose 5% 250 mL IVPB (ready to mix) (mg) 1,250 mg New Bag 11/07/20 1404    vancomycin 750 mg in dextrose 5 % 250 mL IVPB (ready to mix system) (mg) 750 mg New Bag 11/05/20 0935                Microbiologic Results:  Microbiology Results (last 7 days)     Procedure Component Value Units Date/Time    Blood culture [[id number]] Collected: 11/03/20 0904    Order Status: Completed Specimen: Blood from Peripheral, Upper Arm, Right Updated: 11/08/20 1022     Blood Culture, Routine No growth after 5 days.    Narrative:      Blood cultures from 2 different sites. 4 bottles total.  Please draw before starting antibiotics.    Blood culture [[id number]] Collected: 11/03/20 0904    Order Status: Completed Specimen: Blood from Peripheral, Antecubital, Left Updated: 11/08/20 1022     Blood Culture, Routine No growth after 5 days.    Narrative:      Blood cultures x 2 different sites. 4 bottles total. Please  draw cultures before administering antibiotics.    Clostridium difficile EIA [[id number]] Collected: 11/07/20 1102    Order Status: Completed Specimen: Stool Updated: 11/08/20 0559     C. diff Antigen Negative     C difficile Toxins A+B, EIA Negative     Comment: Testing not recommended for children <24 months old.       Culture, Respiratory with Gram Stain [[id number]] Collected: 11/07/20 1659    Order Status: Completed Specimen: Respiratory from Bronchial Wash Updated: 11/08/20 0140     Gram Stain (Respiratory) <10 epithelial cells per low power field.     Gram Stain (Respiratory) No WBC's or organisms seen    Culture, Respiratory with Gram Stain [[id number]] Collected: 11/03/20 0933    Order Status: Completed Specimen: Respiratory from Endotracheal Aspirate Updated: 11/05/20 1055     Respiratory Culture Normal respiratory alejandra      No S aureus or Pseudomonas isolated.     Gram Stain (Respiratory) <10 epithelial cells per low power field.     Gram Stain (Respiratory) No WBC's     Gram Stain  (Respiratory) No organisms seen    Blood culture [[id number]] Collected: 10/30/20 1011    Order Status: Completed Specimen: Blood from Peripheral, Forearm, Left Updated: 11/04/20 1212     Blood Culture, Routine No growth after 5 days.    Narrative:      Blood cultures x 2 different sites. 4 bottles total. Please  draw cultures before administering antibiotics.    Blood culture [[id number]] Collected: 10/30/20 1015    Order Status: Completed Specimen: Blood from Peripheral, Upper Arm, Right Updated: 11/04/20 1212     Blood Culture, Routine No growth after 5 days.    Narrative:      Blood cultures from 2 different sites. 4 bottles total.  Please draw before starting antibiotics.    Culture, Respiratory with Gram Stain [[id number]] Collected: 10/30/20 1040    Order Status: Completed Specimen: Respiratory from Endotracheal Aspirate Updated: 11/02/20 1106     Respiratory Culture Normal respiratory alejandra     Gram Stain (Respiratory) Rare WBC's     Gram Stain (Respiratory) No organisms seen    Narrative:      Mini-BAL. Before antibiotics.    Blood culture [[id number]] Collected: 10/27/20 2011    Order Status: Completed Specimen: Blood from Peripheral, Forearm, Right Updated: 11/01/20 2212     Blood Culture, Routine No growth after 5 days.    Narrative:      Blood cultures from 2 different sites. 4 bottles total.  Please draw before starting antibiotics.    Blood culture [[id number]] Collected: 10/27/20 2011    Order Status: Completed Specimen: Blood from Peripheral, Forearm, Left Updated: 11/01/20 2212     Blood Culture, Routine No growth after 5 days.    Narrative:      Blood cultures x 2 different sites. 4 bottles total. Please  draw cultures before administering antibiotics.    Urine culture [[id number]] Collected: 10/31/20 1428    Order Status: Completed Specimen: Urine Updated: 11/01/20 1814     Urine Culture, Routine No growth    Narrative:      Specimen Source->Urine

## 2020-11-09 NOTE — ASSESSMENT & PLAN NOTE
54 y/o male with no significant past medical history who was admitted to the hospital with respiratory failure due to COVID-19.  Per chart, he is s/p remdesivir and dexamethasone.  He has also received plasma infusion.  Continue supportive care.  Prognosis appears poor at this time.

## 2020-11-09 NOTE — ASSESSMENT & PLAN NOTE
54 y/o admitted with complications of COVID-19.  Septic shock likely related to COVID-19 infection.  He is requiring pressors, having high fevers, and his WBC count is increasing.  On exam today, he is prone and there is greenish emesis coming from his mouth.  He is at risk of aspiration and given his prolonged hospital course there is a risk of drug resistant organisms.   All cultures are negative at this time.  C diff testing was negative as well.  Suspect this is still related to his COVID infection.  His temperature curve is slightly better and his wbc dropped slightly since micafungin was added.  If cultures remain negative, then he may complete a 7 day course of meropenem and micafungin (stop date for both would be November 13).  Okay to discontinue vancomycin IV as he has had at least 11 days of therapy.    ID will sign off.  Re-consult with questions.

## 2020-11-09 NOTE — ASSESSMENT & PLAN NOTE
Likely 2/2 aspiration on 11/4. Febrile, WBC increased to 40K but down to 36 on 11/6. CXR with left sided consolidation. Increased FiO2 and vasopressor requirements.      -- donta, vanc, micafungin (ID following)  -- blood and sputum cultures 11/3 with NGTD 11/8  -- titrate norepi for MAP >65  -- vaso and SDS added   -- CDiff checked 11/7 - negative

## 2020-11-09 NOTE — ASSESSMENT & PLAN NOTE
Likely secondary to TFs +/- dehydration from diuresis    -- emesis noted with free water flushes  -- improved with D5W    -- TF held since 11/5; consider resuming to intermittent suction 11/9

## 2020-11-09 NOTE — PLAN OF CARE
Problem: Adult Inpatient Plan of Care  Goal: Plan of Care Review  Outcome: Ongoing, Not Progressing  Goal: Readiness for Transition of Care  Outcome: Ongoing, Not Progressing     Problem: Adult Inpatient Plan of Care  Goal: Optimal Comfort and Wellbeing  Outcome: Ongoing, Progressing  Goal: Rounds/Family Conference  Outcome: Ongoing, Progressing     Patient remains intubated, sedated, paralyzed, and proned. Currently on propofol 50mcg, fent 300 mcg, nimbex 4. Remains on two vasopressors to maintain MAP >65. Currently on vasopression 0.04 and levophed 0.18. NSR to ST on telemetry with no ectopy. Remains afebrile throughout shift. I&O as documented in flowsheet. CRRT remains with no clotting thus far --- HCO3 bath changed per nephrology - order updated. Current ventilator settings FiO2 100%, PEEP 12, RR 36, . Nitric at 13. Wife at bedside this AM. All questions and comments answered.

## 2020-11-09 NOTE — ASSESSMENT & PLAN NOTE
Follows with Pulm as outpatient. Last seen 8/27/20, told he had RAD  He was also seen by a [facility] pulmonologist who did spirometry that reportedly showed borderline or mild asthma, and has been controlled with albuterol.   Former smoker, started at age 10-12 and quit 10 years ago    -- Continue scheduled duo nikolay

## 2020-11-09 NOTE — PROGRESS NOTES
CRRT system clotted after 9 hrs. Pt restarted venous lumen of cvc does not draw back but flushes well. Primary nurse at bedside.

## 2020-11-09 NOTE — SUBJECTIVE & OBJECTIVE
Interval History/Significant Events: Fevers improved today. Supine 10a - 4p then proned. He improved in regards to pressor support and ventolator support for several hours then required an increase in PEEP to support SpO2. Continues on CRRT, paralytics, sedation. Will need TF resumed in next day or two (marsha need to check NGT placement prior to starting).     Review of Systems   Unable to perform ROS: Intubated     Objective:     Vital Signs (Most Recent):  Temp: 99.3 °F (37.4 °C) (11/08/20 1800)  Pulse: 107 (11/08/20 1800)  Resp: (!) 36 (11/08/20 1800)  BP: (!) 90/51 (11/08/20 1600)  SpO2: 95 % (11/08/20 1800) Vital Signs (24h Range):  Temp:  [96.1 °F (35.6 °C)-100.8 °F (38.2 °C)] 99.3 °F (37.4 °C)  Pulse:  [] 107  Resp:  [36] 36  SpO2:  [84 %-100 %] 95 %  BP: ()/(51-57) 90/51  Arterial Line BP: ()/() 97/50   Weight: 84.4 kg (186 lb)  Body mass index is 26.69 kg/m².      Intake/Output Summary (Last 24 hours) at 11/8/2020 1847  Last data filed at 11/8/2020 1800  Gross per 24 hour   Intake 6089.21 ml   Output 5684 ml   Net 405.21 ml       Physical Exam  Vitals signs and nursing note reviewed.   Constitutional:       General: He is not in acute distress.     Appearance: Normal appearance. He is ill-appearing.      Interventions: He is sedated, chemically paralyzed and intubated.   HENT:      Mouth/Throat:      Lips: Lesions present.      Mouth: Mucous membranes are moist.      Comments: Upper and lower lips with breakdown noted.   Eyes:      Comments: Taped for protection during paralysis   Cardiovascular:      Rate and Rhythm: Regular rhythm. Tachycardia present.      Pulses:           Radial pulses are 2+ on the right side and 2+ on the left side.        Dorsalis pedis pulses are 2+ on the right side and 2+ on the left side.      Heart sounds: Normal heart sounds.      Comments: Bilateral upper extremity edema 2+  Poor pulses in right PT, DP  Cool to touch right foot with darkening noted in  toes on right and left.   Pulmonary:      Effort: He is intubated.      Breath sounds: Examination of the right-lower field reveals rales. Rales present. No wheezing or rhonchi.   Abdominal:      General: Bowel sounds are normal.      Comments: Patient proned for exam   Genitourinary:     Comments: Kelley in place with hematuria.   Musculoskeletal:      Right lower le+ Edema present.      Left lower le+ Edema present.   Skin:     General: Skin is warm and dry.      Capillary Refill: Capillary refill takes 2 to 3 seconds.      Coloration: Skin is not mottled.   Neurological:      Mental Status: He is unresponsive.      GCS: GCS eye subscore is 1. GCS verbal subscore is 1. GCS motor subscore is 1.      Comments: Sedated and paralyzed.          Vents:  Vent Mode: A/C (20)  Set Rate: 36 BPM (20)  Vt Set: 470 mL (20)  PEEP/CPAP: 10 cmH20 (20)  Oxygen Concentration (%): 100 (20 1800)  Peak Airway Pressure: 34 cmH2O (20)  Plateau Pressure: 36 cmH20 (20)  Total Ve: 19 mL (20)  F/VT Ratio<105 (RSBI): (!) 68.05 (20)  Lines/Drains/Airways     Central Venous Catheter Line            Percutaneous Central Line Insertion/Assessment - Triple Lumen  10/26/20 1940 right internal jugular 13 days    Trialysis (Dialysis) Catheter 20 1914 left internal jugular 3 days          Drain                 Urethral Catheter 10/31/20 1300 Non-latex;Straight-tip 16 Fr. 8 days         NG/OG Tube 20 1456 orogastric 14 Fr. Left mouth 6 days         Rectal Tube 20 0230 rectal tube w/ balloon (indicate number of mLs) 3 days          Airway                 Airway - Non-Surgical 10/26/20 1820 Endotracheal Tube 13 days       Airway Anesthesia 10/26/20 13 days          Arterial Line            Arterial Line 10/30/20 1556 Right Radial 9 days              Significant Labs:    CBC/Anemia Profile:  Recent Labs   Lab 20  1752  11/08/20 0415   WBC 51.72* 44.03*   HGB 9.3* 8.5*   HCT 29.5* 26.2*   * 249   * 103*   RDW 14.2 14.6*        Chemistries:  Recent Labs   Lab 11/07/20  0412 11/08/20  0012 11/08/20 0415 11/08/20  1056 11/08/20  1517   *  134*  134*   < > 134*  134*  134* 135*  135*  135*  134* 136 138  138  138  138   K 5.1  5.1  5.1   < > 5.0  5.0  5.0 5.1  5.1  5.1  5.1 5.0 4.8  4.8  4.8  4.7     102  102   < > 102  102  102 103  103  103  102 105 100  100  100  100   CO2 19*  19*  19*   < > 20*  20*  20* 19*  19*  19*  19* 19* 27  27  27  27   BUN 20  21*  21*   < > 40*  40*  40* 29*  29*  29*  29* 17 12  12  12  12   CREATININE 1.7*  1.7*  1.7*   < > 2.4*  2.4*  2.4* 1.9*  1.9*  1.9*  1.9* 1.2 1.0  1.0  1.0  1.0   CALCIUM 7.8*  7.9*  7.9*   < > 7.6*  7.6*  7.6* 7.5*  7.5*  7.5*  7.5* 7.4* 7.2*  7.2*  7.2*  7.0*   ALBUMIN 2.0*  2.0*   < > 2.0*  2.0* 2.0*  2.0*  2.0*  --  1.9*  1.9*  1.9*   PROT 7.1  --   --  6.4  --   --    BILITOT 0.8  --   --  0.7  --   --    ALKPHOS 140*  --   --  115  --   --    ALT 51*  --   --  44  --   --    AST 73*  --   --  86*  --   --    MG 1.9  1.9   < > 2.6  2.6 2.2  2.2  2.2  --  1.8  1.8  1.8   PHOS 3.9  3.8   < > 5.2*  5.2* 4.6*  4.6*  --  3.1  3.1  3.1    < > = values in this interval not displayed.       ABGs:   Recent Labs   Lab 11/08/20  1531   PH 7.291*   PCO2 64.3*   HCO3 31.0*   POCSATURATED 83*   BE 4       Significant Imaging:  CXR: I have reviewed all pertinent results/findings within the past 24 hours and my personal findings are:  minimally improvement from previous CXR

## 2020-11-09 NOTE — PROGRESS NOTES
[facility] - ICU 16   Critical Care Medicine  Progress Note    Patient Name: Nubia Riggs  MRN: [id number]  Admission Date: 10/18/2020  Hospital Length of Stay: 22 days  Code Status: Partial Code  Attending Provider: Arias Thorpe MD  Primary Care Provider: Primary Doctor No   Principal Problem: Acute respiratory distress syndrome (ARDS) due to 2019 novel coronavirus    Subjective:     HPI:  55-year-old male with PMhx of GERD, allergic rinhitis, chronic cough (recently saw pulm and was diagnosed with reactive airway disease, on albuterol PRN) presented the emergency department on 10/18 with chief complaint of shortness of breath. He reports subjective fever, headache, chills, myalgias, poor appetite, and sweats about 6 days ago. He was diagnosed with COVID-19 on 10/15.  Reports presenting to urgent care 10/16 due to 89% pulse ox on room air. Started on levofloxacin and discharged home.  Reports using albuterol treatments at home without much relief.He noticed to have cough productive of blood tinged sputum and shortness of breath so he presented to the ED.     He received ceftria/azithro (x1 each in the ED). He started Remdesivir and completed 5 doses on 10/22, continues on dexamethasone. Patient is on his 7th day of hospital admission and has been requiring higher doses of O2 to keep sats >90%. Today he went up to 60L on 100% FiO2 and thus Critical Care was consulted.       Hospital/ICU Course:  Patient admitted to RICU due to worsening respiratory status with increasing oxygen requirements. Completion of remdesivir on 10/22. On BiPAP with O2 saturation in high 70s-low 80s at rest and acute desaturation to 40s when biPAP removed to provide PO medications. He required intubation on 10/26. Central line and arterial line placed. Proning initiated on 10/27. YOVANI developed with inital response to lasix however, not clearing as hyperkalemia persists. UO decreasing on 10/30 despite multiple lasix doses,  and concern for hematuria, CBC sent. Nephrology consult placed. Blood and sputum cultures on 10/30 NGTD, lactate normal, worsening WBC count. Heparin held overnight for hematuria and blood in sputum, restarted 10/31 for cessation of both. Vasopressor requirements improving, off vinay and vaso as of 11/01, WBC continues to increase, however not febrile and cultures are negative. Added fluconazole 11/1. Febrile on 11/4 with suspected aspiration event. Increase in FiO2 and pressor requirements. Oxygen saturations remained in the low 80's for most of the day. Family updated and code status changed to no shocks, no compressions. RRT started the evening of 11/4. Proned 36 hours for desaturations; difficult to increase PEEP as plateau pressures increase. Patient's significant other agreed to convalescent plasma; 2 units ordered 11/7/20.  CRRT clotted off 11/7 also, so citrate added per nephrology. ID recommended adjusting antimicrobial therapy to micafungin (from fluconazole), in addition to meropenam and renally dosed vancomycin in context of increased leukocytosis and increasing vasopressor support. ID also recommended CDiff panel (returned negative) and obtaining respiratory cultures. Feet cool to touch and weak pulses so dopplers ordered which showed no stenosis. He had a large clot removed from his CRRT line 11/7 which improved the RRt flow.     Interval History/Significant Events: Patient supinated at 4 pm 11/08. NAEO. Patient remains paralyzed, sedated, and on pressors.   Review of Systems   Unable to perform ROS: Intubated     Objective:     Vital Signs (Most Recent):  Temp: 97.7 °F (36.5 °C) (11/09/20 0840)  Pulse: 91 (11/09/20 0840)  Resp: (!) 36 (11/09/20 0840)  BP: 104/63 (11/09/20 0800)  SpO2: (!) 91 % (11/09/20 0840) Vital Signs (24h Range):  Temp:  [97 °F (36.1 °C)-99.7 °F (37.6 °C)] 97.7 °F (36.5 °C)  Pulse:  [] 91  Resp:  [36] 36  SpO2:  [84 %-98 %] 91 %  BP: ()/(51-63) 104/63  Arterial Line BP:  ()/(47-55) 126/52   Weight: 84.4 kg (186 lb)  Body mass index is 26.69 kg/m².      Intake/Output Summary (Last 24 hours) at 2020 0942  Last data filed at 2020 0600  Gross per 24 hour   Intake 5836.49 ml   Output 6511 ml   Net -674.51 ml       Physical Exam  Vitals signs and nursing note reviewed.   Constitutional:       General: He is not in acute distress.     Appearance: Normal appearance. He is ill-appearing.      Interventions: He is sedated, chemically paralyzed and intubated.   HENT:      Mouth/Throat:      Lips: Lesions present.      Mouth: Mucous membranes are moist.      Comments: Upper and lower lips with breakdown noted.   Eyes:      Comments: Taped for protection during paralysis   Cardiovascular:      Rate and Rhythm: Regular rhythm. Tachycardia present.      Pulses:           Radial pulses are 2+ on the right side and 2+ on the left side.        Dorsalis pedis pulses are 2+ on the right side and 2+ on the left side.      Heart sounds: Normal heart sounds.      Comments: Bilateral upper extremity edema 2+  Poor pulses in right PT, DP  Cool to touch right foot with darkening noted in toes on right and left.   Pulmonary:      Effort: He is intubated.      Breath sounds: Examination of the right-lower field reveals rales. Rales present. No wheezing or rhonchi.   Abdominal:      General: Bowel sounds are normal.      Comments: Patient proned for exam   Genitourinary:     Comments: Kelley in place with hematuria.   Musculoskeletal:      Right lower le+ Edema present.      Left lower le+ Edema present.   Skin:     General: Skin is warm and dry.      Capillary Refill: Capillary refill takes 2 to 3 seconds.      Coloration: Skin is not mottled.   Neurological:      Mental Status: He is unresponsive.      GCS: GCS eye subscore is 1. GCS verbal subscore is 1. GCS motor subscore is 1.      Comments: Sedated and paralyzed.          Vents:  Vent Mode: A/C (20 0840)  Set Rate: 36 BPM  (11/09/20 0840)  Vt Set: 470 mL (11/09/20 0840)  PEEP/CPAP: 12 cmH20 (11/09/20 0840)  Oxygen Concentration (%): 100 (11/09/20 0840)  Peak Airway Pressure: 38 cmH2O (11/09/20 0840)  Plateau Pressure: 36 cmH20 (11/09/20 0840)  Total Ve: 19.2 mL (11/09/20 0840)  F/VT Ratio<105 (RSBI): (!) 67.42 (11/09/20 0840)  Lines/Drains/Airways     Central Venous Catheter Line            Percutaneous Central Line Insertion/Assessment - Triple Lumen  10/26/20 1940 right internal jugular 13 days    Trialysis (Dialysis) Catheter 11/04/20 1914 left internal jugular 4 days          Drain                 Urethral Catheter 10/31/20 1300 Non-latex;Straight-tip 16 Fr. 8 days         NG/OG Tube 11/02/20 1456 orogastric 14 Fr. Left mouth 6 days         Rectal Tube 11/05/20 0230 rectal tube w/ balloon (indicate number of mLs) 4 days          Airway                 Airway - Non-Surgical 10/26/20 1820 Endotracheal Tube 13 days       Airway Anesthesia 10/26/20 13 days          Arterial Line            Arterial Line 10/30/20 1556 Right Radial 9 days              Significant Labs:    CBC/Anemia Profile:  Recent Labs   Lab 11/08/20  0415 11/09/20  0405   WBC 44.03* 41.29*   HGB 8.5* 7.5*   HCT 26.2* 23.1*    221   * 103*   RDW 14.6* 16.0*        Chemistries:  Recent Labs   Lab 11/08/20  0415  11/08/20  2315 11/09/20  0405 11/09/20  0411   *  135*  135*  134*   < > 136  136  136  136 136 136  136  136  136   K 5.1  5.1  5.1  5.1   < > 4.5  4.5  4.5  4.5 4.3 4.4  4.4  4.4  4.4     103  103  102   < > 96  96  96  96 95 96  96  96  96   CO2 19*  19*  19*  19*   < > 31*  31*  31*  31* 28 28  28  28  28   BUN 29*  29*  29*  29*   < > 11  11  11  11 14 15  15  15  15   CREATININE 1.9*  1.9*  1.9*  1.9*   < > 0.9  0.9  0.9  0.9 1.1 1.1  1.1  1.1  1.1   CALCIUM 7.5*  7.5*  7.5*  7.5*   < > 6.9*  6.9*  6.9*  6.9* 7.0* 6.9*  6.9*  6.9*  6.9*   ALBUMIN 2.0*  2.0*  2.0*    < > 1.9*  1.9*  1.9* 1.9* 1.9*  1.9*  1.9*   PROT 6.4  --   --  6.0  --    BILITOT 0.7  --   --  0.8  --    ALKPHOS 115  --   --  115  --    ALT 44  --   --  42  --    AST 86*  --   --  85*  --    MG 2.2  2.2  2.2   < > 1.8  1.8  1.8 2.2 2.3  2.3  2.3   PHOS 4.6*  4.6*   < > 2.2*  2.2*  2.2*  2.3* 3.8 3.8  3.8  3.8    < > = values in this interval not displayed.       ABGs:   Recent Labs   Lab 11/09/20  0327   PH 7.323*   PCO2 62.4*   HCO3 32.4*   POCSATURATED 96   BE 6       Significant Imaging:  I have reviewed all pertinent imaging results/findings within the past 24 hours.      ABG  Recent Labs   Lab 11/09/20  0327   PH 7.323*   PO2 92   PCO2 62.4*   HCO3 32.4*   BE 6     Assessment/Plan:     Psychiatric  Anxiety  Holding home vilazodone due heavy sedation administration      Pulmonary  Aspiration pneumonia of left lung due to vomit  Suspected aspiration event on 11/4. Pt subsequently febrile to 100.8F with WBC that continues to trend up. CXR with worsening L-sided opacity. Cultures drawn on 11/3 with NGTD. Zosyn escalated to meropenem.     - Continue meropenem, vanc, and micafungin   - ID suggested CT Chest and bronchoscopy for sputum culture if stable enough (would order aspergillus antigen)   - Need confirm NGT placement prior to restarting TF 11/8; will obtain when supine     Reactive airway disease  Follows with Pulm as outpatient. Last seen 8/27/20, told he had RAD  He was also seen by a OSH pulmonologist who did spirometry that reportedly showed borderline or mild asthma, and has been controlled with albuterol.   Former smoker, started at age 10-12 and quit 10 years ago    -- Continue scheduled duo nebs    Renal/  Hypernatremia  Likely secondary to TFs +/- dehydration from diuresis    -- emesis noted with free water flushes  -- improved with D5W    -- TF held since 11/5; consider resuming to intermittent suction 11/9    YOVANI (acute kidney injury)  No history of prior kidney disease; sCr  on admission 0.8 -->2.6 as of 10/30, with UO less responsive to multiple doses of 40 mg lasix which he was initially responsive to; however, BUN rising and he is not clearing in his urine. Retroperitoneal u/s with bilateral medical renal diseased and no hydronephrosis. Fe Urea 26.4% (<35%) indicative of prerenal disease. Clotted off CRRT 11/7, nephrology contacted and will adjust citrate    -- nephrology following, appreciate assistance   -- RRT started 11/4  -- Avoid ACEI/ARB/NSAIDS/Nephrotoxins.   -- Renally dose all meds  -- Continue to assess CRRT for clotting    ID  Septic shock  Likely 2/2 aspiration on 11/4. Febrile, WBC increased to 40K but down to 36 on 11/6. CXR with left sided consolidation. Increased FiO2 and vasopressor requirements.      -- donta, vanc, micafungin (ID following)  -- blood and sputum cultures 11/3 with NGTD 11/8  -- titrate norepi for MAP >65  -- vaso and SDS added   -- CDiff checked 11/7 - negative    Other  * Acute respiratory distress syndrome (ARDS) due to 2019 novel coronavirus  COVID positive on 10/15. Patient admitted to hospital medicine and continued to deteriorate needing higher oxygen requirements and thus Critical Care Medicine was consulted. Intubated 10/26 and eventually placed on NMB and proned. Peak and plateau pressures remain at goal    - s/p remdesivir (10/18-10/22)  - s/p Dexamethasone (10/27)  - s/p vanc x7 days (end date 11/01)  - duo nebs q4 scheduled and q6 PRN  - continue Heparin gtt D-dimer >33  - continue LPV and pronation for PF <150   - received convalescent plasma 2 units 11/7  - Sputum culture sent 10/28 NGTD, reordered 10/30 for leukocytosis and febrile all negative; reordered 11/7 per ID (increasing WBC)  - Zosyn escalated to donta. Fluconazole escalated to micafungun 11/7. Vancomycin restarted and renally dosed.  - Suspect aspiration event 11/4 with worsening oxygenation.    - CTS evaluated; not a candidate for ECMO     - Jessica started  11/4      COVID-19  See resp failure    Palliative care encounter  Palliative consulted for potential for prolonged hospitalization and following along.     Acute hypoxemic respiratory failure due to COVID-19  Secondary to COVID (see above)    Pneumonia due to COVID-19 virus  See ARDS         Critical Care Daily Checklist:    A: Awake: RASS Goal/Actual Goal: RASS Goal: -5-->unarousable  Actual: Chang Agitation Sedation Scale (RASS): Unarousable   B: Spontaneous Breathing Trial Performed?     C: SAT & SBT Coordinated?  N/A                      D: Delirium: CAM-ICU Overall CAM-ICU: Positive   E: Early Mobility Performed? No   F: Feeding Goal: Goals: Pt to meet >75% EEN and EPN by RD follow up.  Status: Nutrition Goal Status: goal not met   Current Diet Order   Procedures    Diet NPO      AS: Analgesia/Sedation Yes   T: Thromboembolic Prophylaxis Yes   H: HOB > 300 No   U: Stress Ulcer Prophylaxis (if needed) Yes   G: Glucose Control Yes   B: Bowel Function Stool Occurrence: 1   I: Indwelling Catheter (Lines & Kelley) Necessity Yes   D: De-escalation of Antimicrobials/Pharmacotherapies No    Plan for the day/ETD Supinate    Code Status:  Family/Goals of Care: Partial Code         Critical care was time spent personally by me on the following activities: development of treatment plan with patient or surrogate and bedside caregivers, discussions with consultants, evaluation of patient's response to treatment, examination of patient, ordering and performing treatments and interventions, ordering and review of laboratory studies, ordering and review of radiographic studies, pulse oximetry, re-evaluation of patient's condition. This critical care time did not overlap with that of any other provider or involve time for any procedures.     Tera Law MD  Critical Care Medicine  [facility] - ICU 16 WT

## 2020-11-09 NOTE — SUBJECTIVE & OBJECTIVE
Interval History:     No changes.  Remains critically ill.    Review of Systems   Unable to perform ROS: Acuity of condition     Objective:     Vital Signs (Most Recent):  Temp: 99.3 °F (37.4 °C) (11/08/20 1800)  Pulse: 107 (11/08/20 1800)  Resp: (!) 36 (11/08/20 1800)  BP: (!) 90/51 (11/08/20 1600)  SpO2: 95 % (11/08/20 1800) Vital Signs (24h Range):  Temp:  [96.1 °F (35.6 °C)-100.8 °F (38.2 °C)] 99.3 °F (37.4 °C)  Pulse:  [] 107  Resp:  [36] 36  SpO2:  [84 %-100 %] 95 %  BP: ()/(51-57) 90/51  Arterial Line BP: ()/() 97/50     Weight: 84.4 kg (186 lb)  Body mass index is 26.69 kg/m².    Estimated Creatinine Clearance: 88.2 mL/min (based on SCr of 1 mg/dL).    Physical Exam  Vitals signs and nursing note reviewed.   Constitutional:       Comments: Sedated. Not responding   HENT:      Head: Normocephalic and atraumatic.      Mouth/Throat:      Comments: initubated  Eyes:      General: No scleral icterus.        Right eye: No discharge.         Left eye: No discharge.   Cardiovascular:      Rate and Rhythm: Normal rate and regular rhythm.      Heart sounds: No murmur.   Pulmonary:      Comments: On ventilator machine.  PEEP=10, MdG8=452  Abdominal:      General: There is no distension.      Palpations: Abdomen is soft.   Genitourinary:     Comments: Kelley catheter in place.  Rectal tube in place.  Musculoskeletal:         General: No swelling, deformity or signs of injury.   Skin:     General: Skin is warm and dry.   Neurological:      Comments: Sedated, paralyzed.         Significant Labs:   Microbiology Results (last 7 days)     Procedure Component Value Units Date/Time    Blood culture [[id number]] Collected: 11/03/20 0904    Order Status: Completed Specimen: Blood from Peripheral, Upper Arm, Right Updated: 11/08/20 1022     Blood Culture, Routine No growth after 5 days.    Narrative:      Blood cultures from 2 different sites. 4 bottles total.  Please draw before starting antibiotics.     Blood culture [[id number]] Collected: 11/03/20 0904    Order Status: Completed Specimen: Blood from Peripheral, Antecubital, Left Updated: 11/08/20 1022     Blood Culture, Routine No growth after 5 days.    Narrative:      Blood cultures x 2 different sites. 4 bottles total. Please  draw cultures before administering antibiotics.    Clostridium difficile EIA [[id number]] Collected: 11/07/20 1102    Order Status: Completed Specimen: Stool Updated: 11/08/20 0559     C. diff Antigen Negative     C difficile Toxins A+B, EIA Negative     Comment: Testing not recommended for children <24 months old.       Culture, Respiratory with Gram Stain [[id number]] Collected: 11/07/20 1659    Order Status: Completed Specimen: Respiratory from Bronchial Wash Updated: 11/08/20 0140     Gram Stain (Respiratory) <10 epithelial cells per low power field.     Gram Stain (Respiratory) No WBC's or organisms seen    Culture, Respiratory with Gram Stain [[id number]] Collected: 11/03/20 0933    Order Status: Completed Specimen: Respiratory from Endotracheal Aspirate Updated: 11/05/20 1055     Respiratory Culture Normal respiratory alejandra      No S aureus or Pseudomonas isolated.     Gram Stain (Respiratory) <10 epithelial cells per low power field.     Gram Stain (Respiratory) No WBC's     Gram Stain (Respiratory) No organisms seen    Blood culture [[id number]] Collected: 10/30/20 1011    Order Status: Completed Specimen: Blood from Peripheral, Forearm, Left Updated: 11/04/20 1212     Blood Culture, Routine No growth after 5 days.    Narrative:      Blood cultures x 2 different sites. 4 bottles total. Please  draw cultures before administering antibiotics.    Blood culture [[id number]] Collected: 10/30/20 1015    Order Status: Completed Specimen: Blood from Peripheral, Upper Arm, Right Updated: 11/04/20 1212     Blood Culture, Routine No growth after 5 days.    Narrative:      Blood cultures from 2 different sites. 4 bottles total.  Please draw  before starting antibiotics.    Culture, Respiratory with Gram Stain [[id number]] Collected: 10/30/20 1040    Order Status: Completed Specimen: Respiratory from Endotracheal Aspirate Updated: 11/02/20 1106     Respiratory Culture Normal respiratory alejandra     Gram Stain (Respiratory) Rare WBC's     Gram Stain (Respiratory) No organisms seen    Narrative:      Mini-BAL. Before antibiotics.    Blood culture [[id number]] Collected: 10/27/20 2011    Order Status: Completed Specimen: Blood from Peripheral, Forearm, Right Updated: 11/01/20 2212     Blood Culture, Routine No growth after 5 days.    Narrative:      Blood cultures from 2 different sites. 4 bottles total.  Please draw before starting antibiotics.    Blood culture [[id number]] Collected: 10/27/20 2011    Order Status: Completed Specimen: Blood from Peripheral, Forearm, Left Updated: 11/01/20 2212     Blood Culture, Routine No growth after 5 days.    Narrative:      Blood cultures x 2 different sites. 4 bottles total. Please  draw cultures before administering antibiotics.          Significant Imaging: I have reviewed all pertinent imaging results/findings within the past 24 hours.

## 2020-11-09 NOTE — SUBJECTIVE & OBJECTIVE
Interval History/Significant Events: Patient supinated at 4 pm 11/08. NAEO. Patient remains paralyzed, sedated, and on pressors.   Review of Systems   Unable to perform ROS: Intubated     Objective:     Vital Signs (Most Recent):  Temp: 97.7 °F (36.5 °C) (11/09/20 0840)  Pulse: 91 (11/09/20 0840)  Resp: (!) 36 (11/09/20 0840)  BP: 104/63 (11/09/20 0800)  SpO2: (!) 91 % (11/09/20 0840) Vital Signs (24h Range):  Temp:  [97 °F (36.1 °C)-99.7 °F (37.6 °C)] 97.7 °F (36.5 °C)  Pulse:  [] 91  Resp:  [36] 36  SpO2:  [84 %-98 %] 91 %  BP: ()/(51-63) 104/63  Arterial Line BP: ()/(47-55) 126/52   Weight: 84.4 kg (186 lb)  Body mass index is 26.69 kg/m².      Intake/Output Summary (Last 24 hours) at 11/9/2020 0942  Last data filed at 11/9/2020 0600  Gross per 24 hour   Intake 5836.49 ml   Output 6511 ml   Net -674.51 ml       Physical Exam  Vitals signs and nursing note reviewed.   Constitutional:       General: He is not in acute distress.     Appearance: Normal appearance. He is ill-appearing.      Interventions: He is sedated, chemically paralyzed and intubated.   HENT:      Mouth/Throat:      Lips: Lesions present.      Mouth: Mucous membranes are moist.      Comments: Upper and lower lips with breakdown noted.   Eyes:      Comments: Taped for protection during paralysis   Cardiovascular:      Rate and Rhythm: Regular rhythm. Tachycardia present.      Pulses:           Radial pulses are 2+ on the right side and 2+ on the left side.        Dorsalis pedis pulses are 2+ on the right side and 2+ on the left side.      Heart sounds: Normal heart sounds.      Comments: Bilateral upper extremity edema 2+  Poor pulses in right PT, DP  Cool to touch right foot with darkening noted in toes on right and left.   Pulmonary:      Effort: He is intubated.      Breath sounds: Examination of the right-lower field reveals rales. Rales present. No wheezing or rhonchi.   Abdominal:      General: Bowel sounds are normal.       Comments: Patient proned for exam   Genitourinary:     Comments: Kelley in place with hematuria.   Musculoskeletal:      Right lower le+ Edema present.      Left lower le+ Edema present.   Skin:     General: Skin is warm and dry.      Capillary Refill: Capillary refill takes 2 to 3 seconds.      Coloration: Skin is not mottled.   Neurological:      Mental Status: He is unresponsive.      GCS: GCS eye subscore is 1. GCS verbal subscore is 1. GCS motor subscore is 1.      Comments: Sedated and paralyzed.          Vents:  Vent Mode: A/C (20)  Set Rate: 36 BPM (20)  Vt Set: 470 mL (20)  PEEP/CPAP: 12 cmH20 (20)  Oxygen Concentration (%): 100 (20)  Peak Airway Pressure: 38 cmH2O (20)  Plateau Pressure: 36 cmH20 (20)  Total Ve: 19.2 mL (20)  F/VT Ratio<105 (RSBI): (!) 67.42 (20)  Lines/Drains/Airways     Central Venous Catheter Line            Percutaneous Central Line Insertion/Assessment - Triple Lumen  10/26/20 1940 right internal jugular 13 days    Trialysis (Dialysis) Catheter 20 1914 left internal jugular 4 days          Drain                 Urethral Catheter 10/31/20 1300 Non-latex;Straight-tip 16 Fr. 8 days         NG/OG Tube 20 1456 orogastric 14 Fr. Left mouth 6 days         Rectal Tube 20 0230 rectal tube w/ balloon (indicate number of mLs) 4 days          Airway                 Airway - Non-Surgical 10/26/20 1820 Endotracheal Tube 13 days       Airway Anesthesia 10/26/20 13 days          Arterial Line            Arterial Line 10/30/20 1556 Right Radial 9 days              Significant Labs:    CBC/Anemia Profile:  Recent Labs   Lab 20  0415 20  0405   WBC 44.03* 41.29*   HGB 8.5* 7.5*   HCT 26.2* 23.1*    221   * 103*   RDW 14.6* 16.0*        Chemistries:  Recent Labs   Lab 20  0415  20  2315 20  0405 20  0411   *  135*  135*   134*   < > 136  136  136  136 136 136  136  136  136   K 5.1  5.1  5.1  5.1   < > 4.5  4.5  4.5  4.5 4.3 4.4  4.4  4.4  4.4     103  103  102   < > 96  96  96  96 95 96  96  96  96   CO2 19*  19*  19*  19*   < > 31*  31*  31*  31* 28 28  28  28  28   BUN 29*  29*  29*  29*   < > 11  11  11  11 14 15  15  15  15   CREATININE 1.9*  1.9*  1.9*  1.9*   < > 0.9  0.9  0.9  0.9 1.1 1.1  1.1  1.1  1.1   CALCIUM 7.5*  7.5*  7.5*  7.5*   < > 6.9*  6.9*  6.9*  6.9* 7.0* 6.9*  6.9*  6.9*  6.9*   ALBUMIN 2.0*  2.0*  2.0*   < > 1.9*  1.9*  1.9* 1.9* 1.9*  1.9*  1.9*   PROT 6.4  --   --  6.0  --    BILITOT 0.7  --   --  0.8  --    ALKPHOS 115  --   --  115  --    ALT 44  --   --  42  --    AST 86*  --   --  85*  --    MG 2.2  2.2  2.2   < > 1.8  1.8  1.8 2.2 2.3  2.3  2.3   PHOS 4.6*  4.6*   < > 2.2*  2.2*  2.2*  2.3* 3.8 3.8  3.8  3.8    < > = values in this interval not displayed.       ABGs:   Recent Labs   Lab 11/09/20  0327   PH 7.323*   PCO2 62.4*   HCO3 32.4*   POCSATURATED 96   BE 6       Significant Imaging:  I have reviewed all pertinent imaging results/findings within the past 24 hours.

## 2020-11-09 NOTE — PROGRESS NOTES
[facility] - ICU 16   Critical Care Medicine  Progress Note    Patient Name: Nubia Riggs  MRN: [id number]  Admission Date: 10/18/2020  Hospital Length of Stay: 21 days  Code Status: Partial Code  Attending Provider: Parminder Marin*  Primary Care Provider: Primary Doctor No   Principal Problem: Acute respiratory distress syndrome (ARDS) due to 2019 novel coronavirus    Subjective:     HPI:  55-year-old male with PMhx of GERD, allergic rinhitis, chronic cough (recently saw pulm and was diagnosed with reactive airway disease, on albuterol PRN) presented the emergency department on 10/18 with chief complaint of shortness of breath. He reports subjective fever, headache, chills, myalgias, poor appetite, and sweats about 6 days ago. He was diagnosed with COVID-19 on 10/15.  Reports presenting to urgent care 10/16 due to 89% pulse ox on room air. Started on levofloxacin and discharged home.  Reports using albuterol treatments at home without much relief.He noticed to have cough productive of blood tinged sputum and shortness of breath so he presented to the ED.     He received ceftria/azithro (x1 each in the ED). He started Remdesivir and completed 5 doses on 10/22, continues on dexamethasone. Patient is on his 7th day of hospital admission and has been requiring higher doses of O2 to keep sats >90%. Today he went up to 60L on 100% FiO2 and thus Critical Care was consulted.       Hospital/ICU Course:  Patient admitted to RICU due to worsening respiratory status with increasing oxygen requirements. Completion of remdesivir on 10/22. On BiPAP with O2 saturation in high 70s-low 80s at rest and acute desaturation to 40s when biPAP removed to provide PO medications. He required intubation on 10/26. Central line and arterial line placed. Proning initiated on 10/27. YOVANI developed with inital response to lasix however, not clearing as hyperkalemia persists. UO decreasing on 10/30 despite multiple lasix  doses, and concern for hematuria, CBC sent. Nephrology consult placed. Blood and sputum cultures on 10/30 NGTD, lactate normal, worsening WBC count. Heparin held overnight for hematuria and blood in sputum, restarted 10/31 for cessation of both. Vasopressor requirements improving, off vinay and vaso as of 11/01, WBC continues to increase, however not febrile and cultures are negative. Added fluconazole 11/1. Febrile on 11/4 with suspected aspiration event. Increase in FiO2 and pressor requirements. Oxygen saturations remained in the low 80's for most of the day. Family updated and code status changed to no shocks, no compressions. RRT started the evening of 11/4. Proned 36 hours for desaturations; difficult to increase PEEP as plateau pressures increase. Patient's significant other agreed to convalescent plasma; 2 units ordered 11/7/20.  CRRT clotted off 11/7 also, so citrate added per nephrology. ID recommended adjusting antimicrobial therapy to micafungin (from fluconazole), in addition to meropenam and renally dosed vancomycin in context of increased leukocytosis and increasing vasopressor support. ID also recommended CDiff panel (returned negative) and obtaining respiratory cultures. Feet cool to touch and weak pulses so dopplers ordered which showed no stenosis. He had a large clot removed from his CRRT line 11/7 which improved the RRt flow.     Interval History/Significant Events: Fevers improved today. Supine 10a - 4p then proned. He improved in regards to pressor support and ventolator support for several hours then required an increase in PEEP to support SpO2. Continues on CRRT, paralytics, sedation. Will need TF resumed in next day or two (marsha need to check NGT placement prior to starting).     Review of Systems   Unable to perform ROS: Intubated     Objective:     Vital Signs (Most Recent):  Temp: 99.3 °F (37.4 °C) (11/08/20 1800)  Pulse: 107 (11/08/20 1800)  Resp: (!) 36 (11/08/20 1800)  BP: (!) 90/51  (20 1600)  SpO2: 95 % (20 1800) Vital Signs (24h Range):  Temp:  [96.1 °F (35.6 °C)-100.8 °F (38.2 °C)] 99.3 °F (37.4 °C)  Pulse:  [] 107  Resp:  [36] 36  SpO2:  [84 %-100 %] 95 %  BP: ()/(51-57) 90/51  Arterial Line BP: ()/() 97/50   Weight: 84.4 kg (186 lb)  Body mass index is 26.69 kg/m².      Intake/Output Summary (Last 24 hours) at 2020 1847  Last data filed at 2020 1800  Gross per 24 hour   Intake 6089.21 ml   Output 5684 ml   Net 405.21 ml       Physical Exam  Vitals signs and nursing note reviewed.   Constitutional:       General: He is not in acute distress.     Appearance: Normal appearance. He is ill-appearing.      Interventions: He is sedated, chemically paralyzed and intubated.   HENT:      Mouth/Throat:      Lips: Lesions present.      Mouth: Mucous membranes are moist.      Comments: Upper and lower lips with breakdown noted.   Eyes:      Comments: Taped for protection during paralysis   Cardiovascular:      Rate and Rhythm: Regular rhythm. Tachycardia present.      Pulses:           Radial pulses are 2+ on the right side and 2+ on the left side.        Dorsalis pedis pulses are 2+ on the right side and 2+ on the left side.      Heart sounds: Normal heart sounds.      Comments: Bilateral upper extremity edema 2+  Poor pulses in right PT, DP  Cool to touch right foot with darkening noted in toes on right and left.   Pulmonary:      Effort: He is intubated.      Breath sounds: Examination of the right-lower field reveals rales. Rales present. No wheezing or rhonchi.   Abdominal:      General: Bowel sounds are normal.      Comments: Patient proned for exam   Genitourinary:     Comments: Kelley in place with hematuria.   Musculoskeletal:      Right lower le+ Edema present.      Left lower le+ Edema present.   Skin:     General: Skin is warm and dry.      Capillary Refill: Capillary refill takes 2 to 3 seconds.      Coloration: Skin is not mottled.    Neurological:      Mental Status: He is unresponsive.      GCS: GCS eye subscore is 1. GCS verbal subscore is 1. GCS motor subscore is 1.      Comments: Sedated and paralyzed.          Vents:  Vent Mode: A/C (11/08/20 1531)  Set Rate: 36 BPM (11/08/20 1531)  Vt Set: 470 mL (11/08/20 1531)  PEEP/CPAP: 10 cmH20 (11/08/20 1531)  Oxygen Concentration (%): 100 (11/08/20 1800)  Peak Airway Pressure: 34 cmH2O (11/08/20 1531)  Plateau Pressure: 36 cmH20 (11/08/20 1531)  Total Ve: 19 mL (11/08/20 1531)  F/VT Ratio<105 (RSBI): (!) 68.05 (11/08/20 1531)  Lines/Drains/Airways     Central Venous Catheter Line            Percutaneous Central Line Insertion/Assessment - Triple Lumen  10/26/20 1940 right internal jugular 13 days    Trialysis (Dialysis) Catheter 11/04/20 1914 left internal jugular 3 days          Drain                 Urethral Catheter 10/31/20 1300 Non-latex;Straight-tip 16 Fr. 8 days         NG/OG Tube 11/02/20 1456 orogastric 14 Fr. Left mouth 6 days         Rectal Tube 11/05/20 0230 rectal tube w/ balloon (indicate number of mLs) 3 days          Airway                 Airway - Non-Surgical 10/26/20 1820 Endotracheal Tube 13 days       Airway Anesthesia 10/26/20 13 days          Arterial Line            Arterial Line 10/30/20 1556 Right Radial 9 days              Significant Labs:    CBC/Anemia Profile:  Recent Labs   Lab 11/07/20 0412 11/08/20 0415   WBC 51.72* 44.03*   HGB 9.3* 8.5*   HCT 29.5* 26.2*   * 249   * 103*   RDW 14.2 14.6*        Chemistries:  Recent Labs   Lab 11/07/20  0412  11/08/20  0012 11/08/20  0415 11/08/20  1056 11/08/20  1517   *  134*  134*   < > 134*  134*  134* 135*  135*  135*  134* 136 138  138  138  138   K 5.1  5.1  5.1   < > 5.0  5.0  5.0 5.1  5.1  5.1  5.1 5.0 4.8  4.8  4.8  4.7     102  102   < > 102  102  102 103  103  103  102 105 100  100  100  100   CO2 19*  19*  19*   < > 20*  20*  20* 19*  19*  19*  19* 19*  27  27  27  27   BUN 20  21*  21*   < > 40*  40*  40* 29*  29*  29*  29* 17 12  12  12  12   CREATININE 1.7*  1.7*  1.7*   < > 2.4*  2.4*  2.4* 1.9*  1.9*  1.9*  1.9* 1.2 1.0  1.0  1.0  1.0   CALCIUM 7.8*  7.9*  7.9*   < > 7.6*  7.6*  7.6* 7.5*  7.5*  7.5*  7.5* 7.4* 7.2*  7.2*  7.2*  7.0*   ALBUMIN 2.0*  2.0*   < > 2.0*  2.0* 2.0*  2.0*  2.0*  --  1.9*  1.9*  1.9*   PROT 7.1  --   --  6.4  --   --    BILITOT 0.8  --   --  0.7  --   --    ALKPHOS 140*  --   --  115  --   --    ALT 51*  --   --  44  --   --    AST 73*  --   --  86*  --   --    MG 1.9  1.9   < > 2.6  2.6 2.2  2.2  2.2  --  1.8  1.8  1.8   PHOS 3.9  3.8   < > 5.2*  5.2* 4.6*  4.6*  --  3.1  3.1  3.1    < > = values in this interval not displayed.       ABGs:   Recent Labs   Lab 11/08/20  1531   PH 7.291*   PCO2 64.3*   HCO3 31.0*   POCSATURATED 83*   BE 4       Significant Imaging:  CXR: I have reviewed all pertinent results/findings within the past 24 hours and my personal findings are:  minimally improvement from previous CXR      ABG  Recent Labs   Lab 11/08/20  1531   PH 7.291*   PO2 55*   PCO2 64.3*   HCO3 31.0*   BE 4     Assessment/Plan:     Psychiatric  Anxiety  Holding home vilazodone due heavy sedation administration      Pulmonary  Aspiration pneumonia of left lung due to vomit  Suspected aspiration event on 11/4. Pt subsequently febrile to 100.8F with WBC that continues to trend up. CXR with worsening L-sided opacity. Cultures drawn on 11/3 with NGTD. Zosyn escalated to meropenem.     - Continue meropenem, vanc, and micafungin   - ID suggested CT Chest and bronchoscopy for sputum culture if stable enough (would order aspergillus antigen)   - Need confirm NGT placement prior to restarting TF 11/8    Reactive airway disease  Follows with Pulm as outpatient. Last seen 8/27/20, told he had RAD  He was also seen by a [facility] pulmonologist who did spirometry that reportedly showed borderline or mild  asthma, and has been controlled with albuterol.   Former smoker, started at age 10-12 and quit 10 years ago    -- Continue scheduled duo nebs    Renal/  Hypernatremia  Likely secondary to TFs +/- dehydration from diuresis    -- emesis noted with free water flushes  -- improved with D5W    -- TF held since 11/5; consider resuming to intermittent suction 11/9    YOVANI (acute kidney injury)  No history of prior kidney disease; sCr on admission 0.8 -->2.6 as of 10/30, with UO less responsive to multiple doses of 40 mg lasix which he was initially responsive to; however, BUN rising and he is not clearing in his urine. Retroperitoneal u/s with bilateral medical renal diseased and no hydronephrosis. Fe Urea 26.4% (<35%) indicative of prerenal disease. Clotted off CRRT 11/7, nephrology contacted and will adjust citrate    -- nephrology following, appreciate assistance   -- RRT started 11/4  -- Avoid ACEI/ARB/NSAIDS/Nephrotoxins.   -- Renally dose all meds  -- Continue to assess CRRT for clotting    ID  Septic shock  Likely 2/2 aspiration on 11/4. Febrile, WBC increased to 40K but down to 36 on 11/6. CXR with left sided consolidation. Increased FiO2 and vasopressor requirements.      -- donta, vanc, micafungin (ID following)  -- blood and sputum cultures 11/3 with NGTD 11/8  -- titrate norepi for MAP >65  -- vaso and SDS added   -- CDiff checked 11/7 - negative    Other  * Acute respiratory distress syndrome (ARDS) due to 2019 novel coronavirus  COVID positive on 10/15. Patient admitted to hospital medicine and continued to deteriorate needing higher oxygen requirements and thus Critical Care Medicine was consulted. Intubated 10/26 and eventually placed on NMB and proned. Peak and plateau pressures remain at goal    - s/p remdesivir (10/18-10/22)  - s/p Dexamethasone (10/27)  - s/p vanc x7 days (end date 11/01)  - duo nebs q4 scheduled and q6 PRN  - continue Heparin gtt D-dimer >33  - continue LPV and pronation for PF <150    - received convalescent plasma 2 units 11/7  - Sputum culture sent 10/28 NGTD, reordered 10/30 for leukocytosis and febrile all negative; reordered 11/7 per ID (increasing WBC)  - Zosyn escalated to donta. Fluconazole escalated to micafungun 11/7. Vancomycin restarted and renally dosed.  - Suspect aspiration event 11/4 with worsening oxygenation.    - CTS evaluated; not a candidate for ECMO     - Jessica started 11/4      COVID-19  See resp failure    Palliative care encounter  Palliative consulted for potential for prolonged hospitalization and following along.     Acute hypoxemic respiratory failure due to COVID-19  Secondary to COVID (see above)    Pneumonia due to COVID-19 virus  See ARDS          Critical Care Daily Checklist:     A: Awake: RASS Goal/Actual Goal: RASS Goal: -5-->unarousable  Actual: Chang Agitation Sedation Scale (RASS): Unarousable   B: Spontaneous Breathing Trial Performed?     C: SAT & SBT Coordinated?  n/a                      D: Delirium: CAM-ICU Overall CAM-ICU: Positive   E: Early Mobility Performed? No   F: Feeding Goal: Goals: Pt to meet >75% EEN and EPN by RD follow up.  Status: Nutrition Goal Status: goal not met         Current Diet Order   Procedures    Diet NPO       AS: Analgesia/Sedation Fentanyl and propofol   T: Thromboembolic Prophylaxis Heparin infusion   H: HOB > 300 No; proned   U: Stress Ulcer Prophylaxis (if needed) Yes; pepcid   G: Glucose Control yes   B: Bowel Function Stool Occurrence: 1   I: Indwelling Catheter (Lines & Kelley) Necessity Yes, L trialysis, R IJ   D: De-escalation of Antimicrobials/Pharmacotherapies When appropriate     Plan for the day/ETD Supine then prone     Code Status:  Family/Goals of Care: Partial Code  Discussed with attending          Critical secondary to Patient has a condition that poses threat to life and bodily function: Severe Respiratory Distress      Critical care was time spent personally by me on the following activities:  development of treatment plan with patient or surrogate and bedside caregivers, discussions with consultants, evaluation of patient's response to treatment, examination of patient, ordering and performing treatments and interventions, ordering and review of laboratory studies, ordering and review of radiographic studies, pulse oximetry, re-evaluation of patient's condition. This critical care time did not overlap with that of any other provider or involve time for any procedures.     Abi Hartman MD  Critical Care Medicine  [facility] - ICU 16 [facility]

## 2020-11-09 NOTE — ASSESSMENT & PLAN NOTE
Suspected aspiration event on 11/4. Pt subsequently febrile to 100.8F with WBC that continues to trend up. CXR with worsening L-sided opacity. Cultures drawn on 11/3 with NGTD. Zosyn escalated to meropenem.     - Continue meropenem, vanc, and micafungin   - ID suggested CT Chest and bronchoscopy for sputum culture if stable enough (would order aspergillus antigen)   - Need confirm NGT placement prior to restarting TF 11/8; will obtain when supine

## 2020-11-09 NOTE — ADDENDUM NOTE
Addendum  created 11/09/20 1233 by Reno Cook MD    Attestation recorded in Intraprocedure, Intraprocedure Attestations filed

## 2020-11-09 NOTE — SIGNIFICANT EVENT
11:00 AM   Patient supinated at 11:00 AM. He did not tolerate procedure. He became tachycardic, hypotensive, and hypoxic. Patient required maximal pressor support, bicarb, increasing vent suppot, and increasing nitric. Patient's wife contacted and updated. Wife stated she understands and would be at the patient's bedside ASAP.       4:30 PM  Met wife at bedside. Resumed ongoing GOC conversation following acute events of AM. I expressed my concern about patient's progressive decline and acute decompensations will likely increase in frequency. I expressed my concern that given his multi-organ failure and acute decompensations patient has a poor prognosis. Wife expressed her understanding and wished for continued maximal medical support and maneuvers.     5:30 PM   Updated by RN that patient became bradycardic. Wife asked RN not to escalate care further. Notifed by RN of patient cardiac arrest.       Tera Law MD/MS  [facility]   Internal Medicine, PGY-3

## 2020-11-09 NOTE — PROGRESS NOTES
[facility] - ICU 16 WT  Infectious Disease  Progress Note    Patient Name: Nubia Riggs  MRN: [id number]  Admission Date: 10/18/2020  Length of Stay: 21 days  Attending Physician: Parminder Marin*  Primary Care Provider: Primary Doctor No    Isolation Status: Airborne and Contact and Droplet, Special Contact  Assessment/Plan:      * Acute respiratory distress syndrome (ARDS) due to 2019 novel coronavirus  54 y/o male with no significant past medical history who was admitted to the hospital with respiratory failure due to COVID-19.  Per chart, he is s/p remdesivir and dexamethasone.  He has also received plasma infusion.  Continue supportive care.  Prognosis appears poor at this time.    Septic shock  54 y/o admitted with complications of COVID-19.  Septic shock likely related to COVID-19 infection.  He is requiring pressors, having high fevers, and his WBC count is increasing.  On exam today, he is prone and there is greenish emesis coming from his mouth.  He is at risk of aspiration and given his prolonged hospital course there is a risk of drug resistant organisms.   All cultures are negative at this time.  C diff testing was negative as well.  Suspect this is still related to his COVID infection.  His temperature curve is slightly better and his wbc dropped slightly since micafungin was added.  If cultures remain negative, then he may complete a 7 day course of meropenem and micafungin (stop date for both would be November 13).  Okay to discontinue vancomycin IV as he has had at least 11 days of therapy.    ID will sign off.  Re-consult with questions.        Anticipated Disposition: TBD    Thank you for your consult. I will sign off. Please contact us if you have any additional questions.    Fermin Hunt MD  Infectious Disease  [facility] - ICU 16 WT    Subjective:     Principal Problem:Acute respiratory distress syndrome (ARDS) due to 2019 novel coronavirus    HPI:   Oneil is a 54 yo M w/ a PMHx of GERD, allergic rhinitis, chronic cough, and reactive airway disease (prescribed albuterol PRN) who presented to the ED on 10/18 for worsening SOB and cough productive of blood-tinged sputum. He reported onset of fever, HA, chills, myalgias, decreased appetite, and sweats on 10/9 and a diagnosis of COVID on 10/15. On 10/16, he went to an urgent care because of O2 sats persistently ~89% and he was prescribed levofloxacin and sent home. At home, he used his albuterol without much relief.     On 10/18, [place] [facility]. He was given ceftriaxone and azithromycin x1 dose in the ED and then admitted to hospital medicine. He continued dexamethasone started remdesivir on 10/22 and completed 5 doses. By 10/24, his oxygen requirements were increasing steadily. Critical Care was consulted when he went up to 60L on 100% FiO2.     He was admitted to the RICU on 10/24 and intubated on 10/26. Since 10/24, he developed an YOVANI (now requiring CRRT) and he has had WBC counts trending up. Initially, he was afebrile and his lactate was WNL. Fluconazole initiated on 11/1. CRRT was initiated overnight on 11/4. He also had a suspected aspiration event, his T Max overnight was 100.8F, and CXR showed worsening L-sided opacity. He had an increase in FiO2 and pressor requirements with O2 sats consistently in the low 80s throughout the day. On 11/5, Zosyn was escalated to meropenem, Vanc was added, fluconazole was continued. Blood and Sputum cultures drawn earlier in his admission have continued to be unremarkable. Infectious Disease was consulted  Interval History:     No changes.  Remains critically ill.    Review of Systems   Unable to perform ROS: Acuity of condition     Objective:     Vital Signs (Most Recent):  Temp: 99.3 °F (37.4 °C) (11/08/20 1800)  Pulse: 107 (11/08/20 1800)  Resp: (!) 36 (11/08/20 1800)  BP: (!) 90/51 (11/08/20 1600)  SpO2: 95 % (11/08/20 1800) Vital Signs (24h Range):  Temp:   [96.1 °F (35.6 °C)-100.8 °F (38.2 °C)] 99.3 °F (37.4 °C)  Pulse:  [] 107  Resp:  [36] 36  SpO2:  [84 %-100 %] 95 %  BP: ()/(51-57) 90/51  Arterial Line BP: ()/() 97/50     Weight: 84.4 kg (186 lb)  Body mass index is 26.69 kg/m².    Estimated Creatinine Clearance: 88.2 mL/min (based on SCr of 1 mg/dL).    Physical Exam  Vitals signs and nursing note reviewed.   Constitutional:       Comments: Sedated. Not responding   HENT:      Head: Normocephalic and atraumatic.      Mouth/Throat:      Comments: initubated  Eyes:      General: No scleral icterus.        Right eye: No discharge.         Left eye: No discharge.   Cardiovascular:      Rate and Rhythm: Normal rate and regular rhythm.      Heart sounds: No murmur.   Pulmonary:      Comments: On ventilator machine.  PEEP=10, DoS9=058  Abdominal:      General: There is no distension.      Palpations: Abdomen is soft.   Genitourinary:     Comments: Kelley catheter in place.  Rectal tube in place.  Musculoskeletal:         General: No swelling, deformity or signs of injury.   Skin:     General: Skin is warm and dry.   Neurological:      Comments: Sedated, paralyzed.         Significant Labs:   Microbiology Results (last 7 days)     Procedure Component Value Units Date/Time    Blood culture [[id number]] Collected: 11/03/20 0904    Order Status: Completed Specimen: Blood from Peripheral, Upper Arm, Right Updated: 11/08/20 1022     Blood Culture, Routine No growth after 5 days.    Narrative:      Blood cultures from 2 different sites. 4 bottles total.  Please draw before starting antibiotics.    Blood culture [[id number]] Collected: 11/03/20 0904    Order Status: Completed Specimen: Blood from Peripheral, Antecubital, Left Updated: 11/08/20 1022     Blood Culture, Routine No growth after 5 days.    Narrative:      Blood cultures x 2 different sites. 4 bottles total. Please  draw cultures before administering antibiotics.    Clostridium difficile EIA  [[id number]] Collected: 11/07/20 1102    Order Status: Completed Specimen: Stool Updated: 11/08/20 0559     C. diff Antigen Negative     C difficile Toxins A+B, EIA Negative     Comment: Testing not recommended for children <24 months old.       Culture, Respiratory with Gram Stain [[id number]] Collected: 11/07/20 1659    Order Status: Completed Specimen: Respiratory from Bronchial Wash Updated: 11/08/20 0140     Gram Stain (Respiratory) <10 epithelial cells per low power field.     Gram Stain (Respiratory) No WBC's or organisms seen    Culture, Respiratory with Gram Stain [[id number]] Collected: 11/03/20 0933    Order Status: Completed Specimen: Respiratory from Endotracheal Aspirate Updated: 11/05/20 1055     Respiratory Culture Normal respiratory alejandar      No S aureus or Pseudomonas isolated.     Gram Stain (Respiratory) <10 epithelial cells per low power field.     Gram Stain (Respiratory) No WBC's     Gram Stain (Respiratory) No organisms seen    Blood culture [[id number]] Collected: 10/30/20 1011    Order Status: Completed Specimen: Blood from Peripheral, Forearm, Left Updated: 11/04/20 1212     Blood Culture, Routine No growth after 5 days.    Narrative:      Blood cultures x 2 different sites. 4 bottles total. Please  draw cultures before administering antibiotics.    Blood culture [[id number]] Collected: 10/30/20 1015    Order Status: Completed Specimen: Blood from Peripheral, Upper Arm, Right Updated: 11/04/20 1212     Blood Culture, Routine No growth after 5 days.    Narrative:      Blood cultures from 2 different sites. 4 bottles total.  Please draw before starting antibiotics.    Culture, Respiratory with Gram Stain [[id number]] Collected: 10/30/20 1040    Order Status: Completed Specimen: Respiratory from Endotracheal Aspirate Updated: 11/02/20 1106     Respiratory Culture Normal respiratory alejandra     Gram Stain (Respiratory) Rare WBC's     Gram Stain (Respiratory) No organisms seen    Narrative:       Mini-BAL. Before antibiotics.    Blood culture [[id number]] Collected: 10/27/20 2011    Order Status: Completed Specimen: Blood from Peripheral, Forearm, Right Updated: 11/01/20 2212     Blood Culture, Routine No growth after 5 days.    Narrative:      Blood cultures from 2 different sites. 4 bottles total.  Please draw before starting antibiotics.    Blood culture [[id number]] Collected: 10/27/20 2011    Order Status: Completed Specimen: Blood from Peripheral, Forearm, Left Updated: 11/01/20 2212     Blood Culture, Routine No growth after 5 days.    Narrative:      Blood cultures x 2 different sites. 4 bottles total. Please  draw cultures before administering antibiotics.          Significant Imaging: I have reviewed all pertinent imaging results/findings within the past 24 hours.

## 2020-11-09 NOTE — ASSESSMENT & PLAN NOTE
Suspected aspiration event on 11/4. Pt subsequently febrile to 100.8F with WBC that continues to trend up. CXR with worsening L-sided opacity. Cultures drawn on 11/3 with NGTD. Zosyn escalated to meropenem.     - Continue meropenem, vanc, and micafungin   - ID suggested CT Chest and bronchoscopy for sputum culture if stable enough (would order aspergillus antigen)   - Need confirm NGT placement prior to restarting TF 11/8

## 2020-11-09 NOTE — PROGRESS NOTES
[facility] SLED NOTE     Patient currently on SLED for removal of uremic toxins and volume.     Patient seen and evaluated on SLED, see CRRT flowsheet for vitals and assessments.    Labs have been reviewed and the dialysate bath has been adjusted.       Assessment/Plan:    -Continue SLED for metabolic clearance and volume management   -Pt remains anuric  -UFG of 400-500cc/hr, as tolerated  -Goal for net negative fluid balance in a 24hr period   -Labs per CRRT protocol   -Strict I/O's  -Keep MAP >65  -Maintain Hgb >7.0  -Renally dose meds/avoid nephrotoxic meds  -Will continue to follow closely   -Plan discussed with staff, Dr Niesha Weems, APRN, AGNP-C  Nephrology  Pager:  [phone]

## 2020-11-09 NOTE — PROGRESS NOTES
CRRT clots off. Partial rinse back. No palpable femoral pulse. MD at bedside. Norepinephrine gtt increased. Attending at bedside.

## 2020-11-09 NOTE — ASSESSMENT & PLAN NOTE
Follows with Pulm as outpatient. Last seen 8/27/20, told he had RAD  He was also seen by a [facility] pulmonologist who did spirometry that reportedly showed borderline or mild asthma, and has been controlled with albuterol.   Former smoker, started at age 10-12 and quit 10 years ago    -- Continue scheduled duo nikolay

## 2020-11-09 NOTE — ASSESSMENT & PLAN NOTE
COVID positive on 10/15. Patient admitted to hospital medicine and continued to deteriorate needing higher oxygen requirements and thus Critical Care Medicine was consulted. Intubated 10/26 and eventually placed on NMB and proned. Peak and plateau pressures remain at goal    - s/p remdesivir (10/18-10/22)  - s/p Dexamethasone (10/27)  - s/p vanc x7 days (end date 11/01)  - duo nebs q4 scheduled and q6 PRN  - continue Heparin gtt D-dimer >33  - continue LPV and pronation for PF <150   - received convalescent plasma 2 units 11/7  - Sputum culture sent 10/28 NGTD, reordered 10/30 for leukocytosis and febrile all negative; reordered 11/7 per ID (increasing WBC)  - Zosyn escalated to donta. Fluconazole escalated to micafungun 11/7. Vancomycin restarted and renally dosed.  - Suspect aspiration event 11/4 with worsening oxygenation.    - CTS evaluated; not a candidate for ECMO     - Jessica started 11/4

## 2020-11-09 NOTE — PLAN OF CARE
Pt medically not stable for discharge, remains intubated and sedated.  Palliative care following.  Wife coming in to discuss goals of care.    CM to follow for discharge planning needs.  LUPILLO Melgar RN  Case Management  [id number]        11/09/20 133   Discharge Reassessment   Assessment Type Discharge Planning Reassessment   Provided patient/caregiver education on the expected discharge date and the discharge plan Yes   Do you have any problems affording any of your prescribed medications? TBD   Discharge Plan A Long-term acute care facility (LTAC)   Discharge Plan B Other  (TBD)   Anticipated Discharge Disposition LTAC   Can the patient/caregiver answer the patient profile reliably? No, cognitively impaired  (I&S)   Describe the patient's ability to walk at the present time. Does not walk or unable to take any steps at all   Number of comorbid conditions (as recorded on the chart) Four   Post-Acute Status   Post-Acute Authorization Placement   Post-Acute Placement Status Awaiting Internal Medical Clearance   Discharge Delays None known at this time

## 2020-11-09 NOTE — PROGRESS NOTES
SLED treatment restarted to left IJ trialysis. Flows were good to red line, but unable to aspirate blood from blue line. Lines connected, treatment initiated. See flowsheet for details.

## 2020-11-09 NOTE — NURSING
Called wife and updated on assessment and plan of care as promised before end of shift. All questions concerning drip rates and vent settings answered. JUAN

## 2020-11-10 LAB
BACTERIA SPEC AEROBE CULT: NORMAL
BACTERIA SPEC AEROBE CULT: NORMAL
GRAM STN SPEC: NORMAL
GRAM STN SPEC: NORMAL

## 2020-11-10 NOTE — PLAN OF CARE
Patient :  2020  at 1732        CM to follow for discharge planning needs.  LUPILLO Melgar RN  Case Management  [id number]      11/10/20 0836   Final Note   Assessment Type Final Discharge Note   Anticipated Discharge Disposition   (2020  at 1732)   Post-Acute Status   Post-Acute Authorization Other  (2020  at 1732)   Other Status No Post-Acute Service Needs

## 2020-11-10 NOTE — DISCHARGE SUMMARY
[facility] - ICU 16   Critical Care Medicine  Discharge Summary      Patient Name: Nubia Riggs  MRN: [id number]  Admission Date: 10/18/2020  Hospital Length of Stay: 22 days  Discharge Date and Time: 11/17/2020     Attending Physician: Arias Thorpe MD   Discharging Provider: Tera Law MD  Primary Care Provider: Primary Doctor No  Reason for Admission: Acute Hypoxemic Resp Failure    HPI:   55-year-old male with PMhx of GERD, allergic rinhitis, chronic cough (recently saw pulm and was diagnosed with reactive airway disease, on albuterol PRN) presented the emergency department on 10/18 with chief complaint of shortness of breath. He reports subjective fever, headache, chills, myalgias, poor appetite, and sweats about 6 days ago. He was diagnosed with COVID-19 on 10/15.  Reports presenting to urgent care 10/16 due to 89% pulse ox on room air. Started on levofloxacin and discharged home.  Reports using albuterol treatments at home without much relief.He noticed to have cough productive of blood tinged sputum and shortness of breath so he presented to the ED.     He received ceftria/azithro (x1 each in the ED). He started Remdesivir and completed 5 doses on 10/22, continues on dexamethasone. Patient is on his 7th day of hospital admission and has been requiring higher doses of O2 to keep sats >90%. Today he went up to 60L on 100% FiO2 and thus Critical Care was consulted.       * No surgery found *    Indwelling Lines/Drains at Time of Discharge:   Lines/Drains/Airways     Central Venous Catheter Line            Percutaneous Central Line Insertion/Assessment - Triple Lumen  10/26/20 1940 right internal jugular 13 days    Trialysis (Dialysis) Catheter 11/04/20 1914 left internal jugular 4 days          Drain                 Urethral Catheter 10/31/20 1300 Non-latex;Straight-tip 16 Fr. 9 days         NG/OG Tube 11/02/20 1456 orogastric 14 Fr. Left mouth 7 days         Rectal Tube 11/05/20  0230 rectal tube w/ balloon (indicate number of mLs) 4 days          Airway                 Airway - Non-Surgical 10/26/20 1820 Endotracheal Tube 14 days       Airway Anesthesia 10/26/20 14 days          Arterial Line            Arterial Line 10/30/20 1556 Right Radial 10 days              Hospital Course:   Patient admitted to RICU due to worsening respiratory status with increasing oxygen requirements. Completion of remdesivir on 10/22. On BiPAP with O2 saturation in high 70s-low 80s at rest and acute desaturation to 40s when biPAP removed to provide PO medications. He required intubation on 10/26. Central line and arterial line placed. Proning initiated on 10/27. YOVANI developed with inital response to lasix however, not clearing as hyperkalemia persists. UO decreasing on 10/30 despite multiple lasix doses, and concern for hematuria, CBC sent. Nephrology consult placed. Blood and sputum cultures on 10/30 NGTD, lactate normal, worsening WBC count. Heparin held overnight for hematuria and blood in sputum, restarted 10/31 for cessation of both. Vasopressor requirements improving, off vinay and vaso as of 11/01, WBC continues to increase, however not febrile and cultures are negative. Added fluconazole 11/1. Febrile on 11/4 with suspected aspiration event. Increase in FiO2 and pressor requirements. Oxygen saturations remained in the low 80's for most of the day. Family updated and code status changed to no shocks, no compressions. RRT started the evening of 11/4. Proned 36 hours for desaturations; difficult to increase PEEP as plateau pressures increase. Patient's significant other agreed to convalescent plasma; 2 units ordered 11/7/20.  CRRT clotted off 11/7 also, so citrate added per nephrology. ID recommended adjusting antimicrobial therapy to micafungin (from fluconazole), in addition to meropenam and renally dosed vancomycin in context of increased leukocytosis and increasing vasopressor support. ID also recommended  CDiff panel (returned negative) and obtaining respiratory cultures. Feet cool to touch and weak pulses so dopplers ordered which showed no stenosis. He had a large clot removed from his CRRT line 11/7 which improved the RRt flow. Patient supinated at 11:00 AM on 11/09. He did not tolerate procedure. He became tachycardic, hypotensive, and hypoxic. Patient required maximal pressor support, bicarb, increasing vent suppot, and increasing nitric. Resumed ongoing GOC conversation with wife following acute events of AM. I expressed my concern about patient's progressive decline and acute decompensations will likely increase in frequency. I expressed my concern that given his multi-organ failure and acute decompensations patient has a poor prognosis. Wife expressed her understanding and wished for continued maximal medical support and maneuvers. Patient later became bradycardic. Wife asked RN not to escalate care further. Notifed by RN of patient cardiac arrest.        Consults (From admission, onward)        Status Ordering Provider     Inpatient consult to Cardiothoracic Surgery  Once     Provider:  (Not yet assigned)    Completed UZMA CARL     Inpatient consult to Child Life  Once     Provider:  (Not yet assigned)    Completed WINTERBOTTOM, FIONA     Inpatient consult to Critical Care Medicine  Once     Provider:  (Not yet assigned)    Completed ARI ASKEW     Inpatient consult to Infectious Diseases  Once     Provider:  (Not yet assigned)    Completed UZMA CARL     Inpatient consult to Nephrology  Once     Provider:  (Not yet assigned)    Completed JER SANDERS     Inpatient consult to Palliative Care  Once     Provider:  (Not yet assigned)    Completed JOSETTE COLINDRES     Inpatient consult to Palliative Care  Once     Provider:  (Not yet assigned)    Acknowledged ANGELA ANDREWS     Inpatient consult to Registered Dietitian/Nutritionist  Once     Provider:  (Not yet assigned)    Completed STEVE HAGAN      Inpatient consult to Registered Dietitian/Nutritionist  Once     Provider:  (Not yet assigned)    Completed JOSEPH HERNANDEZ     Inpatient consult to Registered Dietitian/Nutritionist  Once     Provider:  (Not yet assigned)    Completed JER SANDERS     Pharmacy Remdesivir Consult  Once     Provider:  (Not yet assigned)    Acknowledged OLU SUAREZ     Pharmacy to dose Vancomycin consult  Once     Provider:  (Not yet assigned)    Completed JER SANDERS     Pharmacy to dose Vancomycin consult  Once     Provider:  (Not yet assigned)    Completed UZMA CARL        Significant Labs:  ABGs:   Recent Labs   Lab 11/09/20  1525   PH 7.280*   PCO2 64.1*   HCO3 30.1*   POCSATURATED 92*   BE 3     CMP:   Recent Labs   Lab 11/08/20  0415  11/09/20  0405 11/09/20  0411 11/09/20  1438   *  135*  135*  134*   < > 136 136  136  136  136 139   K 5.1  5.1  5.1  5.1   < > 4.3 4.4  4.4  4.4  4.4 5.4*     103  103  102   < > 95 96  96  96  96 97   CO2 19*  19*  19*  19*   < > 28 28  28  28  28 27   *  134*  134*  136*   < > 147* 146*  146*  146*  146* 140*   BUN 29*  29*  29*  29*   < > 14 15  15  15  15 9   CREATININE 1.9*  1.9*  1.9*  1.9*   < > 1.1 1.1  1.1  1.1  1.1 0.8   CALCIUM 7.5*  7.5*  7.5*  7.5*   < > 7.0* 6.9*  6.9*  6.9*  6.9* 6.9*   PROT 6.4  --  6.0  --   --    ALBUMIN 2.0*  2.0*  2.0*   < > 1.9* 1.9*  1.9*  1.9* 2.0*   BILITOT 0.7  --  0.8  --   --    ALKPHOS 115  --  115  --   --    AST 86*  --  85*  --   --    ALT 44  --  42  --   --    ANIONGAP 13  13  13  13   < > 13 12  12  12  12 15   EGFRNONAA 39.4*  39.4*  39.4*  39.4*   < > >60.0 >60.0  >60.0  >60.0  >60.0 >60.0    < > = values in this interval not displayed.     All pertinent labs within the past 24 hours have been reviewed.    Significant Imaging:  I have reviewed all pertinent imaging results/findings within the past 24 hours.    Pending Diagnostic Studies:      Procedure Component Value Units Date/Time    APTT [[id number]] Collected: 11/03/20 0605    Order Status: Sent Lab Status: In process Updated: 11/03/20 0605    Specimen: Blood     Anti-Xa Heparin Monitoring [[id number]]     Order Status: Sent Lab Status: No result     Specimen: Blood     Basic metabolic panel [[id number]]     Order Status: Sent Lab Status: No result     Specimen: Blood     Basic metabolic panel [[id number]] Collected: 11/03/20 0605    Order Status: Sent Lab Status: In process Updated: 11/03/20 0605    Specimen: Blood     Urea Nitrogen, Random Urine [[id number]] Collected: 10/30/20 0957    Order Status: Sent Lab Status: No result     Specimen: Urine, Catheterized         Final Active Diagnoses:    Diagnosis Date Noted POA    PRINCIPAL PROBLEM:  Acute respiratory distress syndrome (ARDS) due to 2019 novel coronavirus [U07.1, J80]  Yes    Acute hypoxemic respiratory failure [J96.01]  Yes    Hyperphosphatemia [E83.39]  No    Septic shock [A41.9, R65.21] 11/05/2020 No    Aspiration pneumonia of left lung due to vomit [J69.0] 11/05/2020 Yes    Hypernatremia [E87.0] 11/02/2020 No    YOVANI (acute kidney injury) [N17.9] 10/30/2020 No    COVID-19 [U07.1]  Yes    Palliative care encounter [Z51.5] 10/27/2020 Not Applicable    Reactive airway disease [J45.909] 10/24/2020 Yes    Pneumonia due to COVID-19 virus [U07.1, J12.89] 10/18/2020 Yes    Acute hypoxemic respiratory failure due to COVID-19 [U07.1, J96.01] 10/18/2020 Yes    Anxiety [F41.9] 07/12/2012 Yes      Problems Resolved During this Admission:    Diagnosis Date Noted Date Resolved POA    Hyperkalemia [E87.5] 10/28/2020 11/05/2020 No     Other  * Acute respiratory distress syndrome (ARDS) due to 2019 novel coronavirus  COVID positive on 10/15. Patient admitted to hospital medicine and continued to deteriorate needing higher oxygen requirements and thus Critical Care Medicine was consulted. Intubated 10/26 and eventually placed on NMB and proned.  Peak and plateau pressures remain at goal    - s/p remdesivir (10/18-10/22)  - s/p Dexamethasone (10/27)  - s/p vanc x7 days (end date )  - duo nebs q4 scheduled and q6 PRN  - continue Heparin gtt D-dimer >33  - continue LPV and pronation for PF <150   - received convalescent plasma 2 units   - Sputum culture sent 10/28 NGTD, reordered 10/30 for leukocytosis and febrile all negative; reordered  per ID (increasing WBC)  - Zosyn escalated to donta. Fluconazole escalated to micafungun . Vancomycin restarted and renally dosed.  - Suspect aspiration event  with worsening oxygenation.    - CTS evaluated; not a candidate for ECMO     - Jessica started   - Passed away 2020 due to multi-organ failure  COVID-19         Discharged Condition:     Disposition:     Follow-up Information     Petr Matamoros MD In 2 weeks.    Specialty: Internal Medicine  Why: hospital follow-up - OFFICE TO CALL PATIENT WITH APPOINTMENT DATE AND TIME  Contact information:   [facility]  [place] [address]  [phone]                 Patient Instructions:   No discharge procedures on file.  Medications:  None (patient  at medical facility)     Tera Law MD  Critical Care Medicine  [facility] - ICU 16 WT

## 2020-11-10 NOTE — CHAPLAIN
"Responded to unit request upon pt death. Pt's wife at bedside, "It's been a long couple of weeks". Pt's wife appreciative of visit. Provided pastoral care and grief packet. Awaiting FH information. Provided decedent care packet to unit staff. Following.   "

## 2020-11-10 NOTE — SIGNIFICANT EVENT
Death Note  Hospital Medicine  [facility]            I was called to bedside on 11/9/2020 at 1730. Nursing at beside, nursing supervisor notified.  has been notified. Family at bedside.     Patient is not responding to verbal or tactile stimuli. No heart or breath sounds on auscultation. No respirations. No palpable pulses. Patient does not have a pupillary or corneal reflex. Patient's pupils are fixed and dilated.      Time of death is 11/9/2020  at 1732        Cause of Death: Cardiopulmonary Arrest due to COVID-19 PNA      Signing Physician:    Tera Law MD   [facility] Contact  Email: [email or web address]  Pager: [phone]

## 2020-11-13 NOTE — ADDENDUM NOTE
Addendum  created 11/13/20 1506 by Reno Cook MD    Clinical Note Signed, Intraprocedure Blocks edited, LDA updated via procedure documentation